# Patient Record
Sex: MALE | Race: WHITE | Employment: UNEMPLOYED | URBAN - METROPOLITAN AREA
[De-identification: names, ages, dates, MRNs, and addresses within clinical notes are randomized per-mention and may not be internally consistent; named-entity substitution may affect disease eponyms.]

---

## 2017-01-06 RX ORDER — OMEPRAZOLE 20 MG/1
CAPSULE, DELAYED RELEASE ORAL
Qty: 30 CAPSULE | Refills: 3 | Status: SHIPPED | OUTPATIENT
Start: 2017-01-06 | End: 2017-05-01 | Stop reason: SDUPTHER

## 2017-01-09 RX ORDER — ZOLPIDEM TARTRATE 5 MG/1
TABLET ORAL
Qty: 20 TABLET | Refills: 0 | Status: SHIPPED | OUTPATIENT
Start: 2017-01-09 | End: 2017-02-09 | Stop reason: SDUPTHER

## 2017-02-07 ENCOUNTER — HOSPITAL ENCOUNTER (OUTPATIENT)
Dept: PAIN MANAGEMENT | Age: 52
Discharge: HOME | End: 2017-02-07
Attending: ANESTHESIOLOGY | Admitting: ANESTHESIOLOGY
Payer: MEDICARE

## 2017-02-07 VITALS
SYSTOLIC BLOOD PRESSURE: 140 MMHG | HEART RATE: 68 BPM | TEMPERATURE: 99 F | RESPIRATION RATE: 12 BRPM | DIASTOLIC BLOOD PRESSURE: 100 MMHG

## 2017-02-07 PROCEDURE — 97032 APPL MODALITY 1+ESTIM EA 15: CPT

## 2017-02-07 PROCEDURE — 97016 VASOPNEUMATIC DEVICE THERAPY: CPT

## 2017-02-07 PROCEDURE — 97112 NEUROMUSCULAR REEDUCATION: CPT

## 2017-02-07 ASSESSMENT — PAIN DESCRIPTION - LOCATION: LOCATION: NECK;ARM

## 2017-02-07 ASSESSMENT — PAIN DESCRIPTION - DESCRIPTORS: DESCRIPTORS: CONSTANT;ACHING;DULL

## 2017-02-07 ASSESSMENT — PAIN DESCRIPTION - ORIENTATION: ORIENTATION: LEFT;RIGHT

## 2017-02-07 ASSESSMENT — PAIN DESCRIPTION - PAIN TYPE: TYPE: CHRONIC PAIN

## 2017-02-07 ASSESSMENT — PAIN SCALES - GENERAL: PAINLEVEL_OUTOF10: 5

## 2017-02-09 ENCOUNTER — HOSPITAL ENCOUNTER (OUTPATIENT)
Dept: PAIN MANAGEMENT | Age: 52
Discharge: HOME | End: 2017-02-09
Attending: ANESTHESIOLOGY | Admitting: ANESTHESIOLOGY
Payer: MEDICARE

## 2017-02-09 VITALS
DIASTOLIC BLOOD PRESSURE: 92 MMHG | SYSTOLIC BLOOD PRESSURE: 156 MMHG | HEART RATE: 86 BPM | RESPIRATION RATE: 16 BRPM | TEMPERATURE: 98.4 F

## 2017-02-09 PROCEDURE — 97112 NEUROMUSCULAR REEDUCATION: CPT

## 2017-02-09 PROCEDURE — 97032 APPL MODALITY 1+ESTIM EA 15: CPT

## 2017-02-09 RX ORDER — MELOXICAM 7.5 MG/1
7.5 TABLET ORAL EVERY 12 HOURS
Qty: 60 TABLET | Refills: 0 | Status: SHIPPED | OUTPATIENT
Start: 2017-02-09 | End: 2017-03-09 | Stop reason: ALTCHOICE

## 2017-02-09 RX ORDER — AMITRIPTYLINE HYDROCHLORIDE 25 MG/1
25 TABLET, FILM COATED ORAL NIGHTLY
Qty: 30 TABLET | Refills: 0 | Status: SHIPPED | OUTPATIENT
Start: 2017-02-09 | End: 2017-03-09 | Stop reason: SDUPTHER

## 2017-02-09 RX ORDER — TIZANIDINE 4 MG/1
4 TABLET ORAL NIGHTLY
Qty: 30 TABLET | Refills: 0 | Status: SHIPPED | OUTPATIENT
Start: 2017-02-09 | End: 2017-03-09 | Stop reason: SDUPTHER

## 2017-02-09 ASSESSMENT — PAIN DESCRIPTION - PROGRESSION: CLINICAL_PROGRESSION: NOT CHANGED

## 2017-02-09 ASSESSMENT — PAIN DESCRIPTION - PAIN TYPE: TYPE: CHRONIC PAIN

## 2017-02-09 ASSESSMENT — PAIN SCALES - GENERAL: PAINLEVEL_OUTOF10: 7

## 2017-02-09 ASSESSMENT — PAIN DESCRIPTION - LOCATION: LOCATION: ARM;NECK

## 2017-02-09 ASSESSMENT — PAIN DESCRIPTION - ORIENTATION: ORIENTATION: RIGHT;LEFT;POSTERIOR

## 2017-02-09 ASSESSMENT — PAIN DESCRIPTION - FREQUENCY: FREQUENCY: CONTINUOUS

## 2017-02-09 ASSESSMENT — PAIN DESCRIPTION - DESCRIPTORS: DESCRIPTORS: BURNING;CONSTANT;ACHING

## 2017-02-09 ASSESSMENT — PAIN DESCRIPTION - ONSET: ONSET: ON-GOING

## 2017-02-10 RX ORDER — ZOLPIDEM TARTRATE 5 MG/1
TABLET ORAL
Qty: 20 TABLET | Refills: 0 | Status: SHIPPED | OUTPATIENT
Start: 2017-02-10 | End: 2017-03-03 | Stop reason: SDUPTHER

## 2017-03-03 RX ORDER — GABAPENTIN 300 MG/1
CAPSULE ORAL
Qty: 90 CAPSULE | Refills: 3 | Status: SHIPPED | OUTPATIENT
Start: 2017-03-03 | End: 2017-09-21 | Stop reason: SDUPTHER

## 2017-03-03 RX ORDER — ZOLPIDEM TARTRATE 5 MG/1
TABLET ORAL
Qty: 20 TABLET | Refills: 0 | Status: SHIPPED | OUTPATIENT
Start: 2017-03-03 | End: 2017-04-03 | Stop reason: SDUPTHER

## 2017-03-09 ENCOUNTER — HOSPITAL ENCOUNTER (OUTPATIENT)
Dept: PAIN MANAGEMENT | Age: 52
Discharge: HOME | End: 2017-03-09
Payer: MEDICARE

## 2017-03-09 VITALS
RESPIRATION RATE: 16 BRPM | HEART RATE: 79 BPM | TEMPERATURE: 98 F | BODY MASS INDEX: 31.84 KG/M2 | HEIGHT: 69 IN | DIASTOLIC BLOOD PRESSURE: 88 MMHG | WEIGHT: 215 LBS | SYSTOLIC BLOOD PRESSURE: 147 MMHG

## 2017-03-09 DIAGNOSIS — M54.17 LUMBOSACRAL NEURITIS: Primary | ICD-10-CM

## 2017-03-09 PROCEDURE — 99214 OFFICE O/P EST MOD 30 MIN: CPT

## 2017-03-09 RX ORDER — AMITRIPTYLINE HYDROCHLORIDE 25 MG/1
25 TABLET, FILM COATED ORAL NIGHTLY
Qty: 30 TABLET | Refills: 0 | Status: SHIPPED | OUTPATIENT
Start: 2017-03-11 | End: 2017-04-10 | Stop reason: SDUPTHER

## 2017-03-09 RX ORDER — TIZANIDINE 4 MG/1
4 TABLET ORAL NIGHTLY
Qty: 30 TABLET | Refills: 0 | Status: SHIPPED | OUTPATIENT
Start: 2017-03-11 | End: 2017-04-10 | Stop reason: SDUPTHER

## 2017-03-09 ASSESSMENT — PAIN DESCRIPTION - DESCRIPTORS: DESCRIPTORS: CONSTANT;ACHING;BURNING

## 2017-03-09 ASSESSMENT — PAIN DESCRIPTION - PROGRESSION: CLINICAL_PROGRESSION: GRADUALLY WORSENING

## 2017-03-09 ASSESSMENT — PAIN SCALES - GENERAL: PAINLEVEL_OUTOF10: 7

## 2017-03-09 ASSESSMENT — ENCOUNTER SYMPTOMS
SUSPICIOUS LESIONS: 0
EYE DISCHARGE: 0
UNUSUAL HAIR DISTRIBUTION: 0
BLURRED VISION: 0

## 2017-03-09 ASSESSMENT — PAIN DESCRIPTION - ORIENTATION: ORIENTATION: LOWER;RIGHT;LEFT

## 2017-03-09 ASSESSMENT — PAIN DESCRIPTION - FREQUENCY: FREQUENCY: CONTINUOUS

## 2017-03-09 ASSESSMENT — PAIN DESCRIPTION - LOCATION: LOCATION: NECK;BACK;SHOULDER

## 2017-03-09 ASSESSMENT — PAIN DESCRIPTION - PAIN TYPE: TYPE: CHRONIC PAIN

## 2017-03-09 ASSESSMENT — PAIN DESCRIPTION - ONSET: ONSET: ON-GOING

## 2017-03-20 ENCOUNTER — TELEPHONE (OUTPATIENT)
Dept: INTERNAL MEDICINE | Age: 52
End: 2017-03-20

## 2017-03-26 ENCOUNTER — HOSPITAL ENCOUNTER (INPATIENT)
Age: 52
LOS: 2 days | Discharge: HOME | DRG: 249 | End: 2017-03-28
Attending: EMERGENCY MEDICINE | Admitting: INTERNAL MEDICINE
Payer: MEDICARE

## 2017-03-26 ENCOUNTER — APPOINTMENT (OUTPATIENT)
Dept: GENERAL RADIOLOGY | Age: 52
DRG: 249 | End: 2017-03-26
Payer: MEDICARE

## 2017-03-26 ENCOUNTER — APPOINTMENT (OUTPATIENT)
Dept: CT IMAGING | Age: 52
DRG: 249 | End: 2017-03-26
Payer: MEDICARE

## 2017-03-26 DIAGNOSIS — R10.9 ABDOMINAL PAIN, UNSPECIFIED LOCATION: Primary | ICD-10-CM

## 2017-03-26 DIAGNOSIS — R74.8 ELEVATED ALKALINE PHOSPHATASE LEVEL: ICD-10-CM

## 2017-03-26 DIAGNOSIS — R11.2 NAUSEA AND VOMITING, INTRACTABILITY OF VOMITING NOT SPECIFIED, UNSPECIFIED VOMITING TYPE: ICD-10-CM

## 2017-03-26 DIAGNOSIS — R79.89 ELEVATED LACTIC ACID LEVEL: ICD-10-CM

## 2017-03-26 DIAGNOSIS — R73.9 HYPERGLYCEMIA: ICD-10-CM

## 2017-03-26 DIAGNOSIS — E83.42 HYPOMAGNESEMIA: ICD-10-CM

## 2017-03-26 DIAGNOSIS — F10.10 ALCOHOL ABUSE: ICD-10-CM

## 2017-03-26 DIAGNOSIS — E86.9 VOLUME DEPLETION: ICD-10-CM

## 2017-03-26 DIAGNOSIS — D72.829 LEUKOCYTOSIS, UNSPECIFIED TYPE: ICD-10-CM

## 2017-03-26 DIAGNOSIS — R00.0 TACHYCARDIA: ICD-10-CM

## 2017-03-26 PROBLEM — K20.90 ESOPHAGITIS: Status: ACTIVE | Noted: 2017-03-26

## 2017-03-26 PROBLEM — F32.9 MAJOR DEPRESSIVE DISORDER: Status: ACTIVE | Noted: 2017-03-26

## 2017-03-26 LAB
-: NORMAL
ABSOLUTE EOS #: 0 K/UL (ref 0–0.4)
ABSOLUTE LYMPH #: 1.53 K/UL (ref 1–4.8)
ABSOLUTE MONO #: 1.02 K/UL (ref 0.1–0.8)
ALBUMIN SERPL-MCNC: 4.6 G/DL (ref 3.5–5.2)
ALBUMIN/GLOBULIN RATIO: 1.2 (ref 1–2.5)
ALP BLD-CCNC: 158 U/L (ref 40–129)
ALT SERPL-CCNC: 60 U/L (ref 5–41)
AMORPHOUS: NORMAL
ANION GAP SERPL CALCULATED.3IONS-SCNC: 35 MMOL/L (ref 9–17)
AST SERPL-CCNC: 62 U/L
BACTERIA: NORMAL
BASOPHILS # BLD: 0 % (ref 0–2)
BASOPHILS ABSOLUTE: 0 K/UL (ref 0–0.2)
BILIRUB SERPL-MCNC: 1.24 MG/DL (ref 0.3–1.2)
BILIRUBIN DIRECT: 0.29 MG/DL
BILIRUBIN URINE: NEGATIVE
BILIRUBIN, INDIRECT: 0.95 MG/DL (ref 0–1)
BUN BLDV-MCNC: 5 MG/DL (ref 6–20)
BUN/CREAT BLD: ABNORMAL (ref 9–20)
CALCIUM SERPL-MCNC: 9.1 MG/DL (ref 8.6–10.4)
CASTS UA: NORMAL /LPF (ref 0–8)
CHLORIDE BLD-SCNC: 86 MMOL/L (ref 98–107)
CO2: 15 MMOL/L (ref 20–31)
COLOR: YELLOW
COMMENT UA: ABNORMAL
CREAT SERPL-MCNC: 0.82 MG/DL (ref 0.7–1.2)
CRYSTALS, UA: NORMAL /HPF
DIFFERENTIAL TYPE: ABNORMAL
EOSINOPHILS RELATIVE PERCENT: 0 % (ref 1–4)
EPITHELIAL CELLS UA: NORMAL /HPF (ref 0–5)
FIO2: ABNORMAL
GFR AFRICAN AMERICAN: >60 ML/MIN
GFR NON-AFRICAN AMERICAN: >60 ML/MIN
GFR SERPL CREATININE-BSD FRML MDRD: ABNORMAL ML/MIN/{1.73_M2}
GFR SERPL CREATININE-BSD FRML MDRD: ABNORMAL ML/MIN/{1.73_M2}
GLOBULIN: ABNORMAL G/DL (ref 1.5–3.8)
GLUCOSE BLD-MCNC: 222 MG/DL (ref 75–110)
GLUCOSE BLD-MCNC: 267 MG/DL (ref 70–99)
GLUCOSE URINE: NEGATIVE
HCO3 VENOUS: 17.4 MMOL/L (ref 24–30)
HCT VFR BLD CALC: 57.1 % (ref 41–53)
HEMOGLOBIN: 19.6 G/DL (ref 13.5–17.5)
KETONES, URINE: ABNORMAL
LACTIC ACID, WHOLE BLOOD: 14.5 MMOL/L (ref 0.7–2.1)
LACTIC ACID, WHOLE BLOOD: 8.1 MMOL/L (ref 0.7–2.1)
LEUKOCYTE ESTERASE, URINE: NEGATIVE
LIPASE: 29 U/L (ref 13–60)
LYMPHOCYTES # BLD: 6 % (ref 24–44)
MAGNESIUM: 1.4 MG/DL (ref 1.6–2.6)
MCH RBC QN AUTO: 31.7 PG (ref 26–34)
MCHC RBC AUTO-ENTMCNC: 34.3 G/DL (ref 31–37)
MCV RBC AUTO: 92.4 FL (ref 80–100)
MONOCYTES # BLD: 4 % (ref 1–7)
MORPHOLOGY: NORMAL
MUCUS: NORMAL
NEGATIVE BASE EXCESS, VEN: 7 (ref 0–2)
NITRITE, URINE: NEGATIVE
O2 DEVICE/FLOW/%: ABNORMAL
O2 SAT, VEN: 81 %
OTHER OBSERVATIONS UA: NORMAL
PATIENT TEMP: ABNORMAL
PCO2, VEN: 27 MM HG (ref 39–55)
PDW BLD-RTO: 14.1 % (ref 12.5–15.4)
PH UA: 5.5 (ref 5–8)
PH VENOUS: 7.42 (ref 7.32–7.42)
PLATELET # BLD: 331 K/UL (ref 140–450)
PLATELET ESTIMATE: ABNORMAL
PMV BLD AUTO: 9.8 FL (ref 6–12)
PO2, VEN: 43 MM HG (ref 30–50)
POC LACTIC ACID, VEN: 14.62 MMOL/L (ref 0.9–1.7)
POC PCO2 TEMP: ABNORMAL MM HG
POC PH TEMP: ABNORMAL
POC PO2 TEMP: ABNORMAL MM HG
POC TROPONIN I: 0 NG/ML (ref 0–0.1)
POC TROPONIN I: 0.01 NG/ML (ref 0–0.1)
POC TROPONIN INTERP: NORMAL
POC TROPONIN INTERP: NORMAL
POSITIVE BASE EXCESS, VEN: ABNORMAL (ref 0–2)
POTASSIUM SERPL-SCNC: 4.8 MMOL/L (ref 3.7–5.3)
PROTEIN UA: ABNORMAL
RBC # BLD: 6.18 M/UL (ref 4.5–5.9)
RBC # BLD: ABNORMAL 10*6/UL
RBC UA: NORMAL /HPF (ref 0–4)
RENAL EPITHELIAL, UA: NORMAL /HPF
SEG NEUTROPHILS: 90 % (ref 36–66)
SEGMENTED NEUTROPHILS ABSOLUTE COUNT: 22.95 K/UL (ref 1.8–7.7)
SODIUM BLD-SCNC: 136 MMOL/L (ref 135–144)
SPECIFIC GRAVITY UA: 1.04 (ref 1–1.03)
TOTAL CO2, VENOUS: 18 MM HG (ref 25–31)
TOTAL PROTEIN: 8.4 G/DL (ref 6.4–8.3)
TRICHOMONAS: NORMAL
TURBIDITY: CLEAR
URINE HGB: NEGATIVE
UROBILINOGEN, URINE: NORMAL
WBC # BLD: 25.5 K/UL (ref 3.5–11)
WBC # BLD: ABNORMAL 10*3/UL
WBC UA: NORMAL /HPF (ref 0–5)
YEAST: NORMAL

## 2017-03-26 PROCEDURE — 83605 ASSAY OF LACTIC ACID: CPT

## 2017-03-26 PROCEDURE — 93005 ELECTROCARDIOGRAM TRACING: CPT

## 2017-03-26 PROCEDURE — 80076 HEPATIC FUNCTION PANEL: CPT

## 2017-03-26 PROCEDURE — 6360000002 HC RX W HCPCS: Performed by: EMERGENCY MEDICINE

## 2017-03-26 PROCEDURE — 2500000003 HC RX 250 WO HCPCS: Performed by: HOSPITALIST

## 2017-03-26 PROCEDURE — C9113 INJ PANTOPRAZOLE SODIUM, VIA: HCPCS | Performed by: HOSPITALIST

## 2017-03-26 PROCEDURE — 83036 HEMOGLOBIN GLYCOSYLATED A1C: CPT

## 2017-03-26 PROCEDURE — 80307 DRUG TEST PRSMV CHEM ANLYZR: CPT

## 2017-03-26 PROCEDURE — 6360000004 HC RX CONTRAST MEDICATION: Performed by: EMERGENCY MEDICINE

## 2017-03-26 PROCEDURE — 82947 ASSAY GLUCOSE BLOOD QUANT: CPT

## 2017-03-26 PROCEDURE — 84207 ASSAY OF VITAMIN B-6: CPT

## 2017-03-26 PROCEDURE — 85025 COMPLETE CBC W/AUTO DIFF WBC: CPT

## 2017-03-26 PROCEDURE — 87040 BLOOD CULTURE FOR BACTERIA: CPT

## 2017-03-26 PROCEDURE — 84100 ASSAY OF PHOSPHORUS: CPT

## 2017-03-26 PROCEDURE — 96375 TX/PRO/DX INJ NEW DRUG ADDON: CPT

## 2017-03-26 PROCEDURE — 83735 ASSAY OF MAGNESIUM: CPT

## 2017-03-26 PROCEDURE — 84443 ASSAY THYROID STIM HORMONE: CPT

## 2017-03-26 PROCEDURE — 82010 KETONE BODYS QUAN: CPT

## 2017-03-26 PROCEDURE — 81001 URINALYSIS AUTO W/SCOPE: CPT

## 2017-03-26 PROCEDURE — 71010 XR CHEST PORTABLE: CPT

## 2017-03-26 PROCEDURE — 85610 PROTHROMBIN TIME: CPT

## 2017-03-26 PROCEDURE — 83690 ASSAY OF LIPASE: CPT

## 2017-03-26 PROCEDURE — 80061 LIPID PANEL: CPT

## 2017-03-26 PROCEDURE — 82803 BLOOD GASES ANY COMBINATION: CPT

## 2017-03-26 PROCEDURE — 82607 VITAMIN B-12: CPT

## 2017-03-26 PROCEDURE — 84484 ASSAY OF TROPONIN QUANT: CPT

## 2017-03-26 PROCEDURE — 99285 EMERGENCY DEPT VISIT HI MDM: CPT

## 2017-03-26 PROCEDURE — 2580000003 HC RX 258: Performed by: EMERGENCY MEDICINE

## 2017-03-26 PROCEDURE — 96374 THER/PROPH/DIAG INJ IV PUSH: CPT

## 2017-03-26 PROCEDURE — 2580000003 HC RX 258: Performed by: INTERNAL MEDICINE

## 2017-03-26 PROCEDURE — 74000 XR ABDOMEN LIMITED (KUB): CPT

## 2017-03-26 PROCEDURE — 80074 ACUTE HEPATITIS PANEL: CPT

## 2017-03-26 PROCEDURE — 82746 ASSAY OF FOLIC ACID SERUM: CPT

## 2017-03-26 PROCEDURE — 6370000000 HC RX 637 (ALT 250 FOR IP): Performed by: HOSPITALIST

## 2017-03-26 PROCEDURE — 80048 BASIC METABOLIC PNL TOTAL CA: CPT

## 2017-03-26 PROCEDURE — 6370000000 HC RX 637 (ALT 250 FOR IP): Performed by: INTERNAL MEDICINE

## 2017-03-26 PROCEDURE — 2060000000 HC ICU INTERMEDIATE R&B

## 2017-03-26 PROCEDURE — 6360000002 HC RX W HCPCS: Performed by: HOSPITALIST

## 2017-03-26 PROCEDURE — 82306 VITAMIN D 25 HYDROXY: CPT

## 2017-03-26 PROCEDURE — G0480 DRUG TEST DEF 1-7 CLASSES: HCPCS

## 2017-03-26 PROCEDURE — 96376 TX/PRO/DX INJ SAME DRUG ADON: CPT

## 2017-03-26 PROCEDURE — 6360000002 HC RX W HCPCS

## 2017-03-26 PROCEDURE — 96361 HYDRATE IV INFUSION ADD-ON: CPT

## 2017-03-26 PROCEDURE — 74177 CT ABD & PELVIS W/CONTRAST: CPT

## 2017-03-26 RX ORDER — MORPHINE SULFATE 2 MG/ML
2 INJECTION, SOLUTION INTRAMUSCULAR; INTRAVENOUS
Status: DISCONTINUED | OUTPATIENT
Start: 2017-03-26 | End: 2017-03-27

## 2017-03-26 RX ORDER — DEXTROSE MONOHYDRATE 50 MG/ML
100 INJECTION, SOLUTION INTRAVENOUS PRN
Status: DISCONTINUED | OUTPATIENT
Start: 2017-03-26 | End: 2017-03-28 | Stop reason: HOSPADM

## 2017-03-26 RX ORDER — MORPHINE SULFATE 4 MG/ML
4 INJECTION, SOLUTION INTRAMUSCULAR; INTRAVENOUS
Status: DISCONTINUED | OUTPATIENT
Start: 2017-03-26 | End: 2017-03-27

## 2017-03-26 RX ORDER — LORAZEPAM 2 MG/ML
4 INJECTION INTRAMUSCULAR
Status: DISCONTINUED | OUTPATIENT
Start: 2017-03-26 | End: 2017-03-28 | Stop reason: HOSPADM

## 2017-03-26 RX ORDER — LORAZEPAM 2 MG/ML
3 INJECTION INTRAMUSCULAR
Status: DISCONTINUED | OUTPATIENT
Start: 2017-03-26 | End: 2017-03-28 | Stop reason: HOSPADM

## 2017-03-26 RX ORDER — LORAZEPAM 2 MG/ML
2 INJECTION INTRAMUSCULAR
Status: DISCONTINUED | OUTPATIENT
Start: 2017-03-26 | End: 2017-03-28 | Stop reason: HOSPADM

## 2017-03-26 RX ORDER — ONDANSETRON 2 MG/ML
INJECTION INTRAMUSCULAR; INTRAVENOUS
Status: COMPLETED
Start: 2017-03-26 | End: 2017-03-26

## 2017-03-26 RX ORDER — SODIUM CHLORIDE 0.9 % (FLUSH) 0.9 %
10 SYRINGE (ML) INJECTION EVERY 12 HOURS SCHEDULED
Status: DISCONTINUED | OUTPATIENT
Start: 2017-03-26 | End: 2017-03-28 | Stop reason: HOSPADM

## 2017-03-26 RX ORDER — SODIUM CHLORIDE 9 MG/ML
INJECTION, SOLUTION INTRAVENOUS CONTINUOUS
Status: DISCONTINUED | OUTPATIENT
Start: 2017-03-26 | End: 2017-03-28 | Stop reason: HOSPADM

## 2017-03-26 RX ORDER — LORAZEPAM 2 MG/ML
1 INJECTION INTRAMUSCULAR ONCE
Status: COMPLETED | OUTPATIENT
Start: 2017-03-26 | End: 2017-03-26

## 2017-03-26 RX ORDER — LORAZEPAM 1 MG/1
1 TABLET ORAL
Status: DISCONTINUED | OUTPATIENT
Start: 2017-03-26 | End: 2017-03-28 | Stop reason: HOSPADM

## 2017-03-26 RX ORDER — LORAZEPAM 2 MG/ML
1 INJECTION INTRAMUSCULAR
Status: DISCONTINUED | OUTPATIENT
Start: 2017-03-26 | End: 2017-03-28 | Stop reason: HOSPADM

## 2017-03-26 RX ORDER — NICOTINE 21 MG/24HR
1 PATCH, TRANSDERMAL 24 HOURS TRANSDERMAL DAILY
Status: DISCONTINUED | OUTPATIENT
Start: 2017-03-27 | End: 2017-03-28 | Stop reason: HOSPADM

## 2017-03-26 RX ORDER — 0.9 % SODIUM CHLORIDE 0.9 %
1000 INTRAVENOUS SOLUTION INTRAVENOUS ONCE
Status: COMPLETED | OUTPATIENT
Start: 2017-03-26 | End: 2017-03-27

## 2017-03-26 RX ORDER — LACTULOSE 10 G/15ML
20 SOLUTION ORAL 3 TIMES DAILY
Status: CANCELLED | OUTPATIENT
Start: 2017-03-26

## 2017-03-26 RX ORDER — DEXTROSE MONOHYDRATE 25 G/50ML
12.5 INJECTION, SOLUTION INTRAVENOUS PRN
Status: DISCONTINUED | OUTPATIENT
Start: 2017-03-26 | End: 2017-03-28 | Stop reason: HOSPADM

## 2017-03-26 RX ORDER — SODIUM CHLORIDE 0.9 % (FLUSH) 0.9 %
10 SYRINGE (ML) INJECTION PRN
Status: DISCONTINUED | OUTPATIENT
Start: 2017-03-26 | End: 2017-03-28 | Stop reason: HOSPADM

## 2017-03-26 RX ORDER — KETOROLAC TROMETHAMINE 30 MG/ML
30 INJECTION, SOLUTION INTRAMUSCULAR; INTRAVENOUS EVERY 6 HOURS PRN
Status: CANCELLED | OUTPATIENT
Start: 2017-03-26 | End: 2017-03-31

## 2017-03-26 RX ORDER — LORAZEPAM 2 MG/1
4 TABLET ORAL
Status: DISCONTINUED | OUTPATIENT
Start: 2017-03-26 | End: 2017-03-28 | Stop reason: HOSPADM

## 2017-03-26 RX ORDER — QUETIAPINE FUMARATE 25 MG/1
25 TABLET, FILM COATED ORAL NIGHTLY
Status: DISCONTINUED | OUTPATIENT
Start: 2017-03-26 | End: 2017-03-28 | Stop reason: HOSPADM

## 2017-03-26 RX ORDER — 0.9 % SODIUM CHLORIDE 0.9 %
10 VIAL (ML) INJECTION DAILY
Status: DISCONTINUED | OUTPATIENT
Start: 2017-03-27 | End: 2017-03-27

## 2017-03-26 RX ORDER — FOLIC ACID 1 MG/1
1 TABLET ORAL DAILY
Status: DISCONTINUED | OUTPATIENT
Start: 2017-03-27 | End: 2017-03-28 | Stop reason: HOSPADM

## 2017-03-26 RX ORDER — FENTANYL CITRATE 50 UG/ML
100 INJECTION, SOLUTION INTRAMUSCULAR; INTRAVENOUS ONCE
Status: COMPLETED | OUTPATIENT
Start: 2017-03-26 | End: 2017-03-26

## 2017-03-26 RX ORDER — ONDANSETRON 2 MG/ML
4 INJECTION INTRAMUSCULAR; INTRAVENOUS ONCE
Status: COMPLETED | OUTPATIENT
Start: 2017-03-26 | End: 2017-03-26

## 2017-03-26 RX ORDER — PANTOPRAZOLE SODIUM 40 MG/10ML
40 INJECTION, POWDER, LYOPHILIZED, FOR SOLUTION INTRAVENOUS 2 TIMES DAILY
Status: DISCONTINUED | OUTPATIENT
Start: 2017-03-26 | End: 2017-03-27

## 2017-03-26 RX ORDER — ONDANSETRON 2 MG/ML
4 INJECTION INTRAMUSCULAR; INTRAVENOUS EVERY 6 HOURS PRN
Status: DISCONTINUED | OUTPATIENT
Start: 2017-03-26 | End: 2017-03-28 | Stop reason: HOSPADM

## 2017-03-26 RX ORDER — LORAZEPAM 2 MG/1
2 TABLET ORAL
Status: DISCONTINUED | OUTPATIENT
Start: 2017-03-26 | End: 2017-03-28 | Stop reason: HOSPADM

## 2017-03-26 RX ORDER — SUCRALFATE ORAL 1 G/10ML
1 SUSPENSION ORAL EVERY 6 HOURS SCHEDULED
Status: DISCONTINUED | OUTPATIENT
Start: 2017-03-27 | End: 2017-03-28 | Stop reason: HOSPADM

## 2017-03-26 RX ORDER — NICOTINE POLACRILEX 4 MG
15 LOZENGE BUCCAL PRN
Status: DISCONTINUED | OUTPATIENT
Start: 2017-03-26 | End: 2017-03-28 | Stop reason: HOSPADM

## 2017-03-26 RX ORDER — CHLORDIAZEPOXIDE HYDROCHLORIDE 5 MG/1
10 CAPSULE, GELATIN COATED ORAL 3 TIMES DAILY
Status: DISCONTINUED | OUTPATIENT
Start: 2017-03-26 | End: 2017-03-27

## 2017-03-26 RX ORDER — GABAPENTIN 300 MG/1
300 CAPSULE ORAL 3 TIMES DAILY
Status: DISCONTINUED | OUTPATIENT
Start: 2017-03-26 | End: 2017-03-28 | Stop reason: HOSPADM

## 2017-03-26 RX ORDER — THIAMINE MONONITRATE (VIT B1) 100 MG
100 TABLET ORAL DAILY
Status: DISCONTINUED | OUTPATIENT
Start: 2017-03-27 | End: 2017-03-28 | Stop reason: HOSPADM

## 2017-03-26 RX ORDER — 0.9 % SODIUM CHLORIDE 0.9 %
2000 INTRAVENOUS SOLUTION INTRAVENOUS ONCE
Status: COMPLETED | OUTPATIENT
Start: 2017-03-26 | End: 2017-03-26

## 2017-03-26 RX ORDER — PYRIDOXINE HCL (VITAMIN B6) 50 MG
50 TABLET ORAL DAILY
Status: DISCONTINUED | OUTPATIENT
Start: 2017-03-27 | End: 2017-03-28 | Stop reason: HOSPADM

## 2017-03-26 RX ORDER — TIZANIDINE 4 MG/1
4 TABLET ORAL NIGHTLY
Status: CANCELLED | OUTPATIENT
Start: 2017-03-26

## 2017-03-26 RX ORDER — PANTOPRAZOLE SODIUM 40 MG/1
40 TABLET, DELAYED RELEASE ORAL
Status: DISCONTINUED | OUTPATIENT
Start: 2017-03-27 | End: 2017-03-28 | Stop reason: HOSPADM

## 2017-03-26 RX ORDER — METOCLOPRAMIDE HYDROCHLORIDE 5 MG/ML
10 INJECTION INTRAMUSCULAR; INTRAVENOUS ONCE
Status: COMPLETED | OUTPATIENT
Start: 2017-03-26 | End: 2017-03-26

## 2017-03-26 RX ORDER — UREA 10 %
3 LOTION (ML) TOPICAL NIGHTLY PRN
Status: DISCONTINUED | OUTPATIENT
Start: 2017-03-27 | End: 2017-03-28 | Stop reason: HOSPADM

## 2017-03-26 RX ORDER — METOPROLOL TARTRATE 50 MG/1
50 TABLET, FILM COATED ORAL 2 TIMES DAILY
Status: DISCONTINUED | OUTPATIENT
Start: 2017-03-27 | End: 2017-03-27

## 2017-03-26 RX ADMIN — GABAPENTIN 300 MG: 300 CAPSULE ORAL at 23:45

## 2017-03-26 RX ADMIN — HYDROMORPHONE HYDROCHLORIDE 1 MG: 1 INJECTION, SOLUTION INTRAMUSCULAR; INTRAVENOUS; SUBCUTANEOUS at 19:54

## 2017-03-26 RX ADMIN — ONDANSETRON 4 MG: 2 INJECTION, SOLUTION INTRAMUSCULAR; INTRAVENOUS at 18:05

## 2017-03-26 RX ADMIN — PANTOPRAZOLE SODIUM 40 MG: 40 INJECTION, POWDER, FOR SOLUTION INTRAVENOUS at 23:46

## 2017-03-26 RX ADMIN — QUETIAPINE FUMARATE 25 MG: 25 TABLET ORAL at 23:45

## 2017-03-26 RX ADMIN — HYDROMORPHONE HYDROCHLORIDE 1 MG: 1 INJECTION, SOLUTION INTRAMUSCULAR; INTRAVENOUS; SUBCUTANEOUS at 18:26

## 2017-03-26 RX ADMIN — LORAZEPAM 1 MG: 2 INJECTION INTRAMUSCULAR; INTRAVENOUS at 20:49

## 2017-03-26 RX ADMIN — IOHEXOL 130 ML: 350 INJECTION, SOLUTION INTRAVENOUS at 20:10

## 2017-03-26 RX ADMIN — SUCRALFATE 1 G: 1 SUSPENSION ORAL at 23:49

## 2017-03-26 RX ADMIN — MAGNESIUM SULFATE IN DEXTROSE 2 G: 10 INJECTION, SOLUTION INTRAVENOUS at 23:14

## 2017-03-26 RX ADMIN — CHLORDIAZEPOXIDE HYDROCHLORIDE 10 MG: 5 CAPSULE ORAL at 23:45

## 2017-03-26 RX ADMIN — MAGNESIUM SULFATE IN DEXTROSE 1 G: 10 INJECTION, SOLUTION INTRAVENOUS at 22:09

## 2017-03-26 RX ADMIN — SODIUM CHLORIDE 2000 ML: 9 INJECTION, SOLUTION INTRAVENOUS at 18:27

## 2017-03-26 RX ADMIN — FENTANYL CITRATE 100 MCG: 50 INJECTION, SOLUTION INTRAMUSCULAR; INTRAVENOUS at 17:37

## 2017-03-26 RX ADMIN — ONDANSETRON 4 MG: 2 INJECTION INTRAMUSCULAR; INTRAVENOUS at 18:05

## 2017-03-26 RX ADMIN — Medication 10 ML: at 23:46

## 2017-03-26 RX ADMIN — HYDROMORPHONE HYDROCHLORIDE 1 MG: 1 INJECTION, SOLUTION INTRAMUSCULAR; INTRAVENOUS; SUBCUTANEOUS at 22:16

## 2017-03-26 RX ADMIN — LORAZEPAM 1 MG: 2 INJECTION INTRAMUSCULAR; INTRAVENOUS at 18:04

## 2017-03-26 RX ADMIN — METOCLOPRAMIDE 10 MG: 5 INJECTION, SOLUTION INTRAMUSCULAR; INTRAVENOUS at 18:35

## 2017-03-26 RX ADMIN — INSULIN LISPRO 2 UNITS: 100 INJECTION, SOLUTION INTRAVENOUS; SUBCUTANEOUS at 23:51

## 2017-03-26 RX ADMIN — METRONIDAZOLE 500 MG: 500 INJECTION, SOLUTION INTRAVENOUS at 23:56

## 2017-03-26 ASSESSMENT — PAIN DESCRIPTION - PAIN TYPE
TYPE: ACUTE PAIN

## 2017-03-26 ASSESSMENT — PAIN SCALES - GENERAL
PAINLEVEL_OUTOF10: 7
PAINLEVEL_OUTOF10: 10
PAINLEVEL_OUTOF10: 9
PAINLEVEL_OUTOF10: 10
PAINLEVEL_OUTOF10: 6

## 2017-03-26 ASSESSMENT — PAIN DESCRIPTION - LOCATION
LOCATION: ABDOMEN

## 2017-03-26 ASSESSMENT — PAIN DESCRIPTION - ORIENTATION
ORIENTATION: RIGHT

## 2017-03-26 ASSESSMENT — PAIN DESCRIPTION - DESCRIPTORS: DESCRIPTORS: ACHING

## 2017-03-27 ENCOUNTER — APPOINTMENT (OUTPATIENT)
Dept: ULTRASOUND IMAGING | Age: 52
DRG: 249 | End: 2017-03-27
Payer: MEDICARE

## 2017-03-27 ENCOUNTER — ANESTHESIA (OUTPATIENT)
Dept: ENDOSCOPY | Age: 52
DRG: 249 | End: 2017-03-27
Payer: MEDICARE

## 2017-03-27 ENCOUNTER — ANESTHESIA EVENT (OUTPATIENT)
Dept: ENDOSCOPY | Age: 52
DRG: 249 | End: 2017-03-27
Payer: MEDICARE

## 2017-03-27 VITALS
DIASTOLIC BLOOD PRESSURE: 52 MMHG | RESPIRATION RATE: 14 BRPM | SYSTOLIC BLOOD PRESSURE: 93 MMHG | OXYGEN SATURATION: 93 %

## 2017-03-27 LAB
ABSOLUTE EOS #: 0 K/UL (ref 0–0.4)
ABSOLUTE LYMPH #: 1.9 K/UL (ref 1–4.8)
ABSOLUTE MONO #: 0.1 K/UL (ref 0.1–1.2)
ALBUMIN SERPL-MCNC: 3.9 G/DL (ref 3.5–5.2)
ALBUMIN/GLOBULIN RATIO: 1.3 (ref 1–2.5)
ALP BLD-CCNC: 112 U/L (ref 40–129)
ALT SERPL-CCNC: 38 U/L (ref 5–41)
AMMONIA: 78 UMOL/L (ref 16–60)
AMPHETAMINE SCREEN URINE: NEGATIVE
ANION GAP SERPL CALCULATED.3IONS-SCNC: 12 MMOL/L (ref 9–17)
ANION GAP SERPL CALCULATED.3IONS-SCNC: 19 MMOL/L (ref 9–17)
AST SERPL-CCNC: 31 U/L
BARBITURATE SCREEN URINE: NEGATIVE
BASOPHILS # BLD: 0 % (ref 0–2)
BASOPHILS ABSOLUTE: 0.1 K/UL (ref 0–0.2)
BENZODIAZEPINE SCREEN, URINE: NEGATIVE
BETA-HYDROXYBUTYRATE: 0.07 MMOL/L (ref 0.02–0.27)
BILIRUB SERPL-MCNC: 0.68 MG/DL (ref 0.3–1.2)
BILIRUBIN URINE: NEGATIVE
BUN BLDV-MCNC: 4 MG/DL (ref 6–20)
BUN BLDV-MCNC: 4 MG/DL (ref 6–20)
BUN/CREAT BLD: ABNORMAL (ref 9–20)
BUN/CREAT BLD: ABNORMAL (ref 9–20)
BUPRENORPHINE URINE: ABNORMAL
CALCIUM SERPL-MCNC: 8.1 MG/DL (ref 8.6–10.4)
CALCIUM SERPL-MCNC: 8.3 MG/DL (ref 8.6–10.4)
CANNABINOID SCREEN URINE: NEGATIVE
CHLORIDE BLD-SCNC: 100 MMOL/L (ref 98–107)
CHLORIDE BLD-SCNC: 93 MMOL/L (ref 98–107)
CHOLESTEROL/HDL RATIO: 6.3
CHOLESTEROL: 221 MG/DL
CO2: 20 MMOL/L (ref 20–31)
CO2: 24 MMOL/L (ref 20–31)
COCAINE METABOLITE, URINE: NEGATIVE
COLOR: YELLOW
COMMENT UA: NORMAL
CREAT SERPL-MCNC: 0.63 MG/DL (ref 0.7–1.2)
CREAT SERPL-MCNC: 0.74 MG/DL (ref 0.7–1.2)
DIFFERENTIAL TYPE: ABNORMAL
EKG ATRIAL RATE: 159 BPM
EKG ATRIAL RATE: 99 BPM
EKG P AXIS: -3 DEGREES
EKG P AXIS: 32 DEGREES
EKG P-R INTERVAL: 124 MS
EKG P-R INTERVAL: 148 MS
EKG Q-T INTERVAL: 318 MS
EKG Q-T INTERVAL: 374 MS
EKG QRS DURATION: 80 MS
EKG QRS DURATION: 86 MS
EKG QTC CALCULATION (BAZETT): 479 MS
EKG QTC CALCULATION (BAZETT): 517 MS
EKG R AXIS: -41 DEGREES
EKG R AXIS: -62 DEGREES
EKG T AXIS: 42 DEGREES
EKG T AXIS: 54 DEGREES
EKG VENTRICULAR RATE: 159 BPM
EKG VENTRICULAR RATE: 99 BPM
EOSINOPHILS RELATIVE PERCENT: 0 % (ref 1–4)
ESTIMATED AVERAGE GLUCOSE: 146 MG/DL
ETHANOL PERCENT: <0.01 %
ETHANOL: <10 MG/DL
FOLATE: 7 NG/ML
GFR AFRICAN AMERICAN: >60 ML/MIN
GFR AFRICAN AMERICAN: >60 ML/MIN
GFR NON-AFRICAN AMERICAN: >60 ML/MIN
GFR NON-AFRICAN AMERICAN: >60 ML/MIN
GFR SERPL CREATININE-BSD FRML MDRD: ABNORMAL ML/MIN/{1.73_M2}
GLUCOSE BLD-MCNC: 125 MG/DL (ref 75–110)
GLUCOSE BLD-MCNC: 131 MG/DL (ref 70–99)
GLUCOSE BLD-MCNC: 132 MG/DL (ref 75–110)
GLUCOSE BLD-MCNC: 133 MG/DL (ref 75–110)
GLUCOSE BLD-MCNC: 165 MG/DL (ref 75–110)
GLUCOSE BLD-MCNC: 229 MG/DL (ref 70–99)
GLUCOSE URINE: NEGATIVE
HAV IGM SER IA-ACNC: NONREACTIVE
HBA1C MFR BLD: 6.7 % (ref 4–6)
HCT VFR BLD CALC: 45.7 % (ref 41–53)
HDLC SERPL-MCNC: 35 MG/DL
HEMOGLOBIN: 15.6 G/DL (ref 13.5–17.5)
HEPATITIS B CORE IGM ANTIBODY: NONREACTIVE
HEPATITIS B SURFACE ANTIGEN: NONREACTIVE
HEPATITIS C ANTIBODY: NONREACTIVE
INR BLD: 1.1
KETONES, URINE: NEGATIVE
LACTIC ACID, WHOLE BLOOD: 1.7 MMOL/L (ref 0.7–2.1)
LACTIC ACID, WHOLE BLOOD: 6.4 MMOL/L (ref 0.7–2.1)
LACTIC ACID: ABNORMAL MMOL/L
LACTIC ACID: NORMAL MMOL/L
LDL CHOLESTEROL DIRECT: 150 MG/DL
LDL CHOLESTEROL: ABNORMAL MG/DL (ref 0–130)
LEUKOCYTE ESTERASE, URINE: NEGATIVE
LYMPHOCYTES # BLD: 12 % (ref 24–44)
MAGNESIUM: 2 MG/DL (ref 1.6–2.6)
MCH RBC QN AUTO: 31.8 PG (ref 26–34)
MCHC RBC AUTO-ENTMCNC: 34.2 G/DL (ref 31–37)
MCV RBC AUTO: 92.9 FL (ref 80–100)
MDMA URINE: ABNORMAL
METHADONE SCREEN, URINE: NEGATIVE
METHAMPHETAMINE, URINE: ABNORMAL
MONOCYTES # BLD: 1 % (ref 2–11)
NITRITE, URINE: NEGATIVE
OPIATES, URINE: POSITIVE
OSMOLALITY URINE: 197 MOSM/KG (ref 80–1300)
OXYCODONE SCREEN URINE: NEGATIVE
PDW BLD-RTO: 14 % (ref 12.5–15.4)
PH UA: 5.5 (ref 5–8)
PHENCYCLIDINE, URINE: NEGATIVE
PHOSPHORUS: 1.4 MG/DL (ref 2.5–4.5)
PLATELET # BLD: 219 K/UL (ref 140–450)
PLATELET ESTIMATE: ABNORMAL
PMV BLD AUTO: 8.6 FL (ref 6–12)
POTASSIUM SERPL-SCNC: 3.7 MMOL/L (ref 3.7–5.3)
POTASSIUM SERPL-SCNC: 3.8 MMOL/L (ref 3.7–5.3)
PROPOXYPHENE, URINE: ABNORMAL
PROTEIN UA: NEGATIVE
PROTHROMBIN TIME: 11.8 SEC (ref 9.4–12.6)
RBC # BLD: 4.92 M/UL (ref 4.5–5.9)
RBC # BLD: ABNORMAL 10*6/UL
SEG NEUTROPHILS: 87 % (ref 36–66)
SEGMENTED NEUTROPHILS ABSOLUTE COUNT: 13.9 K/UL (ref 1.8–7.7)
SERUM OSMOLALITY: 283 MOSM/KG (ref 275–295)
SODIUM BLD-SCNC: 132 MMOL/L (ref 135–144)
SODIUM BLD-SCNC: 136 MMOL/L (ref 135–144)
SODIUM,UR: <20 MMOL/L
SPECIFIC GRAVITY UA: 1.02 (ref 1–1.03)
TEST INFORMATION: ABNORMAL
TOTAL PROTEIN: 6.8 G/DL (ref 6.4–8.3)
TRICYCLIC ANTIDEPRESSANTS, UR: ABNORMAL
TRIGL SERPL-MCNC: 448 MG/DL
TSH SERPL DL<=0.05 MIU/L-ACNC: 2.56 MIU/L (ref 0.3–5)
TURBIDITY: CLEAR
URINE HGB: NEGATIVE
UROBILINOGEN, URINE: NORMAL
VITAMIN B-12: 269 PG/ML (ref 211–946)
VITAMIN D 25-HYDROXY: 11.4 NG/ML (ref 30–100)
VLDLC SERPL CALC-MCNC: ABNORMAL MG/DL (ref 1–30)
WBC # BLD: 15.9 K/UL (ref 3.5–11)
WBC # BLD: ABNORMAL 10*3/UL

## 2017-03-27 PROCEDURE — 6370000000 HC RX 637 (ALT 250 FOR IP): Performed by: INTERNAL MEDICINE

## 2017-03-27 PROCEDURE — 6360000002 HC RX W HCPCS: Performed by: HOSPITALIST

## 2017-03-27 PROCEDURE — 80053 COMPREHEN METABOLIC PANEL: CPT

## 2017-03-27 PROCEDURE — 2580000003 HC RX 258: Performed by: STUDENT IN AN ORGANIZED HEALTH CARE EDUCATION/TRAINING PROGRAM

## 2017-03-27 PROCEDURE — 6360000002 HC RX W HCPCS: Performed by: SPECIALIST

## 2017-03-27 PROCEDURE — 80074 ACUTE HEPATITIS PANEL: CPT

## 2017-03-27 PROCEDURE — 85025 COMPLETE CBC W/AUTO DIFF WBC: CPT

## 2017-03-27 PROCEDURE — 97161 PT EVAL LOW COMPLEX 20 MIN: CPT

## 2017-03-27 PROCEDURE — 87206 SMEAR FLUORESCENT/ACID STAI: CPT

## 2017-03-27 PROCEDURE — 2580000003 HC RX 258: Performed by: HOSPITALIST

## 2017-03-27 PROCEDURE — 2580000003 HC RX 258: Performed by: INTERNAL MEDICINE

## 2017-03-27 PROCEDURE — 83935 ASSAY OF URINE OSMOLALITY: CPT

## 2017-03-27 PROCEDURE — 82947 ASSAY GLUCOSE BLOOD QUANT: CPT

## 2017-03-27 PROCEDURE — 88312 SPECIAL STAINS GROUP 1: CPT

## 2017-03-27 PROCEDURE — 87106 FUNGI IDENTIFICATION YEAST: CPT

## 2017-03-27 PROCEDURE — 3700000000 HC ANESTHESIA ATTENDED CARE: Performed by: INTERNAL MEDICINE

## 2017-03-27 PROCEDURE — 2500000003 HC RX 250 WO HCPCS: Performed by: HOSPITALIST

## 2017-03-27 PROCEDURE — 2060000000 HC ICU INTERMEDIATE R&B

## 2017-03-27 PROCEDURE — 84425 ASSAY OF VITAMIN B-1: CPT

## 2017-03-27 PROCEDURE — 6370000000 HC RX 637 (ALT 250 FOR IP): Performed by: HOSPITALIST

## 2017-03-27 PROCEDURE — 80061 LIPID PANEL: CPT

## 2017-03-27 PROCEDURE — 84300 ASSAY OF URINE SODIUM: CPT

## 2017-03-27 PROCEDURE — 97535 SELF CARE MNGMENT TRAINING: CPT

## 2017-03-27 PROCEDURE — 2500000003 HC RX 250 WO HCPCS: Performed by: INTERNAL MEDICINE

## 2017-03-27 PROCEDURE — 83605 ASSAY OF LACTIC ACID: CPT

## 2017-03-27 PROCEDURE — 7100000011 HC PHASE II RECOVERY - ADDTL 15 MIN: Performed by: INTERNAL MEDICINE

## 2017-03-27 PROCEDURE — 83721 ASSAY OF BLOOD LIPOPROTEIN: CPT

## 2017-03-27 PROCEDURE — G8988 SELF CARE GOAL STATUS: HCPCS

## 2017-03-27 PROCEDURE — 2500000003 HC RX 250 WO HCPCS: Performed by: SPECIALIST

## 2017-03-27 PROCEDURE — 97530 THERAPEUTIC ACTIVITIES: CPT

## 2017-03-27 PROCEDURE — 36415 COLL VENOUS BLD VENIPUNCTURE: CPT

## 2017-03-27 PROCEDURE — 0DB58ZX EXCISION OF ESOPHAGUS, VIA NATURAL OR ARTIFICIAL OPENING ENDOSCOPIC, DIAGNOSTIC: ICD-10-PCS | Performed by: INTERNAL MEDICINE

## 2017-03-27 PROCEDURE — 97165 OT EVAL LOW COMPLEX 30 MIN: CPT

## 2017-03-27 PROCEDURE — 94762 N-INVAS EAR/PLS OXIMTRY CONT: CPT

## 2017-03-27 PROCEDURE — 3609012400 HC EGD TRANSORAL BIOPSY SINGLE/MULTIPLE: Performed by: INTERNAL MEDICINE

## 2017-03-27 PROCEDURE — G8979 MOBILITY GOAL STATUS: HCPCS

## 2017-03-27 PROCEDURE — 76937 US GUIDE VASCULAR ACCESS: CPT

## 2017-03-27 PROCEDURE — G8978 MOBILITY CURRENT STATUS: HCPCS

## 2017-03-27 PROCEDURE — 6360000002 HC RX W HCPCS: Performed by: INTERNAL MEDICINE

## 2017-03-27 PROCEDURE — 83735 ASSAY OF MAGNESIUM: CPT

## 2017-03-27 PROCEDURE — 76705 ECHO EXAM OF ABDOMEN: CPT

## 2017-03-27 PROCEDURE — G8987 SELF CARE CURRENT STATUS: HCPCS

## 2017-03-27 PROCEDURE — 6360000002 HC RX W HCPCS: Performed by: STUDENT IN AN ORGANIZED HEALTH CARE EDUCATION/TRAINING PROGRAM

## 2017-03-27 PROCEDURE — 7100000010 HC PHASE II RECOVERY - FIRST 15 MIN: Performed by: INTERNAL MEDICINE

## 2017-03-27 PROCEDURE — 83930 ASSAY OF BLOOD OSMOLALITY: CPT

## 2017-03-27 PROCEDURE — 87102 FUNGUS ISOLATION CULTURE: CPT

## 2017-03-27 PROCEDURE — 80048 BASIC METABOLIC PNL TOTAL CA: CPT

## 2017-03-27 PROCEDURE — 88305 TISSUE EXAM BY PATHOLOGIST: CPT

## 2017-03-27 PROCEDURE — 3700000001 HC ADD 15 MINUTES (ANESTHESIA): Performed by: INTERNAL MEDICINE

## 2017-03-27 PROCEDURE — 82140 ASSAY OF AMMONIA: CPT

## 2017-03-27 RX ORDER — FLUCONAZOLE 2 MG/ML
400 INJECTION, SOLUTION INTRAVENOUS EVERY 24 HOURS
Status: DISCONTINUED | OUTPATIENT
Start: 2017-03-27 | End: 2017-03-28 | Stop reason: HOSPADM

## 2017-03-27 RX ORDER — CHLORDIAZEPOXIDE HYDROCHLORIDE 5 MG/1
15 CAPSULE, GELATIN COATED ORAL 3 TIMES DAILY
Status: DISCONTINUED | OUTPATIENT
Start: 2017-03-27 | End: 2017-03-28 | Stop reason: HOSPADM

## 2017-03-27 RX ORDER — METOPROLOL TARTRATE 50 MG/1
50 TABLET, FILM COATED ORAL 2 TIMES DAILY
Status: DISCONTINUED | OUTPATIENT
Start: 2017-03-27 | End: 2017-03-28 | Stop reason: HOSPADM

## 2017-03-27 RX ORDER — ATORVASTATIN CALCIUM 40 MG/1
40 TABLET, FILM COATED ORAL NIGHTLY
Status: DISCONTINUED | OUTPATIENT
Start: 2017-03-27 | End: 2017-03-28 | Stop reason: HOSPADM

## 2017-03-27 RX ORDER — LUBIPROSTONE 8 UG/1
8 CAPSULE, GELATIN COATED ORAL 2 TIMES DAILY WITH MEALS
Status: DISCONTINUED | OUTPATIENT
Start: 2017-03-27 | End: 2017-03-28 | Stop reason: HOSPADM

## 2017-03-27 RX ORDER — LIDOCAINE HYDROCHLORIDE 10 MG/ML
INJECTION, SOLUTION EPIDURAL; INFILTRATION; INTRACAUDAL; PERINEURAL PRN
Status: DISCONTINUED | OUTPATIENT
Start: 2017-03-27 | End: 2017-03-27 | Stop reason: SDUPTHER

## 2017-03-27 RX ORDER — ERGOCALCIFEROL 1.25 MG/1
50000 CAPSULE ORAL WEEKLY
Status: DISCONTINUED | OUTPATIENT
Start: 2017-03-27 | End: 2017-03-27

## 2017-03-27 RX ORDER — FENOFIBRATE 54 MG/1
54 TABLET ORAL DAILY
Status: DISCONTINUED | OUTPATIENT
Start: 2017-03-27 | End: 2017-03-28 | Stop reason: HOSPADM

## 2017-03-27 RX ORDER — PROPOFOL 10 MG/ML
INJECTION, EMULSION INTRAVENOUS PRN
Status: DISCONTINUED | OUTPATIENT
Start: 2017-03-27 | End: 2017-03-27 | Stop reason: SDUPTHER

## 2017-03-27 RX ORDER — PREDNISOLONE 15 MG/5 ML
4 SOLUTION, ORAL ORAL 4 TIMES DAILY
Status: DISCONTINUED | OUTPATIENT
Start: 2017-03-27 | End: 2017-03-28 | Stop reason: HOSPADM

## 2017-03-27 RX ORDER — OXYCODONE HYDROCHLORIDE 5 MG/1
10 TABLET ORAL EVERY 4 HOURS PRN
Status: DISCONTINUED | OUTPATIENT
Start: 2017-03-27 | End: 2017-03-28 | Stop reason: HOSPADM

## 2017-03-27 RX ORDER — OXYCODONE HYDROCHLORIDE 5 MG/1
5 TABLET ORAL EVERY 4 HOURS PRN
Status: DISCONTINUED | OUTPATIENT
Start: 2017-03-27 | End: 2017-03-28 | Stop reason: HOSPADM

## 2017-03-27 RX ORDER — MIDAZOLAM HYDROCHLORIDE 1 MG/ML
INJECTION INTRAMUSCULAR; INTRAVENOUS PRN
Status: DISCONTINUED | OUTPATIENT
Start: 2017-03-27 | End: 2017-03-27 | Stop reason: SDUPTHER

## 2017-03-27 RX ORDER — LACTULOSE 10 G/15ML
10 SOLUTION ORAL 3 TIMES DAILY
Status: DISCONTINUED | OUTPATIENT
Start: 2017-03-27 | End: 2017-03-28 | Stop reason: HOSPADM

## 2017-03-27 RX ORDER — OMEGA-3S/DHA/EPA/FISH OIL/D3 300MG-1000
800 CAPSULE ORAL DAILY
Status: DISCONTINUED | OUTPATIENT
Start: 2017-03-27 | End: 2017-03-28 | Stop reason: HOSPADM

## 2017-03-27 RX ADMIN — SODIUM CHLORIDE: 9 INJECTION, SOLUTION INTRAVENOUS at 00:29

## 2017-03-27 RX ADMIN — PANCRELIPASE 2 CAPSULE: 24000; 76000; 120000 CAPSULE, DELAYED RELEASE PELLETS ORAL at 16:16

## 2017-03-27 RX ADMIN — MIDAZOLAM HYDROCHLORIDE 2 MG: 1 INJECTION, SOLUTION INTRAMUSCULAR; INTRAVENOUS at 14:16

## 2017-03-27 RX ADMIN — OXYCODONE HYDROCHLORIDE 10 MG: 5 TABLET ORAL at 06:41

## 2017-03-27 RX ADMIN — SUCRALFATE 1 G: 1 SUSPENSION ORAL at 18:38

## 2017-03-27 RX ADMIN — Medication 5 ML: at 18:39

## 2017-03-27 RX ADMIN — METOPROLOL TARTRATE 50 MG: 50 TABLET, FILM COATED ORAL at 22:15

## 2017-03-27 RX ADMIN — OXYCODONE HYDROCHLORIDE 10 MG: 5 TABLET ORAL at 16:14

## 2017-03-27 RX ADMIN — OXYCODONE HYDROCHLORIDE 10 MG: 5 TABLET ORAL at 10:50

## 2017-03-27 RX ADMIN — MAGNESIUM SULFATE IN DEXTROSE 2 G: 10 INJECTION, SOLUTION INTRAVENOUS at 09:30

## 2017-03-27 RX ADMIN — Medication 50 MG: at 09:16

## 2017-03-27 RX ADMIN — METOPROLOL TARTRATE 50 MG: 50 TABLET, FILM COATED ORAL at 01:02

## 2017-03-27 RX ADMIN — Medication 3.9 MG: at 18:38

## 2017-03-27 RX ADMIN — FLUCONAZOLE 400 MG: 2 INJECTION, SOLUTION INTRAVENOUS at 18:38

## 2017-03-27 RX ADMIN — PANCRELIPASE 2 CAPSULE: 24000; 76000; 120000 CAPSULE, DELAYED RELEASE PELLETS ORAL at 09:16

## 2017-03-27 RX ADMIN — PANTOPRAZOLE SODIUM 40 MG: 40 TABLET, DELAYED RELEASE ORAL at 09:16

## 2017-03-27 RX ADMIN — Medication 1 TABLET: at 09:16

## 2017-03-27 RX ADMIN — ENOXAPARIN SODIUM 40 MG: 40 INJECTION SUBCUTANEOUS at 09:16

## 2017-03-27 RX ADMIN — GABAPENTIN 300 MG: 300 CAPSULE ORAL at 09:16

## 2017-03-27 RX ADMIN — METOPROLOL TARTRATE 50 MG: 50 TABLET, FILM COATED ORAL at 09:13

## 2017-03-27 RX ADMIN — MORPHINE SULFATE 4 MG: 4 INJECTION, SOLUTION INTRAMUSCULAR; INTRAVENOUS at 01:07

## 2017-03-27 RX ADMIN — Medication 100 MG: at 09:16

## 2017-03-27 RX ADMIN — SODIUM CHLORIDE: 9 INJECTION, SOLUTION INTRAVENOUS at 14:13

## 2017-03-27 RX ADMIN — CHLORDIAZEPOXIDE HYDROCHLORIDE 15 MG: 5 CAPSULE ORAL at 21:18

## 2017-03-27 RX ADMIN — LIDOCAINE HYDROCHLORIDE 40 MG: 10 INJECTION, SOLUTION EPIDURAL; INFILTRATION; INTRACAUDAL; PERINEURAL at 14:16

## 2017-03-27 RX ADMIN — INSULIN LISPRO 1 UNITS: 100 INJECTION, SOLUTION INTRAVENOUS; SUBCUTANEOUS at 20:49

## 2017-03-27 RX ADMIN — CHLORDIAZEPOXIDE HYDROCHLORIDE 15 MG: 5 CAPSULE ORAL at 16:14

## 2017-03-27 RX ADMIN — ATORVASTATIN CALCIUM 40 MG: 40 TABLET, FILM COATED ORAL at 21:16

## 2017-03-27 RX ADMIN — METRONIDAZOLE 500 MG: 500 INJECTION, SOLUTION INTRAVENOUS at 23:07

## 2017-03-27 RX ADMIN — MORPHINE SULFATE 4 MG: 4 INJECTION, SOLUTION INTRAMUSCULAR; INTRAVENOUS at 03:27

## 2017-03-27 RX ADMIN — SODIUM PHOSPHATE, MONOBASIC, MONOHYDRATE AND SODIUM PHOSPHATE, DIBASIC, ANHYDROUS 31.2 MMOL: 276; 142 INJECTION, SOLUTION INTRAVENOUS at 09:30

## 2017-03-27 RX ADMIN — METRONIDAZOLE 500 MG: 500 INJECTION, SOLUTION INTRAVENOUS at 06:43

## 2017-03-27 RX ADMIN — SUCRALFATE 1 G: 1 SUSPENSION ORAL at 06:42

## 2017-03-27 RX ADMIN — CEFTRIAXONE SODIUM 1 G: 1 INJECTION, POWDER, FOR SOLUTION INTRAMUSCULAR; INTRAVENOUS at 00:29

## 2017-03-27 RX ADMIN — CHLORDIAZEPOXIDE HYDROCHLORIDE 15 MG: 5 CAPSULE ORAL at 09:16

## 2017-03-27 RX ADMIN — SODIUM CHLORIDE 1000 ML: 9 INJECTION, SOLUTION INTRAVENOUS at 00:32

## 2017-03-27 RX ADMIN — SODIUM CHLORIDE 1000 ML: 9 INJECTION, SOLUTION INTRAVENOUS at 00:33

## 2017-03-27 RX ADMIN — LACTULOSE 10 G: 20 SOLUTION ORAL at 21:19

## 2017-03-27 RX ADMIN — PROPOFOL 300 MG: 10 INJECTION, EMULSION INTRAVENOUS at 14:23

## 2017-03-27 RX ADMIN — GABAPENTIN 300 MG: 300 CAPSULE ORAL at 16:14

## 2017-03-27 RX ADMIN — CALCIUM GLUCONATE 2 G: 94 INJECTION, SOLUTION INTRAVENOUS at 16:11

## 2017-03-27 RX ADMIN — FOLIC ACID 1 MG: 1 TABLET ORAL at 09:16

## 2017-03-27 RX ADMIN — GABAPENTIN 300 MG: 300 CAPSULE ORAL at 21:18

## 2017-03-27 RX ADMIN — OXYCODONE HYDROCHLORIDE 10 MG: 5 TABLET ORAL at 19:56

## 2017-03-27 ASSESSMENT — PAIN DESCRIPTION - PAIN TYPE
TYPE: ACUTE PAIN
TYPE: ACUTE PAIN;CHRONIC PAIN
TYPE: CHRONIC PAIN

## 2017-03-27 ASSESSMENT — PAIN SCALES - GENERAL
PAINLEVEL_OUTOF10: 7
PAINLEVEL_OUTOF10: 5
PAINLEVEL_OUTOF10: 0
PAINLEVEL_OUTOF10: 8
PAINLEVEL_OUTOF10: 7
PAINLEVEL_OUTOF10: 3
PAINLEVEL_OUTOF10: 7
PAINLEVEL_OUTOF10: 7
PAINLEVEL_OUTOF10: 4
PAINLEVEL_OUTOF10: 5
PAINLEVEL_OUTOF10: 7
PAINLEVEL_OUTOF10: 9
PAINLEVEL_OUTOF10: 7
PAINLEVEL_OUTOF10: 0
PAINLEVEL_OUTOF10: 0

## 2017-03-27 ASSESSMENT — PAIN DESCRIPTION - ORIENTATION
ORIENTATION: RIGHT;LOWER
ORIENTATION: RIGHT;LEFT

## 2017-03-27 ASSESSMENT — PAIN DESCRIPTION - LOCATION
LOCATION: BACK;NECK
LOCATION: BACK;ABDOMEN
LOCATION: ABDOMEN;BACK
LOCATION: BACK;ABDOMEN

## 2017-03-27 ASSESSMENT — PAIN DESCRIPTION - FREQUENCY: FREQUENCY: CONTINUOUS

## 2017-03-27 ASSESSMENT — PAIN DESCRIPTION - DESCRIPTORS
DESCRIPTORS: ACHING
DESCRIPTORS: ACHING;SORE
DESCRIPTORS: SORE;ACHING

## 2017-03-28 VITALS
RESPIRATION RATE: 16 BRPM | BODY MASS INDEX: 31.84 KG/M2 | WEIGHT: 215 LBS | OXYGEN SATURATION: 98 % | DIASTOLIC BLOOD PRESSURE: 91 MMHG | HEIGHT: 69 IN | HEART RATE: 79 BPM | SYSTOLIC BLOOD PRESSURE: 122 MMHG | TEMPERATURE: 99 F

## 2017-03-28 LAB
DIRECT EXAM: NORMAL
GLUCOSE BLD-MCNC: 109 MG/DL (ref 75–110)
GLUCOSE BLD-MCNC: 121 MG/DL (ref 75–110)
HIV AG/AB: NONREACTIVE
Lab: NORMAL
Lab: NORMAL
SPECIMEN DESCRIPTION: NORMAL
SPECIMEN DESCRIPTION: NORMAL
STATUS: NORMAL
STATUS: NORMAL
SURGICAL PATHOLOGY REPORT: NORMAL

## 2017-03-28 PROCEDURE — 2580000003 HC RX 258: Performed by: INTERNAL MEDICINE

## 2017-03-28 PROCEDURE — 97110 THERAPEUTIC EXERCISES: CPT

## 2017-03-28 PROCEDURE — 97535 SELF CARE MNGMENT TRAINING: CPT

## 2017-03-28 PROCEDURE — 82947 ASSAY GLUCOSE BLOOD QUANT: CPT

## 2017-03-28 PROCEDURE — 2500000003 HC RX 250 WO HCPCS: Performed by: INTERNAL MEDICINE

## 2017-03-28 PROCEDURE — 87389 HIV-1 AG W/HIV-1&-2 AB AG IA: CPT

## 2017-03-28 PROCEDURE — 97116 GAIT TRAINING THERAPY: CPT

## 2017-03-28 PROCEDURE — 6370000000 HC RX 637 (ALT 250 FOR IP): Performed by: INTERNAL MEDICINE

## 2017-03-28 PROCEDURE — 97530 THERAPEUTIC ACTIVITIES: CPT

## 2017-03-28 PROCEDURE — 94762 N-INVAS EAR/PLS OXIMTRY CONT: CPT

## 2017-03-28 PROCEDURE — 6370000000 HC RX 637 (ALT 250 FOR IP): Performed by: HOSPITALIST

## 2017-03-28 PROCEDURE — 36415 COLL VENOUS BLD VENIPUNCTURE: CPT

## 2017-03-28 PROCEDURE — 6360000002 HC RX W HCPCS: Performed by: INTERNAL MEDICINE

## 2017-03-28 RX ORDER — FENOFIBRATE 54 MG/1
54 TABLET ORAL DAILY
Qty: 30 TABLET | Refills: 3 | Status: SHIPPED | OUTPATIENT
Start: 2017-03-28 | End: 2017-07-23 | Stop reason: SDUPTHER

## 2017-03-28 RX ORDER — OXYCODONE HYDROCHLORIDE 5 MG/1
5 TABLET ORAL EVERY 4 HOURS PRN
Qty: 15 TABLET | Refills: 0 | Status: SHIPPED | OUTPATIENT
Start: 2017-03-28 | End: 2017-04-04

## 2017-03-28 RX ORDER — METRONIDAZOLE 500 MG/1
500 TABLET ORAL 3 TIMES DAILY
Qty: 21 TABLET | Refills: 0 | Status: SHIPPED | OUTPATIENT
Start: 2017-03-28 | End: 2017-04-04 | Stop reason: ALTCHOICE

## 2017-03-28 RX ORDER — NICOTINE 21 MG/24HR
1 PATCH, TRANSDERMAL 24 HOURS TRANSDERMAL DAILY
Qty: 30 PATCH | Refills: 3 | Status: SHIPPED | OUTPATIENT
Start: 2017-03-28 | End: 2017-04-27

## 2017-03-28 RX ORDER — PREDNISOLONE 15 MG/5 ML
4 SOLUTION, ORAL ORAL 4 TIMES DAILY
Qty: 36.4 ML | Refills: 0 | Status: SHIPPED | OUTPATIENT
Start: 2017-03-28 | End: 2017-04-04

## 2017-03-28 RX ORDER — QUETIAPINE FUMARATE 25 MG/1
25 TABLET, FILM COATED ORAL NIGHTLY
Qty: 60 TABLET | Refills: 3 | Status: SHIPPED | OUTPATIENT
Start: 2017-03-28 | End: 2017-03-28 | Stop reason: HOSPADM

## 2017-03-28 RX ORDER — ATORVASTATIN CALCIUM 40 MG/1
40 TABLET, FILM COATED ORAL NIGHTLY
Qty: 30 TABLET | Refills: 3 | Status: SHIPPED | OUTPATIENT
Start: 2017-03-28 | End: 2017-07-23 | Stop reason: SDUPTHER

## 2017-03-28 RX ADMIN — OXYCODONE HYDROCHLORIDE 10 MG: 5 TABLET ORAL at 08:48

## 2017-03-28 RX ADMIN — Medication 5 ML: at 08:48

## 2017-03-28 RX ADMIN — Medication 50 MG: at 08:46

## 2017-03-28 RX ADMIN — QUETIAPINE FUMARATE 25 MG: 25 TABLET ORAL at 00:08

## 2017-03-28 RX ADMIN — SODIUM CHLORIDE: 9 INJECTION, SOLUTION INTRAVENOUS at 02:59

## 2017-03-28 RX ADMIN — SUCRALFATE 1 G: 1 SUSPENSION ORAL at 13:48

## 2017-03-28 RX ADMIN — PANCRELIPASE 2 CAPSULE: 24000; 76000; 120000 CAPSULE, DELAYED RELEASE PELLETS ORAL at 08:47

## 2017-03-28 RX ADMIN — PANTOPRAZOLE SODIUM 40 MG: 40 TABLET, DELAYED RELEASE ORAL at 06:05

## 2017-03-28 RX ADMIN — Medication 3.9 MG: at 13:48

## 2017-03-28 RX ADMIN — METOPROLOL TARTRATE 50 MG: 50 TABLET, FILM COATED ORAL at 08:46

## 2017-03-28 RX ADMIN — OXYCODONE HYDROCHLORIDE 5 MG: 5 TABLET ORAL at 04:25

## 2017-03-28 RX ADMIN — ENOXAPARIN SODIUM 40 MG: 40 INJECTION SUBCUTANEOUS at 08:48

## 2017-03-28 RX ADMIN — GABAPENTIN 300 MG: 300 CAPSULE ORAL at 08:47

## 2017-03-28 RX ADMIN — OXYCODONE HYDROCHLORIDE 10 MG: 5 TABLET ORAL at 00:08

## 2017-03-28 RX ADMIN — SUCRALFATE 1 G: 1 SUSPENSION ORAL at 08:48

## 2017-03-28 RX ADMIN — LACTULOSE 10 G: 20 SOLUTION ORAL at 08:48

## 2017-03-28 RX ADMIN — Medication 5 ML: at 13:48

## 2017-03-28 RX ADMIN — Medication 3.9 MG: at 08:48

## 2017-03-28 RX ADMIN — FENOFIBRATE 54 MG: 54 TABLET, FILM COATED ORAL at 08:47

## 2017-03-28 RX ADMIN — GABAPENTIN 300 MG: 300 CAPSULE ORAL at 13:48

## 2017-03-28 RX ADMIN — OXYCODONE HYDROCHLORIDE 10 MG: 5 TABLET ORAL at 13:09

## 2017-03-28 RX ADMIN — Medication 1 TABLET: at 08:45

## 2017-03-28 RX ADMIN — CHOLECALCIFEROL TAB 10 MCG (400 UNIT) 800 UNITS: 10 TAB at 08:45

## 2017-03-28 RX ADMIN — Medication 100 MG: at 08:45

## 2017-03-28 RX ADMIN — PANCRELIPASE 2 CAPSULE: 24000; 76000; 120000 CAPSULE, DELAYED RELEASE PELLETS ORAL at 13:48

## 2017-03-28 RX ADMIN — CHLORDIAZEPOXIDE HYDROCHLORIDE 15 MG: 5 CAPSULE ORAL at 13:09

## 2017-03-28 RX ADMIN — FOLIC ACID 1 MG: 1 TABLET ORAL at 08:47

## 2017-03-28 RX ADMIN — METRONIDAZOLE 500 MG: 500 INJECTION, SOLUTION INTRAVENOUS at 06:05

## 2017-03-28 RX ADMIN — CHLORDIAZEPOXIDE HYDROCHLORIDE 15 MG: 5 CAPSULE ORAL at 08:48

## 2017-03-28 RX ADMIN — LACTULOSE 10 G: 20 SOLUTION ORAL at 13:48

## 2017-03-28 ASSESSMENT — PAIN SCALES - GENERAL
PAINLEVEL_OUTOF10: 8
PAINLEVEL_OUTOF10: 6
PAINLEVEL_OUTOF10: 6
PAINLEVEL_OUTOF10: 7
PAINLEVEL_OUTOF10: 5
PAINLEVEL_OUTOF10: 4
PAINLEVEL_OUTOF10: 8
PAINLEVEL_OUTOF10: 5
PAINLEVEL_OUTOF10: 7
PAINLEVEL_OUTOF10: 7

## 2017-03-28 ASSESSMENT — PAIN DESCRIPTION - LOCATION
LOCATION: BACK;ABDOMEN
LOCATION: ABDOMEN;BACK
LOCATION: BACK;ABDOMEN

## 2017-03-28 ASSESSMENT — PAIN DESCRIPTION - PAIN TYPE
TYPE: ACUTE PAIN
TYPE: ACUTE PAIN;CHRONIC PAIN
TYPE: ACUTE PAIN

## 2017-03-28 ASSESSMENT — PAIN DESCRIPTION - ORIENTATION: ORIENTATION: RIGHT;LEFT

## 2017-03-28 ASSESSMENT — PAIN DESCRIPTION - FREQUENCY: FREQUENCY: CONTINUOUS

## 2017-03-28 ASSESSMENT — PAIN DESCRIPTION - DESCRIPTORS: DESCRIPTORS: SORE

## 2017-03-28 ASSESSMENT — PAIN DESCRIPTION - PROGRESSION: CLINICAL_PROGRESSION: GRADUALLY IMPROVING

## 2017-03-29 ENCOUNTER — CARE COORDINATION (OUTPATIENT)
Dept: INTERNAL MEDICINE | Age: 52
End: 2017-03-29

## 2017-03-29 LAB
-: NORMAL
REASON FOR REJECTION: NORMAL
VITAMIN B1 WHOLE BLOOD: 104 NMOL/L (ref 70–180)
ZZ NTE CLEAN UP: ORDERED TEST: NORMAL
ZZ NTE WITH NAME CLEAN UP: SPECIMEN SOURCE: NORMAL

## 2017-04-02 LAB
CULTURE: NORMAL
Lab: NORMAL
Lab: NORMAL
SPECIMEN DESCRIPTION: NORMAL
SPECIMEN DESCRIPTION: NORMAL
STATUS: NORMAL
STATUS: NORMAL

## 2017-04-03 RX ORDER — ZOLPIDEM TARTRATE 5 MG/1
TABLET ORAL
Qty: 20 TABLET | Refills: 0 | Status: SHIPPED | OUTPATIENT
Start: 2017-04-03 | End: 2017-04-28 | Stop reason: SDUPTHER

## 2017-04-04 ENCOUNTER — OFFICE VISIT (OUTPATIENT)
Dept: INTERNAL MEDICINE | Age: 52
End: 2017-04-04
Payer: MEDICARE

## 2017-04-04 ENCOUNTER — OFFICE VISIT (OUTPATIENT)
Dept: NEUROSURGERY | Age: 52
End: 2017-04-04
Payer: MEDICARE

## 2017-04-04 VITALS
WEIGHT: 210.8 LBS | SYSTOLIC BLOOD PRESSURE: 133 MMHG | HEIGHT: 69 IN | DIASTOLIC BLOOD PRESSURE: 96 MMHG | OXYGEN SATURATION: 99 % | RESPIRATION RATE: 12 BRPM | HEART RATE: 86 BPM | BODY MASS INDEX: 31.22 KG/M2

## 2017-04-04 VITALS
HEIGHT: 69 IN | HEART RATE: 86 BPM | BODY MASS INDEX: 31.55 KG/M2 | SYSTOLIC BLOOD PRESSURE: 153 MMHG | DIASTOLIC BLOOD PRESSURE: 98 MMHG | WEIGHT: 213 LBS

## 2017-04-04 DIAGNOSIS — M54.2 NECK PAIN: Primary | ICD-10-CM

## 2017-04-04 DIAGNOSIS — Z23 NEED FOR PNEUMOCOCCAL VACCINATION: ICD-10-CM

## 2017-04-04 DIAGNOSIS — F10.10 ALCOHOL ABUSE: ICD-10-CM

## 2017-04-04 DIAGNOSIS — K22.10 EROSIVE ESOPHAGITIS: ICD-10-CM

## 2017-04-04 DIAGNOSIS — E11.69 TYPE 2 DIABETES MELLITUS WITH OTHER SPECIFIED COMPLICATION (HCC): Primary | ICD-10-CM

## 2017-04-04 DIAGNOSIS — K86.89 PANCREATIC INSUFFICIENCY: ICD-10-CM

## 2017-04-04 PROCEDURE — 90670 PCV13 VACCINE IM: CPT | Performed by: INTERNAL MEDICINE

## 2017-04-04 PROCEDURE — 90471 IMMUNIZATION ADMIN: CPT | Performed by: INTERNAL MEDICINE

## 2017-04-04 PROCEDURE — 99496 TRANSJ CARE MGMT HIGH F2F 7D: CPT | Performed by: INTERNAL MEDICINE

## 2017-04-04 PROCEDURE — 99212 OFFICE O/P EST SF 10 MIN: CPT | Performed by: NEUROLOGICAL SURGERY

## 2017-04-04 ASSESSMENT — PATIENT HEALTH QUESTIONNAIRE - PHQ9
1. LITTLE INTEREST OR PLEASURE IN DOING THINGS: 0
SUM OF ALL RESPONSES TO PHQ9 QUESTIONS 1 & 2: 0
2. FEELING DOWN, DEPRESSED OR HOPELESS: 0
SUM OF ALL RESPONSES TO PHQ QUESTIONS 1-9: 0

## 2017-04-04 ASSESSMENT — ENCOUNTER SYMPTOMS: ABDOMINAL PAIN: 1

## 2017-04-05 LAB
CULTURE: ABNORMAL
Lab: ABNORMAL
SPECIMEN DESCRIPTION: ABNORMAL
STATUS: ABNORMAL

## 2017-04-14 ENCOUNTER — APPOINTMENT (OUTPATIENT)
Dept: MRI IMAGING | Age: 52
End: 2017-04-14
Payer: MEDICARE

## 2017-04-14 ENCOUNTER — HOSPITAL ENCOUNTER (OUTPATIENT)
Dept: MRI IMAGING | Age: 52
Discharge: HOME | End: 2017-04-14
Payer: MEDICARE

## 2017-04-14 DIAGNOSIS — M54.17 LUMBOSACRAL NEURITIS: ICD-10-CM

## 2017-04-14 PROCEDURE — 72148 MRI LUMBAR SPINE W/O DYE: CPT

## 2017-04-27 ENCOUNTER — HOSPITAL ENCOUNTER (OUTPATIENT)
Dept: GENERAL RADIOLOGY | Age: 52
Discharge: HOME | End: 2017-04-27
Payer: MEDICARE

## 2017-04-27 ENCOUNTER — TELEPHONE (OUTPATIENT)
Dept: NEUROSURGERY | Age: 52
End: 2017-04-27

## 2017-04-27 ENCOUNTER — TELEPHONE (OUTPATIENT)
Dept: PAIN MANAGEMENT | Age: 52
End: 2017-04-27

## 2017-04-27 ENCOUNTER — HOSPITAL ENCOUNTER (OUTPATIENT)
Dept: PAIN MANAGEMENT | Age: 52
Discharge: HOME | End: 2017-04-27
Payer: MEDICARE

## 2017-04-27 ENCOUNTER — HOSPITAL ENCOUNTER (OUTPATIENT)
Age: 52
Discharge: HOME | End: 2017-04-27
Payer: MEDICARE

## 2017-04-27 VITALS
DIASTOLIC BLOOD PRESSURE: 97 MMHG | HEART RATE: 94 BPM | RESPIRATION RATE: 18 BRPM | TEMPERATURE: 98.9 F | SYSTOLIC BLOOD PRESSURE: 153 MMHG

## 2017-04-27 DIAGNOSIS — M54.41 ACUTE RIGHT-SIDED LOW BACK PAIN WITH BILATERAL SCIATICA: Primary | ICD-10-CM

## 2017-04-27 DIAGNOSIS — M54.42 ACUTE RIGHT-SIDED LOW BACK PAIN WITH BILATERAL SCIATICA: Primary | ICD-10-CM

## 2017-04-27 DIAGNOSIS — M54.2 NECK PAIN: ICD-10-CM

## 2017-04-27 PROCEDURE — 99214 OFFICE O/P EST MOD 30 MIN: CPT

## 2017-04-27 PROCEDURE — 72040 X-RAY EXAM NECK SPINE 2-3 VW: CPT

## 2017-04-27 ASSESSMENT — PAIN DESCRIPTION - FREQUENCY: FREQUENCY: CONTINUOUS

## 2017-04-27 ASSESSMENT — ENCOUNTER SYMPTOMS
BLURRED VISION: 0
SUSPICIOUS LESIONS: 0
UNUSUAL HAIR DISTRIBUTION: 0
EYE DISCHARGE: 0

## 2017-04-27 ASSESSMENT — PAIN SCALES - GENERAL: PAINLEVEL_OUTOF10: 8

## 2017-04-27 ASSESSMENT — PAIN DESCRIPTION - ONSET: ONSET: ON-GOING

## 2017-04-27 ASSESSMENT — PAIN DESCRIPTION - PAIN TYPE: TYPE: CHRONIC PAIN

## 2017-04-27 ASSESSMENT — PAIN DESCRIPTION - ORIENTATION: ORIENTATION: LOWER

## 2017-04-27 ASSESSMENT — PAIN DESCRIPTION - LOCATION: LOCATION: BACK;NECK

## 2017-04-27 NOTE — TELEPHONE ENCOUNTER
Patient stopped by the office today, upset about his MRI of cervical spine being denied by [facility]. He brought denial letter, he was told by [facility] that we received the same letter. The Brittny Singer noticed that   The address for Dr. Elmer Hodges was for the office he had years ago in [place], [place]. Author explained that this is the reason why our office was unaware. Conrado Tate at [facility] was contacted; notified the issue of the wrong address. She stated that the MRI of Cervical was denied because there had been no evidence of prior CT or xrays prior to ordering MRI. Author told there that there have been images ordered and performed. She requested documentation   Of notes and results of testing faxed to [phone] attn: Conrado Tate. Author told patient that as soon as office is notified on [facility]'s response he will be contacted. Patient decided to cancel appt with Dr. Elmer Hodges on 05.02.17 but did complete his xray of cervical from order 04.04.2017. Patient stated he felt as though the pain clinic acted like they \"were mad at me, or like I offended them because I was unable to get the testing down. I have not been able to start physical therapy either because I have been sick\". Author stated she would make Dr. Lyn Silveira aware of the current situation.

## 2017-05-01 RX ORDER — ZOLPIDEM TARTRATE 5 MG/1
TABLET ORAL
Qty: 20 TABLET | Refills: 0 | Status: SHIPPED | OUTPATIENT
Start: 2017-05-01 | End: 2017-05-18 | Stop reason: ALTCHOICE

## 2017-05-01 RX ORDER — OMEPRAZOLE 20 MG/1
CAPSULE, DELAYED RELEASE ORAL
Qty: 30 CAPSULE | Refills: 3 | Status: SHIPPED | OUTPATIENT
Start: 2017-05-01 | End: 2017-08-25 | Stop reason: SDUPTHER

## 2017-05-06 ENCOUNTER — HOSPITAL ENCOUNTER (OUTPATIENT)
Dept: MRI IMAGING | Age: 52
Discharge: HOME | End: 2017-05-06
Payer: MEDICARE

## 2017-05-06 DIAGNOSIS — M54.2 NECK PAIN: ICD-10-CM

## 2017-05-06 PROCEDURE — 72141 MRI NECK SPINE W/O DYE: CPT

## 2017-05-08 RX ORDER — AMITRIPTYLINE HYDROCHLORIDE 25 MG/1
25 TABLET, FILM COATED ORAL NIGHTLY
Qty: 30 TABLET | Refills: 0 | Status: SHIPPED | OUTPATIENT
Start: 2017-05-08 | End: 2017-05-18 | Stop reason: ALTCHOICE

## 2017-05-08 RX ORDER — TIZANIDINE 4 MG/1
4 TABLET ORAL NIGHTLY
Qty: 30 TABLET | Refills: 0 | Status: SHIPPED | OUTPATIENT
Start: 2017-05-08 | End: 2017-05-18 | Stop reason: ALTCHOICE

## 2017-05-09 ENCOUNTER — OFFICE VISIT (OUTPATIENT)
Dept: NEUROSURGERY | Age: 52
End: 2017-05-09
Payer: MEDICARE

## 2017-05-09 VITALS
WEIGHT: 222 LBS | BODY MASS INDEX: 32.88 KG/M2 | DIASTOLIC BLOOD PRESSURE: 86 MMHG | HEART RATE: 79 BPM | HEIGHT: 69 IN | SYSTOLIC BLOOD PRESSURE: 159 MMHG

## 2017-05-09 DIAGNOSIS — G95.9 CERVICAL MYELOPATHY (HCC): Primary | ICD-10-CM

## 2017-05-09 PROCEDURE — 99212 OFFICE O/P EST SF 10 MIN: CPT | Performed by: NEUROLOGICAL SURGERY

## 2017-05-18 ENCOUNTER — HOSPITAL ENCOUNTER (OUTPATIENT)
Dept: PAIN MANAGEMENT | Age: 52
Discharge: HOME | End: 2017-05-18
Payer: MEDICARE

## 2017-05-18 VITALS
DIASTOLIC BLOOD PRESSURE: 92 MMHG | HEIGHT: 69 IN | TEMPERATURE: 98.8 F | WEIGHT: 222 LBS | BODY MASS INDEX: 32.88 KG/M2 | HEART RATE: 95 BPM | SYSTOLIC BLOOD PRESSURE: 150 MMHG | RESPIRATION RATE: 20 BRPM

## 2017-05-18 PROCEDURE — 99215 OFFICE O/P EST HI 40 MIN: CPT

## 2017-05-18 RX ORDER — M-VIT,TX,IRON,MINS/CALC/FOLIC 27MG-0.4MG
1 TABLET ORAL DAILY
Status: ON HOLD | COMMUNITY
End: 2018-06-05

## 2017-05-18 RX ORDER — VENLAFAXINE HYDROCHLORIDE 150 MG/1
150 TABLET, EXTENDED RELEASE ORAL
COMMUNITY
End: 2018-03-13 | Stop reason: SDUPTHER

## 2017-05-18 RX ORDER — TEMAZEPAM 30 MG/1
30 CAPSULE ORAL NIGHTLY PRN
Status: ON HOLD | COMMUNITY
End: 2018-06-05

## 2017-05-18 ASSESSMENT — ENCOUNTER SYMPTOMS
BACK PAIN: 1
UNUSUAL HAIR DISTRIBUTION: 0
BLURRED VISION: 0
SUSPICIOUS LESIONS: 0
EYE DISCHARGE: 0

## 2017-05-18 ASSESSMENT — PAIN DESCRIPTION - DESCRIPTORS: DESCRIPTORS: BURNING;PINS AND NEEDLES;CONSTANT

## 2017-05-18 ASSESSMENT — PAIN DESCRIPTION - PAIN TYPE: TYPE: CHRONIC PAIN

## 2017-05-18 ASSESSMENT — PAIN DESCRIPTION - ONSET: ONSET: ON-GOING

## 2017-05-18 ASSESSMENT — PAIN DESCRIPTION - PROGRESSION: CLINICAL_PROGRESSION: GRADUALLY WORSENING

## 2017-05-18 ASSESSMENT — PAIN DESCRIPTION - ORIENTATION: ORIENTATION: LOWER

## 2017-05-18 ASSESSMENT — PAIN DESCRIPTION - FREQUENCY: FREQUENCY: CONTINUOUS

## 2017-05-18 ASSESSMENT — PAIN SCALES - GENERAL: PAINLEVEL_OUTOF10: 8

## 2017-05-18 ASSESSMENT — PAIN DESCRIPTION - LOCATION: LOCATION: BACK;NECK

## 2017-05-25 ENCOUNTER — HOSPITAL ENCOUNTER (OUTPATIENT)
Dept: PAIN MANAGEMENT | Age: 52
Discharge: HOME | End: 2017-05-25
Payer: MEDICARE

## 2017-05-25 VITALS
OXYGEN SATURATION: 98 % | DIASTOLIC BLOOD PRESSURE: 81 MMHG | HEIGHT: 69 IN | TEMPERATURE: 98.1 F | HEART RATE: 76 BPM | RESPIRATION RATE: 16 BRPM | WEIGHT: 222 LBS | BODY MASS INDEX: 32.88 KG/M2 | SYSTOLIC BLOOD PRESSURE: 113 MMHG

## 2017-05-25 DIAGNOSIS — M54.9 BACKACHE, UNSPECIFIED: ICD-10-CM

## 2017-05-25 LAB
HCT VFR BLD CALC: 42.4 % (ref 41–53)
HEMOGLOBIN: 14.9 G/DL (ref 13.5–17.5)
INR BLD: 1
MCH RBC QN AUTO: 31.8 PG (ref 26–34)
MCHC RBC AUTO-ENTMCNC: 35.1 G/DL (ref 31–37)
MCV RBC AUTO: 90.7 FL (ref 80–100)
PDW BLD-RTO: 14.4 % (ref 12.5–15.4)
PLATELET # BLD: 363 K/UL (ref 140–450)
PMV BLD AUTO: 9.4 FL (ref 6–12)
PROTHROMBIN TIME: 11.3 SEC (ref 9.4–12.6)
RBC # BLD: 4.68 M/UL (ref 4.5–5.9)
WBC # BLD: 11.3 K/UL (ref 3.5–11)

## 2017-05-25 PROCEDURE — 62323 NJX INTERLAMINAR LMBR/SAC: CPT

## 2017-05-25 PROCEDURE — 6360000002 HC RX W HCPCS: Performed by: PAIN MEDICINE

## 2017-05-25 PROCEDURE — 2580000003 HC RX 258: Performed by: PAIN MEDICINE

## 2017-05-25 PROCEDURE — 6360000002 HC RX W HCPCS

## 2017-05-25 PROCEDURE — 6360000004 HC RX CONTRAST MEDICATION

## 2017-05-25 PROCEDURE — 85610 PROTHROMBIN TIME: CPT

## 2017-05-25 PROCEDURE — 85027 COMPLETE CBC AUTOMATED: CPT

## 2017-05-25 RX ORDER — MIDAZOLAM HYDROCHLORIDE 1 MG/ML
2 INJECTION INTRAMUSCULAR; INTRAVENOUS ONCE
Status: COMPLETED | OUTPATIENT
Start: 2017-05-25 | End: 2017-05-25

## 2017-05-25 RX ORDER — SODIUM CHLORIDE, SODIUM LACTATE, POTASSIUM CHLORIDE, CALCIUM CHLORIDE 600; 310; 30; 20 MG/100ML; MG/100ML; MG/100ML; MG/100ML
INJECTION, SOLUTION INTRAVENOUS CONTINUOUS
Status: DISCONTINUED | OUTPATIENT
Start: 2017-05-25 | End: 2017-05-26 | Stop reason: HOSPADM

## 2017-05-25 RX ORDER — FENTANYL CITRATE 50 UG/ML
100 INJECTION, SOLUTION INTRAMUSCULAR; INTRAVENOUS ONCE
Status: COMPLETED | OUTPATIENT
Start: 2017-05-25 | End: 2017-05-25

## 2017-05-25 RX ADMIN — SODIUM CHLORIDE, POTASSIUM CHLORIDE, SODIUM LACTATE AND CALCIUM CHLORIDE: 600; 310; 30; 20 INJECTION, SOLUTION INTRAVENOUS at 11:54

## 2017-05-25 RX ADMIN — FENTANYL CITRATE 50 MCG: 50 INJECTION, SOLUTION INTRAMUSCULAR; INTRAVENOUS at 12:00

## 2017-05-25 RX ADMIN — MIDAZOLAM HYDROCHLORIDE 1 MG: 1 INJECTION, SOLUTION INTRAMUSCULAR; INTRAVENOUS at 11:59

## 2017-05-25 ASSESSMENT — PAIN - FUNCTIONAL ASSESSMENT
PAIN_FUNCTIONAL_ASSESSMENT: 0-10

## 2017-05-25 ASSESSMENT — PAIN DESCRIPTION - DESCRIPTORS: DESCRIPTORS: BURNING;PINS AND NEEDLES;CONSTANT

## 2017-05-30 RX ORDER — PANCRELIPASE 24000; 76000; 120000 [USP'U]/1; [USP'U]/1; [USP'U]/1
CAPSULE, DELAYED RELEASE PELLETS ORAL
Qty: 180 CAPSULE | Refills: 0 | Status: SHIPPED | OUTPATIENT
Start: 2017-05-30 | End: 2017-09-28 | Stop reason: SDUPTHER

## 2017-06-05 RX ORDER — AMITRIPTYLINE HYDROCHLORIDE 25 MG/1
TABLET, FILM COATED ORAL
Qty: 30 TABLET | Refills: 3 | Status: SHIPPED | OUTPATIENT
Start: 2017-06-05 | End: 2017-09-28 | Stop reason: SDUPTHER

## 2017-06-09 ENCOUNTER — HOSPITAL ENCOUNTER (OUTPATIENT)
Dept: PAIN MANAGEMENT | Age: 52
Discharge: HOME | End: 2017-06-09
Payer: MEDICARE

## 2017-06-09 VITALS
SYSTOLIC BLOOD PRESSURE: 121 MMHG | RESPIRATION RATE: 16 BRPM | WEIGHT: 22 LBS | HEIGHT: 69 IN | HEART RATE: 68 BPM | TEMPERATURE: 98.2 F | DIASTOLIC BLOOD PRESSURE: 86 MMHG | OXYGEN SATURATION: 99 % | BODY MASS INDEX: 3.26 KG/M2

## 2017-06-09 DIAGNOSIS — M54.9 BACKACHE, UNSPECIFIED: ICD-10-CM

## 2017-06-09 PROCEDURE — 2580000003 HC RX 258: Performed by: PAIN MEDICINE

## 2017-06-09 PROCEDURE — 6360000002 HC RX W HCPCS: Performed by: PAIN MEDICINE

## 2017-06-09 PROCEDURE — 64494 INJ PARAVERT F JNT L/S 2 LEV: CPT

## 2017-06-09 PROCEDURE — 64493 INJ PARAVERT F JNT L/S 1 LEV: CPT

## 2017-06-09 PROCEDURE — 2500000003 HC RX 250 WO HCPCS

## 2017-06-09 RX ORDER — SODIUM CHLORIDE, SODIUM LACTATE, POTASSIUM CHLORIDE, CALCIUM CHLORIDE 600; 310; 30; 20 MG/100ML; MG/100ML; MG/100ML; MG/100ML
INJECTION, SOLUTION INTRAVENOUS CONTINUOUS
Status: DISCONTINUED | OUTPATIENT
Start: 2017-06-09 | End: 2017-06-10 | Stop reason: HOSPADM

## 2017-06-09 RX ORDER — MIDAZOLAM HYDROCHLORIDE 1 MG/ML
2 INJECTION INTRAMUSCULAR; INTRAVENOUS ONCE
Status: COMPLETED | OUTPATIENT
Start: 2017-06-09 | End: 2017-06-09

## 2017-06-09 RX ORDER — FENTANYL CITRATE 50 UG/ML
100 INJECTION, SOLUTION INTRAMUSCULAR; INTRAVENOUS ONCE
Status: DISCONTINUED | OUTPATIENT
Start: 2017-06-09 | End: 2017-06-10 | Stop reason: HOSPADM

## 2017-06-09 RX ADMIN — MIDAZOLAM HYDROCHLORIDE 2 MG: 1 INJECTION, SOLUTION INTRAMUSCULAR; INTRAVENOUS at 11:07

## 2017-06-09 RX ADMIN — SODIUM CHLORIDE, POTASSIUM CHLORIDE, SODIUM LACTATE AND CALCIUM CHLORIDE: 600; 310; 30; 20 INJECTION, SOLUTION INTRAVENOUS at 10:57

## 2017-06-09 ASSESSMENT — PAIN DESCRIPTION - DESCRIPTORS: DESCRIPTORS: STABBING;CONSTANT

## 2017-06-09 ASSESSMENT — PAIN - FUNCTIONAL ASSESSMENT: PAIN_FUNCTIONAL_ASSESSMENT: 0-10

## 2017-06-12 RX ORDER — TIZANIDINE 4 MG/1
TABLET ORAL
Qty: 30 TABLET | Refills: 0 | Status: SHIPPED | OUTPATIENT
Start: 2017-06-12 | End: 2017-07-10 | Stop reason: SDUPTHER

## 2017-06-16 ENCOUNTER — HOSPITAL ENCOUNTER (OUTPATIENT)
Dept: PAIN MANAGEMENT | Age: 52
Discharge: HOME | End: 2017-06-16
Payer: MEDICARE

## 2017-06-16 VITALS
HEIGHT: 69 IN | DIASTOLIC BLOOD PRESSURE: 86 MMHG | RESPIRATION RATE: 18 BRPM | HEART RATE: 68 BPM | WEIGHT: 220 LBS | OXYGEN SATURATION: 99 % | BODY MASS INDEX: 32.58 KG/M2 | TEMPERATURE: 99.2 F | SYSTOLIC BLOOD PRESSURE: 131 MMHG

## 2017-06-16 DIAGNOSIS — M54.9 BACKACHE, UNSPECIFIED: ICD-10-CM

## 2017-06-16 PROCEDURE — 2500000003 HC RX 250 WO HCPCS

## 2017-06-16 PROCEDURE — 6360000002 HC RX W HCPCS: Performed by: PAIN MEDICINE

## 2017-06-16 PROCEDURE — 64493 INJ PARAVERT F JNT L/S 1 LEV: CPT

## 2017-06-16 PROCEDURE — 64494 INJ PARAVERT F JNT L/S 2 LEV: CPT

## 2017-06-16 RX ORDER — FENTANYL CITRATE 50 UG/ML
100 INJECTION, SOLUTION INTRAMUSCULAR; INTRAVENOUS ONCE
Status: DISCONTINUED | OUTPATIENT
Start: 2017-06-16 | End: 2017-06-17 | Stop reason: HOSPADM

## 2017-06-16 RX ORDER — MIDAZOLAM HYDROCHLORIDE 1 MG/ML
2 INJECTION INTRAMUSCULAR; INTRAVENOUS ONCE
Status: COMPLETED | OUTPATIENT
Start: 2017-06-16 | End: 2017-06-16

## 2017-06-16 RX ORDER — SODIUM CHLORIDE, SODIUM LACTATE, POTASSIUM CHLORIDE, CALCIUM CHLORIDE 600; 310; 30; 20 MG/100ML; MG/100ML; MG/100ML; MG/100ML
INJECTION, SOLUTION INTRAVENOUS CONTINUOUS
Status: DISCONTINUED | OUTPATIENT
Start: 2017-06-16 | End: 2017-06-17 | Stop reason: HOSPADM

## 2017-06-16 RX ADMIN — MIDAZOLAM HYDROCHLORIDE 2 MG: 1 INJECTION, SOLUTION INTRAMUSCULAR; INTRAVENOUS at 12:21

## 2017-06-16 ASSESSMENT — PAIN - FUNCTIONAL ASSESSMENT
PAIN_FUNCTIONAL_ASSESSMENT: 0-10

## 2017-06-16 ASSESSMENT — PAIN DESCRIPTION - DESCRIPTORS: DESCRIPTORS: CONSTANT;ACHING;SHARP

## 2017-06-22 ENCOUNTER — HOSPITAL ENCOUNTER (OUTPATIENT)
Dept: PAIN MANAGEMENT | Age: 52
Discharge: HOME | End: 2017-06-22
Payer: MEDICARE

## 2017-06-22 VITALS
HEIGHT: 69 IN | WEIGHT: 220 LBS | SYSTOLIC BLOOD PRESSURE: 164 MMHG | DIASTOLIC BLOOD PRESSURE: 99 MMHG | RESPIRATION RATE: 18 BRPM | TEMPERATURE: 98.1 F | BODY MASS INDEX: 32.58 KG/M2 | HEART RATE: 96 BPM

## 2017-06-22 PROCEDURE — 99214 OFFICE O/P EST MOD 30 MIN: CPT

## 2017-06-22 ASSESSMENT — ENCOUNTER SYMPTOMS
BLURRED VISION: 0
UNUSUAL HAIR DISTRIBUTION: 0
SUSPICIOUS LESIONS: 0
BACK PAIN: 1
EYE DISCHARGE: 0

## 2017-06-26 ENCOUNTER — HOSPITAL ENCOUNTER (OUTPATIENT)
Dept: PAIN MANAGEMENT | Age: 52
Discharge: HOME | End: 2017-06-26
Payer: MEDICARE

## 2017-06-26 VITALS
TEMPERATURE: 98.5 F | WEIGHT: 220 LBS | DIASTOLIC BLOOD PRESSURE: 79 MMHG | BODY MASS INDEX: 32.58 KG/M2 | RESPIRATION RATE: 16 BRPM | HEIGHT: 69 IN | OXYGEN SATURATION: 96 % | HEART RATE: 77 BPM | SYSTOLIC BLOOD PRESSURE: 125 MMHG

## 2017-06-26 DIAGNOSIS — M54.9 BACKACHE, UNSPECIFIED: ICD-10-CM

## 2017-06-26 PROCEDURE — 2580000003 HC RX 258: Performed by: ANESTHESIOLOGY

## 2017-06-26 PROCEDURE — 6360000002 HC RX W HCPCS

## 2017-06-26 PROCEDURE — 6360000002 HC RX W HCPCS: Performed by: ANESTHESIOLOGY

## 2017-06-26 PROCEDURE — 64635 DESTROY LUMB/SAC FACET JNT: CPT

## 2017-06-26 PROCEDURE — 2500000003 HC RX 250 WO HCPCS

## 2017-06-26 PROCEDURE — 64636 DESTROY L/S FACET JNT ADDL: CPT

## 2017-06-26 RX ORDER — LIDOCAINE HYDROCHLORIDE 10 MG/ML
0.3 INJECTION, SOLUTION EPIDURAL; INFILTRATION; INTRACAUDAL; PERINEURAL ONCE
Status: DISCONTINUED | OUTPATIENT
Start: 2017-06-26 | End: 2017-06-27 | Stop reason: HOSPADM

## 2017-06-26 RX ORDER — FENTANYL CITRATE 50 UG/ML
25 INJECTION, SOLUTION INTRAMUSCULAR; INTRAVENOUS ONCE
Status: COMPLETED | OUTPATIENT
Start: 2017-06-26 | End: 2017-06-26

## 2017-06-26 RX ORDER — MIDAZOLAM HYDROCHLORIDE 1 MG/ML
0.5 INJECTION INTRAMUSCULAR; INTRAVENOUS 4 TIMES DAILY PRN
Status: DISCONTINUED | OUTPATIENT
Start: 2017-06-26 | End: 2017-06-27 | Stop reason: HOSPADM

## 2017-06-26 RX ORDER — FENTANYL CITRATE 50 UG/ML
50 INJECTION, SOLUTION INTRAMUSCULAR; INTRAVENOUS PRN
Status: COMPLETED | OUTPATIENT
Start: 2017-06-26 | End: 2017-06-26

## 2017-06-26 RX ORDER — SODIUM CHLORIDE, SODIUM LACTATE, POTASSIUM CHLORIDE, CALCIUM CHLORIDE 600; 310; 30; 20 MG/100ML; MG/100ML; MG/100ML; MG/100ML
500 INJECTION, SOLUTION INTRAVENOUS CONTINUOUS
Status: DISCONTINUED | OUTPATIENT
Start: 2017-06-26 | End: 2017-06-27 | Stop reason: HOSPADM

## 2017-06-26 RX ADMIN — FENTANYL CITRATE 25 MCG: 50 INJECTION, SOLUTION INTRAMUSCULAR; INTRAVENOUS at 11:16

## 2017-06-26 RX ADMIN — MIDAZOLAM HYDROCHLORIDE 1 MG: 1 INJECTION, SOLUTION INTRAMUSCULAR; INTRAVENOUS at 11:05

## 2017-06-26 RX ADMIN — FENTANYL CITRATE 50 MCG: 50 INJECTION, SOLUTION INTRAMUSCULAR; INTRAVENOUS at 11:05

## 2017-06-26 RX ADMIN — MIDAZOLAM HYDROCHLORIDE 0.5 MG: 1 INJECTION, SOLUTION INTRAMUSCULAR; INTRAVENOUS at 11:15

## 2017-06-26 RX ADMIN — MIDAZOLAM HYDROCHLORIDE 0.5 MG: 1 INJECTION, SOLUTION INTRAMUSCULAR; INTRAVENOUS at 11:21

## 2017-06-26 RX ADMIN — FENTANYL CITRATE 25 MCG: 50 INJECTION, SOLUTION INTRAMUSCULAR; INTRAVENOUS at 11:24

## 2017-06-26 RX ADMIN — SODIUM CHLORIDE, POTASSIUM CHLORIDE, SODIUM LACTATE AND CALCIUM CHLORIDE 500 ML: 600; 310; 30; 20 INJECTION, SOLUTION INTRAVENOUS at 10:53

## 2017-06-26 ASSESSMENT — PAIN DESCRIPTION - DESCRIPTORS: DESCRIPTORS: CONSTANT;ACHING;SHARP

## 2017-06-26 ASSESSMENT — PAIN DESCRIPTION - FREQUENCY: FREQUENCY: CONTINUOUS

## 2017-06-26 ASSESSMENT — PAIN SCALES - GENERAL: PAINLEVEL_OUTOF10: 8

## 2017-06-26 ASSESSMENT — PAIN DESCRIPTION - ONSET: ONSET: ON-GOING

## 2017-06-26 ASSESSMENT — PAIN DESCRIPTION - PAIN TYPE: TYPE: CHRONIC PAIN

## 2017-06-26 ASSESSMENT — PAIN - FUNCTIONAL ASSESSMENT
PAIN_FUNCTIONAL_ASSESSMENT: 0-10
PAIN_FUNCTIONAL_ASSESSMENT: 0-10

## 2017-06-26 ASSESSMENT — PAIN DESCRIPTION - ORIENTATION: ORIENTATION: RIGHT;LEFT;LOWER

## 2017-06-26 ASSESSMENT — PAIN DESCRIPTION - LOCATION: LOCATION: BACK

## 2017-07-11 RX ORDER — TIZANIDINE 4 MG/1
TABLET ORAL
Qty: 30 TABLET | Refills: 0 | Status: SHIPPED | OUTPATIENT
Start: 2017-07-11 | End: 2017-08-07 | Stop reason: SDUPTHER

## 2017-07-18 ENCOUNTER — HOSPITAL ENCOUNTER (OUTPATIENT)
Dept: PAIN MANAGEMENT | Age: 52
Discharge: HOME | End: 2017-07-18
Payer: MEDICARE

## 2017-07-18 VITALS
SYSTOLIC BLOOD PRESSURE: 146 MMHG | TEMPERATURE: 97.9 F | HEART RATE: 85 BPM | BODY MASS INDEX: 32.58 KG/M2 | WEIGHT: 220 LBS | DIASTOLIC BLOOD PRESSURE: 95 MMHG | HEIGHT: 69 IN | RESPIRATION RATE: 16 BRPM

## 2017-07-18 PROCEDURE — 99213 OFFICE O/P EST LOW 20 MIN: CPT

## 2017-07-18 ASSESSMENT — PAIN DESCRIPTION - FREQUENCY: FREQUENCY: CONTINUOUS

## 2017-07-18 ASSESSMENT — PAIN DESCRIPTION - ORIENTATION: ORIENTATION: LOWER;RIGHT;LEFT

## 2017-07-18 ASSESSMENT — PAIN DESCRIPTION - PROGRESSION: CLINICAL_PROGRESSION: NOT CHANGED

## 2017-07-18 ASSESSMENT — PAIN DESCRIPTION - ONSET: ONSET: ON-GOING

## 2017-07-18 ASSESSMENT — PAIN DESCRIPTION - LOCATION: LOCATION: BACK;NECK;SHOULDER

## 2017-07-18 ASSESSMENT — PAIN SCALES - GENERAL: PAINLEVEL_OUTOF10: 8

## 2017-07-18 ASSESSMENT — PAIN DESCRIPTION - PAIN TYPE: TYPE: CHRONIC PAIN

## 2017-07-21 ENCOUNTER — HOSPITAL ENCOUNTER (OUTPATIENT)
Dept: PAIN MANAGEMENT | Age: 52
Discharge: HOME | End: 2017-07-21
Payer: MEDICARE

## 2017-07-21 VITALS
OXYGEN SATURATION: 99 % | HEART RATE: 71 BPM | SYSTOLIC BLOOD PRESSURE: 121 MMHG | DIASTOLIC BLOOD PRESSURE: 68 MMHG | RESPIRATION RATE: 16 BRPM | TEMPERATURE: 97.8 F

## 2017-07-21 DIAGNOSIS — M54.9 BACKACHE, UNSPECIFIED: ICD-10-CM

## 2017-07-21 PROCEDURE — 64636 DESTROY L/S FACET JNT ADDL: CPT

## 2017-07-21 PROCEDURE — 2500000003 HC RX 250 WO HCPCS

## 2017-07-21 PROCEDURE — 2580000003 HC RX 258: Performed by: PAIN MEDICINE

## 2017-07-21 PROCEDURE — 64635 DESTROY LUMB/SAC FACET JNT: CPT

## 2017-07-21 PROCEDURE — 6360000002 HC RX W HCPCS

## 2017-07-21 PROCEDURE — 6360000002 HC RX W HCPCS: Performed by: PAIN MEDICINE

## 2017-07-21 RX ORDER — FENTANYL CITRATE 50 UG/ML
100 INJECTION, SOLUTION INTRAMUSCULAR; INTRAVENOUS ONCE
Status: COMPLETED | OUTPATIENT
Start: 2017-07-21 | End: 2017-07-21

## 2017-07-21 RX ORDER — MIDAZOLAM HYDROCHLORIDE 1 MG/ML
2 INJECTION INTRAMUSCULAR; INTRAVENOUS ONCE
Status: COMPLETED | OUTPATIENT
Start: 2017-07-21 | End: 2017-07-21

## 2017-07-21 RX ORDER — SODIUM CHLORIDE, SODIUM LACTATE, POTASSIUM CHLORIDE, CALCIUM CHLORIDE 600; 310; 30; 20 MG/100ML; MG/100ML; MG/100ML; MG/100ML
INJECTION, SOLUTION INTRAVENOUS CONTINUOUS
Status: DISCONTINUED | OUTPATIENT
Start: 2017-07-21 | End: 2017-07-22 | Stop reason: HOSPADM

## 2017-07-21 RX ADMIN — FENTANYL CITRATE 50 MCG: 50 INJECTION, SOLUTION INTRAMUSCULAR; INTRAVENOUS at 13:30

## 2017-07-21 RX ADMIN — SODIUM CHLORIDE, POTASSIUM CHLORIDE, SODIUM LACTATE AND CALCIUM CHLORIDE: 600; 310; 30; 20 INJECTION, SOLUTION INTRAVENOUS at 13:25

## 2017-07-21 RX ADMIN — MIDAZOLAM HYDROCHLORIDE 1 MG: 1 INJECTION, SOLUTION INTRAMUSCULAR; INTRAVENOUS at 13:31

## 2017-07-21 ASSESSMENT — PAIN - FUNCTIONAL ASSESSMENT
PAIN_FUNCTIONAL_ASSESSMENT: 0-10
PAIN_FUNCTIONAL_ASSESSMENT: 0-10

## 2017-07-24 RX ORDER — FENOFIBRATE 54 MG/1
TABLET ORAL
Qty: 30 TABLET | Refills: 2 | Status: SHIPPED | OUTPATIENT
Start: 2017-07-24 | End: 2017-10-25 | Stop reason: SDUPTHER

## 2017-07-24 RX ORDER — ATORVASTATIN CALCIUM 40 MG/1
TABLET, FILM COATED ORAL
Qty: 30 TABLET | Refills: 2 | Status: SHIPPED | OUTPATIENT
Start: 2017-07-24 | End: 2017-10-25 | Stop reason: SDUPTHER

## 2017-08-03 ENCOUNTER — HOSPITAL ENCOUNTER (OUTPATIENT)
Dept: PAIN MANAGEMENT | Age: 52
Discharge: HOME | End: 2017-08-03
Payer: MEDICARE

## 2017-08-03 VITALS
TEMPERATURE: 98 F | DIASTOLIC BLOOD PRESSURE: 92 MMHG | RESPIRATION RATE: 12 BRPM | SYSTOLIC BLOOD PRESSURE: 138 MMHG | HEART RATE: 68 BPM

## 2017-08-03 PROCEDURE — 99214 OFFICE O/P EST MOD 30 MIN: CPT

## 2017-08-03 RX ORDER — CLONAZEPAM 1 MG/1
1 TABLET ORAL NIGHTLY
Status: ON HOLD | COMMUNITY
End: 2018-07-03 | Stop reason: HOSPADM

## 2017-08-03 ASSESSMENT — ENCOUNTER SYMPTOMS
EYE DISCHARGE: 0
HEMOPTYSIS: 0
UNUSUAL HAIR DISTRIBUTION: 0
BLURRED VISION: 0
SUSPICIOUS LESIONS: 0
WHEEZING: 0
BACK PAIN: 1

## 2017-08-07 ENCOUNTER — OFFICE VISIT (OUTPATIENT)
Dept: INTERNAL MEDICINE | Age: 52
End: 2017-08-07
Payer: MEDICARE

## 2017-08-07 VITALS
DIASTOLIC BLOOD PRESSURE: 86 MMHG | BODY MASS INDEX: 34.51 KG/M2 | OXYGEN SATURATION: 97 % | HEIGHT: 69 IN | HEART RATE: 83 BPM | WEIGHT: 233 LBS | SYSTOLIC BLOOD PRESSURE: 128 MMHG

## 2017-08-07 DIAGNOSIS — E11.9 TYPE 2 DIABETES MELLITUS WITHOUT COMPLICATION, WITHOUT LONG-TERM CURRENT USE OF INSULIN (HCC): Primary | ICD-10-CM

## 2017-08-07 DIAGNOSIS — I10 BENIGN ESSENTIAL HTN: ICD-10-CM

## 2017-08-07 DIAGNOSIS — Z23 NEED FOR PROPHYLACTIC VACCINATION AGAINST STREPTOCOCCUS PNEUMONIAE (PNEUMOCOCCUS): ICD-10-CM

## 2017-08-07 PROCEDURE — 99214 OFFICE O/P EST MOD 30 MIN: CPT | Performed by: INTERNAL MEDICINE

## 2017-08-07 RX ORDER — TIZANIDINE 4 MG/1
TABLET ORAL
Qty: 30 TABLET | Refills: 0 | Status: SHIPPED | OUTPATIENT
Start: 2017-08-07 | End: 2017-08-25 | Stop reason: SDUPTHER

## 2017-08-07 ASSESSMENT — ENCOUNTER SYMPTOMS
BACK PAIN: 1
GASTROINTESTINAL NEGATIVE: 1

## 2017-08-10 ENCOUNTER — TELEPHONE (OUTPATIENT)
Dept: INTERNAL MEDICINE | Age: 52
End: 2017-08-10

## 2017-08-10 DIAGNOSIS — H91.93 BILATERAL HEARING LOSS, UNSPECIFIED HEARING LOSS TYPE: Primary | ICD-10-CM

## 2017-08-25 ENCOUNTER — TELEPHONE (OUTPATIENT)
Dept: INTERNAL MEDICINE | Age: 52
End: 2017-08-25

## 2017-08-25 DIAGNOSIS — H91.93 DECREASED HEARING, BILATERAL: Primary | ICD-10-CM

## 2017-08-25 RX ORDER — TIZANIDINE 4 MG/1
TABLET ORAL
Qty: 30 TABLET | Refills: 0 | Status: SHIPPED | OUTPATIENT
Start: 2017-08-25 | End: 2017-09-28 | Stop reason: SDUPTHER

## 2017-08-25 RX ORDER — OMEPRAZOLE 20 MG/1
CAPSULE, DELAYED RELEASE ORAL
Qty: 30 CAPSULE | Refills: 2 | Status: SHIPPED | OUTPATIENT
Start: 2017-08-25 | End: 2017-11-24 | Stop reason: SDUPTHER

## 2017-08-29 ENCOUNTER — TELEPHONE (OUTPATIENT)
Dept: ORTHOPEDIC SURGERY | Age: 52
End: 2017-08-29

## 2017-09-14 ENCOUNTER — HOSPITAL ENCOUNTER (OUTPATIENT)
Dept: PAIN MANAGEMENT | Age: 52
Discharge: HOME | End: 2017-09-14
Payer: MEDICARE

## 2017-09-14 VITALS
TEMPERATURE: 98 F | HEART RATE: 88 BPM | RESPIRATION RATE: 16 BRPM | SYSTOLIC BLOOD PRESSURE: 159 MMHG | DIASTOLIC BLOOD PRESSURE: 113 MMHG

## 2017-09-14 DIAGNOSIS — M54.50 CHRONIC BILATERAL LOW BACK PAIN WITHOUT SCIATICA: Primary | ICD-10-CM

## 2017-09-14 DIAGNOSIS — G89.29 CHRONIC BILATERAL LOW BACK PAIN WITHOUT SCIATICA: Primary | ICD-10-CM

## 2017-09-14 PROCEDURE — 99213 OFFICE O/P EST LOW 20 MIN: CPT | Performed by: PAIN MEDICINE

## 2017-09-14 PROCEDURE — 99214 OFFICE O/P EST MOD 30 MIN: CPT

## 2017-09-14 ASSESSMENT — ENCOUNTER SYMPTOMS
WHEEZING: 0
SUSPICIOUS LESIONS: 0
EYE DISCHARGE: 0
BLURRED VISION: 0
UNUSUAL HAIR DISTRIBUTION: 0
HEMOPTYSIS: 0
BACK PAIN: 1

## 2017-09-14 ASSESSMENT — PAIN DESCRIPTION - PAIN TYPE: TYPE: CHRONIC PAIN

## 2017-09-14 ASSESSMENT — PAIN DESCRIPTION - DESCRIPTORS: DESCRIPTORS: BURNING;ACHING;STABBING

## 2017-09-14 ASSESSMENT — PAIN DESCRIPTION - FREQUENCY: FREQUENCY: CONTINUOUS

## 2017-09-14 ASSESSMENT — PAIN SCALES - GENERAL: PAINLEVEL_OUTOF10: 7

## 2017-09-14 ASSESSMENT — PAIN DESCRIPTION - LOCATION: LOCATION: BACK;SHOULDER

## 2017-09-14 ASSESSMENT — PAIN DESCRIPTION - ORIENTATION: ORIENTATION: RIGHT;LEFT;LOWER;UPPER

## 2017-09-14 ASSESSMENT — PAIN DESCRIPTION - ONSET: ONSET: ON-GOING

## 2017-09-18 ENCOUNTER — OFFICE VISIT (OUTPATIENT)
Dept: ORTHOPEDIC SURGERY | Age: 52
End: 2017-09-18
Payer: MEDICARE

## 2017-09-18 VITALS
DIASTOLIC BLOOD PRESSURE: 87 MMHG | HEIGHT: 68 IN | BODY MASS INDEX: 34.86 KG/M2 | WEIGHT: 230 LBS | HEART RATE: 98 BPM | SYSTOLIC BLOOD PRESSURE: 129 MMHG

## 2017-09-18 DIAGNOSIS — M25.562 CHRONIC PAIN OF LEFT KNEE: Primary | ICD-10-CM

## 2017-09-18 DIAGNOSIS — G89.29 CHRONIC PAIN OF LEFT KNEE: Primary | ICD-10-CM

## 2017-09-18 PROCEDURE — 20610 DRAIN/INJ JOINT/BURSA W/O US: CPT | Performed by: ORTHOPAEDIC SURGERY

## 2017-09-18 PROCEDURE — 99203 OFFICE O/P NEW LOW 30 MIN: CPT | Performed by: ORTHOPAEDIC SURGERY

## 2017-09-18 RX ORDER — BETAMETHASONE SODIUM PHOSPHATE AND BETAMETHASONE ACETATE 3; 3 MG/ML; MG/ML
6 INJECTION, SUSPENSION INTRA-ARTICULAR; INTRALESIONAL; INTRAMUSCULAR; SOFT TISSUE ONCE
Status: COMPLETED | OUTPATIENT
Start: 2017-09-18 | End: 2017-09-18

## 2017-09-18 RX ORDER — BUPIVACAINE HYDROCHLORIDE 2.5 MG/ML
2 INJECTION, SOLUTION INFILTRATION; PERINEURAL ONCE
Status: COMPLETED | OUTPATIENT
Start: 2017-09-18 | End: 2017-09-18

## 2017-09-18 RX ORDER — LIDOCAINE HYDROCHLORIDE 10 MG/ML
5 INJECTION, SOLUTION INFILTRATION; PERINEURAL ONCE
Status: COMPLETED | OUTPATIENT
Start: 2017-09-18 | End: 2017-09-18

## 2017-09-18 RX ADMIN — BUPIVACAINE HYDROCHLORIDE 5 MG: 2.5 INJECTION, SOLUTION INFILTRATION; PERINEURAL at 14:29

## 2017-09-18 RX ADMIN — BETAMETHASONE SODIUM PHOSPHATE AND BETAMETHASONE ACETATE 6 MG: 3; 3 INJECTION, SUSPENSION INTRA-ARTICULAR; INTRALESIONAL; INTRAMUSCULAR; SOFT TISSUE at 14:29

## 2017-09-18 RX ADMIN — LIDOCAINE HYDROCHLORIDE 5 ML: 10 INJECTION, SOLUTION INFILTRATION; PERINEURAL at 14:30

## 2017-09-21 LAB
CREATININE URINE: 120.6 MG/DL
MICROALBUMIN/CREAT 24H UR: 6 MG/G{CREAT}

## 2017-09-22 RX ORDER — GABAPENTIN 300 MG/1
CAPSULE ORAL
Qty: 90 CAPSULE | Refills: 2 | Status: ON HOLD | OUTPATIENT
Start: 2017-09-22 | End: 2018-06-05

## 2017-09-29 RX ORDER — TIZANIDINE 4 MG/1
TABLET ORAL
Qty: 30 TABLET | Refills: 0 | Status: SHIPPED | OUTPATIENT
Start: 2017-09-29 | End: 2017-11-06 | Stop reason: SDUPTHER

## 2017-09-29 RX ORDER — AMITRIPTYLINE HYDROCHLORIDE 25 MG/1
TABLET, FILM COATED ORAL
Qty: 30 TABLET | Refills: 2 | Status: SHIPPED | OUTPATIENT
Start: 2017-09-29 | End: 2017-12-15 | Stop reason: SDUPTHER

## 2017-09-29 RX ORDER — PANCRELIPASE 24000; 76000; 120000 [USP'U]/1; [USP'U]/1; [USP'U]/1
CAPSULE, DELAYED RELEASE PELLETS ORAL
Qty: 180 CAPSULE | Refills: 0 | Status: SHIPPED | OUTPATIENT
Start: 2017-09-29 | End: 2017-10-29 | Stop reason: SDUPTHER

## 2017-10-02 ENCOUNTER — HOSPITAL ENCOUNTER (OUTPATIENT)
Dept: PHYSICAL THERAPY | Facility: CLINIC | Age: 52
Setting detail: THERAPIES SERIES
Discharge: HOME | End: 2017-10-02
Payer: MEDICARE

## 2017-10-02 PROCEDURE — 97163 PT EVAL HIGH COMPLEX 45 MIN: CPT

## 2017-10-02 PROCEDURE — 97140 MANUAL THERAPY 1/> REGIONS: CPT

## 2017-10-02 NOTE — CONSULTS
[] Rajeev Cedillo        Outpatient Physical                Therapy       [address]       Phone: [phone]       Fax: [phone] [x] [facility]       Promotion at [address]       Phone: [phone]       Fax: [phone] [] [facility] [address] [place] [address]      for Health Promotion    [address] [facility] [address]      Phone: [phone]     Fax:  [phone]     Physical Therapy Spine Evaluation    Date:  10/2/2017  Patient: Dori Miranda  : 1965  MRN: [id number]  Physician: Micheal Barney MD     Insurance: [employer]  Medical Diagnosis: bilateral LBP without sciatica    Rehab Codes:  M54.5; M79.1; M54.2; M54.6; M25.511; M25.512  Onset Date: 16    Next 's appt.:     Subjective:   CC: pain in low back and neck; Unable to walk, sit or stand for any perior of time; constant burning in shoulders and upper arms; patient with burning in both anterior thighs since the accident;  HPI: (onset date) patient fractured c-spine with decompression surgery    PMHx:  [x] Diabetes past [x] HTN      [x] Arthritis [x] Other: pancreatitis, falls frequently; ETOH abouse, DKA, c.  Difficile; anesthesia complication cervical  Surgery; patient with laminecomy C3-7              [x] Refer to full medical chart  In EPIC   Tests: [x] X-Ray: [x] MRI: Per patient DDD     Medications: [x] Refer to full medical record [] None [x] Other: has had injections and various therapies  Allergies:       [x] None     Function:  Hand Dominance  [x] Right    Working:    [x] Not Employed - has been denied for disability two times; by training, patient is a journeyman; hasn't worked since just before hurting self     Job/ADL Description: patient with limited activity; lives with sister; difficulty taking dinner to another room; patient tries to walk throughout the day a total of 30 minutes;  Pain:  [x] Yes   Location: LBP, neck, shoulders Pain Rating: (0-10 scale) 7/10  Pain altered Tx:    [x] No    Symptoms:  [x] Same  Better: Worse:   [x] Sit    [x] Rise/Sit    [x]Stand    [x] Walk    [] Lying    [x] Bend                              Sleep: [] OK    [x] Disturbed: sleeps only 1 hr at a time and wakes several times because of this. Objective:      STRENGTH  STRENGTH  ROM    Left Right  Left Right Cervical    C5 Shld Abd   L1-2 Hip Flex 4 4 Flexion 38   Shld Flexion   Hip Abd   Extension 30   Shld IR   L3-4 Knee Ext 4 4 Rotation L  68 R 55   Shld ER   L4 Ankle DF 4 4 Sidebend L 28 R 28   C6 Elb Flex   L5 EHL   Retraction    C7 Elb Ext   S1 Plant.  Flex 4- 4- Lumbar    C8 EPL   Abdominals   Flexion 12 cm   T1 Fing Abd   Erector Spinae   Extension 12         Rotation L  R         Sidebend L 15 R 20         UE/LE           Shoulder AROM 100 100         IR LB LB                                    SLS: less than 2 sec each LE  General listening seated: L anterior body; lower  General listening standing: anterior; many direction  Tender points: 9/16    TESTS (+/-) LEFT RIGHT Not Tested   SLR [] sit [] supine   [x]   Hamstring (SLR)   [x]   SKTC   [x]   DKTC   [x]   Slump/Dural + + []   SI JT   [x]   SHANNAN   [x]   Joint Mobility +spasm  []     OBSERVATION No Deficit Deficit Not Tested Comments   Posture       Forward Head [] [x] [] Indentation cervial   Rounded Shoulders [] [x] []    Kyphosis [x] [] []    Lordosis [] [x] [] increased   Lateral Shift [] [] []    Scoliosis [] [x] [] lumbar   Iliac Crest [] [] []    PSIS [] [] []    ASIS [] [] []    Genu Valgus [x] [] []    Genu Varus [x] [] []    Genu Recurvatum [x] [] []    Pronation [] [] []    Supination [] [] []    Leg Length Discrp [] [] []    Slumped Sitting [] [] []    Palpation [] [x] [] Multiple trigger points c-spine, t-spine and lumbar region   Sensation [] [] []    Edema [x] [] []    Neurological [] [] []                FUNCTION Normal Difficult Unable   Sitting [] [x] []   Standing [] [x] []   Ambulation [] [x]

## 2017-10-02 NOTE — PLAN OF CARE
isometric strength  4. ? Function: modify posture/body mechanics  5. Independent with Home Exercise Program  6. Demonstrate Knowledge of fall prevention     LTG: (to be met in 12 treatments)  1. Pain below 6/10  2. Decreased lordosis and possible reduction in scoliosis with treatment of soft tissue  3. Have patient do at least 20 min of continuous walking to increase physical condition     Patient goals:  \"get better\"        Rehab Potential:  [] Good  [x] Fair  [] Poor             Suggested Professional Referral:  [x] No  [] Yes:  Barriers to Goal Achievement[de-identified]  [x] No  [] Yes:  Domestic Concerns:  [] No  [x] Yes: patient with unstable social situation, currently living with sister; no signs of abuse     Pt. Education:  [x] Plans/Goals, Risks/Benefits discussed  [x] Home exercise program  Method of Education: [x] Verbal  [x] Demo  [x] Written: information on dry needling  Comprehension of Education:  [x] Verbalizes understanding. [x] Demonstrates understanding. [x] Needs Review. [] Demonstrates/verbalizes understanding of HEP/Ed previously given. Treatment Plan:  [x] Therapeutic Exercise    [] Modalities:  [x] Therapeutic Activity    [] Ultrasound  [x] Electrical Stimulation  [] Gait Training     [] Massage       [] Lumbar/Cervical Traction  [] Neuromuscular Re-education [x] Cold/hotpack [] Iontophoresis: 4 mg/mL  [x] Instruction in HEP             Dexamethasone Sodium  [x] Manual Therapy, dry needling             Phosphate 40-80 mAmin  [] Aquatic Therapy    [] Other:     [] Vasocompression/       Game Ready      []  Medication allergies reviewed for use of    Dexamethasone Sodium Phosphate 4mg/ml     with iontophoresis treatments. Pt is not allergic.       Frequency:  2  x/week for 12 visits      Electronically signed by: Red Brown PT        Physician Signature:________________________________Date:__________________  By signing above or cosigning this note, I have reviewed this plan of care and certify a need for medically necessary rehabilitation services.      *PLEASE SIGN ABOVE AND FAX BACK ALL PAGES*

## 2017-10-02 NOTE — FLOWSHEET NOTE
[facility] Fall Risk Assessment    Patient Name:  Linda Rowley  : 1965        Risk Factor Scale  Score   History of Falls [x] Yes  [] No 25  0  25   Secondary Diagnosis [x] Yes  [] No 15  0 15   Ambulatory Aid [] Furniture  [] Crutches/cane/walker  [x] None/bedrest/wheelchair/nurse 30  15  0 0   IV/Heparin Lock [] Yes  [x] No 20  0 0   Gait/Transferring [] Impaired  [] Weak  [x] Normal/bedrest/immobile 20  10  0 0   Mental Status [] Forgets limitations  [x] Oriented to own ability 15  0 0      Total:  40     Based on the Assessment score: check the appropriate box.     []  No intervention needed   Low =   Score of 0-24    [x]  Use standard prevention interventions Moderate =  Score of 24-44   [x] Give patient handout and discuss fall prevention strategies   [x] Establish goal of education for patient/family RE: fall prevention strategies    []  Use high risk prevention interventions High = Score of 45 and higher   [] Give patient handout and discuss fall prevention strategies   [] Establish goal of education for patient/family Re: fall prevention strategies   [] Discuss lifeline / other resources    Electronically signed by:   Red Brown PT  Date: 10/2/2017

## 2017-10-05 ENCOUNTER — HOSPITAL ENCOUNTER (OUTPATIENT)
Dept: PHYSICAL THERAPY | Facility: CLINIC | Age: 52
Setting detail: THERAPIES SERIES
Discharge: HOME | End: 2017-10-05
Payer: MEDICARE

## 2017-10-05 PROCEDURE — 97140 MANUAL THERAPY 1/> REGIONS: CPT

## 2017-10-05 PROCEDURE — 97530 THERAPEUTIC ACTIVITIES: CPT

## 2017-10-05 NOTE — FLOWSHEET NOTE
per plan of care.    [] Other:      Time In:  8:00 am           Time Out: 8:55 am    Electronically signed by:  Akshat Zimmer PT

## 2017-10-11 ENCOUNTER — HOSPITAL ENCOUNTER (OUTPATIENT)
Dept: PHYSICAL THERAPY | Facility: CLINIC | Age: 52
Setting detail: THERAPIES SERIES
Discharge: HOME | End: 2017-10-11
Payer: MEDICARE

## 2017-10-11 PROCEDURE — 97140 MANUAL THERAPY 1/> REGIONS: CPT

## 2017-10-11 NOTE — FLOWSHEET NOTE
infraspinatus bilaterally; 0.5\" in cervical paraspinals (3); 1 2\" in T6 homeostatic point; 2 1\" in upper thoracic paraspinals, bilaterally  left in situ 15 min     Specific Instructions for next treatment:  DN; stretch/manual work to psoas; core mm contraction; eval for MET          Treatment Charges: Mins Units   []  Modalities     []  Ther Exercise     [x]  Manual Therapy 35 2   []  Ther Activities     []  Aquatics     []  Vasocompression     []  Other     Total Treatment time 35        Assessment: [] Progressing toward goals. [x] No change: no significant changes this date. [] Other:    Problems:    [x] ? Back Pain: 7/10                                         [x] ? Cervical Pain: 7/10              [x] ? ROM: general stiffness in spine with limitation in movement; poor UE shoulder flexibility                                           [x] ? Strength: poor physical condition               [x] ? Function:  [x] Postural Deviations: scoliosis; increased lordosis; cervical spine abnormality with incision indented  [] Gait Deviations  [x] Other: mm tenderness/spasm  Poor balance                                                STG: (to be met in 6  treatments)  1. ? Pain: below 7/10  2. ? ROM: tolerate shoulder flexibility; psoas stretching  3. ? Strength: tolerate some core and post hip isometric strength  4. ? Function: modify posture/body mechanics  5. Independent with Home Exercise Program  6. Demonstrate Knowledge of fall prevention     LTG: (to be met in 12 treatments)  1. Pain below 6/10  2. Decreased lordosis and possible reduction in scoliosis with treatment of soft tissue  3. Have patient do at least 20 min of continuous walking to increase physical condition     Patient goals:  \"get better\"       Pt. Education:  [] Yes  [x] No  [] Reviewed Prior HEP/Ed  Method of Education: [] Verbal  [] Demo  [] Written: Fall Prevention strategies  Comprehension of Education:  [x] Verbalizes understanding.   []

## 2017-10-12 ENCOUNTER — HOSPITAL ENCOUNTER (OUTPATIENT)
Dept: PHYSICAL THERAPY | Facility: CLINIC | Age: 52
Setting detail: THERAPIES SERIES
Discharge: HOME | End: 2017-10-12
Payer: MEDICARE

## 2017-10-12 ENCOUNTER — APPOINTMENT (OUTPATIENT)
Dept: PHYSICAL THERAPY | Facility: CLINIC | Age: 52
End: 2017-10-12
Payer: MEDICARE

## 2017-10-12 PROCEDURE — 97140 MANUAL THERAPY 1/> REGIONS: CPT

## 2017-10-12 PROCEDURE — 97110 THERAPEUTIC EXERCISES: CPT

## 2017-10-12 NOTE — FLOWSHEET NOTE
to increase physical condition     Patient goals:  \"get better\"       Pt. Education:  [] Yes  [] No  [x] Reviewed Prior HEP/Ed  Method of Education: [x] Verbal  [] Demo  [] Written:  Comprehension of Education:  [x] Verbalizes understanding. [] Demonstrates understanding. [] Needs review. [x] Demonstrates/verbalizes HEP/Ed previously given. Reviewed rolling to the side to get out of bed and fall prevention strategies     Plan: [x] Continue per plan of care.    [] Other:      Time In:  11:00 am           Time Out: 12:10 pm    Electronically signed by:  Alexys Nunes PTA

## 2017-10-16 ENCOUNTER — APPOINTMENT (OUTPATIENT)
Dept: PHYSICAL THERAPY | Facility: CLINIC | Age: 52
End: 2017-10-16
Payer: MEDICARE

## 2017-10-18 ENCOUNTER — APPOINTMENT (OUTPATIENT)
Dept: PHYSICAL THERAPY | Facility: CLINIC | Age: 52
End: 2017-10-18
Payer: MEDICARE

## 2017-10-23 ENCOUNTER — HOSPITAL ENCOUNTER (OUTPATIENT)
Dept: PHYSICAL THERAPY | Facility: CLINIC | Age: 52
Setting detail: THERAPIES SERIES
Discharge: HOME | End: 2017-10-23
Payer: MEDICARE

## 2017-10-23 PROCEDURE — 97140 MANUAL THERAPY 1/> REGIONS: CPT

## 2017-10-23 NOTE — FLOWSHEET NOTE
paraspinals at multiple levels;  10 1\" in upper thoracic paraspinals, bilaterally  left in situ 15 min     Specific Instructions for next treatment:  DN; stretch/manual work to psoas; core mm contraction; eval for MET    10.12.17 Review SKTC technique to avoid use of UE to avoid burning sensation noted by patient       Treatment Charges: Mins Units   []  Modalities     []  Ther Exercise     [x]  Manual Therapy 50 3   []  Ther Activities     []  Aquatics     []  Vasocompression     []  Other     Total Treatment time 50 3       Assessment: [] Progressing toward goals. [x] No change:      [] Other:    Problems:    [x] ? Back Pain: 7/10                                         [x] ? Cervical Pain: 7/10              [x] ? ROM: general stiffness in spine with limitation in movement; poor UE shoulder flexibility                                           [x] ? Strength: poor physical condition               [x] ? Function:  [x] Postural Deviations: scoliosis; increased lordosis; cervical spine abnormality with incision indented  [] Gait Deviations  [x] Other: mm tenderness/spasm  Poor balance                                                STG: (to be met in 6  treatments)  1. ? Pain: below 7/10  2. ? ROM: tolerate shoulder flexibility; psoas stretching  3. ? Strength: tolerate some core and post hip isometric strength  4. ? Function: modify posture/body mechanics  5. Independent with Home Exercise Program  6. Demonstrate Knowledge of fall prevention     LTG: (to be met in 12 treatments)  1. Pain below 6/10  2. Decreased lordosis and possible reduction in scoliosis with treatment of soft tissue  3. Have patient do at least 20 min of continuous walking to increase physical condition     Patient goals:  \"get better\"       Pt. Education:  [] Yes  [] No  [x] Reviewed Prior HEP/Ed  Method of Education: [x] Verbal  [] Demo  [] Written:  Comprehension of Education:  [x] Verbalizes understanding.   [] Demonstrates understanding. [] Needs review. [x] Demonstrates/verbalizes HEP/Ed previously given. Reviewed rolling to the side to get out of bed and fall prevention strategies     Plan: [x] Continue per plan of care.    [] Other:      Time In:  11:00 am           Time Out: 12:10 pm    Electronically signed by:  Lewis Mcgill, PT

## 2017-10-25 RX ORDER — ATORVASTATIN CALCIUM 40 MG/1
TABLET, FILM COATED ORAL
Qty: 90 TABLET | Refills: 2 | Status: SHIPPED | OUTPATIENT
Start: 2017-10-25 | End: 2019-02-25 | Stop reason: SDUPTHER

## 2017-10-25 RX ORDER — FENOFIBRATE 54 MG/1
TABLET ORAL
Qty: 90 TABLET | Refills: 2 | Status: ON HOLD | OUTPATIENT
Start: 2017-10-25 | End: 2021-01-01

## 2017-10-25 NOTE — TELEPHONE ENCOUNTER
Health Maintenance   Topic Date Due    Diabetic foot exam  07/26/1977    Diabetic retinal exam  07/26/1977    Pneumococcal med risk (1 of 1 - PPSV23) 07/26/1986    Colon Cancer Screen FIT/FOBT  07/26/2017    Flu vaccine (1) 09/01/2017    Diabetic hemoglobin A1C test  03/27/2018    Lipid screen  03/27/2018    Diabetic microalbuminuria test  09/21/2018    DTaP/Tdap/Td vaccine (2 - Td) 04/27/2026    HIV screen  Completed             (applicable per patient's age: Cancer Screenings, Depression Screening, Fall Risk Screening, Immunizations)    Hemoglobin A1C (%)   Date Value   03/27/2017 6.7 (H)   04/27/2016 5.2   09/12/2012 7.5 (H)     Microalb/Crt.  Ratio (mcg/mg creat)   Date Value   08/29/2011 25     LDL Cholesterol (mg/dL)   Date Value   03/27/2017          AST (U/L)   Date Value   03/27/2017 31     ALT (U/L)   Date Value   03/27/2017 38     BUN (mg/dL)   Date Value   03/27/2017 4 (L)      (goal A1C is < 7)   (goal LDL is <100) need 30-50% reduction from baseline     BP Readings from Last 3 Encounters:   09/18/17 129/87   09/14/17 (!) 159/113   08/07/17 128/86    (goal /80)      All Future Testing planned in [facility]:  Lab Frequency Next Occurrence   PT eval and treat Once 03/09/2017   Microalbumin, Ur Once 03/05/2018   PT eval and treat Once 04/27/2017   PT eval and treat Once 07/28/2017   Microalbumin, Ur Once 05/14/2018       Next Visit Date:  Future Appointments  Date Time Provider Bob Serrano   10/26/2017 8:00 AM Mariano Kay, PT [facility]   10/30/2017 11:00 AM Mariano Kay, PT [facility]   11/2/2017 8:00 AM Marianoelizabeth Kay, PT [facility]   3/21/2018 9:35 AM Scarlett Chanel MD [id number]            Patient Active Problem List:     Alcohol withdrawal (HCC)     Alcohol abuse     Benign essential HTN     Chronic bilateral low back pain without sciatica     Moderate episode of recurrent major depressive disorder (HCC)     Generalized abdominal pain

## 2017-10-26 ENCOUNTER — HOSPITAL ENCOUNTER (OUTPATIENT)
Dept: PHYSICAL THERAPY | Facility: CLINIC | Age: 52
Setting detail: THERAPIES SERIES
Discharge: HOME | End: 2017-10-26
Payer: MEDICARE

## 2017-10-26 PROCEDURE — 97140 MANUAL THERAPY 1/> REGIONS: CPT

## 2017-10-26 PROCEDURE — 97110 THERAPEUTIC EXERCISES: CPT

## 2017-10-26 NOTE — FLOWSHEET NOTE
prone: LUMBAR: 4 2\" in L2-4 ea side, 3\" in L5 bilaterally; 3 3\" in superior cluneals bilaterally; ; 1\" in bilat posterior popliteals; ; 1\" in sural; 2\" in common fibular; 2\" in T6 homeostatic point;  1\" in greater occipital homestatic points bilaterally; ;  0.5\" in cervical paraspinals at multiple levels;  10 1\" in upper thoracic paraspinals, bilaterally  left in situ 15 min     Specific Instructions for next treatment:  DN; stretch/manual work to psoas; core mm contraction; eval for MET    10.12.17 Review SKTC technique to avoid use of UE to avoid burning sensation noted by patient       Treatment Charges: Mins Units   []  Modalities     [x]  Ther Exercise 20 1   [x]  Manual Therapy 30 2   []  Ther Activities     []  Aquatics     []  Vasocompression     []  Other     Total Treatment time 50 3       Assessment: [x] Progressing toward goals. Significant tightness in bilateral hip flexors with R much more tight than L; concentrated on form and understanding of anatomy for patient to continue with stretches at home; much cervical tightness, especially with rotation, mostly due to past surgery. Patient able to tolerate gentle PROM and UT stretches with head in neutral to help decrease UE symptoms. Patient encouraged to continue with home program to further benefit him. Demonstrating proper body mechanics without need for verbal cues. [x] No change:      [] Other:    Problems:    [x] ? Back Pain: 7/10                                         [x] ? Cervical Pain: 7/10              [x] ? ROM: general stiffness in spine with limitation in movement; poor UE shoulder flexibility                                           [x] ? Strength: poor physical condition               [x] ?  Function:  [x] Postural Deviations: scoliosis; increased lordosis; cervical spine abnormality with incision indented  [] Gait Deviations  [x] Other: mm tenderness/spasm  Poor balance                                                STG: (to be met

## 2017-10-30 ENCOUNTER — HOSPITAL ENCOUNTER (OUTPATIENT)
Dept: PHYSICAL THERAPY | Facility: CLINIC | Age: 52
Setting detail: THERAPIES SERIES
Discharge: HOME | End: 2017-10-30
Payer: MEDICARE

## 2017-10-30 PROCEDURE — 97140 MANUAL THERAPY 1/> REGIONS: CPT

## 2017-10-30 RX ORDER — PANCRELIPASE 24000; 76000; 120000 [USP'U]/1; [USP'U]/1; [USP'U]/1
CAPSULE, DELAYED RELEASE PELLETS ORAL
Qty: 180 CAPSULE | Refills: 0 | Status: SHIPPED | OUTPATIENT
Start: 2017-10-30 | End: 2017-11-27 | Stop reason: SDUPTHER

## 2017-10-30 NOTE — TELEPHONE ENCOUNTER
Next Visit Date:  Future Appointments  Date Time Provider Bob Maggie   10/30/2017 11:00 AM Mariano Rahul, PT [facility]   11/2/2017 8:00 AM Mariano Rahul, PT [facility]   11/6/2017 11:00 AM Mariano Rahul, PT [facility]   11/9/2017 8:00 AM Megan Povsic, PT [facility]   11/13/2017 11:00 AM Megan Povsic, PT [facility]   11/16/2017 8:00 AM Megan Povsic, PT [facility]   11/20/2017 11:00 AM Megan Povsic, PT [facility]   11/23/2017 8:00 AM Megan Povsic, PT [facility]   11/27/2017 11:00 AM Mariano Rahul, PT [facility]   11/30/2017 8:00 AM Mariano Rahul, PT [facility]   3/21/2018 9:35 AM [facility]       Health Maintenance   Topic Date Due    Diabetic foot exam  07/26/1977    Diabetic retinal exam  07/26/1977    Pneumococcal med risk (1 of 1 - PPSV23) 07/26/1986    Colon Cancer Screen FIT/FOBT  07/26/2017    Flu vaccine (1) 09/01/2017    Diabetic hemoglobin A1C test  03/27/2018    Lipid screen  03/27/2018    Diabetic microalbuminuria test  09/21/2018    DTaP/Tdap/Td vaccine (2 - Td) 04/27/2026    HIV screen  Completed       Hemoglobin A1C (%)   Date Value   03/27/2017 6.7 (H)   04/27/2016 5.2   09/12/2012 7.5 (H)             ( goal A1C is < 7)   Microalb/Crt.  Ratio (mcg/mg creat)   Date Value   08/29/2011 25     LDL Cholesterol (mg/dL)   Date Value   03/27/2017            (goal LDL is <100)   AST (U/L)   Date Value   03/27/2017 31     ALT (U/L)   Date Value   03/27/2017 38     BUN (mg/dL)   Date Value   03/27/2017 4 (L)     BP Readings from Last 3 Encounters:   09/18/17 129/87   09/14/17 (!) 159/113   08/07/17 128/86          (goal 120/80)    All Future Testing planned in [facility]  Lab Frequency Next Occurrence   PT eval and treat Once 03/09/2017   Microalbumin, Ur Once 03/05/2018   PT eval and treat Once 04/27/2017   PT eval and treat Once 07/28/2017   Microalbumin, Ur

## 2017-10-30 NOTE — FLOWSHEET NOTE
[] Estrellita Moreno       Outpatient Physical        Therapy       [address]       Phone: [phone]       Fax: [phone] [x] [facility] at [address] [facility]       Phone: [phone]       Fax: [phone] [] [facility]  [address] [facility]   Phone: [phone]   Fax:  [phone]     Physical Therapy Daily Treatment Note    Date:  10/30/2017  Patient Name:  Dilia Ramirez    :  1965  MRN: [id number]  Physician: Maycol Toscano MD                                                          Insurance: paramount advantage  Medical Diagnosis: bilateral LBP without sciatica                                      Rehab Codes:                      M54.5; M79.1; M54.2; M54.6; M25.511; M25.512  Onset Date: 16                                       Next 's appt. :      Visit# / total visits:       Cancels/No Shows: 0    Subjective:    Pain:  [x] Yes  [] No Location: lumbar, cervical Pain Rating: (0-10 scale) 710  Pain altered Tx:  [x] No  [] Yes  Action:  Comments: normal amount of pain; patient feels like he is coming down with a cold.  Reports he is working on HEP    Objective:  Modalities:   Precautions: cervical laminectomy  Exercises: not today  Exercise Reps/ Time Weight/ Level Comments   UT stretches 2 ea  Head in midline   PROM c-spine x  Gentle, in available ROM   Cane shoulder flexion 10 1# cane    Cane h. abd/adduction 10 1# cane           PROM  5'   Psoas, HS, piriformis, hip IR/ER   Psoas stretch 2 min  Off edge of table    PPT 10x        Marches 10x        SKTC 20'x3   Use with towel     LTR 10x       Other:   Not today: Therapeutic activities: reviewed fall prevention strategies; briefly spoke re posture and use of support  Not today: MFR: c-spine, traps, OBR, bilateral shoulders including deltoids, biceps; much tightness    DRY NEEDLING: consent obtained and skin prepped with alcohol; standard procedure followed:   45 total:in prone: LUMBAR: 3 2\" in L2-4 ea side, 3\" in L5 bilaterally; 3 3\" in superior cluneals bilaterally; ; 1\" in bilat posterior popliteals; ; 1\" in sural; 2\" in common fibular; 0.5\" in tibial bilaterally;    0.5\" in greater occipital homestatic points bilaterally; ;  0.5\" in cervical paraspinals at multiple levels;  and upper thoracic paraspinals, bilaterally  left in situ 15 min     Specific Instructions for next treatment:  DN; stretch/manual work to psoas; core mm contraction; eval for MET    10.12.17 Review SKTC technique to avoid use of UE to avoid burning sensation noted by patient       Treatment Charges: Mins Units   []  Modalities     []  Ther Exercise     [x]  Manual Therapy 35 2   []  Ther Activities     []  Aquatics     []  Vasocompression     []  Other     Total Treatment time 35 2       Assessment: [] Progressing toward goals. [x] No change: did not do as much today due to cold/sniffling; patient tolerated dry needling. [] Other:    Problems:    [x] ? Back Pain: 7/10                                         [x] ? Cervical Pain: 7/10              [x] ? ROM: general stiffness in spine with limitation in movement; poor UE shoulder flexibility                                           [x] ? Strength: poor physical condition               [x] ? Function:  [x] Postural Deviations: scoliosis; increased lordosis; cervical spine abnormality with incision indented  [] Gait Deviations  [x] Other: mm tenderness/spasm  Poor balance                                                STG: (to be met in 6  treatments)  1. ? Pain: below 7/10: ONGOING 10/26/17  2. ? ROM: tolerate shoulder flexibility; psoas stretching: MET 10/26/17  3. ? Strength: tolerate some core and post hip isometric strength: MET 10/26/17  4. ? Function: modify posture/body mechanics: MET 10/26/17  5. Independent with Home Exercise Program: MET 10/26/17  6.  Demonstrate Knowledge of fall prevention: MET 10/5/17     LTG: (to be met in 12 treatments)  1. Pain below 6/10  2. Decreased lordosis and possible reduction in scoliosis with treatment of soft tissue  3. Have patient do at least 20 min of continuous walking to increase physical condition     Patient goals:  \"get better\"       Pt. Education:  [] Yes  [] No  [x] Reviewed Prior HEP/Ed  Method of Education: [] Verbal  [] Demo  [] Written: cane shoulder ex and psoas stretching  Comprehension of Education:  [x] Verbalizes understanding. [] Demonstrates understanding. [x] Needs review. [] Demonstrates/verbalizes HEP/Ed previously given. Plan: [x] Continue per plan of care.    [] Other:      Time In:  11:00 am           Time Out:  11:50 am    Electronically signed by:  Diana Lakhani PT

## 2017-11-02 ENCOUNTER — HOSPITAL ENCOUNTER (OUTPATIENT)
Dept: PHYSICAL THERAPY | Facility: CLINIC | Age: 52
Setting detail: THERAPIES SERIES
Discharge: HOME | End: 2017-11-02
Payer: MEDICARE

## 2017-11-02 PROCEDURE — 97140 MANUAL THERAPY 1/> REGIONS: CPT

## 2017-11-02 PROCEDURE — 97110 THERAPEUTIC EXERCISES: CPT

## 2017-11-02 NOTE — FLOWSHEET NOTE
[] Angle Hunter       Outpatient Physical        Therapy       [address]       Phone: [phone]       Fax: [phone] [x] [facility] Promotion at [address]       Phone: [phone]       Fax: [phone] [] [facility]. [address] [place] [address] Promotion  [address] [place] [address]   Phone: [phone]   Fax:  [phone]     Physical Therapy Daily Treatment Note    Date:  2017  Patient Name:  Bonnie Huertas    :  1965  MRN: [id number]  Physician: Louis Grace MD                                                          Insurance: paramount advantage  Medical Diagnosis: bilateral LBP without sciatica                                      Rehab Codes:                      M54.5; M79.1; M54.2; M54.6; M25.511; M25.512  Onset Date: 16                                       Next 's appt. :      Visit# / total visits:       Cancels/No Shows: 0    Subjective:    Pain:  [x] Yes  [] No Location: lumbar, cervical Pain Rating: (0-10 scale) 7.5/10  Pain altered Tx:  [x] No  [] Yes  Action:  Comments: shoulders are burning down to elbows; patient was sore the day of the last treatment but it improved by the next day. Patient sees counselor tomorrow.     Objective:  Modalities:   Precautions: cervical laminectomy  Exercises: not today  Exercise Reps/ Time Weight/ Level Comments   Pulleys UE 4 min  Started 17         UT stretches 2 ea  Head in midline   PROM c-spine x  Gentle, in available ROM   Cane punch-ups 15 2# cane Started 17   Cane shoulder flexion 10 2# cane Progressed 17   Cane h. abd/adduction 10 2# cane Progressed 17          PROM  5'   Psoas, HS, piriformis, hip IR/ER   Psoas stretch 2 min  Off edge of table    PPT 10x  5 sec      Marches 10x       Alt arm/leg 10 A Started    SLR 10 A Started 17          SKTC 20'x3   Use with towel     LTR 10x       Other:   Not today: Therapeutic (to be met in 6  treatments)  1. ? Pain: below 7/10: ONGOING 10/26/17  2. ? ROM: tolerate shoulder flexibility; psoas stretching: MET 10/26/17  3. ? Strength: tolerate some core and post hip isometric strength: MET 10/26/17  4. ? Function: modify posture/body mechanics: MET 10/26/17  5. Independent with Home Exercise Program: MET 10/26/17  6. Demonstrate Knowledge of fall prevention: MET 10/5/17     LTG: (to be met in 12 treatments)  1. Pain below 6/10  2. Decreased lordosis and possible reduction in scoliosis with treatment of soft tissue  3. Have patient do at least 20 min of continuous walking to increase physical condition     Patient goals:  \"get better\"       Pt. Education:  [x] Yes  [] No  [x] Reviewed Prior HEP/Ed  Method of Education: [x] Verbal  [x] Demo  [x] Written: core mm exercises  Comprehension of Education:  [x] Verbalizes understanding. [x] Demonstrates understanding. [x] Needs review. [] Demonstrates/verbalizes HEP/Ed previously given. Plan: [x] Continue per plan of care.    [] Other:      Time In:  8:00 am           Time Out:  9:10 am    Electronically signed by:  Karsten Barahona PT

## 2017-11-06 ENCOUNTER — HOSPITAL ENCOUNTER (OUTPATIENT)
Dept: PHYSICAL THERAPY | Facility: CLINIC | Age: 52
Setting detail: THERAPIES SERIES
Discharge: HOME | End: 2017-11-06
Payer: MEDICARE

## 2017-11-06 PROCEDURE — 97140 MANUAL THERAPY 1/> REGIONS: CPT

## 2017-11-06 PROCEDURE — 97110 THERAPEUTIC EXERCISES: CPT

## 2017-11-06 NOTE — FLOWSHEET NOTE
[] Deena Harrison       Outpatient Physical        Therapy       [address]       Phone: [phone]       Fax: [phone] [x] [facility] Prom[facility] at [address] [facility]       Phone: [phone]       Fax: [phone] [] [facility]. [facility] Prom[facility]  [address] [facility]   Phone: [phone]   Fax:  [phone]     Physical Therapy Daily Treatment Note    Date:  2017  Patient Name:  Angus Sicard    :  1965  MRN: [id number]  Physician: Paolo Ho MD                                                          Insurance: paramount advantage  Medical Diagnosis: bilateral LBP without sciatica                                      Rehab Codes:                      M54.5; M79.1; M54.2; M54.6; M25.511; M25.512  Onset Date: 16                                       Next 's appt. :      Visit# / total visits:       Cancels/No Shows: 0    Subjective:    Pain:  [x] Yes  [] No Location: lumbar, cervical Pain Rating: (0-10 scale) 7.5/10  Pain altered Tx:  [x] No  [] Yes  Action:  Comments: shoulders are burning down to elbows; patient was sore the day of the last treatment but it improved by the next day. Patient sees counselor tomorrow.     Objective:  Modalities:   Precautions: cervical laminectomy  Exercises: not today  Exercise Reps/ Time Weight/ Level Comments   Pulleys UE 4 min  Started 17         standing   bilaterally   Shoulder extension 10 red Started 17   Shoulder rows 10 red Started 17   ER 10 red Started 17         UT stretches 2 ea  Head in midline   PROM c-spine x  Gentle, in available ROM   Cane punch-ups 15 2# cane Started 17; easy; inc next visit   Cane shoulder flexion 15 2# cane Progressed 17   Cane h. abd/adduction 15 2# cane Progressed 17          PROM  5'   Psoas, HS, piriformis, hip IR/ER   Psoas stretch 2 min  Off edge of table bilaterally    PPT 10x  5

## 2017-11-06 NOTE — TELEPHONE ENCOUNTER
sciatica     Moderate episode of recurrent major depressive disorder (HCC)     Generalized abdominal pain     Diarrhea of presumed infectious origin     Metabolic acidosis, increased anion gap (IAG)     C. difficile diarrhea     SIRS (systemic inflammatory response syndrome) (HCC)     Chronic diarrhea     Pancreatic insufficiency     Alcohol-induced chronic pancreatitis (HCC)     Major depressive disorder     Esophagitis     Type 2 diabetes mellitus without complication, without long-term current use of insulin ([address]

## 2017-11-07 RX ORDER — TIZANIDINE 4 MG/1
TABLET ORAL
Qty: 30 TABLET | Refills: 0 | Status: SHIPPED | OUTPATIENT
Start: 2017-11-07 | End: 2017-12-15 | Stop reason: SDUPTHER

## 2017-11-09 ENCOUNTER — HOSPITAL ENCOUNTER (OUTPATIENT)
Dept: PHYSICAL THERAPY | Facility: CLINIC | Age: 52
Setting detail: THERAPIES SERIES
Discharge: HOME | End: 2017-11-09
Payer: MEDICARE

## 2017-11-09 PROCEDURE — 97110 THERAPEUTIC EXERCISES: CPT

## 2017-11-09 PROCEDURE — 97140 MANUAL THERAPY 1/> REGIONS: CPT

## 2017-11-09 NOTE — FLOWSHEET NOTE
[] [facility]       Outpatient Physical        Therapy       [address]       Phone: [phone]       Fax: [phone] [x] [facility] at [address]       Phone: [phone]       Fax: [phone] [] Marie. [address] [place] [address] Health Promotion  [address] [place] [address]   Phone: [phone]   Fax:  [phone]     Physical Therapy Daily Treatment Note    Date:  2017  Patient Name:  Shaina Stoddard    :  1965  MRN: [id number]  Physician: Benigno Redmond MD                                                          Insurance: paramount advantage  Medical Diagnosis: bilateral LBP without sciatica                                      Rehab Codes:                      M54.5; M79.1; M54.2; M54.6; M25.511; M25.512  Onset Date: 16                                       Next 's appt. :       Visit# / total visits: 10/12      Cancels/No Shows: 0    Subjective:    Pain:  [x] Yes  [] No Location: lumbar, cervical Pain Rating: (0-10 scale) 7/10  Pain altered Tx:  [x] No  [] Yes  Action:  Comments: patient was sore in buttocks after last session; patient can really feel the weakness while doing exercises. Patient feels strengthening is more beneficial to him than the dry needling which he can't say that it is making a great difference to him.     Objective:  Modalities:   Precautions: cervical laminectomy  Exercises: not today  Exercise Reps/ Time Weight/ Level Comments   Pulleys UE 4 min  Started 17         standing   bilaterally   Shoulder extension 10 red Started 17   Shoulder rows 10 red Started 17   ER 10 red Started 17         UT stretches 2 ea  Head in midline   PROM c-spine x  Gentle, in available ROM   Cane punch-ups 15 3# cane Progressed 17   Cane shoulder flexion 10 3# cane Progressed 17   Cane h. abd/adduction 10 3# cane Progressed 17          PROM  5'   Psoas, HS, piriformis, hip IR/ER   Psoas stretch 2 min  Off edge of table bilaterally    PPT 10x  5 sec      Marches 10x       Alt arm/leg 10 A Started 11/2/17   SLR 15 A progressed 11/6/17   HS stretch x man bilateral    SKTC 20'x3   Use with towel therapist assist due to patient having pain in shoulders    LTR 10x             Side-bilaterally      Hip abduction 10 A Started 11/6/17   clamshell 10 A Started 11/6/17   Reverse clamshell 10 A Started 11/6/17         prone      Alt leg 5 A Started 11/9/17- difficult   Alt arm 5 A Started 11/9/17- difficult         Other:   Not today: Therapeutic activities: reviewed fall prevention strategies; briefly spoke re posture and use of support  Not today: MFR: c-spine, traps, OBR, bilateral shoulders including deltoids, biceps; much tightness    DRY NEEDLING: consent obtained and skin prepped with alcohol; standard procedure followed:   32 total:in prone: LUMBAR: 4 2\" in L2-5 ea side; 0.5\" in posterior popliteal bilaterally; 2\" in common fibular bilaterally, 0.5\" in tibial bilaterally,  3 superior cluneals bilaterally with 3\" needles; 0.5\" in greater occipital homestatic points bilaterally;2\" in T6 homeostatic point;  3\" in inferior gluteal bilaterally  left in situ 15 min     Specific Instructions for next treatment:  RE-EVAL; DN; continue to progress with exercises       Treatment Charges: Mins Units   []  Modalities     [x]  Ther Exercise 30 2   [x]  Manual Therapy 25 2   []  Ther Activities     []  Aquatics     []  Vasocompression     []  Other     Total Treatment time 55 4       Assessment: [x] Progressing toward goals. Weakness in cervical spine was noted with attempt to do arm raises in prone. Patient will need further cervical strengthening in protected range to furhter improve this area; patient with much improvement in ROM with stretching during session. [] No change:      [] Other:    Problems:    [x] ? Back Pain: 7/10                                         [x] ?  Cervical Pain: 7/10 [x] ? ROM: general stiffness in spine with limitation in movement; poor UE shoulder flexibility                                           [x] ? Strength: poor physical condition               [x] ? Function:  [x] Postural Deviations: scoliosis; increased lordosis; cervical spine abnormality with incision indented  [] Gait Deviations  [x] Other: mm tenderness/spasm  Poor balance                                                STG: (to be met in 6  treatments)  1. ? Pain: below 7/10: ONGOING 10/26/17  2. ? ROM: tolerate shoulder flexibility; psoas stretching: MET 10/26/17  3. ? Strength: tolerate some core and post hip isometric strength: MET 10/26/17  4. ? Function: modify posture/body mechanics: MET 10/26/17  5. Independent with Home Exercise Program: MET 10/26/17  6. Demonstrate Knowledge of fall prevention: MET 10/5/17     LTG: (to be met in 12 treatments)  1. Pain below 6/10  2. Decreased lordosis and possible reduction in scoliosis with treatment of soft tissue  3. Have patient do at least 20 min of continuous walking to increase physical condition     Patient goals:  \"get better\"       Pt. Education:  [x] Yes  [] No  [x] Reviewed Prior HEP/Ed  Method of Education: [x] Verbal  [x] Demo  [] Written: clamshell and theraband UE ex and red t-band  Comprehension of Education:  [x] Verbalizes understanding. [x] Demonstrates understanding. [x] Needs review. [] Demonstrates/verbalizes HEP/Ed previously given. Plan: [x] Continue per plan of care. [x] Other: re-eval next session.       Time In:  8:00 am           Time Out:  9:10 am    Electronically signed by:  Krystle Pedroza, PT

## 2017-11-13 ENCOUNTER — HOSPITAL ENCOUNTER (OUTPATIENT)
Dept: PHYSICAL THERAPY | Facility: CLINIC | Age: 52
Setting detail: THERAPIES SERIES
Discharge: HOME | End: 2017-11-13
Payer: MEDICARE

## 2017-11-13 PROCEDURE — 97140 MANUAL THERAPY 1/> REGIONS: CPT

## 2017-11-13 PROCEDURE — 97110 THERAPEUTIC EXERCISES: CPT

## 2017-11-13 NOTE — PROGRESS NOTES
[] Deena Harrison       Outpatient Physical                Therapy         [address]       Phone: [phone]       Fax: [phone] [x] [facility] at [address] [facility]       Phone: [phone]       Fax: [phone] [] [facility]. [address] [place] [address]     [address] [facility]     Phone: [phone]     Fax:  [phone]     Physical Therapy Progress Note    Date: 2017      Patient: Angus Sicard  : 1965  MRN: [id number]    Physician: Paolo Ho MD                                                          Insurance: [employer] advantage  Medical Diagnosis: bilateral LBP without sciatica                                      Rehab Codes:                      M54.5; M79.1; M54.2; M54.6; M25.511; M25.512  Onset Date: 16                                       Next 's appt. :       Visit# / total visits:                             Cancels/No Shows: 0      Subjective:  Pain:  [x] Yes  [] No          Location: lumbar, cervical       Pain Rating: (0-10 scale) 7/10  Pain altered Tx:  [x] No  [] Yes  Action:  Comments:  Patient feels strengthening is more beneficial to him than the dry needling which he can't say that DN  is making a great difference to him. No real change in pain level reported       Objective:  Test Measurements: decreased amount of scoliosis; lumbar lordosis remains unchanged, however, tightness of hip flexors is improved  Function: still with very limited endurance to physical activity. (3 min on treadmill)    Assessment:  STG: (to be met in 6  treatments)  1. ? Pain: below 7/10: ONGOING 10/26/17  2. ? ROM: tolerate shoulder flexibility; psoas stretching: MET 10/26/17  3. ? Strength: tolerate some core and post hip isometric strength: MET 10/26/17  4. ? Function: modify posture/body mechanics: MET 10/26/17  5.  Independent with Home Exercise Program: MET

## 2017-11-13 NOTE — FLOWSHEET NOTE
[] [facility]       Outpatient Physical        Therapy       [address]       Phone: [phone]       Fax: [phone] [x] [facility] Promotion at [address]       Phone: [phone]       Fax: [phone] [] Marie. [address] [place] [address] Health Promotion  [address] [place] [address]   Phone: [phone]   Fax:  [phone]     Physical Therapy Daily Treatment Note    Date:  2017  Patient Name:  Diogenes Casanova    :  1965  MRN: [id number]  Physician: Vanna Delarosa MD                                                          Insurance: paramount advantage  Medical Diagnosis: bilateral LBP without sciatica                                      Rehab Codes:                      M54.5; M79.1; M54.2; M54.6; M25.511; M25.512  Onset Date: 16                                       Next 's appt. :       Visit# / total visits:       Cancels/No Shows: 0    Subjective:    Pain:  [x] Yes  [] No Location: lumbar, cervical Pain Rating: (0-10 scale) 7-7.5/10  Pain altered Tx:  [x] No  [] Yes  Action:  Comments: patient reports shoulders were really sore and he used HP on Friday night;     Objective:   17: Observation: min scoliosis noted; still with increased lordosis  Modalities:  declines  Precautions: cervical laminectomy poor ability in prone  Exercises: not today  Exercise Reps/ Time Weight/ Level Comments   treadmill 3 min 1.0 Started : fatigue, sob, knees weak         Side step mini lunge 5ea A Started ; keep ta contracted; posture   Front mini lunge 5ea A Started ; keep ta contracted; watch posture         Pulleys UE 4 min  Started 17         standing   bilaterally   Shoulder extension 10 red Started 17   Shoulder rows 10 red Started 17   ER 10 red Started 17         supine      c-isometrics 3 ea Mild R Started 17   UT stretches 2 ea  Head in midline   PROM c-spine x  Gentle, well with c-isometrics and keeping chin tuck in prone during arm lifts (had to modify position with arms more horizontal); still with pain when doing this activity. [] No change:      [] Other:    Problems:    [x] ? Back Pain: 7/10                                         [x] ? Cervical Pain: 7/10              [x] ? ROM: general stiffness in spine with limitation in movement; poor UE shoulder flexibility                                           [x] ? Strength: poor physical condition               [x] ? Function:  [x] Postural Deviations: scoliosis; increased lordosis; cervical spine abnormality with incision indented  [] Gait Deviations  [x] Other: mm tenderness/spasm  Poor balance                                                STG: (to be met in 6  treatments)  1. ? Pain: below 7/10: ONGOING 10/26/17  2. ? ROM: tolerate shoulder flexibility; psoas stretching: MET 10/26/17  3. ? Strength: tolerate some core and post hip isometric strength: MET 10/26/17  4. ? Function: modify posture/body mechanics: MET 10/26/17  5. Independent with Home Exercise Program: MET 10/26/17  6. Demonstrate Knowledge of fall prevention: MET 10/5/17     LTG: (to be met in 12 treatments): ONGOING: continue x 12 sessions if approved by MD  1. Pain below 6/10  2. Decreased lordosis and possible reduction in scoliosis with treatment of soft tissue  3. Have patient do at least 20 min of continuous walking to increase physical condition     Patient goals:  \"get better\"       Pt. Education:  [x] Yes  [] No  [x] Reviewed Prior HEP/Ed  Method of Education: [x] Verbal  [x] Demo  [] Written: pari and jer UE ex and red t-band  Comprehension of Education:  [x] Verbalizes understanding. [x] Demonstrates understanding. [x] Needs review. [] Demonstrates/verbalizes HEP/Ed previously given. Plan: [x] Continue per plan of care.    [x] Other: send MD note      Time In:  11:00 am           Time Out:  11:58 am    Electronically signed by:

## 2017-11-16 ENCOUNTER — HOSPITAL ENCOUNTER (OUTPATIENT)
Dept: PHYSICAL THERAPY | Facility: CLINIC | Age: 52
Setting detail: THERAPIES SERIES
Discharge: HOME | End: 2017-11-16
Payer: MEDICARE

## 2017-11-16 NOTE — FLOWSHEET NOTE
[] Job Eleonora        Outpatient Physical                Therapy       [address]       Phone: [phone]       Fax: [phone] [] [facility]       Promotion at [address] [place] [address]       Phone: [phone]       Fax: [phone] [] [facility] Health Promotion     [address] [place] [address]      Phone: [phone]      Fax:  [phone]     Physical Therapy Cancel/No Show note    Date: 2017  Patient: Colene Kanner  : 1965  MRN: [id number]    Cancels/No Shows to date:      For today's appointment patient:  []  Cancelled  []  Rescheduled appointment  [x]  No-show     Reason given by patient:  []  Patient ill  []  Conflicting appointment  []  No transportation    []  Conflict with work  []  No reason given  []  Weather related  []  Other:      Comments: left message asking to confirm next appointment     []  Next appointment was confirmed    Electronically signed by: Lam Beaulieu PTA

## 2017-11-20 ENCOUNTER — HOSPITAL ENCOUNTER (OUTPATIENT)
Dept: PHYSICAL THERAPY | Facility: CLINIC | Age: 52
Setting detail: THERAPIES SERIES
Discharge: HOME | End: 2017-11-20
Payer: MEDICARE

## 2017-11-20 PROCEDURE — 97110 THERAPEUTIC EXERCISES: CPT

## 2017-11-23 ENCOUNTER — APPOINTMENT (OUTPATIENT)
Dept: PHYSICAL THERAPY | Facility: CLINIC | Age: 52
End: 2017-11-23
Payer: MEDICARE

## 2017-11-24 ENCOUNTER — TELEPHONE (OUTPATIENT)
Dept: INTERNAL MEDICINE | Age: 52
End: 2017-11-24

## 2017-11-24 RX ORDER — QUETIAPINE FUMARATE 25 MG/1
25 TABLET, FILM COATED ORAL 2 TIMES DAILY
Qty: 60 TABLET | Refills: 3 | Status: SHIPPED | OUTPATIENT
Start: 2017-11-24 | End: 2018-03-23 | Stop reason: SDUPTHER

## 2017-11-24 NOTE — TELEPHONE ENCOUNTER
Patient called requesting Seroquel 25 mg. He states the doctor her was getting it from told him to get it form his PCP.  Please advise

## 2017-11-24 NOTE — TELEPHONE ENCOUNTER
Patient call regarding running out of script from hospital prescribed by Dr. Jose Juan Gallardo while inpatient. Refill refused by Dr. Jose Juan Gallardo for seroquel outpatient as has never seen patient after hospital encounter. Patient claims has PCP and psychiatrist and agrees to contact them regarding refill and scheduling appointment to discuss medications.     Ludmila Lux MD  Internal Medicine, PGY2

## 2017-11-27 ENCOUNTER — HOSPITAL ENCOUNTER (OUTPATIENT)
Dept: PHYSICAL THERAPY | Facility: CLINIC | Age: 52
Setting detail: THERAPIES SERIES
Discharge: HOME | End: 2017-11-27
Payer: MEDICARE

## 2017-11-27 PROCEDURE — 97140 MANUAL THERAPY 1/> REGIONS: CPT

## 2017-11-27 RX ORDER — PANCRELIPASE 24000; 76000; 120000 [USP'U]/1; [USP'U]/1; [USP'U]/1
CAPSULE, DELAYED RELEASE PELLETS ORAL
Qty: 180 CAPSULE | Refills: 2 | Status: ON HOLD | OUTPATIENT
Start: 2017-11-27 | End: 2018-06-06 | Stop reason: SDUPTHER

## 2017-11-27 RX ORDER — OMEPRAZOLE 20 MG/1
CAPSULE, DELAYED RELEASE ORAL
Qty: 30 CAPSULE | Refills: 1 | Status: SHIPPED | OUTPATIENT
Start: 2017-11-27 | End: 2018-01-24 | Stop reason: SDUPTHER

## 2017-11-27 NOTE — FLOWSHEET NOTE
[] [facility]       Outpatient Physical        Therapy       [address]       Phone: [phone]       Fax: [phone] [x] [facility] at [address] [facility]       Phone: [phone]       Fax: [phone] [] [facility]. [address] [place] [address] Health Promotion  [address] [facility]   Phone: [phone]   Fax:  [phone]     Physical Therapy Daily Treatment Note    Date:  2017  Patient Name:  Teressa Singh    :  1965  MRN: [id number]  Physician: Magdalene Bosworth, MD                                                          Insurance: paramount advantage  Medical Diagnosis: bilateral LBP without sciatica                                      Rehab Codes:                      M54.5; M79.1; M54.2; M54.6; M25.511; M25.512  Onset Date: 16                                       Next 's appt. :       Visit# / total visits:       Cancels/No Shows: 1/0    Subjective:    Pain:  [x] Yes  [] No Location: lumbar, cervical Pain Rating: (0-10 scale) 8/10  Pain altered Tx:  [x] No  [] Yes  Action:  Comments: pt lost balance and fell backwards onto buttocks; also coming down with respiratory issues; patient with pain in lower back \"at the bottom\" and shoulders/neck.     Objective:   17: Observation: min scoliosis noted; still with increased lordosis  Modalities:  declines  Precautions: cervical laminectomy poor ability in prone  Exercises: NOT TODAY  Exercise Reps/ Time Weight/ Level Comments   treadmill 4 min 1.0 Started : fatigue, sob, knees weak         Side step mini lunge 5ea A Started ; keep ta contracted; posture   Front mini lunge 5ea A Started ; keep ta contracted; watch posture         Pulleys UE 4 min  Started 17         standing   bilaterally   Shoulder extension 15 red Started 17   Shoulder rows 15 red Started 17   ER 10 red Started 17         supine      c-isometrics 3 ea Mild R Started 11/13/17   UT stretches 2 ea  Head in midline   PROM c-spine x  Gentle, in available ROM   Cane punch-ups 15 3# cane Progressed 11/9/17   Cane shoulder flexion 10 3# cane Progressed 11/9/17   Cane h. abd/adduction 10 3# cane Progressed 11/9/17          PROM  5'   Psoas, HS, piriformis, hip IR/ER   Psoas stretch 2 min  Off edge of table bilaterally    PPT 10x  5 sec     Alt arm 10 A Started 11/13    Marches 10x  A     Alt arm/leg 10 A Started 11/2/17   SLR 15 A progressed 11/6/17   HS stretch x man bilateral    SKTC 20'x3   Use with towel therapist assist due to patient having pain in shoulders    LTR 10x             Side-bilaterally      Hip abduction 12 A Progressed 11/13/17   clamshell 10 A Started 11/6/17   Reverse clamshell 10 A Started 11/6/17         prone      Alt leg 2x5 A Progressed 11/13   Alt arm 5 A Started 11/9/17- difficult         Other:    Not today: Therapeutic activities: reviewed fall prevention strategies; briefly spoke re posture and use of support  Not today MFR: supine: c-spine, traps, OBR, bilateral shoulders including deltoids, biceps; spine in prone, 15 min total     DRY NEEDLING: consent obtained and skin prepped with alcohol; standard procedure followed:   48 total:in prone: LUMBAR: 4 2\" in L2-4 ea side; 3\" IN L5 bilat and 3 3\" in B inferior cluneals; 3\" in ea inferior gluteal; 1\" in posterior popliteal; 2\" in B common fibular: ; 0.5\" in posterior popliteal bilaterally; 2\" in common fibular bilaterally,  1\" in greater occipital homestatic points bilaterally 0.5\" in cervical paraspinals bilaterally; 1\" (3) in both RC insertions; 1\" in deltoid B; 1\" in B lateral antebrachial cutaneous, 1\" in deep radial bilaterally;  left in situ 15 min     Listening: standing: much swaying; also with pull L lateral;  Specific Instructions for next treatment:   continue to progress with exercises -careful with arms overhead; try to progress time on treadmill or walking around clinic; may also do nu-step in addition       Treatment Charges: Mins Units   []  Modalities     []  Ther Exercise     [x]  Manual Therapy 50 3   []  Ther Activities     []  Aquatics     []  Vasocompression     []  Other     Total Treatment time 50 3       Assessment: [] Progressing toward goals. [x] No change: difficult to determine if recent fall affected overall symptoms      [] Other:    Problems:    [x] ? Back Pain: 7/10                                         [x] ? Cervical Pain: 7/10              [x] ? ROM: general stiffness in spine with limitation in movement; poor UE shoulder flexibility                                           [x] ? Strength: poor physical condition               [x] ? Function:  [x] Postural Deviations: scoliosis; increased lordosis; cervical spine abnormality with incision indented  [] Gait Deviations  [x] Other: mm tenderness/spasm  Poor balance                                                STG: (to be met in 6  treatments)  1. ? Pain: below 7/10: ONGOING 10/26/17  2. ? ROM: tolerate shoulder flexibility; psoas stretching: MET 10/26/17  3. ? Strength: tolerate some core and post hip isometric strength: MET 10/26/17  4. ? Function: modify posture/body mechanics: MET 10/26/17  5. Independent with Home Exercise Program: MET 10/26/17  6. Demonstrate Knowledge of fall prevention: MET 10/5/17     LTG: (to be met in 12 treatments): ONGOING: continue x 12 sessions   1. Pain below 6/10  2. Decreased lordosis and possible reduction in scoliosis with treatment of soft tissue  3. Have patient do at least 20 min of continuous walking to increase physical condition     Patient goals:  \"get better\"       Pt. Education:  [] Yes  [x] No  [] Reviewed Prior HEP/Ed  Method of Education: [] Verbal  [] Demo  [] Written: pari and jer UE ex and red t-band  Comprehension of Education:  [] Verbalizes understanding. [] Demonstrates understanding. [x] Needs review.   [] Demonstrates/verbalizes HEP/Ed previously given.     Plan: [x] Continue per plan of care.    [] Other:       Time In:  11:00 am           Time Out:  12:00 pm    Electronically signed by:  Savannah Nuñez PT

## 2017-11-27 NOTE — TELEPHONE ENCOUNTER
Health Maintenance   Topic Date Due    Diabetic foot exam  07/26/1977    Diabetic retinal exam  07/26/1977    Pneumococcal med risk (1 of 1 - PPSV23) 07/26/1986    Colon Cancer Screen FIT/FOBT  07/26/2017    Flu vaccine (1) 09/01/2017    Diabetic hemoglobin A1C test  03/27/2018    Lipid screen  03/27/2018    Diabetic microalbuminuria test  09/21/2018    DTaP/Tdap/Td vaccine (2 - Td) 04/27/2026    HIV screen  Completed             (applicable per patient's age: Cancer Screenings, Depression Screening, Fall Risk Screening, Immunizations)    Hemoglobin A1C (%)   Date Value   03/27/2017 6.7 (H)   04/27/2016 5.2   09/12/2012 7.5 (H)     Microalb/Crt.  Ratio (mcg/mg creat)   Date Value   08/29/2011 25     LDL Cholesterol (mg/dL)   Date Value   03/27/2017          AST (U/L)   Date Value   03/27/2017 31     ALT (U/L)   Date Value   03/27/2017 38     BUN (mg/dL)   Date Value   03/27/2017 4 (L)      (goal A1C is < 7)   (goal LDL is <100) need 30-50% reduction from baseline     BP Readings from Last 3 Encounters:   09/18/17 129/87   09/14/17 (!) 159/113   08/07/17 128/86    (goal /80)      All Future Testing planned in [facility]:  Lab Frequency Next Occurrence   PT eval and treat Once 03/09/2017   Microalbumin, Ur Once 03/05/2018   PT eval and treat Once 04/27/2017   PT eval and treat Once 07/28/2017   Microalbumin, Ur Once 05/14/2018       Next Visit Date:  Future Appointments  Date Time Provider Bob Serrano   11/27/2017 11:00 AM Eulas Betina, PT [facility]   11/30/2017 8:00 AM Eulas Betina, PT [facility]   12/4/2017 11:00 AM Danish Lias, PTA [facility]   12/7/2017 8:00 AM Eulas Betina, PT [facility]   12/11/2017 11:00 AM Danish Lias, PTA [facility]   12/14/2017 8:00 AM Salud Moffett PT [facility]   3/21/2018 9:35 AM [facility]            Patient Active Problem List:     Alcohol withdrawal ([facility]     Alcohol

## 2017-11-27 NOTE — TELEPHONE ENCOUNTER
([facility] [address]     Generalized abdominal pain     Diarrhea of presumed infectious origin     Metabolic acidosis, increased anion gap (IAG)     C. difficile diarrhea     SIRS (systemic inflammatory response syndrome) (HCC)     Chronic diarrhea     Pancreatic insufficiency     Alcohol-induced chronic pancreatitis (HCC)     Major depressive disorder     Esophagitis     Type 2 diabetes mellitus without complication, without long-term current use of insulin ([facility]

## 2017-11-30 ENCOUNTER — HOSPITAL ENCOUNTER (OUTPATIENT)
Dept: PHYSICAL THERAPY | Facility: CLINIC | Age: 52
Setting detail: THERAPIES SERIES
Discharge: HOME | End: 2017-11-30
Payer: MEDICARE

## 2017-11-30 PROCEDURE — 97110 THERAPEUTIC EXERCISES: CPT

## 2017-11-30 NOTE — FLOWSHEET NOTE
[] April [facility]       Outpatient Physical        Therapy       [address]       Phone: [phone]       Fax: [phone] [x] [facility] at [address]       Phone: [phone]       Fax: [phone] [] [facility] [address] [place] [address] Health Promotion  [address] [facility]   Phone: [phone]   Fax:  [phone]     Physical Therapy Daily Treatment Note    Date:  2017  Patient Name:  Akil Finley    :  1965  MRN: [id number]  Physician: Paulina Pearson MD                                                          Insurance: paramount advantage  Medical Diagnosis: bilateral LBP without sciatica                                      Rehab Codes:                      M54.5; M79.1; M54.2; M54.6; M25.511; M25.512  Onset Date: 16                                       Next 's appt. :       Visit# / total visits: 15/24      Cancels/No Shows: 1/0    Subjective:    Pain:  [x] Yes  [] No Location: lumbar, cervical Pain Rating: (0-10 scale) 8/10  Pain altered Tx:  [x] No  [] Yes  Action:  Comments: pt lost balance and fell backwards onto buttocks; also coming down with respiratory issues; patient with pain in lower back \"at the bottom\" and shoulders/neck.     Objective:   17: Observation: min scoliosis noted; still with increased lordosis  Modalities:  declines  Precautions: cervical laminectomy poor ability in prone; L knee pain  Exercises: bolded today  Exercise Reps/ Time Weight/ Level Comments   treadmill 3.5 min 1.0 Started :    Nu-step 7 min 2.0 Started 17         standing      Side step mini lunge 5ea A Started ; keep ta contracted; posture   Front mini lunge 5ea A Started ; keep ta contracted; watch posture   Step-ups 10ea 4\" Started 17   squats 10 A Started 17         Pulleys UE 4 min  Started 17         standing   bilaterally   Shoulder extension 15 red Started Listening: standing: much swaying; also with pull L lateral;    Specific Instructions for next treatment:   Continue with conditioning, DN, manual therapy       Treatment Charges: Mins Units   [x]  Modalities:CP 15 1   [x]  Ther Exercise 55 4   []  Manual Therapy     []  Ther Activities     []  Aquatics     []  Vasocompression     []  Other     Total Treatment time 55 4       Assessment: [] Progressing toward goals. [] No change:       [x] Other: L knee is painful with LE ex; able to gently progress with ex    Problems:    [x] ? Back Pain: 7/10                                         [x] ? Cervical Pain: 7/10              [x] ? ROM: general stiffness in spine with limitation in movement; poor UE shoulder flexibility                                           [x] ? Strength: poor physical condition               [x] ? Function:  [x] Postural Deviations: scoliosis; increased lordosis; cervical spine abnormality with incision indented  [] Gait Deviations  [x] Other: mm tenderness/spasm  Poor balance                                                STG: (to be met in 6  treatments)  1. ? Pain: below 7/10: ONGOING 10/26/17  2. ? ROM: tolerate shoulder flexibility; psoas stretching: MET 10/26/17  3. ? Strength: tolerate some core and post hip isometric strength: MET 10/26/17  4. ? Function: modify posture/body mechanics: MET 10/26/17  5. Independent with Home Exercise Program: MET 10/26/17  6. Demonstrate Knowledge of fall prevention: MET 10/5/17     LTG: (to be met in 12 treatments): ONGOING: continue x 12 sessions   1. Pain below 6/10  2. Decreased lordosis and possible reduction in scoliosis with treatment of soft tissue  3. Have patient do at least 20 min of continuous walking to increase physical condition     Patient goals:  \"get better\"       Pt.  Education:  [x] Yes  [] No  [] Reviewed Prior HEP/Ed  Method of Education: [x] Verbal  [x] Demo: clinic progression [] Written: pari and jer UE ex and red

## 2017-12-04 ENCOUNTER — HOSPITAL ENCOUNTER (OUTPATIENT)
Dept: PHYSICAL THERAPY | Facility: CLINIC | Age: 52
Setting detail: THERAPIES SERIES
Discharge: HOME | End: 2017-12-04
Payer: MEDICARE

## 2017-12-04 PROCEDURE — G0283 ELEC STIM OTHER THAN WOUND: HCPCS

## 2017-12-04 PROCEDURE — 97110 THERAPEUTIC EXERCISES: CPT

## 2017-12-04 NOTE — FLOWSHEET NOTE
[] Juany Garrido       Outpatient Physical        Therapy       [address]       Phone: [phone]       Fax: [phone] [x] [facility] at [address] [facility]       Phone: [phone]       Fax: [phone] [] [facility] for Health Promotion  [address] [facility]   Phone: [phone]   Fax:  [phone]     Physical Therapy Daily Treatment Note    Date:  2017  Patient Name:  Savanna Johnson    :  1965  MRN: [id number]  Physician: Anastasiia Garrison MD                                                          Insurance: paramount advantage  Medical Diagnosis: bilateral LBP without sciatica                                      Rehab Codes:                      M54.5; M79.1; M54.2; M54.6; M25.511; M25.512  Onset Date: 16                                       Next 's appt. :       Visit# / total visits:       Cancels/No Shows: 1/0    Subjective:    Pain:  [x] Yes  [] No Location: lumbar, cervical Pain Rating: (0-10 scale) 8/10  Pain altered Tx:  [] No  [x] Yes  Action: had to stop ex early due to pain  Comments: Patient reporting that he isn't sleeping due to having pain despite taking pain pills. Patient requesting dry needling stating he is in too much pain to do his exercises. Patient contemplated going to ER before coming to PT.      Objective:  Unable to do DN due to therapist unavailable who does the DN  17: Observation: min scoliosis noted; still with increased lordosis  Modalities:  HP/stim to neck and back at the end of treatment  Precautions: cervical laminectomy poor ability in prone; L knee pain  Exercises: bolded today  Exercise Reps/ Time Weight/ Level Comments   treadmill 3.5 min 1.0 Started :    Nu-step 7 min 2.0 Started 17         standing      Side step mini lunge 5ea A Started ; keep ta contracted; posture   Front mini lunge 5ea A Started ; keep ta contracted; watch posture   Step-ups 10ea 4\" Started 11/30/17   squats 10 A Started 11/30/17         Pulleys UE 4 min  Started 11/2/17         standing   bilaterally   Shoulder extension 15 red Started 11/6/17   Shoulder rows 15 red Started 11/6/17   ER 15 red Progressed 11/30/17         supine      c-isometrics 3 ea Mild R Started 11/13/17   UT stretches 2 ea  Head in midline   PROM c-spine x  Gentle, in available ROM   Cane punch-ups 15 3# cane Progressed 11/9/17   Cane shoulder flexion 10 3# cane Progressed 11/9/17   Cane h. abd/adduction 10 3# cane Progressed 11/9/17          PROM  5'   Psoas, HS, piriformis, hip IR/ER   Psoas stretch 2 min  Off edge of table bilaterally    PPT 10x  5 sec     Alt arm 10 A Started 11/13    Marches 10x  A     Alt arm/leg 10 A Started 11/2/17   SLR 12 A progressed 11/6/17   HS stretch x man bilateral    SKTC 20'x3   Use with towel therapist assist due to patient having pain in shoulders    LTR 10x             Side-bilaterally      Hip abduction 15 A Progressed 11/30/17   clamshell 15 A Progressed 11/30/17   Reverse clamshell 15 A Progressed 11/30/17         prone      Alt leg 8 A Progressed 11/13   Alt arm 5 A Started 11/9/17- difficult, modified, arms out to side   Gluteal squeeze 5 5 sec Started 11/30/17 -too much pressure on c-spine   Other:    Not today: Therapeutic activities: reviewed fall prevention strategies; briefly spoke re posture and use of support  Not today MFR: supine: c-spine, traps, OBR, bilateral shoulders including deltoids, biceps; spine in prone, 15 min total    Not today: DRY NEEDLING: consent obtained and skin prepped with alcohol; standard procedure followed:   48 total:in prone: LUMBAR: 4 2\" in L2-4 ea side; 3\" IN L5 bilat and 3 3\" in B inferior cluneals; 3\" in ea inferior gluteal; 1\" in posterior popliteal; 2\" in B common fibular: ; 0.5\" in posterior popliteal bilaterally; 2\" in common fibular bilaterally,  1\" in greater occipital homestatic points bilaterally 0.5\" in cervical paraspinals bilaterally; 1\" (3) in both RC insertions; 1\" in deltoid B; 1\" in B lateral antebrachial cutaneous, 1\" in deep radial bilaterally;  left in situ 15 min     Not today: Listening: standing: much swaying; also with pull L lateral;    Specific Instructions for next treatment:   Continue with conditioning, DN, manual therapy       Treatment Charges: Mins Units   [x]  Modalities: HP/Estm 15/15 1/1   [x]  Ther Exercise 30 2   []  Manual Therapy     []  Ther Activities     []  Aquatics     []  Vasocompression     []  Other     Total Treatment time 45 3       Assessment: [] Progressing toward goals. [] No change:       [x] Other: Limited in ability to complete UE exercises. While 30 minutes into exercise, patient reported he felt too painful and wanted modalities. Estim and heat completed today after exercises. Problems:    [x] ? Back Pain: 7/10                                         [x] ? Cervical Pain: 7/10              [x] ? ROM: general stiffness in spine with limitation in movement; poor UE shoulder flexibility                                           [x] ? Strength: poor physical condition               [x] ? Function:  [x] Postural Deviations: scoliosis; increased lordosis; cervical spine abnormality with incision indented  [] Gait Deviations  [x] Other: mm tenderness/spasm  Poor balance                                                STG: (to be met in 6  treatments)  1. ? Pain: below 7/10: ONGOING 10/26/17  2. ? ROM: tolerate shoulder flexibility; psoas stretching: MET 10/26/17  3. ? Strength: tolerate some core and post hip isometric strength: MET 10/26/17  4. ? Function: modify posture/body mechanics: MET 10/26/17  5. Independent with Home Exercise Program: MET 10/26/17  6. Demonstrate Knowledge of fall prevention: MET 10/5/17     LTG: (to be met in 12 treatments): ONGOING: continue x 12 sessions to visit #24  1. Pain below 6/10  2.  Decreased lordosis and possible reduction in scoliosis with treatment of soft tissue  3. Have patient do at least 20 min of continuous walking to increase physical condition     Patient goals:  \"get better\"       Pt. Education:  [] Yes  [x] No  [] Reviewed Prior HEP/Ed  Method of Education: [] Verbal  [] Demo: clinic progression [] Written: clamshell and therrk UE ex and red t-band  Comprehension of Education:  [] Verbalizes understanding. [] Demonstrates understanding with VC. [x] Needs review. [] Demonstrates/verbalizes HEP/Ed previously given. Plan: [x] Continue per plan of care.    [] Other:       Time In:  11:00 am           Time Out:  11: 58 am    Electronically signed by:  Jeanne Whitehead PTA

## 2017-12-07 ENCOUNTER — HOSPITAL ENCOUNTER (OUTPATIENT)
Dept: PHYSICAL THERAPY | Facility: CLINIC | Age: 52
Setting detail: THERAPIES SERIES
Discharge: HOME | End: 2017-12-07
Payer: MEDICARE

## 2017-12-07 PROCEDURE — 97140 MANUAL THERAPY 1/> REGIONS: CPT

## 2017-12-07 NOTE — FLOWSHEET NOTE
Started 11/30/17         Pulleys UE 4 min  Started 11/2/17         standing   bilaterally   Shoulder extension 15 red Started 11/6/17   Shoulder rows 15 red Started 11/6/17   ER 15 red Progressed 11/30/17         supine      c-isometrics 3 ea Mild R Started 11/13/17   UT stretches 2 ea  Head in midline   PROM c-spine x  Gentle, in available ROM   Cane punch-ups 15 3# cane Progressed 11/9/17   Cane shoulder flexion 10 3# cane Progressed 11/9/17   Cane h. abd/adduction 10 3# cane Progressed 11/9/17          PROM  5'   Psoas, HS, piriformis, hip IR/ER   Psoas stretch 2 min  Off edge of table bilaterally    PPT 10x  5 sec     Alt arm 10 A Started 11/13    Marches 10x  A     Alt arm/leg 10 A Started 11/2/17   SLR 12 A progressed 11/6/17   HS stretch x man bilateral    SKTC 20'x3   Use with towel therapist assist due to patient having pain in shoulders    LTR 10x             Side-bilaterally      Hip abduction 15 A Progressed 11/30/17   clamshell 15 A Progressed 11/30/17   Reverse clamshell 15 A Progressed 11/30/17         prone      Alt leg 8 A Progressed 11/13   Alt arm 5 A Started 11/9/17- difficult, modified, arms out to side   Gluteal squeeze 5 5 sec Started 11/30/17 -too much pressure on c-spine   Other:    Not today: Therapeutic activities: reviewed fall prevention strategies; briefly spoke re posture and use of support  Not today MFR: supine: c-spine, traps, OBR, bilateral shoulders including deltoids, biceps; spine in prone, 15 min total    DRY NEEDLING: consent obtained and skin prepped with alcohol; standard procedure followed:   60 total:in prone: LUMBAR: 4 2\" in L2-4 ea side; 3\" IN L5 bilat and 3 3\" in B inferior cluneals; 3\" in ea inferior gluteal;   1\" in greater occipital homestatic points bilaterally and bilat UT insertion; 0.5\" in bilat greater auricular homeostatic points;  0.5\" in cervical paraspinals bilaterally; 1\" (3) in both RC insertions; 2\" in bilateral scapulae bilat; 1\" in thoracic

## 2017-12-11 ENCOUNTER — HOSPITAL ENCOUNTER (OUTPATIENT)
Dept: PHYSICAL THERAPY | Facility: CLINIC | Age: 52
Setting detail: THERAPIES SERIES
Discharge: HOME | End: 2017-12-11
Payer: MEDICARE

## 2017-12-11 PROCEDURE — 97110 THERAPEUTIC EXERCISES: CPT

## 2017-12-11 NOTE — FLOWSHEET NOTE
posture   Step-ups 10ea 4\" Started 11/30/17   squats 10 A Started 11/30/17         Pulleys UE 4 min  Started 11/2/17         standing   bilaterally   Shoulder extension 15 Green Started 11/6/17 Increased resistance 12/11   Shoulder rows 15 Green Started 11/6/17 Increased resistance 12/11   ER 15 red Progressed 11/30/17         supine      c-isometrics 3 ea Mild R Started 11/13/17   UT stretches 2 ea  Head in midline   PROM c-spine x  Gentle, in available ROM   Cane punch-ups 15 3# cane Progressed 11/9/17   Cane shoulder flexion 15 3# cane Progressed 11/9/17 Increased reps 12/11   Cane h. abd/adduction 15 3# cane Progressed 11/9/17  Increased reps 12/11          PROM  5'   Psoas, HS, piriformis, hip IR/ER   Psoas stretch 2 min  Off edge of table bilaterally    PPT 10x  5 sec     Alt arm 10 A Started 11/13    Marches 10x  A     Alt arm/leg 10 A Started 11/2/17   SLR 12 A progressed 11/6/17   HS stretch x man bilateral    SKTC 20'x3   Use with towel therapist assist due to patient having pain in shoulders    LTR 10x             Side-bilaterally      Hip abduction 15 A Progressed 11/30/17   clamshell 15 A Progressed 11/30/17   Reverse clamshell 15 A Progressed 11/30/17         prone      Alt leg 8 A Progressed 11/13   Alt arm 5 A Started 11/9/17- difficult, modified, arms out to side   UE/LE 5x  Added 12/11   Gluteal squeeze 5 5 sec Started 11/30/17 -too much pressure on c-spine   Other:    Not today: Therapeutic activities: reviewed fall prevention strategies; briefly spoke re posture and use of support  Not today MFR: supine: c-spine, traps, OBR, bilateral shoulders including deltoids, biceps; spine in prone, 15 min total    DRY NEEDLING: consent obtained and skin prepped with alcohol; standard procedure followed:   60 total:in prone: LUMBAR: 4 2\" in L2-4 ea side; 3\" IN L5 bilat and 3 3\" in B inferior cluneals; 3\" in ea inferior gluteal;   1\" in greater occipital homestatic points bilaterally and bilat UT insertion;

## 2017-12-14 ENCOUNTER — HOSPITAL ENCOUNTER (OUTPATIENT)
Dept: PHYSICAL THERAPY | Facility: CLINIC | Age: 52
Setting detail: THERAPIES SERIES
Discharge: HOME | End: 2017-12-14
Payer: MEDICARE

## 2017-12-14 PROCEDURE — 97140 MANUAL THERAPY 1/> REGIONS: CPT

## 2017-12-14 NOTE — FLOWSHEET NOTE
standing   bilaterally   Shoulder extension 15 Green Started 11/6/17 Increased resistance 12/11   Shoulder rows 15 Green Started 11/6/17 Increased resistance 12/11   ER 15 red Progressed 11/30/17         supine      c-isometrics 3 ea Mild R Started 11/13/17   UT stretches 2 ea  Head in midline   PROM c-spine x  Gentle, in available ROM   Cane punch-ups 15 3# cane Progressed 11/9/17   Cane shoulder flexion 15 3# cane Progressed 11/9/17 Increased reps 12/11   Cane h. abd/adduction 15 3# cane Progressed 11/9/17  Increased reps 12/11          PROM  5'   Psoas, HS, piriformis, hip IR/ER   Psoas stretch 2 min  Off edge of table bilaterally    PPT 10x  5 sec     Alt arm 10 A Started 11/13    Marches 10x  A     Alt arm/leg 10 A Started 11/2/17   SLR 12 A progressed 11/6/17   HS stretch x man bilateral    SKTC 20'x3   Use with towel therapist assist due to patient having pain in shoulders    LTR 10x             Side-bilaterally      Hip abduction 15 A Progressed 11/30/17   clamshell 15 A Progressed 11/30/17   Reverse clamshell 15 A Progressed 11/30/17         prone      Alt leg 8 A Progressed 11/13   Alt arm 5 A Started 11/9/17- difficult, modified, arms out to side   UE/LE 5x  Added 12/11   Gluteal squeeze 5 5 sec Started 11/30/17 -too much pressure on c-spine   Other:    Not today: Therapeutic activities: reviewed fall prevention strategies; briefly spoke re posture and use of support  Not today MFR: supine: c-spine, traps, OBR, bilateral shoulders including deltoids, biceps; spine in prone, 15 min total    DRY NEEDLING: consent obtained and skin prepped with alcohol; standard procedure followed:   79 total:in prone: LUMBAR: 4 2\" in L2-4 ea side; 3\" IN L5 bilat and 3 3\" in B superior cluneals;    1\" in greater occipital homestatic points bilaterally and bilat UT insertion;0.5\" in cervical paraspinals bilaterally; 1\" (3) in both RC insertions; 2\" in bilateral scapulae bilat; 1\" in thoracic paravertebral points to T9 HEP/Ed  Method of Education: [] Verbal  [] Demo: clinic progression [] Written: clamshell and thercamilod UE ex and red t-band  Comprehension of Education:  [] Verbalizes understanding. [] Demonstrates understanding with VC. [x] Needs review. [] Demonstrates/verbalizes HEP/Ed previously given. Plan: [x] Continue per plan of care.    [x] Other:  Send MD progress report      Time In: 8:20 am           Time Out:   9:10 am    Electronically signed by:  Lewis Mcgill, PT

## 2017-12-14 NOTE — FLOWSHEET NOTE
[] Shayan Serra       Outpatient Physical                Therapy         [address]       Phone: [phone]       Fax: [phone] [x] [facility]       Promotion at [address] [place] [address]       Phone: [phone]       Fax: [phone] [] [facility]      [facility]     [address] [facility]     Phone: [phone]     Fax:  [phone]     Physical Therapy Progress Note    Date: 2017      Patient: Na Clarke  : 1965  MRN: [id number]    Insurance: [employer] advantage  Medical Diagnosis: bilateral LBP without sciatica                                      Rehab Codes:                      M54.5; M79.1; M54.2; M54.6; M25.511; M25.512  Onset Date: 16                                       Next 's appt. :       Visit# / total visits:                             Cancels/No Shows: 1/0        Subjective:  Pain:  [x] Yes  [] No          Location: lumbar, cervical       Pain Rating: (0-10 scale) 7.5/10  Pain altered Tx:  [] No  [x] Yes  Action: had to stop ex early due to pain  Comments: shoulder pain; patient slipped on slick surface and almost fell 17; most pain is in shoulders today. Assessment: patient has been working on a clinic ex program on certain days and dry needling on other days with some success, though benefit seems to be short term. STG: (to be met in 6  treatments)  1. ? Pain: below 7/10: ONGOING 10/26/17  2. ? ROM: tolerate shoulder flexibility; psoas stretching: MET 10/26/17  3. ? Strength: tolerate some core and post hip isometric strength: MET 10/26/17  4. ? Function: modify posture/body mechanics: MET 10/26/17  5. Independent with Home Exercise Program: MET 10/26/17  6. Demonstrate Knowledge of fall prevention: MET 10/5/17     LTG: (to be met in 12 treatments): ONGOING: continue x 12 sessions to visit #24: NOT YET MET as of 17  1. Pain below 6/10  2.  Decreased

## 2017-12-15 ENCOUNTER — HOSPITAL ENCOUNTER (OUTPATIENT)
Dept: PAIN MANAGEMENT | Age: 52
Discharge: HOME | End: 2017-12-15
Payer: MEDICARE

## 2017-12-15 DIAGNOSIS — G89.29 CHRONIC BILATERAL LOW BACK PAIN WITH BILATERAL SCIATICA: Primary | ICD-10-CM

## 2017-12-15 DIAGNOSIS — M54.41 CHRONIC BILATERAL LOW BACK PAIN WITH BILATERAL SCIATICA: Primary | ICD-10-CM

## 2017-12-15 DIAGNOSIS — M54.2 NECK PAIN, BILATERAL: ICD-10-CM

## 2017-12-15 DIAGNOSIS — M54.42 CHRONIC BILATERAL LOW BACK PAIN WITH BILATERAL SCIATICA: Primary | ICD-10-CM

## 2017-12-15 PROCEDURE — 99214 OFFICE O/P EST MOD 30 MIN: CPT | Performed by: NURSE PRACTITIONER

## 2017-12-15 PROCEDURE — 99214 OFFICE O/P EST MOD 30 MIN: CPT

## 2017-12-15 RX ORDER — AMITRIPTYLINE HYDROCHLORIDE 25 MG/1
TABLET, FILM COATED ORAL
Qty: 30 TABLET | Refills: 3 | Status: SHIPPED | OUTPATIENT
Start: 2017-12-15 | End: 2018-04-13 | Stop reason: SDUPTHER

## 2017-12-15 RX ORDER — MELOXICAM 7.5 MG/1
7.5 TABLET ORAL EVERY 12 HOURS
Qty: 60 TABLET | Refills: 0 | Status: SHIPPED | OUTPATIENT
Start: 2017-12-15 | End: 2018-02-13 | Stop reason: SDUPTHER

## 2017-12-15 RX ORDER — TIZANIDINE 4 MG/1
TABLET ORAL
Qty: 30 TABLET | Refills: 3 | Status: SHIPPED | OUTPATIENT
Start: 2017-12-15 | End: 2018-04-13 | Stop reason: SDUPTHER

## 2017-12-15 ASSESSMENT — ENCOUNTER SYMPTOMS
CONSTIPATION: 0
COUGH: 0
BACK PAIN: 1
SHORTNESS OF BREATH: 0

## 2017-12-15 NOTE — PROGRESS NOTES
Patient is here today for follow up      Chief Complaint:  Neck and low back pain    PMH    Pt c/o neck and back pain for many years. His neck pain started after a fall with unilatreral facet fracture C5-6. He has had surgery to the cervical area in 2016 but radiculopathy and headaches continue. He last say NS in 5/2017 with no surgical intervention suggested. His back pain is chronic and radiates down to his feet with prolonged activity. He has tried NMS, caudal, right and left RFA, PT and dry needling with minimal relief. HPI:   Neck Pain    This is a chronic problem. The current episode started more than 1 year ago. The problem occurs constantly. The pain is associated with a fall. The pain is present in the left side, midline and right side. The quality of the pain is described as aching, burning, shooting and stabbing. The pain is at a severity of 7/10. The pain is moderate. The symptoms are aggravated by bending and position. The pain is same all the time. Associated symptoms include tingling and weakness. Pertinent negatives include no chest pain or fever. He has tried acetaminophen, muscle relaxants and NSAIDs for the symptoms. The treatment provided mild relief. Back Pain   This is a chronic problem. The current episode started more than 1 year ago. The problem occurs constantly. The problem has been gradually worsening since onset. The pain is present in the lumbar spine and sacro-iliac. The quality of the pain is described as aching, burning and shooting. The pain radiates to the right foot and left foot. The pain is at a severity of 7/10. The pain is moderate. The pain is the same all the time. The symptoms are aggravated by position, standing and sitting. Associated symptoms include paresthesias, tingling and weakness. Pertinent negatives include no chest pain or fever. Risk factors include lack of exercise and obesity.  He has tried analgesics, muscle relaxant and NSAIDs (PT NMS) for the symptoms. The treatment provided mild relief. Patient denies any new neurological symptoms. No bowel or bladder incontinence, no weakness, and no falling. Review of OARRS does not show any aberrant prescription behavior. Medication is helping the patient stay active. Patient denies any side effects and reports adequate analgesia. No sign of misuse/abuse.     Past Medical History:   Diagnosis Date    Anesthesia complication     combative/ confused after some anesthesias per pt    C. difficile diarrhea 12/2/2016    Diabetes mellitus ([facility] [address]     DKA (diabetic ketoacidoses) ([facility] [address]     ETOH abuse     Falls frequently     Pancreatitis        Past Surgical History:   Procedure Laterality Date    CERVICAL [address] SURGERY  5/2/16    c-3 thru 7    KNEE ARTHROSCOPY Bilateral     several each knee    KNEE SURGERY Bilateral     right x 3, left x2, scopes plus    NERVE BLOCK  05/25/2017    caudal #1 decadron 10mg    NERVE BLOCK  06/09/2017    bilateral mbnb, marcaine only    NERVE BLOCK Bilateral 06/16/2017    Bilat MBNB #2 marcaine only    NERVE BLOCK Right 06/26/2017    right lumbar RFA decadron  10mg    NERVE BLOCK Left 07/21/2017    left lumbar RF, decadron 4mg and marcaine    NOSE SURGERY      NE EGD TRANSORAL BIOPSY SINGLE/MULTIPLE N/A 3/27/2017    EGD BIOPSY performed by Kiran Hayes DO at [facility]    WISDOM TOOTH EXTRACTION         No Known Allergies      Current Outpatient Prescriptions:     omeprazole (PRILOSEC) 20 MG delayed release capsule, TAKE ONE CAPSULE BY MOUTH DAILY, Disp: 30 capsule, Rfl: 1    CREON [id number] units delayed release capsule, TAKE TWO CAPSULES BY MOUTH THREE TIMES A DAY WITH MEALS, Disp: 180 capsule, Rfl: 2    QUEtiapine (SEROQUEL) 25 MG tablet, Take 1 tablet by mouth 2 times daily, Disp: 60 tablet, Rfl: 3    metoprolol tartrate (LOPRESSOR) 25 MG tablet, TAKE ONE TABLET BY MOUTH TWICE A DAY, Disp: 60 tablet, Rfl: 3    tiZANidine (ZANAFLEX) 4 MG tablet, TAKE ONE diffuse disc bulging and mild   bilateral uncovertebral hypertrophy.  There is focal thickening of the   posterior longitudinal ligament at the C3 level, versus disc extrusion with   superior migration measuring approximately 3 mm in AP dimension.  Mild   bilateral C3 foraminal narrowing is present. She Se is mild spinal canal   stenosis.  AP dimension of the spinal canal measures 8-9 mm at midline.       C4-C5: Bilateral facet hypertrophy.  No significant disc bulge or herniation. Mild -moderate right and mild left C5 foraminal narrowing.  No significant   spinal canal stenosis.  The posterior and lateral aspects of the cord appears   somewhat flattened.  There is no overall cord compression.  This may relate   to cord atrophy.       C5-C6: Bilateral facet hypertrophy.  No significant disc bulge or herniation. Mild right greater than left C6 foraminal narrowing.  The spinal canal is   narrowed in the transverse dimension, with indentation and flattening of the   right posterior aspect of the cord due to facet hypertrophy.  There is no   overall cord compression.       C6-C7: Bilateral facet hypertrophy.  Mild diffuse disc bulging and bilateral   uncovertebral hypertrophy.  Mild bilateral C7 foraminal narrowing.  No   significant spinal canal stenosis.       C7-T1: Bilateral facet hypertrophy.  No significant disc bulge or herniation. Mild bilateral C8 foraminal stenosis.  No significant spinal canal stenosis.         Impression   Interval decrease in extent of cervical cord signal abnormality.  Small foci   of cord signal alteration are present at the C4 vertebral body level,   possibly myelomalacia.       There is mild spinal canal stenosis at several levels with indentation of the   cord and cord flattening.  No evidence of cord compression.       Mild multilevel neural foraminal narrowing is present. Lumbar MRI 4/2017:  FINDINGS:   BONES/ALIGNMENT: There is normal alignment of the spine.  The Chronic bilateral low back pain with bilateral sciatica - Primary    Neck pain, bilateral      Other Visit Diagnoses    None. Treatment Plan:  DISCUSSION: Treatment options discussed with patient and all questions answered to patient's satisfaction. TREATMENT OPTIONS:     Case discussed with Dr Christiano Whittington, at this point there are no further non-opioid measure to offer pt. Will restart Mobic and pt instructed to stop advil  Pt would like to follow up with NS regarding neck pain and will discuss back pain with them.   Pt instructed to call office after NS appt for other options

## 2017-12-15 NOTE — TELEPHONE ENCOUNTER
Next Visit Date:  Future Appointments  Date Time Provider Bob Jaimesi   12/21/2017 8:00 AM Gerardellis Cheek, PTA [facility]   12/28/2017 8:00 AM Roxi Us, PT [facility]   1/2/2018 1:00 PM Dejah Donato MD [facility]   1/4/2018 8:00 AM Roxi Us, PT [facility]   1/8/2018 11:00 AM Gerard Cheek, PTA [facility]   1/11/2018 8:00 AM Roxi Us, PT [facility]   1/15/2018 11:00 AM Gerardellis Cheek, PTA [facility]   1/18/2018 8:00 AM Roxi Us, PT [facility]   1/22/2018 11:00 AM Gerard Cheek, PTA [facility]   1/25/2018 8:00 AM Roxi Us, PT [facility]   3/21/2018 9:35 AM Carol Bae MD [facility]       Health Maintenance   Topic Date Due    Diabetic foot exam  07/26/1977    Diabetic retinal exam  07/26/1977    Pneumococcal med risk (1 of 1 - PPSV23) 07/26/1986    Colon Cancer Screen FIT/FOBT  07/26/2017    Flu vaccine (1) 09/01/2017    Diabetic hemoglobin A1C test  03/27/2018    Lipid screen  03/27/2018    Diabetic microalbuminuria test  09/21/2018    DTaP/Tdap/Td vaccine (2 - Td) 04/27/2026    HIV screen  Completed       Hemoglobin A1C (%)   Date Value   03/27/2017 6.7 (H)   04/27/2016 5.2   09/12/2012 7.5 (H)             ( goal A1C is < 7)   Microalb/Crt.  Ratio (mcg/mg creat)   Date Value   08/29/2011 25     LDL Cholesterol (mg/dL)   Date Value   03/27/2017            (goal LDL is <100)   AST (U/L)   Date Value   03/27/2017 31     ALT (U/L)   Date Value   03/27/2017 38     BUN (mg/dL)   Date Value   03/27/2017 4 (L)     BP Readings from Last 3 Encounters:   09/18/17 129/87   09/14/17 (!) 159/113   08/07/17 128/86          (goal 120/80)    All Future Testing planned in [facility]  Lab Frequency Next Occurrence   PT eval and treat Once 03/09/2017   Microalbumin, Ur Once 03/05/2018   PT eval and treat Once 04/27/2017   PT eval and treat Once 07/28/2017   Microalbumin, Ur Once 05/14/2018               Patient Active Problem List:     Alcohol withdrawal ([facility] [address]     Alcohol abuse     Benign essential HTN     Chronic bilateral low back pain with bilateral sciatica     Moderate episode of recurrent major depressive disorder (HCC)     Generalized abdominal pain     Diarrhea of presumed infectious origin     Metabolic acidosis, increased anion gap (IAG)     C. difficile diarrhea     SIRS (systemic inflammatory response syndrome) (HCC)     Chronic diarrhea     Pancreatic insufficiency     Alcohol-induced chronic pancreatitis (HCC)     Major depressive disorder     Esophagitis     Type 2 diabetes mellitus without complication, without long-term current use of insulin (HCC)     Neck pain, bilateral

## 2017-12-21 ENCOUNTER — HOSPITAL ENCOUNTER (OUTPATIENT)
Dept: PHYSICAL THERAPY | Facility: CLINIC | Age: 52
Setting detail: THERAPIES SERIES
Discharge: HOME | End: 2017-12-21
Payer: MEDICARE

## 2017-12-21 PROCEDURE — 97110 THERAPEUTIC EXERCISES: CPT

## 2017-12-21 PROCEDURE — G0283 ELEC STIM OTHER THAN WOUND: HCPCS

## 2017-12-21 NOTE — FLOWSHEET NOTE
[] [facility]       Outpatient Physical        Therapy       [address]       Phone: [phone]       Fax: [phone] [x] [facility] at [address]       Phone: [phone]       Fax: [phone] [] Marie. [address] [place] [address] [facility]otion  [address] [place] [address]   Phone: [phone]   Fax:  [phone]     Physical Therapy Daily Treatment Note    Date:  2017  Patient Name:  Kylie Nettles    :  1965  MRN: [id number]  Physician: Ruddy Reeder MD                                                          Insurance: paramount advantage  Medical Diagnosis: bilateral LBP without sciatica                                      Rehab Codes:                      M54.5; M79.1; M54.2; M54.6; M25.511; M25.512  Onset Date: 16                                       Next 's appt. :       Visit# / total visits:       Cancels/No Shows: 1/0    Subjective:    Pain:  [x] Yes  [] No Location: lumbar, cervical Pain Rating: (0-10 scale) 7/10  Pain altered Tx:  [] No  [x] Yes  Action: had to stop ex early due to pain  Comments: Shoulder, neck, and back pain remain unchanged.      Objective:    17: Observation: min scoliosis noted; still with increased lordosis  Modalities:   HP TO NECK AND BACK IN supine, HP/stim to neck and back at the end of treatment  Precautions: cervical laminectomy poor ability in prone; L knee pain  Exercises: Bolded completed   Exercise Reps/ Time Weight/ Level Comments   treadmill 3.5 min 1.0 Started :    Nu-step 7 min 3.0 Started 17         standing      Side step mini lunge 5ea A Started ; keep ta contracted; posture   Front mini lunge 5ea A Started ; keep ta contracted; watch posture   Step-ups 10ea 4\" Started 17   squats 15 A Started 17 Increased reps          Pulleys UE 4 min  Started 17         standing   bilaterally   Shoulder extension 15 Green Started 11/6/17 Increased resistance 12/11   Shoulder rows 15 Green Started 11/6/17 Increased resistance 12/11   ER 15 red Progressed 11/30/17         supine      c-isometrics 3 ea Mild R Started 11/13/17   UT stretches 2 ea  Head in midline   PROM c-spine x  Gentle, in available ROM   Cane punch-ups 15 3# cane Progressed 11/9/17   Cane shoulder flexion 15 3# cane Progressed 11/9/17 Increased reps 12/11   Cane h. abd/adduction 15 3# cane Progressed 11/9/17  Increased reps 12/11          PROM  5'   Psoas, HS, piriformis, hip IR/ER   Psoas stretch 2 min  Off edge of table bilaterally    PPT 10x  5 sec     Alt arm 10 A Started 11/13    Marches 10x  A     Alt arm/leg 10 A Started 11/2/17   SLR 12 A progressed 11/6/17   HS stretch x man bilateral    SKTC 20'x3   Use with towel therapist assist due to patient having pain in shoulders    LTR 10x       Calf stretch 20\"x3 Slant board 12/21   Side-bilaterally      Hip abduction 15 A Progressed 11/30/17   clamshell 15 A Progressed 11/30/17   Reverse clamshell 15 A Progressed 11/30/17         prone      Alt leg 10 A Progressed 11/13 Increased reps 12/21   Alt arm 5 A Started 11/9/17- difficult, modified, arms out to side   UE/LE 5x  Added 12/11   Gluteal squeeze 5 5 sec Started 11/30/17 -too much pressure on c-spine   Other:    Not today: Therapeutic activities: reviewed fall prevention strategies; briefly spoke re posture and use of support  Not today MFR: supine: c-spine, traps, OBR, bilateral shoulders including deltoids, biceps; spine in prone, 15 min total    DRY NEEDLING: consent obtained and skin prepped with alcohol; standard procedure followed:   79 total:in prone: LUMBAR: 4 2\" in L2-4 ea side; 3\" IN L5 bilat and 3 3\" in B superior cluneals;    1\" in greater occipital homestatic points bilaterally and bilat UT insertion;0.5\" in cervical paraspinals bilaterally; 1\" (3) in both RC insertions; 2\" in bilateral scapulae bilat; 1\" in thoracic paravertebral points to T9 bilaterally;  bialteral lateral antebrachial cutaneous with 1\" and deep and superficial radial with 1\"; left in situ 15 min      Not today: fascial loading: standing: much swaying; also with pull L lateral;    Specific Instructions for next treatment:   Continue with conditioning, DN, manual therapy       Treatment Charges: Mins Units   []  Modalities: HP/Estm 15/15 0/1   [x]  Ther Exercise     []  Manual Therapy 40 3   []  Ther Activities     []  Aquatics     []  Vasocompression     []  Other     Total Treatment time 55 4       Assessment: [] Progressing toward goals. [x] No change: Focused on conditioning this date. Completed as indicated today with some limitations due to pain. Added calf stretch today in which patient found beneficial.      [] Other:   Problems:    [x] ? Back Pain: 7/10                                         [x] ? Cervical Pain: 7/10              [x] ? ROM: general stiffness in spine with limitation in movement; poor UE shoulder flexibility                                           [x] ? Strength: poor physical condition               [x] ? Function:  [x] Postural Deviations: scoliosis; increased lordosis; cervical spine abnormality with incision indented  [] Gait Deviations  [x] Other: mm tenderness/spasm  Poor balance                                                STG: (to be met in 6  treatments)  1. ? Pain: below 7/10: ONGOING 10/26/17  2. ? ROM: tolerate shoulder flexibility; psoas stretching: MET 10/26/17  3. ? Strength: tolerate some core and post hip isometric strength: MET 10/26/17  4. ? Function: modify posture/body mechanics: MET 10/26/17  5. Independent with Home Exercise Program: MET 10/26/17  6. Demonstrate Knowledge of fall prevention: MET 10/5/17     LTG: (to be met in 12 treatments): ONGOING: continue x 12 sessions to visit #24: NOT YET MET as of 12/14/17  1. Pain below 6/10  2.  Decreased lordosis and possible reduction in scoliosis with treatment of soft

## 2017-12-28 ENCOUNTER — HOSPITAL ENCOUNTER (OUTPATIENT)
Dept: PHYSICAL THERAPY | Facility: CLINIC | Age: 52
Setting detail: THERAPIES SERIES
Discharge: HOME | End: 2017-12-28
Payer: MEDICARE

## 2017-12-28 PROCEDURE — 97140 MANUAL THERAPY 1/> REGIONS: CPT

## 2017-12-28 NOTE — FLOWSHEET NOTE
Side step mini lunge 5ea A Started 11/13; keep ta contracted; posture   Front mini lunge 5ea A Started 11/13; keep ta contracted; watch posture   Step-ups 10ea 4\" Started 11/30/17   squats 15 A Started 11/30/17 Increased reps 12/21         Pulleys UE 4 min  Started 11/2/17         standing   bilaterally   Shoulder extension 15 Green Started 11/6/17 Increased resistance 12/11   Shoulder rows 15 Green Started 11/6/17 Increased resistance 12/11   ER 15 red Progressed 11/30/17         supine      c-isometrics 3 ea Mild R Started 11/13/17   UT stretches 2 ea  Head in midline   PROM c-spine x  Gentle, in available ROM   Cane punch-ups 15 3# cane Progressed 11/9/17   Cane shoulder flexion 15 3# cane Progressed 11/9/17 Increased reps 12/11   Cane h. abd/adduction 15 3# cane Progressed 11/9/17  Increased reps 12/11          PROM  5'   Psoas, HS, piriformis, hip IR/ER   Psoas stretch 2 min  Off edge of table bilaterally    PPT 10x  5 sec     Alt arm 10 A Started 11/13 Marches 10x  A     Alt arm/leg 10 A Started 11/2/17   SLR 12 A progressed 11/6/17   HS stretch x man bilateral    SKTC 20'x3   Use with towel therapist assist due to patient having pain in shoulders    LTR 10x       Calf stretch 20\"x3 Slant board 12/21   Side-bilaterally      Hip abduction 15 A Progressed 11/30/17   clamshell 15 A Progressed 11/30/17   Reverse clamshell 15 A Progressed 11/30/17         prone      Alt leg 10 A Progressed 11/13 Increased reps 12/21   Alt arm 5 A Started 11/9/17- difficult, modified, arms out to side   UE/LE 5x  Added 12/11   Gluteal squeeze 5 5 sec Started 11/30/17 -too much pressure on c-spine   Other:    Not today: Therapeutic activities: reviewed fall prevention strategies; briefly spoke re posture and use of support  Not today MFR: supine: c-spine, traps, OBR, bilateral shoulders including deltoids, biceps; spine in prone, 15 min total    DRY NEEDLING: consent obtained and skin prepped with alcohol; standard procedure followed:    total: 36 in prone: LUMBAR: 4 2\" in L2-4 ea side; 3\" IN L5 bilat and 3 3\" in B superior cluneals;  1\" in thoracic paravertebral points T6-12;  bialteral lateral antebrachial cutaneous with 1\" and deep and superficial radial with 1\"; bilateral 1\" posterior popliteal and sural; 2\" in common fibular; 1\" in tibial; left in situ 15 min      Not today: fascial loading: standing: much swaying; also with pull L lateral;    Specific Instructions for next treatment:   Continue with conditioning, DN, manual therapy       Treatment Charges: Mins Units   []  Modalities: HP/Estm     []  Ther Exercise     [x]  Manual Therapy 35 2   []  Ther Activities     []  Aquatics     []  Vasocompression     []  Other     Total Treatment time         Assessment: [] Progressing toward goals. [x] No change: continue to work on pain control one visit and conditioning/spine stability work the other visit; this combo seems to be helping maintain LB in a tolerable position;     [] Other:   Problems:    [x] ? Back Pain: 7/10                                         [x] ? Cervical Pain: 7/10              [x] ? ROM: general stiffness in spine with limitation in movement; poor UE shoulder flexibility                                           [x] ? Strength: poor physical condition               [x] ? Function:  [x] Postural Deviations: scoliosis; increased lordosis; cervical spine abnormality with incision indented  [] Gait Deviations  [x] Other: mm tenderness/spasm  Poor balance                                                STG: (to be met in 6  treatments)  1. ? Pain: below 7/10: ONGOING 10/26/17  2. ? ROM: tolerate shoulder flexibility; psoas stretching: MET 10/26/17  3. ? Strength: tolerate some core and post hip isometric strength: MET 10/26/17  4. ? Function: modify posture/body mechanics: MET 10/26/17  5. Independent with Home Exercise Program: MET 10/26/17  6.  Demonstrate Knowledge of fall prevention: MET 10/5/17     LTG: (to

## 2018-01-02 ENCOUNTER — OFFICE VISIT (OUTPATIENT)
Dept: NEUROSURGERY | Age: 53
End: 2018-01-02
Payer: MEDICARE

## 2018-01-02 VITALS
WEIGHT: 229 LBS | HEART RATE: 96 BPM | SYSTOLIC BLOOD PRESSURE: 150 MMHG | HEIGHT: 69 IN | DIASTOLIC BLOOD PRESSURE: 96 MMHG | BODY MASS INDEX: 33.92 KG/M2

## 2018-01-02 DIAGNOSIS — M54.2 NECK PAIN: Primary | ICD-10-CM

## 2018-01-02 PROCEDURE — 99213 OFFICE O/P EST LOW 20 MIN: CPT | Performed by: NEUROLOGICAL SURGERY

## 2018-01-02 NOTE — PROGRESS NOTES
[address]  [address] [place] [address]  Dept: [phone]    Patient:  Anne Hussein  YOB: 1965  Date: 1/2/18    The patient is a 50 y.o. male who presents today for followup of the following problems:     Chief Complaint   Patient presents with    Neck Pain        HPI:     Anne Hussein is a 62-year-old  male who is a patient of my former partner Dr. Jaquan Knox. He underwent posterior cervical laminectomy from C3 to C7 on 5/3/16. He complains of neck and shoulder pain. He reports that the majority of his pain is localized to the shoulder area. His pain level is 8/10. He describes the pain as stinging and burning. The pain is aggravated by movement. Nothing alleviates his pain. He denies all other neurological deficit.     History:     Past Medical History:   Diagnosis Date    Anesthesia complication     combative/ confused after some anesthesias per pt    C. difficile diarrhea 12/2/2016    [facility]     DKA (diabetic ketoacidoses) [facility]     ETOH abuse     Falls frequently     Pancreatitis      Past Surgical History:   Procedure Laterality Date    CERVICAL [address] SURGERY  5/2/16    c-3 thru 7    KNEE ARTHROSCOPY Bilateral     several each knee    KNEE SURGERY Bilateral     right x 3, left x2, scopes plus    NERVE BLOCK  05/25/2017    caudal #1 decadron 10mg    NERVE BLOCK  06/09/2017    bilateral mbnb, marcaine only    NERVE BLOCK Bilateral 06/16/2017    Bilat MBNB #2 marcaine only    NERVE BLOCK Right 06/26/2017    right lumbar RFA decadron  10mg    NERVE BLOCK Left 07/21/2017    left lumbar RF, decadron 4mg and marcaine    NOSE SURGERY      UT EGD TRANSORAL BIOPSY SINGLE/MULTIPLE N/A 3/27/2017    EGD BIOPSY [facility] by Dony Cruz DO at [facility]    WISDOM TOOTH EXTRACTION       Family History   Problem Relation Age of Onset    Diabetes Mother     Heart Disease Systems  Constitutional: Negative for activity change and appetite change. HENT: Negative for ear pain and facial swelling. Eyes: Negative for discharge and itching. Respiratory: Negative for choking and chest tightness. Cardiovascular: Negative for chest pain and leg swelling. + CAD + Hyperlipidemia + Hypertension  Gastrointestinal: Negative for nausea and abdominal pain. + GERD  Endocrine: Negative for cold intolerance and heat intolerance. Genitourinary: Negative for frequency and flank pain. Musculoskeletal: Negative for myalgias and joint swelling. Skin: Negative for rash and wound. Allergic/Immunologic: Negative for environmental allergies and food allergies. Hematological: Negative for adenopathy. Does not bruise/bleed easily. Psychiatric/Behavioral: Negative for self-injury. The patient is not nervous/anxious. Physical Exam:      Physical Examination  BP (!) 150/96 (Site: Left Arm, Position: Sitting, Cuff Size: Small Adult)   Pulse 96   Ht 5' 9\" (1.753 m)   Wt 229 lb (103.9 kg)   BMI 33.82 kg/m²   Estimated body mass index is 33.82 kg/m² as calculated from the following:    Height as of this encounter: 5' 9\" (1.753 m). Weight as of this encounter: 229 lb (103.9 kg). General:  Kathy King is a 50y.o. year old male who appears his stated age. HEENT: Normocephalic atraumatic. Neck supple. Neurologic Exam   CN II-XII: Intact  Motor examination:  Full strength, symmetric. Sensory examination:  Intact perception of light touch. Reflex examination:  Negative Wu sign. Patella 2/4. Gait and posture:  Upright posture. Normal narrow based nonspastic gait.     Studies Review:     Reviewed MRI cervical spine without contrast Type:  Film and Report    Images:  Date of Image: 5/6/17    Interval decrease in extent of cervical cord signal abnormality.  Small foci   of cord signal alteration are present at the C4 vertebral body level,   possibly myelomalacia.       There is made to ensure the accuracy of this automated transcription, some errors in transcription may have occurred.

## 2018-01-04 ENCOUNTER — HOSPITAL ENCOUNTER (OUTPATIENT)
Dept: PHYSICAL THERAPY | Facility: CLINIC | Age: 53
Setting detail: THERAPIES SERIES
Discharge: HOME | End: 2018-01-04
Payer: MEDICARE

## 2018-01-04 PROCEDURE — 97140 MANUAL THERAPY 1/> REGIONS: CPT
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
## 2018-01-09 ENCOUNTER — OFFICE VISIT (OUTPATIENT)
Dept: BURN CARE | Age: 53
End: 2018-01-09
Payer: MEDICARE

## 2018-01-09 VITALS
HEART RATE: 97 BPM | DIASTOLIC BLOOD PRESSURE: 85 MMHG | SYSTOLIC BLOOD PRESSURE: 131 MMHG | BODY MASS INDEX: 23.99 KG/M2 | WEIGHT: 162 LBS | HEIGHT: 69 IN

## 2018-01-09 DIAGNOSIS — T30.0 PARTIAL THICKNESS AND FULL THICKNESS BURNS: Primary | ICD-10-CM

## 2018-01-09 PROCEDURE — 3017F COLORECTAL CA SCREEN DOC REV: CPT | Performed by: PLASTIC SURGERY

## 2018-01-09 PROCEDURE — G8484 FLU IMMUNIZE NO ADMIN: HCPCS | Performed by: PLASTIC SURGERY

## 2018-01-09 PROCEDURE — 99203 OFFICE O/P NEW LOW 30 MIN: CPT | Performed by: PLASTIC SURGERY

## 2018-01-09 PROCEDURE — 4004F PT TOBACCO SCREEN RCVD TLK: CPT | Performed by: PLASTIC SURGERY

## 2018-01-09 PROCEDURE — G8427 DOCREV CUR MEDS BY ELIG CLIN: HCPCS | Performed by: PLASTIC SURGERY

## 2018-01-09 PROCEDURE — G8420 CALC BMI NORM PARAMETERS: HCPCS | Performed by: PLASTIC SURGERY

## 2018-01-11 ENCOUNTER — HOSPITAL ENCOUNTER (OUTPATIENT)
Dept: PHYSICAL THERAPY | Facility: CLINIC | Age: 53
Setting detail: THERAPIES SERIES
Discharge: HOME | End: 2018-01-11
Payer: MEDICARE

## 2018-01-11 PROCEDURE — 97140 MANUAL THERAPY 1/> REGIONS: CPT
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
## 2018-01-15 ENCOUNTER — HOSPITAL ENCOUNTER (OUTPATIENT)
Dept: PHYSICAL THERAPY | Facility: CLINIC | Age: 53
Setting detail: THERAPIES SERIES
Discharge: HOME | End: 2018-01-15
Payer: MEDICARE

## 2018-01-15 PROCEDURE — 97110 THERAPEUTIC EXERCISES: CPT
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
## 2018-01-18 ENCOUNTER — HOSPITAL ENCOUNTER (OUTPATIENT)
Dept: PHYSICAL THERAPY | Facility: CLINIC | Age: 53
Setting detail: THERAPIES SERIES
Discharge: HOME | End: 2018-01-18
Payer: MEDICARE

## 2018-01-22 ENCOUNTER — HOSPITAL ENCOUNTER (OUTPATIENT)
Dept: PHYSICAL THERAPY | Facility: CLINIC | Age: 53
Setting detail: THERAPIES SERIES
Discharge: HOME | End: 2018-01-22
Payer: MEDICARE

## 2018-01-22 PROCEDURE — 97110 THERAPEUTIC EXERCISES: CPT
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
## 2018-01-23 NOTE — DISCHARGE SUMMARY
[] Ciarra Lund        Outpatient Physical                Therapy       [address]       Phone: [phone]       Fax: [phone] [x] [facility] at [address] [facility]       Phone: [phone]       Fax: [phone] [] [facility] [address] [place] [address]     [address] [facility]     Phone: [phone]     Fax:  [phone]     Physical Therapy Discharge Note    Date: 2018      Patient: Yimi Murdock  : 1965  MRN: [id number]    Physician: Jamie Bonner MD                                                          Insurance: [employer] advantage  Medical Diagnosis: bilateral LBP without sciatica                                      Rehab Codes:                      M54.5; M79.1; M54.2; M54.6; M25.511; M25.512  Onset Date: 16                                       Next 's appt. :       Visit# / total visits:                             Cancels/No Shows:   Date of initial visit: 10/2/17                Date of final visit: 18        Assessment: the patient was making some progress with overall conditioning level; pain would improve temporarily with dry needling but would not stay better after a couple of days. STG: (to be met in 6  treatments)  1. ? Pain: below 7/10: ONGOING 10/26/17  2. ? ROM: tolerate shoulder flexibility; psoas stretching: MET 10/26/17  3. ? Strength: tolerate some core and post hip isometric strength: MET 10/26/17  4. ? Function: modify posture/body mechanics: MET 10/26/17  5. Independent with Home Exercise Program: MET 10/26/17  6. Demonstrate Knowledge of fall prevention: MET 10/5/17     LTG: (to be met in 12 treatments): ONGOING: continue x 12 sessions to visit #24: NOT YET MET as of 17; NOT MET 18; patient to be on hold until after MD visit in   1. Pain below 6/10  2.  Decreased lordosis and possible reduction in scoliosis with treatment of soft tissue  3. Have patient do at least 20 min of continuous walking to increase physical condition     Patient goals:  \"get better\"        Treatment to Date:  [x] Therapeutic Exercise    [] Modalities:  [x] Therapeutic Activity    [] Ultrasound  [x] Electrical Stimulation  [] Gait Training     [] Massage       [] Lumbar/Cervical Traction  [] Neuromuscular Re-education [x] Cold/hotpack [] Iontophoresis: 4 mg/mL  [x] Instruction in Home Exercise Program                     Dexamethasone Sodium  [x] Manual Therapy             Phosphate 40-80 mAmin  [] Aquatic Therapy                   [] Vasocompression/    [] Other:             Game Ready    Discharge Status:     [] Pt recovered from conditions. Treatment goals were met. [] Pt received maximum benefit. No further therapy indicated at this time. [x] Pt to continue exercise/home instructions independently. [] Therapy interrupted due to:    [] Pt has 2 or more no shows/cancels, is discontinued per our policy. [x] Pt has completed prescribed number of treatment sessions. [] Other:         Electronically signed by Nicholas Erickson PT on 1/22/2018 at 7:48 PM      If you have any questions or concerns, please don't hesitate to call.   Thank you for your referral.

## 2018-01-24 RX ORDER — OMEPRAZOLE 20 MG/1
CAPSULE, DELAYED RELEASE ORAL
Qty: 30 CAPSULE | Refills: 0 | Status: SHIPPED | OUTPATIENT
Start: 2018-01-24 | End: 2018-02-21 | Stop reason: SDUPTHER

## 2018-01-25 ENCOUNTER — APPOINTMENT (OUTPATIENT)
Dept: PHYSICAL THERAPY | Facility: CLINIC | Age: 53
End: 2018-01-25
Payer: MEDICARE

## 2018-02-13 RX ORDER — MELOXICAM 7.5 MG/1
7.5 TABLET ORAL EVERY 12 HOURS
Qty: 60 TABLET | Refills: 0 | Status: SHIPPED | OUTPATIENT
Start: 2018-02-13 | End: 2018-03-14 | Stop reason: SDUPTHER
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
## 2018-02-21 RX ORDER — OMEPRAZOLE 20 MG/1
CAPSULE, DELAYED RELEASE ORAL
Qty: 30 CAPSULE | Refills: 3 | Status: SHIPPED | OUTPATIENT
Start: 2018-02-21 | End: 2018-10-04 | Stop reason: SDUPTHER

## 2018-03-13 ENCOUNTER — INITIAL CONSULT (OUTPATIENT)
Dept: PHYSICAL MEDICINE AND REHAB | Age: 53
End: 2018-03-13
Payer: MEDICARE

## 2018-03-13 VITALS
HEIGHT: 69 IN | WEIGHT: 226.2 LBS | HEART RATE: 79 BPM | BODY MASS INDEX: 33.5 KG/M2 | DIASTOLIC BLOOD PRESSURE: 85 MMHG | TEMPERATURE: 97.8 F | SYSTOLIC BLOOD PRESSURE: 122 MMHG

## 2018-03-13 DIAGNOSIS — M54.2 CHRONIC MIDLINE POSTERIOR NECK PAIN: Primary | ICD-10-CM

## 2018-03-13 DIAGNOSIS — G89.29 CHRONIC MIDLINE POSTERIOR NECK PAIN: Primary | ICD-10-CM

## 2018-03-13 PROCEDURE — G8427 DOCREV CUR MEDS BY ELIG CLIN: HCPCS | Performed by: PHYSICAL MEDICINE & REHABILITATION

## 2018-03-13 PROCEDURE — 3017F COLORECTAL CA SCREEN DOC REV: CPT | Performed by: PHYSICAL MEDICINE & REHABILITATION

## 2018-03-13 PROCEDURE — G8417 CALC BMI ABV UP PARAM F/U: HCPCS | Performed by: PHYSICAL MEDICINE & REHABILITATION

## 2018-03-13 PROCEDURE — 99244 OFF/OP CNSLTJ NEW/EST MOD 40: CPT | Performed by: PHYSICAL MEDICINE & REHABILITATION

## 2018-03-13 PROCEDURE — G8484 FLU IMMUNIZE NO ADMIN: HCPCS | Performed by: PHYSICAL MEDICINE & REHABILITATION

## 2018-03-13 RX ORDER — TRAZODONE HYDROCHLORIDE 100 MG/1
200 TABLET ORAL NIGHTLY PRN
Status: ON HOLD | COMMUNITY
Start: 2018-03-12 | End: 2018-07-03 | Stop reason: HOSPADM

## 2018-03-13 RX ORDER — VENLAFAXINE HYDROCHLORIDE 75 MG/1
CAPSULE, EXTENDED RELEASE ORAL
COMMUNITY
Start: 2018-03-12 | End: 2018-03-13 | Stop reason: SDUPTHER

## 2018-03-13 RX ORDER — ERGOCALCIFEROL 1.25 MG/1
CAPSULE ORAL
Status: ON HOLD | COMMUNITY
Start: 2018-03-12 | End: 2018-06-05

## 2018-03-13 RX ORDER — GABAPENTIN 800 MG/1
TABLET ORAL
Status: ON HOLD | COMMUNITY
Start: 2018-02-14 | End: 2018-06-05

## 2018-03-13 RX ORDER — HYDROXYZINE PAMOATE 100 MG/1
CAPSULE ORAL 3 TIMES DAILY PRN
Status: ON HOLD | COMMUNITY
Start: 2018-03-12 | End: 2018-07-03 | Stop reason: HOSPADM

## 2018-03-13 RX ORDER — VENLAFAXINE HYDROCHLORIDE 150 MG/1
CAPSULE, EXTENDED RELEASE ORAL
COMMUNITY
Start: 2018-03-12 | End: 2018-03-13 | Stop reason: SDUPTHER

## 2018-03-13 RX ORDER — MULTIVITAMIN WITH FOLIC ACID 400 MCG
TABLET ORAL
Status: ON HOLD | COMMUNITY
Start: 2018-03-12 | End: 2018-06-05

## 2018-03-13 NOTE — LETTER
[facility] PHYSICIANS  [facility] PHYSICAL MEDICINE AND REHABILITATION  [address]  [facility] [address]  Dept: [phone]        3/13/18    Patient: Gabriela Hong  YOB: 1965    Dear Dr. Griselda Lopez,    I had the pleasure of seeing one of your patients, Claudia Lala today in the office today. Below are the relevant portions of my assessment and plan of care. IMPRESSION:     1. Chronic midline posterior neck pain  Amb External Referral To Physical Therapy       PLAN:       Orders Placed This Encounter   Procedures    Amb External Referral To Physical Therapy     Referral Priority:   Routine     Referral Type:   Consult for Advice and Opinion     Referral Reason:   Specialty Services Required     Requested Specialty:   Physical Therapy     Number of Visits Requested:   1     Orders Placed This Encounter   Medications    Gabapentin POWD     Sig: Formula 8E: diclo3%+gaba6%+DMSO5%+prilo2%+baclofen2%+lido2%. Apply 1-2 gm topically to affected area TID-QID     Dispense:  120 g     Refill:  0     1. Trial of aqua therapy with PT.  2.   Will provide HEP to begin until he is able to get into aqua therapy. Patient is requesting someplace close to his home for therapy. 3.  Patient can do self massage with theracane. 4.  Trial of compounding cream.      Return in about 6 weeks (around 4/24/2018) for re-evaluate neck pain. Thank you for allowing me to participate in the care of this patient. I will keep you updated on this patient's follow up and I look forward to serving you and your patients again in the future.         Nadia Casillas MD

## 2018-03-13 NOTE — PROGRESS NOTES
[facility] PHYSICIANS  [facility] PHYSICAL MEDICINE AND REHABILITATION  [address] [id number]  Dept: [phone]  Dept Fax: [phone]    Outpatient Follow up Note    Edd Haynes, 50 y.o., male, presents for follow up c/o of Neck Pain (Consult: Neck Pain per Dr. Peter Staples)  . HPI:     Neck Pain    This is a chronic (2 1/2 years after tripping over his dog.) problem. The current episode started more than 1 year ago (had decompressive lami after fall in May of 2017). The problem occurs daily. The problem has been gradually worsening (pain goes into both shoulders causing N/Tin both hands. ). The pain is associated with a remote injury. Pain location: back of the neck, shoulders and hands. The quality of the pain is described as shooting, stabbing and burning. The pain is at a severity of 7/10 (at its worst it is a 9/10). The pain is severe. Exacerbated by: movement. The pain is same all the time. Associated symptoms include headaches, numbness, tingling and weakness. Associated symptoms comments: N/T in hands with weakness (dropping things). Treatments tried: PT ~ 1month ago, TEN's unit, dry needling. The treatment provided mild (dry needling pain relief did not last) relief.        Past Medical History:   Diagnosis Date    Anesthesia complication     combative/ confused after some anesthesias per pt    C. difficile diarrhea 12/2/2016    Diabetes mellitus ([facility] [address]     DKA (diabetic ketoacidoses) ([facility] [address]     ETOH abuse     Falls frequently     Pancreatitis       Past Surgical History:   Procedure Laterality Date    CERVICAL [address] SURGERY  5/2/16    c-3 thru 7    KNEE ARTHROSCOPY Bilateral     several each knee    KNEE SURGERY Bilateral     right x 3, left x2, scopes plus    NERVE BLOCK  05/25/2017    caudal #1 decadron 10mg    NERVE BLOCK  06/09/2017    bilateral mbnb, marcaine only    NERVE BLOCK Bilateral 06/16/2017    Bilat MBNB #2 marcaine only    NERVE BLOCK Right 06/26/2017    right lumbar RFA decadron  10mg    NERVE BLOCK Left 07/21/2017    left lumbar RF, decadron 4mg and marcaine    NOSE SURGERY      [place] EGD TRANSORAL BIOPSY SINGLE/MULTIPLE N/A 3/27/2017    EGD BIOPSY performed [facility]    WISDOM TOOTH EXTRACTION       Family History   Problem Relation Age of Onset    Diabetes Mother     Heart Disease Father     Diabetes Sister      Social History   Substance Use Topics    Smoking status: Current Every Day Smoker     Packs/day: 2.00     Years: 34.00    Smokeless tobacco: Never Used    Alcohol use 1.8 oz/week     3 Cans of beer per week      Comment: stopped 1 month ago        Current Outpatient Prescriptions   Medication Sig Dispense Refill    vitamin D (ERGOCALCIFEROL) 00052 units CAPS capsule       gabapentin (NEURONTIN) 800 MG tablet       hydrOXYzine (VISTARIL) 100 MG capsule       Multiple Vitamin (DAILY-CAROLYNE) TABS       traZODone (DESYREL) 100 MG tablet       Gabapentin POWD Formula 8E: diclo3%+gaba6%+DMSO5%+prilo2%+baclofen2%+lido2%.  Apply 1-2 gm topically to affected area TID- g 0    omeprazole (PRILOSEC) 20 MG delayed release capsule TAKE ONE CAPSULE BY MOUTH DAILY 30 capsule 3    meloxicam (MOBIC) 7.5 MG tablet Take 1 tablet by mouth every 12 hours 60 tablet 0    amitriptyline (ELAVIL) 25 MG tablet TAKE ONE TABLET BY MOUTH ONCE NIGHTLY 30 tablet 3    tiZANidine (ZANAFLEX) 4 MG tablet TAKE ONE TABLET BY MOUTH EVERY NIGHT 30 tablet 3    CREON 86482-21480 units delayed release capsule TAKE TWO CAPSULES BY MOUTH THREE TIMES A DAY WITH MEALS 180 capsule 2    QUEtiapine (SEROQUEL) 25 MG tablet Take 1 tablet by mouth 2 times daily 60 tablet 3    metoprolol tartrate (LOPRESSOR) 25 MG tablet TAKE ONE TABLET BY MOUTH TWICE A DAY 60 tablet 3    fenofibrate (TRICOR) 54 MG tablet TAKE ONE TABLET BY MOUTH DAILY 90 tablet 2    atorvastatin (LIPITOR) 40 MG tablet TAKE ONE TABLET BY MOUTH ONCE NIGHTLY 90 tablet 2    right and mild left C5 foraminal narrowing.  No significant   spinal canal stenosis.  The posterior and lateral aspects of the cord appears   somewhat flattened.  There is no overall cord compression.  This may relate   to cord atrophy.       C5-C6: Bilateral facet hypertrophy.  No significant disc bulge or herniation. Mild right greater than left C6 foraminal narrowing.  The spinal canal is   narrowed in the transverse dimension, with indentation and flattening of the   right posterior aspect of the cord due to facet hypertrophy.  There is no   overall cord compression.       C6-C7: Bilateral facet hypertrophy.  Mild diffuse disc bulging and bilateral   uncovertebral hypertrophy.  Mild bilateral C7 foraminal narrowing.  No   significant spinal canal stenosis.       C7-T1: Bilateral facet hypertrophy.  No significant disc bulge or herniation. Mild bilateral C8 foraminal stenosis.  No significant spinal canal stenosis.         Impression   Interval decrease in extent of cervical cord signal abnormality.  Small foci   of cord signal alteration are present at the C4 vertebral body level,   possibly myelomalacia.       There is mild spinal canal stenosis at several levels with indentation of the   cord and cord flattening.  No evidence of cord compression.       Mild multilevel neural foraminal narrowing is present. Assessment:      1. Chronic midline posterior neck pain  Amb External Referral To Physical Therapy        Plan:        Orders Placed This Encounter   Procedures    Amb External Referral To Physical Therapy     Referral Priority:   Routine     Referral Type:   Consult for Advice and Opinion     Referral Reason:   Specialty Services Required     Requested Specialty:   Physical Therapy     Number of Visits Requested:   1     Orders Placed This Encounter   Medications    Gabapentin POWD     Sig: Formula 8E: diclo3%+gaba6%+DMSO5%+prilo2%+baclofen2%+lido2%.  Apply 1-2 gm topically to affected area

## 2018-03-14 RX ORDER — MELOXICAM 7.5 MG/1
TABLET ORAL
Qty: 60 TABLET | Refills: 3 | Status: ON HOLD | OUTPATIENT
Start: 2018-03-14 | End: 2018-06-05

## 2018-03-14 NOTE — TELEPHONE ENCOUNTER
Next Visit Date:  Future Appointments  Date Time Provider Department Center   3/21/2018 9:35 AM Macario Quan MD [facility]   4/24/2018 1:40 PM Ruby Silva MD [address] [place] [address] Drive Maintenance   Topic Date Due    Diabetic foot exam  07/26/1977    Diabetic retinal exam  07/26/1977    Pneumococcal med risk (1 of 1 - PPSV23) 07/26/1986    Colon Cancer Screen FIT/FOBT  07/26/2017    Shingles Vaccine (1 of 2 - 2 Dose Series) 07/26/2017    Flu vaccine (1) 09/01/2017    A1C test (Diabetic or Prediabetic)  03/27/2018    Lipid screen  03/27/2018    Potassium monitoring  03/27/2018    Creatinine monitoring  03/27/2018    Diabetic microalbuminuria test  09/21/2018    DTaP/Tdap/Td vaccine (2 - Td) 04/27/2026    HIV screen  Completed       Hemoglobin A1C (%)   Date Value   03/27/2017 6.7 (H)   04/27/2016 5.2   09/12/2012 7.5 (H)             ( goal A1C is < 7)   Microalb/Crt.  Ratio (mcg/mg creat)   Date Value   08/29/2011 25     LDL Cholesterol (mg/dL)   Date Value   03/27/2017            (goal LDL is <100)   AST (U/L)   Date Value   03/27/2017 31     ALT (U/L)   Date Value   03/27/2017 38     BUN (mg/dL)   Date Value   03/27/2017 4 (L)     BP Readings from Last 3 Encounters:   03/13/18 122/85   01/09/18 131/85   01/02/18 (!) 150/96          (goal 120/80)    All Future Testing planned in [facility]  Lab Frequency Next Occurrence   Microalbumin, Ur Once 03/05/2018   PT eval and treat Once 04/27/2017   PT eval and treat Once 07/28/2017   Microalbumin, Ur Once 05/14/2018               Patient Active Problem List:     Alcohol withdrawal ([facility] [address]     Alcohol abuse     Benign essential HTN     Chronic bilateral low back pain with bilateral sciatica     Moderate episode of recurrent major depressive disorder (HCC)     Generalized abdominal pain     Diarrhea of presumed infectious origin     Metabolic acidosis, increased anion gap (IAG)     C. difficile diarrhea     SIRS (systemic inflammatory response

## 2018-03-26 RX ORDER — QUETIAPINE FUMARATE 25 MG/1
TABLET, FILM COATED ORAL
Qty: 60 TABLET | Refills: 2 | Status: ON HOLD | OUTPATIENT
Start: 2018-03-26 | End: 2018-06-22 | Stop reason: SDUPTHER

## 2018-04-04 ENCOUNTER — OFFICE VISIT (OUTPATIENT)
Dept: ORTHOPEDIC SURGERY | Age: 53
End: 2018-04-04
Payer: MEDICARE

## 2018-04-04 VITALS
SYSTOLIC BLOOD PRESSURE: 132 MMHG | DIASTOLIC BLOOD PRESSURE: 88 MMHG | BODY MASS INDEX: 33.34 KG/M2 | HEIGHT: 68 IN | WEIGHT: 220 LBS | HEART RATE: 76 BPM

## 2018-04-04 DIAGNOSIS — G89.29 CHRONIC PAIN OF BOTH KNEES: Primary | ICD-10-CM

## 2018-04-04 DIAGNOSIS — M25.562 CHRONIC PAIN OF BOTH KNEES: Primary | ICD-10-CM

## 2018-04-04 DIAGNOSIS — M25.561 CHRONIC PAIN OF BOTH KNEES: Primary | ICD-10-CM

## 2018-04-04 PROCEDURE — 99213 OFFICE O/P EST LOW 20 MIN: CPT | Performed by: ORTHOPAEDIC SURGERY

## 2018-04-04 PROCEDURE — 20610 DRAIN/INJ JOINT/BURSA W/O US: CPT | Performed by: ORTHOPAEDIC SURGERY

## 2018-04-04 PROCEDURE — 4004F PT TOBACCO SCREEN RCVD TLK: CPT | Performed by: ORTHOPAEDIC SURGERY

## 2018-04-04 PROCEDURE — G8417 CALC BMI ABV UP PARAM F/U: HCPCS | Performed by: ORTHOPAEDIC SURGERY

## 2018-04-04 PROCEDURE — G8427 DOCREV CUR MEDS BY ELIG CLIN: HCPCS | Performed by: ORTHOPAEDIC SURGERY

## 2018-04-04 PROCEDURE — 3017F COLORECTAL CA SCREEN DOC REV: CPT | Performed by: ORTHOPAEDIC SURGERY

## 2018-04-04 RX ORDER — BETAMETHASONE SODIUM PHOSPHATE AND BETAMETHASONE ACETATE 3; 3 MG/ML; MG/ML
12 INJECTION, SUSPENSION INTRA-ARTICULAR; INTRALESIONAL; INTRAMUSCULAR; SOFT TISSUE ONCE
Status: COMPLETED | OUTPATIENT
Start: 2018-04-04 | End: 2018-04-04

## 2018-04-04 RX ORDER — BUPIVACAINE HYDROCHLORIDE 5 MG/ML
2 INJECTION, SOLUTION PERINEURAL ONCE
Status: COMPLETED | OUTPATIENT
Start: 2018-04-04 | End: 2018-04-04

## 2018-04-04 RX ORDER — LIDOCAINE HYDROCHLORIDE 10 MG/ML
2 INJECTION, SOLUTION EPIDURAL; INFILTRATION; INTRACAUDAL; PERINEURAL ONCE
Status: COMPLETED | OUTPATIENT
Start: 2018-04-04 | End: 2018-04-04

## 2018-04-04 RX ADMIN — BUPIVACAINE HYDROCHLORIDE 10 MG: 5 INJECTION, SOLUTION PERINEURAL at 16:02

## 2018-04-04 RX ADMIN — LIDOCAINE HYDROCHLORIDE 2 ML: 10 INJECTION, SOLUTION EPIDURAL; INFILTRATION; INTRACAUDAL; PERINEURAL at 16:02

## 2018-04-04 RX ADMIN — LIDOCAINE HYDROCHLORIDE 2 ML: 10 INJECTION, SOLUTION EPIDURAL; INFILTRATION; INTRACAUDAL; PERINEURAL at 16:03

## 2018-04-04 RX ADMIN — BETAMETHASONE SODIUM PHOSPHATE AND BETAMETHASONE ACETATE 12 MG: 3; 3 INJECTION, SUSPENSION INTRA-ARTICULAR; INTRALESIONAL; INTRAMUSCULAR; SOFT TISSUE at 16:01

## 2018-04-04 RX ADMIN — BETAMETHASONE SODIUM PHOSPHATE AND BETAMETHASONE ACETATE 12 MG: 3; 3 INJECTION, SUSPENSION INTRA-ARTICULAR; INTRALESIONAL; INTRAMUSCULAR; SOFT TISSUE at 16:02

## 2018-04-13 RX ORDER — AMITRIPTYLINE HYDROCHLORIDE 25 MG/1
TABLET, FILM COATED ORAL
Qty: 30 TABLET | Refills: 3 | Status: ON HOLD | OUTPATIENT
Start: 2018-04-13 | End: 2018-07-03 | Stop reason: HOSPADM

## 2018-04-13 RX ORDER — TIZANIDINE 4 MG/1
TABLET ORAL
Qty: 30 TABLET | Refills: 2 | Status: SHIPPED | OUTPATIENT
Start: 2018-04-13 | End: 2018-05-17 | Stop reason: HOSPADM

## 2018-04-24 ENCOUNTER — OFFICE VISIT (OUTPATIENT)
Dept: PHYSICAL MEDICINE AND REHAB | Age: 53
End: 2018-04-24
Payer: MEDICARE

## 2018-04-24 ENCOUNTER — CLINICAL DOCUMENTATION (OUTPATIENT)
Dept: PHYSICAL MEDICINE AND REHAB | Age: 53
End: 2018-04-24

## 2018-04-24 VITALS
TEMPERATURE: 97 F | BODY MASS INDEX: 34.8 KG/M2 | HEART RATE: 122 BPM | WEIGHT: 235 LBS | SYSTOLIC BLOOD PRESSURE: 147 MMHG | HEIGHT: 69 IN | DIASTOLIC BLOOD PRESSURE: 92 MMHG

## 2018-04-24 DIAGNOSIS — M54.2 NECK PAIN, BILATERAL: Primary | ICD-10-CM

## 2018-04-24 PROCEDURE — 99214 OFFICE O/P EST MOD 30 MIN: CPT | Performed by: PHYSICAL MEDICINE & REHABILITATION

## 2018-04-24 PROCEDURE — 3017F COLORECTAL CA SCREEN DOC REV: CPT | Performed by: PHYSICAL MEDICINE & REHABILITATION

## 2018-04-24 PROCEDURE — 4004F PT TOBACCO SCREEN RCVD TLK: CPT | Performed by: PHYSICAL MEDICINE & REHABILITATION

## 2018-04-24 PROCEDURE — G8427 DOCREV CUR MEDS BY ELIG CLIN: HCPCS | Performed by: PHYSICAL MEDICINE & REHABILITATION

## 2018-04-24 PROCEDURE — G8417 CALC BMI ABV UP PARAM F/U: HCPCS | Performed by: PHYSICAL MEDICINE & REHABILITATION

## 2018-04-24 RX ORDER — VENLAFAXINE 25 MG/1
125 TABLET ORAL 2 TIMES DAILY
Status: ON HOLD | COMMUNITY
End: 2018-06-05

## 2018-05-09 ENCOUNTER — HOSPITAL ENCOUNTER (OUTPATIENT)
Dept: MRI IMAGING | Age: 53
Discharge: HOME | End: 2018-05-11
Payer: MEDICARE

## 2018-05-09 DIAGNOSIS — M54.2 NECK PAIN, BILATERAL: ICD-10-CM

## 2018-05-09 PROCEDURE — 72141 MRI NECK SPINE W/O DYE: CPT

## 2018-05-17 ENCOUNTER — APPOINTMENT (OUTPATIENT)
Dept: CT IMAGING | Age: 53
End: 2018-05-17
Payer: MEDICARE

## 2018-05-17 ENCOUNTER — APPOINTMENT (OUTPATIENT)
Dept: GENERAL RADIOLOGY | Age: 53
End: 2018-05-17
Payer: MEDICARE

## 2018-05-17 ENCOUNTER — HOSPITAL ENCOUNTER (EMERGENCY)
Age: 53
Discharge: HOME | End: 2018-05-17
Attending: EMERGENCY MEDICINE
Payer: MEDICARE

## 2018-05-17 VITALS
WEIGHT: 228 LBS | SYSTOLIC BLOOD PRESSURE: 150 MMHG | BODY MASS INDEX: 33.77 KG/M2 | DIASTOLIC BLOOD PRESSURE: 58 MMHG | OXYGEN SATURATION: 98 % | HEART RATE: 88 BPM | TEMPERATURE: 98.6 F | RESPIRATION RATE: 20 BRPM | HEIGHT: 69 IN

## 2018-05-17 DIAGNOSIS — R03.0 ELEVATED BLOOD PRESSURE READING: ICD-10-CM

## 2018-05-17 DIAGNOSIS — S09.90XA CLOSED HEAD INJURY, INITIAL ENCOUNTER: ICD-10-CM

## 2018-05-17 DIAGNOSIS — S46.102A INJURY OF TENDON OF LONG HEAD OF BICEPS, LEFT, INITIAL ENCOUNTER: Primary | ICD-10-CM

## 2018-05-17 PROCEDURE — 72125 CT NECK SPINE W/O DYE: CPT

## 2018-05-17 PROCEDURE — 73070 X-RAY EXAM OF ELBOW: CPT

## 2018-05-17 PROCEDURE — 73030 X-RAY EXAM OF SHOULDER: CPT

## 2018-05-17 PROCEDURE — 6370000000 HC RX 637 (ALT 250 FOR IP): Performed by: PHYSICIAN ASSISTANT

## 2018-05-17 PROCEDURE — 99284 EMERGENCY DEPT VISIT MOD MDM: CPT

## 2018-05-17 PROCEDURE — 70450 CT HEAD/BRAIN W/O DYE: CPT

## 2018-05-17 RX ORDER — HYDROCODONE BITARTRATE AND ACETAMINOPHEN 5; 325 MG/1; MG/1
1 TABLET ORAL 3 TIMES DAILY
Qty: 15 TABLET | Refills: 0 | Status: SHIPPED | OUTPATIENT
Start: 2018-05-17 | End: 2018-05-22

## 2018-05-17 RX ORDER — METHOCARBAMOL 750 MG/1
750 TABLET, FILM COATED ORAL 4 TIMES DAILY
Qty: 40 TABLET | Refills: 0 | Status: SHIPPED | OUTPATIENT
Start: 2018-05-17 | End: 2018-05-27

## 2018-05-17 RX ORDER — HYDROCODONE BITARTRATE AND ACETAMINOPHEN 5; 325 MG/1; MG/1
1 TABLET ORAL ONCE
Status: COMPLETED | OUTPATIENT
Start: 2018-05-17 | End: 2018-05-17

## 2018-05-17 RX ADMIN — HYDROCODONE BITARTRATE AND ACETAMINOPHEN 1 TABLET: 5; 325 TABLET ORAL at 21:36

## 2018-05-17 RX ADMIN — HYDROCODONE BITARTRATE AND ACETAMINOPHEN 1 TABLET: 5; 325 TABLET ORAL at 20:02

## 2018-05-17 ASSESSMENT — PAIN SCALES - GENERAL
PAINLEVEL_OUTOF10: 9
PAINLEVEL_OUTOF10: 9
PAINLEVEL_OUTOF10: 10
PAINLEVEL_OUTOF10: 8

## 2018-05-17 ASSESSMENT — PAIN DESCRIPTION - LOCATION: LOCATION: GENERALIZED;ARM;NECK

## 2018-05-17 ASSESSMENT — PAIN DESCRIPTION - ORIENTATION: ORIENTATION: LEFT

## 2018-05-17 ASSESSMENT — PAIN DESCRIPTION - DESCRIPTORS: DESCRIPTORS: SHARP;STABBING

## 2018-06-04 ENCOUNTER — APPOINTMENT (OUTPATIENT)
Dept: CT IMAGING | Age: 53
End: 2018-06-04
Payer: MEDICARE

## 2018-06-04 ENCOUNTER — APPOINTMENT (OUTPATIENT)
Dept: GENERAL RADIOLOGY | Age: 53
End: 2018-06-04
Payer: MEDICARE

## 2018-06-04 ENCOUNTER — HOSPITAL ENCOUNTER (EMERGENCY)
Age: 53
Discharge: HOME | End: 2018-06-04
Attending: EMERGENCY MEDICINE
Payer: MEDICARE

## 2018-06-04 VITALS
DIASTOLIC BLOOD PRESSURE: 97 MMHG | RESPIRATION RATE: 16 BRPM | TEMPERATURE: 97.2 F | HEART RATE: 102 BPM | BODY MASS INDEX: 34.07 KG/M2 | SYSTOLIC BLOOD PRESSURE: 173 MMHG | WEIGHT: 230 LBS | OXYGEN SATURATION: 96 % | HEIGHT: 69 IN

## 2018-06-04 DIAGNOSIS — S02.32XA CLOSED FRACTURE OF LEFT ORBITAL FLOOR, INITIAL ENCOUNTER (HCC): ICD-10-CM

## 2018-06-04 DIAGNOSIS — S22.32XA CLOSED FRACTURE OF ONE RIB OF LEFT SIDE, INITIAL ENCOUNTER: ICD-10-CM
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
## 2018-06-04 DIAGNOSIS — Z00.8 MEDICAL CLEARANCE FOR INCARCERATION: ICD-10-CM

## 2018-06-04 DIAGNOSIS — S13.4XXA WHIPLASH INJURY TO NECK, INITIAL ENCOUNTER: ICD-10-CM

## 2018-06-04 DIAGNOSIS — R45.851 SUICIDAL IDEATION: Primary | ICD-10-CM

## 2018-06-04 LAB
ANION GAP SERPL CALCULATED.3IONS-SCNC: 19 MMOL/L (ref 9–17)
BUN BLDV-MCNC: 6 MG/DL (ref 6–20)
BUN/CREAT BLD: ABNORMAL (ref 9–20)
CALCIUM SERPL-MCNC: 8.3 MG/DL (ref 8.6–10.4)
CHLORIDE BLD-SCNC: 98 MMOL/L (ref 98–107)
CO2: 20 MMOL/L (ref 20–31)
CREAT SERPL-MCNC: 0.57 MG/DL (ref 0.7–1.2)
ETHANOL PERCENT: 0.22 %
ETHANOL: 217 MG/DL
GFR AFRICAN AMERICAN: >60 ML/MIN
GFR NON-AFRICAN AMERICAN: >60 ML/MIN
GFR SERPL CREATININE-BSD FRML MDRD: ABNORMAL ML/MIN/{1.73_M2}
GFR SERPL CREATININE-BSD FRML MDRD: ABNORMAL ML/MIN/{1.73_M2}
GLUCOSE BLD-MCNC: 192 MG/DL (ref 70–99)
HCT VFR BLD CALC: 43.4 % (ref 40.7–50.3)
HEMOGLOBIN: 14.9 G/DL (ref 13–17)
MCH RBC QN AUTO: 28.2 PG (ref 25.2–33.5)
MCHC RBC AUTO-ENTMCNC: 34.3 G/DL (ref 28.4–34.8)
MCV RBC AUTO: 82.2 FL (ref 82.6–102.9)
NRBC AUTOMATED: 0 PER 100 WBC
PDW BLD-RTO: 13.6 % (ref 11.8–14.4)
PLATELET # BLD: 328 K/UL (ref 138–453)
PMV BLD AUTO: 10.5 FL (ref 8.1–13.5)
POTASSIUM SERPL-SCNC: 4.1 MMOL/L (ref 3.7–5.3)
RBC # BLD: 5.28 M/UL (ref 4.21–5.77)
SODIUM BLD-SCNC: 137 MMOL/L (ref 135–144)
WBC # BLD: 13.8 K/UL (ref 3.5–11.3)

## 2018-06-04 PROCEDURE — 70486 CT MAXILLOFACIAL W/O DYE: CPT

## 2018-06-04 PROCEDURE — 74177 CT ABD & PELVIS W/CONTRAST: CPT

## 2018-06-04 PROCEDURE — G0480 DRUG TEST DEF 1-7 CLASSES: HCPCS

## 2018-06-04 PROCEDURE — 72131 CT LUMBAR SPINE W/O DYE: CPT

## 2018-06-04 PROCEDURE — 85027 COMPLETE CBC AUTOMATED: CPT

## 2018-06-04 PROCEDURE — 80048 BASIC METABOLIC PNL TOTAL CA: CPT

## 2018-06-04 PROCEDURE — 73030 X-RAY EXAM OF SHOULDER: CPT

## 2018-06-04 PROCEDURE — 99285 EMERGENCY DEPT VISIT HI MDM: CPT

## 2018-06-04 PROCEDURE — 6360000004 HC RX CONTRAST MEDICATION: Performed by: EMERGENCY MEDICINE

## 2018-06-04 PROCEDURE — 72125 CT NECK SPINE W/O DYE: CPT

## 2018-06-04 PROCEDURE — 70450 CT HEAD/BRAIN W/O DYE: CPT

## 2018-06-04 PROCEDURE — 6370000000 HC RX 637 (ALT 250 FOR IP): Performed by: EMERGENCY MEDICINE

## 2018-06-04 RX ORDER — ACETAMINOPHEN 325 MG/1
650 TABLET ORAL ONCE
Status: COMPLETED | OUTPATIENT
Start: 2018-06-04 | End: 2018-06-04

## 2018-06-04 RX ORDER — GUAIFENESIN, PSEUDOEPHEDRINE HYDROCHLORIDE 600; 60 MG/1; MG/1
1 TABLET, EXTENDED RELEASE ORAL EVERY 12 HOURS
Qty: 14 TABLET | Refills: 0 | Status: ON HOLD | OUTPATIENT
Start: 2018-06-04 | End: 2018-06-05

## 2018-06-04 RX ORDER — AMOXICILLIN AND CLAVULANATE POTASSIUM 875; 125 MG/1; MG/1
1 TABLET, FILM COATED ORAL 2 TIMES DAILY
Qty: 20 TABLET | Refills: 0 | Status: ON HOLD | OUTPATIENT
Start: 2018-06-04 | End: 2018-07-03 | Stop reason: HOSPADM

## 2018-06-04 RX ORDER — AMOXICILLIN AND CLAVULANATE POTASSIUM 875; 125 MG/1; MG/1
1 TABLET, FILM COATED ORAL ONCE
Status: COMPLETED | OUTPATIENT
Start: 2018-06-04 | End: 2018-06-04

## 2018-06-04 RX ADMIN — AMOXICILLIN AND CLAVULANATE POTASSIUM 1 TABLET: 875; 125 TABLET, FILM COATED ORAL at 22:56

## 2018-06-04 RX ADMIN — ACETAMINOPHEN 650 MG: 325 TABLET ORAL at 20:41

## 2018-06-04 RX ADMIN — IOPAMIDOL 75 ML: 755 INJECTION, SOLUTION INTRAVENOUS at 21:46

## 2018-06-04 ASSESSMENT — ENCOUNTER SYMPTOMS
RHINORRHEA: 0
COUGH: 0
BACK PAIN: 1
VOMITING: 0
NAUSEA: 0
ABDOMINAL PAIN: 1
EYE PAIN: 1
SHORTNESS OF BREATH: 0

## 2018-06-04 ASSESSMENT — PAIN SCALES - GENERAL
PAINLEVEL_OUTOF10: 9
PAINLEVEL_OUTOF10: 9

## 2018-06-04 ASSESSMENT — PAIN DESCRIPTION - DESCRIPTORS: DESCRIPTORS: CONSTANT

## 2018-06-04 ASSESSMENT — PAIN DESCRIPTION - LOCATION: LOCATION: NECK;SHOULDER

## 2018-06-04 ASSESSMENT — PAIN DESCRIPTION - FREQUENCY: FREQUENCY: CONTINUOUS

## 2018-06-04 ASSESSMENT — PAIN DESCRIPTION - PAIN TYPE: TYPE: ACUTE PAIN

## 2018-06-04 ASSESSMENT — PAIN DESCRIPTION - PROGRESSION: CLINICAL_PROGRESSION: NOT CHANGED

## 2018-06-05 ENCOUNTER — HOSPITAL ENCOUNTER (OUTPATIENT)
Age: 53
Setting detail: OBSERVATION
Discharge: HOME | End: 2018-06-05
Attending: EMERGENCY MEDICINE | Admitting: EMERGENCY MEDICINE
Payer: MEDICARE

## 2018-06-05 ENCOUNTER — APPOINTMENT (OUTPATIENT)
Dept: GENERAL RADIOLOGY | Age: 53
End: 2018-06-05
Payer: MEDICARE

## 2018-06-05 ENCOUNTER — HOSPITAL ENCOUNTER (INPATIENT)
Age: 53
LOS: 28 days | Discharge: HOME | DRG: 750 | End: 2018-07-03
Attending: PSYCHIATRY & NEUROLOGY | Admitting: PSYCHIATRY & NEUROLOGY
Payer: MEDICARE

## 2018-06-05 VITALS
TEMPERATURE: 97.7 F | SYSTOLIC BLOOD PRESSURE: 127 MMHG | OXYGEN SATURATION: 96 % | HEIGHT: 68 IN | WEIGHT: 221 LBS | DIASTOLIC BLOOD PRESSURE: 76 MMHG | BODY MASS INDEX: 33.49 KG/M2 | HEART RATE: 72 BPM | RESPIRATION RATE: 16 BRPM

## 2018-06-05 DIAGNOSIS — R07.9 CHEST PAIN, UNSPECIFIED TYPE: Primary | ICD-10-CM

## 2018-06-05 PROBLEM — F20.9 SCHIZOPHRENIA (HCC): Status: ACTIVE | Noted: 2018-06-05

## 2018-06-05 LAB
EKG ATRIAL RATE: 73 BPM
EKG P AXIS: 23 DEGREES
EKG P-R INTERVAL: 122 MS
EKG Q-T INTERVAL: 432 MS
EKG QRS DURATION: 88 MS
EKG QTC CALCULATION (BAZETT): 475 MS
EKG R AXIS: -19 DEGREES
EKG T AXIS: 31 DEGREES
EKG VENTRICULAR RATE: 73 BPM
LIPASE: 13 U/L (ref 13–60)
POC TROPONIN I: 0 NG/ML (ref 0–0.1)
POC TROPONIN I: 0 NG/ML (ref 0–0.1)
POC TROPONIN INTERP: NORMAL
POC TROPONIN INTERP: NORMAL

## 2018-06-05 PROCEDURE — 6360000002 HC RX W HCPCS: Performed by: EMERGENCY MEDICINE

## 2018-06-05 PROCEDURE — 6370000000 HC RX 637 (ALT 250 FOR IP): Performed by: EMERGENCY MEDICINE

## 2018-06-05 PROCEDURE — G0378 HOSPITAL OBSERVATION PER HR: HCPCS

## 2018-06-05 PROCEDURE — 99285 EMERGENCY DEPT VISIT HI MDM: CPT

## 2018-06-05 PROCEDURE — 2580000003 HC RX 258: Performed by: EMERGENCY MEDICINE

## 2018-06-05 PROCEDURE — 1240000000 HC EMOTIONAL WELLNESS R&B

## 2018-06-05 PROCEDURE — 96372 THER/PROPH/DIAG INJ SC/IM: CPT

## 2018-06-05 PROCEDURE — 71046 X-RAY EXAM CHEST 2 VIEWS: CPT

## 2018-06-05 PROCEDURE — 96375 TX/PRO/DX INJ NEW DRUG ADDON: CPT

## 2018-06-05 PROCEDURE — 83690 ASSAY OF LIPASE: CPT

## 2018-06-05 PROCEDURE — 6370000000 HC RX 637 (ALT 250 FOR IP): Performed by: INTERNAL MEDICINE

## 2018-06-05 PROCEDURE — 84484 ASSAY OF TROPONIN QUANT: CPT

## 2018-06-05 PROCEDURE — 6370000000 HC RX 637 (ALT 250 FOR IP): Performed by: STUDENT IN AN ORGANIZED HEALTH CARE EDUCATION/TRAINING PROGRAM

## 2018-06-05 PROCEDURE — 96376 TX/PRO/DX INJ SAME DRUG ADON: CPT

## 2018-06-05 PROCEDURE — 96374 THER/PROPH/DIAG INJ IV PUSH: CPT

## 2018-06-05 PROCEDURE — 6370000000 HC RX 637 (ALT 250 FOR IP): Performed by: PSYCHIATRY & NEUROLOGY

## 2018-06-05 PROCEDURE — 93005 ELECTROCARDIOGRAM TRACING: CPT

## 2018-06-05 RX ORDER — AMOXICILLIN AND CLAVULANATE POTASSIUM 875; 125 MG/1; MG/1
1 TABLET, FILM COATED ORAL 2 TIMES DAILY
Status: DISCONTINUED | OUTPATIENT
Start: 2018-06-05 | End: 2018-06-15

## 2018-06-05 RX ORDER — TRAZODONE HYDROCHLORIDE 100 MG/1
200 TABLET ORAL ONCE
Status: COMPLETED | OUTPATIENT
Start: 2018-06-05 | End: 2018-06-05

## 2018-06-05 RX ORDER — FENOFIBRATE 54 MG/1
54 TABLET ORAL DAILY
Status: DISCONTINUED | OUTPATIENT
Start: 2018-06-06 | End: 2018-07-03 | Stop reason: HOSPADM

## 2018-06-05 RX ORDER — ONDANSETRON 2 MG/ML
4 INJECTION INTRAMUSCULAR; INTRAVENOUS ONCE
Status: COMPLETED | OUTPATIENT
Start: 2018-06-05 | End: 2018-06-05

## 2018-06-05 RX ORDER — CLONAZEPAM 1 MG/1
1 TABLET ORAL NIGHTLY
Status: DISCONTINUED | OUTPATIENT
Start: 2018-06-05 | End: 2018-06-22

## 2018-06-05 RX ORDER — HYDROXYZINE HYDROCHLORIDE 25 MG/1
50 TABLET, FILM COATED ORAL 3 TIMES DAILY PRN
Status: DISCONTINUED | OUTPATIENT
Start: 2018-06-05 | End: 2018-06-05

## 2018-06-05 RX ORDER — ONDANSETRON 4 MG/1
4 TABLET, ORALLY DISINTEGRATING ORAL EVERY 8 HOURS PRN
Status: DISCONTINUED | OUTPATIENT
Start: 2018-06-05 | End: 2018-06-05 | Stop reason: HOSPADM

## 2018-06-05 RX ORDER — MELOXICAM 7.5 MG/1
7.5 TABLET ORAL 2 TIMES DAILY
COMMUNITY
End: 2018-08-20 | Stop reason: SDUPTHER

## 2018-06-05 RX ORDER — M-VIT,TX,IRON,MINS/CALC/FOLIC 27MG-0.4MG
1 TABLET ORAL DAILY
Status: DISCONTINUED | OUTPATIENT
Start: 2018-06-06 | End: 2018-07-03 | Stop reason: HOSPADM

## 2018-06-05 RX ORDER — MAGNESIUM HYDROXIDE/ALUMINUM HYDROXICE/SIMETHICONE 120; 1200; 1200 MG/30ML; MG/30ML; MG/30ML
30 SUSPENSION ORAL EVERY 6 HOURS PRN
Status: DISCONTINUED | OUTPATIENT
Start: 2018-06-05 | End: 2018-07-03 | Stop reason: HOSPADM

## 2018-06-05 RX ORDER — ATORVASTATIN CALCIUM 20 MG/1
40 TABLET, FILM COATED ORAL DAILY
Status: DISCONTINUED | OUTPATIENT
Start: 2018-06-06 | End: 2018-07-03 | Stop reason: HOSPADM

## 2018-06-05 RX ORDER — ASPIRIN 81 MG/1
324 TABLET, CHEWABLE ORAL ONCE
Status: COMPLETED | OUTPATIENT
Start: 2018-06-05 | End: 2018-06-05

## 2018-06-05 RX ORDER — AMITRIPTYLINE HYDROCHLORIDE 25 MG/1
25 TABLET, FILM COATED ORAL NIGHTLY
Status: DISCONTINUED | OUTPATIENT
Start: 2018-06-05 | End: 2018-06-07

## 2018-06-05 RX ORDER — SODIUM CHLORIDE 0.9 % (FLUSH) 0.9 %
10 SYRINGE (ML) INJECTION EVERY 12 HOURS SCHEDULED
Status: DISCONTINUED | OUTPATIENT
Start: 2018-06-05 | End: 2018-06-05 | Stop reason: HOSPADM

## 2018-06-05 RX ORDER — AMOXICILLIN AND CLAVULANATE POTASSIUM 875; 125 MG/1; MG/1
1 TABLET, FILM COATED ORAL 2 TIMES DAILY
Status: DISCONTINUED | OUTPATIENT
Start: 2018-06-05 | End: 2018-06-05 | Stop reason: HOSPADM

## 2018-06-05 RX ORDER — TIZANIDINE 4 MG/1
4 TABLET ORAL NIGHTLY
COMMUNITY
End: 2018-08-05 | Stop reason: SDUPTHER

## 2018-06-05 RX ORDER — GABAPENTIN 300 MG/1
300 CAPSULE ORAL 3 TIMES DAILY
Status: CANCELLED | OUTPATIENT
Start: 2018-06-05

## 2018-06-05 RX ORDER — BENZTROPINE MESYLATE 1 MG/ML
2 INJECTION INTRAMUSCULAR; INTRAVENOUS 2 TIMES DAILY PRN
Status: DISCONTINUED | OUTPATIENT
Start: 2018-06-05 | End: 2018-07-03 | Stop reason: HOSPADM

## 2018-06-05 RX ORDER — GABAPENTIN 300 MG/1
300 CAPSULE ORAL 3 TIMES DAILY
Status: DISCONTINUED | OUTPATIENT
Start: 2018-06-05 | End: 2018-06-05 | Stop reason: HOSPADM

## 2018-06-05 RX ORDER — TRAZODONE HYDROCHLORIDE 50 MG/1
50 TABLET ORAL NIGHTLY PRN
Status: DISCONTINUED | OUTPATIENT
Start: 2018-06-06 | End: 2018-06-05

## 2018-06-05 RX ORDER — TRAZODONE HYDROCHLORIDE 100 MG/1
200 TABLET ORAL NIGHTLY PRN
Status: DISCONTINUED | OUTPATIENT
Start: 2018-06-06 | End: 2018-06-14

## 2018-06-05 RX ORDER — VENLAFAXINE 75 MG/1
225 TABLET ORAL
Status: DISCONTINUED | OUTPATIENT
Start: 2018-06-06 | End: 2018-06-22

## 2018-06-05 RX ORDER — HYDROXYZINE HYDROCHLORIDE 25 MG/1
100 TABLET, FILM COATED ORAL 3 TIMES DAILY PRN
Status: DISCONTINUED | OUTPATIENT
Start: 2018-06-05 | End: 2018-07-03 | Stop reason: HOSPADM

## 2018-06-05 RX ORDER — ACETAMINOPHEN 325 MG/1
650 TABLET ORAL EVERY 4 HOURS PRN
Status: DISCONTINUED | OUTPATIENT
Start: 2018-06-05 | End: 2018-06-05 | Stop reason: HOSPADM

## 2018-06-05 RX ORDER — ACETAMINOPHEN 325 MG/1
650 TABLET ORAL EVERY 4 HOURS PRN
Status: DISCONTINUED | OUTPATIENT
Start: 2018-06-05 | End: 2018-07-03 | Stop reason: HOSPADM

## 2018-06-05 RX ORDER — MELOXICAM 7.5 MG/1
7.5 TABLET ORAL 2 TIMES DAILY
Status: DISCONTINUED | OUTPATIENT
Start: 2018-06-05 | End: 2018-06-07

## 2018-06-05 RX ORDER — AMOXICILLIN AND CLAVULANATE POTASSIUM 875; 125 MG/1; MG/1
1 TABLET, FILM COATED ORAL 2 TIMES DAILY
Status: CANCELLED | OUTPATIENT
Start: 2018-06-05

## 2018-06-05 RX ORDER — GABAPENTIN 400 MG/1
800 CAPSULE ORAL 3 TIMES DAILY
Status: ON HOLD | COMMUNITY
End: 2021-01-01

## 2018-06-05 RX ORDER — FENTANYL CITRATE 50 UG/ML
50 INJECTION, SOLUTION INTRAMUSCULAR; INTRAVENOUS ONCE
Status: COMPLETED | OUTPATIENT
Start: 2018-06-05 | End: 2018-06-05

## 2018-06-05 RX ORDER — FOLIC ACID 1 MG/1
1 TABLET ORAL DAILY
Status: DISCONTINUED | OUTPATIENT
Start: 2018-06-06 | End: 2018-07-03 | Stop reason: HOSPADM

## 2018-06-05 RX ORDER — VENLAFAXINE HYDROCHLORIDE 225 MG/1
225 TABLET, EXTENDED RELEASE ORAL
Status: ON HOLD | COMMUNITY
End: 2018-07-03 | Stop reason: HOSPADM

## 2018-06-05 RX ORDER — PANTOPRAZOLE SODIUM 40 MG/1
40 TABLET, DELAYED RELEASE ORAL
Status: DISCONTINUED | OUTPATIENT
Start: 2018-06-06 | End: 2018-07-03 | Stop reason: HOSPADM

## 2018-06-05 RX ORDER — TIZANIDINE 4 MG/1
4 TABLET ORAL NIGHTLY
Status: DISCONTINUED | OUTPATIENT
Start: 2018-06-05 | End: 2018-07-03 | Stop reason: HOSPADM

## 2018-06-05 RX ORDER — QUETIAPINE FUMARATE 25 MG/1
25 TABLET, FILM COATED ORAL 2 TIMES DAILY
Status: DISCONTINUED | OUTPATIENT
Start: 2018-06-05 | End: 2018-06-06

## 2018-06-05 RX ORDER — GABAPENTIN 400 MG/1
800 CAPSULE ORAL 3 TIMES DAILY
Status: DISCONTINUED | OUTPATIENT
Start: 2018-06-05 | End: 2018-06-26 | Stop reason: CLARIF

## 2018-06-05 RX ORDER — SODIUM CHLORIDE 0.9 % (FLUSH) 0.9 %
10 SYRINGE (ML) INJECTION PRN
Status: DISCONTINUED | OUTPATIENT
Start: 2018-06-05 | End: 2018-06-05 | Stop reason: HOSPADM

## 2018-06-05 RX ORDER — MULTIVITAMIN/IRON/FOLIC ACID 18MG-0.4MG
1 TABLET ORAL DAILY
Status: ON HOLD | COMMUNITY
End: 2021-01-01

## 2018-06-05 RX ORDER — ONDANSETRON 4 MG/1
4 TABLET, ORALLY DISINTEGRATING ORAL EVERY 8 HOURS PRN
Status: CANCELLED | OUTPATIENT
Start: 2018-06-05

## 2018-06-05 RX ORDER — LORAZEPAM 2 MG/ML
1 INJECTION INTRAMUSCULAR ONCE
Status: COMPLETED | OUTPATIENT
Start: 2018-06-05 | End: 2018-06-05

## 2018-06-05 RX ORDER — NICOTINE 21 MG/24HR
1 PATCH, TRANSDERMAL 24 HOURS TRANSDERMAL DAILY
Status: DISCONTINUED | OUTPATIENT
Start: 2018-06-06 | End: 2018-07-03 | Stop reason: HOSPADM

## 2018-06-05 RX ADMIN — Medication 10 ML: at 09:19

## 2018-06-05 RX ADMIN — ONDANSETRON 4 MG: 2 INJECTION, SOLUTION INTRAMUSCULAR; INTRAVENOUS at 08:06

## 2018-06-05 RX ADMIN — GABAPENTIN 800 MG: 400 CAPSULE ORAL at 22:04

## 2018-06-05 RX ADMIN — PANCRELIPASE 2 CAPSULE: 24000; 76000; 120000 CAPSULE, DELAYED RELEASE PELLETS ORAL at 11:50

## 2018-06-05 RX ADMIN — AMITRIPTYLINE HYDROCHLORIDE 25 MG: 25 TABLET, FILM COATED ORAL at 22:05

## 2018-06-05 RX ADMIN — METOPROLOL TARTRATE 25 MG: 25 TABLET ORAL at 22:05

## 2018-06-05 RX ADMIN — ONDANSETRON 4 MG: 4 TABLET, ORALLY DISINTEGRATING ORAL at 15:40

## 2018-06-05 RX ADMIN — AMOXICILLIN AND CLAVULANATE POTASSIUM 1 TABLET: 875; 125 TABLET, FILM COATED ORAL at 22:04

## 2018-06-05 RX ADMIN — QUETIAPINE FUMARATE 25 MG: 25 TABLET ORAL at 22:05

## 2018-06-05 RX ADMIN — LORAZEPAM 1 MG: 2 INJECTION INTRAMUSCULAR; INTRAVENOUS at 04:24

## 2018-06-05 RX ADMIN — CLONAZEPAM 1 MG: 1 TABLET ORAL at 22:05

## 2018-06-05 RX ADMIN — ACETAMINOPHEN 650 MG: 325 TABLET, FILM COATED ORAL at 22:04

## 2018-06-05 RX ADMIN — ACETAMINOPHEN 650 MG: 325 TABLET ORAL at 15:53

## 2018-06-05 RX ADMIN — AMOXICILLIN AND CLAVULANATE POTASSIUM 1 TABLET: 875; 125 TABLET, FILM COATED ORAL at 11:50

## 2018-06-05 RX ADMIN — TRAZODONE HYDROCHLORIDE 200 MG: 100 TABLET ORAL at 22:09

## 2018-06-05 RX ADMIN — TIZANIDINE 4 MG: 4 TABLET ORAL at 22:08

## 2018-06-05 RX ADMIN — ASPIRIN 81 MG 324 MG: 81 TABLET ORAL at 06:02

## 2018-06-05 RX ADMIN — MELOXICAM 7.5 MG: 7.5 TABLET ORAL at 22:05

## 2018-06-05 RX ADMIN — ENOXAPARIN SODIUM 40 MG: 40 INJECTION SUBCUTANEOUS at 11:51

## 2018-06-05 RX ADMIN — ONDANSETRON 4 MG: 2 INJECTION, SOLUTION INTRAMUSCULAR; INTRAVENOUS at 04:24

## 2018-06-05 RX ADMIN — METOPROLOL TARTRATE 25 MG: 25 TABLET ORAL at 12:12

## 2018-06-05 RX ADMIN — FENTANYL CITRATE 50 MCG: 50 INJECTION INTRAMUSCULAR; INTRAVENOUS at 09:18

## 2018-06-05 RX ADMIN — GABAPENTIN 300 MG: 300 CAPSULE ORAL at 11:50

## 2018-06-05 ASSESSMENT — ENCOUNTER SYMPTOMS
EYE PAIN: 0
SORE THROAT: 0
PHOTOPHOBIA: 0
NAUSEA: 0
RHINORRHEA: 0
COUGH: 0
DIARRHEA: 0
VOMITING: 0
BLOOD IN STOOL: 0
ABDOMINAL PAIN: 0
SINUS PRESSURE: 0
SHORTNESS OF BREATH: 0

## 2018-06-05 ASSESSMENT — PAIN DESCRIPTION - LOCATION
LOCATION: FLANK
LOCATION: ABDOMEN

## 2018-06-05 ASSESSMENT — PAIN DESCRIPTION - ORIENTATION
ORIENTATION: RIGHT
ORIENTATION: RIGHT

## 2018-06-05 ASSESSMENT — PAIN DESCRIPTION - PROGRESSION: CLINICAL_PROGRESSION: NOT CHANGED

## 2018-06-05 ASSESSMENT — PAIN SCALES - GENERAL
PAINLEVEL_OUTOF10: 7
PAINLEVEL_OUTOF10: 9
PAINLEVEL_OUTOF10: 3
PAINLEVEL_OUTOF10: 10
PAINLEVEL_OUTOF10: 8
PAINLEVEL_OUTOF10: 7

## 2018-06-05 ASSESSMENT — PAIN DESCRIPTION - PAIN TYPE
TYPE: CHRONIC PAIN
TYPE: CHRONIC PAIN
TYPE: ACUTE PAIN
TYPE: ACUTE PAIN
TYPE: CHRONIC PAIN

## 2018-06-05 ASSESSMENT — LIFESTYLE VARIABLES: HISTORY_ALCOHOL_USE: NO

## 2018-06-05 ASSESSMENT — PAIN DESCRIPTION - FREQUENCY
FREQUENCY: CONTINUOUS
FREQUENCY: CONTINUOUS

## 2018-06-05 ASSESSMENT — HEART SCORE: ECG: 1

## 2018-06-05 ASSESSMENT — PATIENT HEALTH QUESTIONNAIRE - PHQ9: SUM OF ALL RESPONSES TO PHQ QUESTIONS 1-9: 14

## 2018-06-05 ASSESSMENT — SLEEP AND FATIGUE QUESTIONNAIRES
DO YOU HAVE DIFFICULTY SLEEPING: NO
DO YOU USE A SLEEP AID: YES
AVERAGE NUMBER OF SLEEP HOURS: 6

## 2018-06-05 ASSESSMENT — PAIN DESCRIPTION - DESCRIPTORS: DESCRIPTORS: ACHING;CONSTANT

## 2018-06-05 ASSESSMENT — PAIN DESCRIPTION - ONSET: ONSET: ON-GOING
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
## 2018-06-05 NOTE — BH NOTE
Patient given tobacco quitline number 57724856316 at this time, refusing to call at this time, states \" I just dont want to quit now\"- patient given information as to the dangers of long term tobacco use. Continue to reinforce the importance of tobacco cessation.

## 2018-06-06 LAB
ABSOLUTE EOS #: 0.1 K/UL (ref 0–0.4)
ABSOLUTE IMMATURE GRANULOCYTE: ABNORMAL K/UL (ref 0–0.3)
ABSOLUTE LYMPH #: 3.4 K/UL (ref 1–4.8)
ABSOLUTE MONO #: 0.5 K/UL (ref 0.1–1.3)
ALBUMIN SERPL-MCNC: 3.7 G/DL (ref 3.5–5.2)
ALBUMIN/GLOBULIN RATIO: ABNORMAL (ref 1–2.5)
ALP BLD-CCNC: 89 U/L (ref 40–129)
ALT SERPL-CCNC: 27 U/L (ref 5–41)
ANION GAP SERPL CALCULATED.3IONS-SCNC: 14 MMOL/L (ref 9–17)
AST SERPL-CCNC: 23 U/L
BASOPHILS # BLD: 1 % (ref 0–2)
BASOPHILS ABSOLUTE: 0 K/UL (ref 0–0.2)
BILIRUB SERPL-MCNC: 0.59 MG/DL (ref 0.3–1.2)
BUN BLDV-MCNC: 8 MG/DL (ref 6–20)
BUN/CREAT BLD: ABNORMAL (ref 9–20)
CALCIUM SERPL-MCNC: 8.2 MG/DL (ref 8.6–10.4)
CHLORIDE BLD-SCNC: 101 MMOL/L (ref 98–107)
CHOLESTEROL/HDL RATIO: 6.1
CHOLESTEROL: 158 MG/DL
CO2: 24 MMOL/L (ref 20–31)
CREAT SERPL-MCNC: 0.62 MG/DL (ref 0.7–1.2)
DIFFERENTIAL TYPE: ABNORMAL
EOSINOPHILS RELATIVE PERCENT: 1 % (ref 0–4)
ESTIMATED AVERAGE GLUCOSE: 206 MG/DL
GFR AFRICAN AMERICAN: >60 ML/MIN
GFR NON-AFRICAN AMERICAN: >60 ML/MIN
GFR SERPL CREATININE-BSD FRML MDRD: ABNORMAL ML/MIN/{1.73_M2}
GFR SERPL CREATININE-BSD FRML MDRD: ABNORMAL ML/MIN/{1.73_M2}
GLUCOSE BLD-MCNC: 208 MG/DL (ref 70–99)
HBA1C MFR BLD: 8.8 % (ref 4–6)
HCT VFR BLD CALC: 38.3 % (ref 41–53)
HDLC SERPL-MCNC: 26 MG/DL
HEMOGLOBIN: 13 G/DL (ref 13.5–17.5)
IMMATURE GRANULOCYTES: ABNORMAL %
LDL CHOLESTEROL DIRECT: 40 MG/DL
LDL CHOLESTEROL: ABNORMAL MG/DL (ref 0–130)
LYMPHOCYTES # BLD: 44 % (ref 24–44)
MAGNESIUM: 1.5 MG/DL (ref 1.6–2.6)
MCH RBC QN AUTO: 28.9 PG (ref 26–34)
MCHC RBC AUTO-ENTMCNC: 34 G/DL (ref 31–37)
MCV RBC AUTO: 85.1 FL (ref 80–100)
MONOCYTES # BLD: 7 % (ref 1–7)
NRBC AUTOMATED: ABNORMAL PER 100 WBC
PDW BLD-RTO: 14.1 % (ref 11.5–14.9)
PLATELET # BLD: 237 K/UL (ref 150–450)
PLATELET ESTIMATE: ABNORMAL
PMV BLD AUTO: 8.7 FL (ref 6–12)
POTASSIUM SERPL-SCNC: 2.7 MMOL/L (ref 3.7–5.3)
RBC # BLD: 4.5 M/UL (ref 4.5–5.9)
RBC # BLD: ABNORMAL 10*6/UL
SEG NEUTROPHILS: 47 % (ref 36–66)
SEGMENTED NEUTROPHILS ABSOLUTE COUNT: 3.6 K/UL (ref 1.3–9.1)
SODIUM BLD-SCNC: 139 MMOL/L (ref 135–144)
THYROXINE, FREE: 1.07 NG/DL (ref 0.93–1.7)
TOTAL PROTEIN: 6 G/DL (ref 6.4–8.3)
TRIGL SERPL-MCNC: 1084 MG/DL
TSH SERPL DL<=0.05 MIU/L-ACNC: 6.37 MIU/L (ref 0.3–5)
VLDLC SERPL CALC-MCNC: ABNORMAL MG/DL (ref 1–30)
WBC # BLD: 7.7 K/UL (ref 3.5–11)
WBC # BLD: ABNORMAL 10*3/UL

## 2018-06-06 PROCEDURE — 80061 LIPID PANEL: CPT

## 2018-06-06 PROCEDURE — 84443 ASSAY THYROID STIM HORMONE: CPT

## 2018-06-06 PROCEDURE — 85025 COMPLETE CBC W/AUTO DIFF WBC: CPT

## 2018-06-06 PROCEDURE — 83036 HEMOGLOBIN GLYCOSYLATED A1C: CPT

## 2018-06-06 PROCEDURE — 83721 ASSAY OF BLOOD LIPOPROTEIN: CPT

## 2018-06-06 PROCEDURE — 99223 1ST HOSP IP/OBS HIGH 75: CPT | Performed by: PSYCHIATRY & NEUROLOGY

## 2018-06-06 PROCEDURE — 83735 ASSAY OF MAGNESIUM: CPT

## 2018-06-06 PROCEDURE — 84439 ASSAY OF FREE THYROXINE: CPT

## 2018-06-06 PROCEDURE — 6370000000 HC RX 637 (ALT 250 FOR IP): Performed by: PSYCHIATRY & NEUROLOGY

## 2018-06-06 PROCEDURE — 36415 COLL VENOUS BLD VENIPUNCTURE: CPT

## 2018-06-06 PROCEDURE — 80053 COMPREHEN METABOLIC PANEL: CPT

## 2018-06-06 PROCEDURE — 1240000000 HC EMOTIONAL WELLNESS R&B

## 2018-06-06 RX ORDER — QUETIAPINE FUMARATE 100 MG/1
100 TABLET, FILM COATED ORAL 2 TIMES DAILY
Status: DISCONTINUED | OUTPATIENT
Start: 2018-06-06 | End: 2018-06-10

## 2018-06-06 RX ORDER — PANCRELIPASE 24000; 76000; 120000 [USP'U]/1; [USP'U]/1; [USP'U]/1
CAPSULE, DELAYED RELEASE PELLETS ORAL
Qty: 180 CAPSULE | Refills: 3 | Status: ON HOLD | OUTPATIENT
Start: 2018-06-06 | End: 2021-01-01

## 2018-06-06 RX ORDER — POTASSIUM CHLORIDE 20 MEQ/1
40 TABLET, EXTENDED RELEASE ORAL 2 TIMES DAILY WITH MEALS
Status: DISCONTINUED | OUTPATIENT
Start: 2018-06-06 | End: 2018-06-06

## 2018-06-06 RX ORDER — POTASSIUM CHLORIDE 20 MEQ/1
40 TABLET, EXTENDED RELEASE ORAL ONCE
Status: COMPLETED | OUTPATIENT
Start: 2018-06-06 | End: 2018-06-06

## 2018-06-06 RX ADMIN — MULTIPLE VITAMINS W/ MINERALS TAB 1 TABLET: TAB at 08:25

## 2018-06-06 RX ADMIN — GABAPENTIN 800 MG: 400 CAPSULE ORAL at 08:23

## 2018-06-06 RX ADMIN — VENLAFAXINE 225 MG: 75 TABLET ORAL at 08:23

## 2018-06-06 RX ADMIN — MELOXICAM 7.5 MG: 7.5 TABLET ORAL at 08:24

## 2018-06-06 RX ADMIN — AMOXICILLIN AND CLAVULANATE POTASSIUM 1 TABLET: 875; 125 TABLET, FILM COATED ORAL at 08:24

## 2018-06-06 RX ADMIN — PANCRELIPASE 2 CAPSULE: 24000; 76000; 120000 CAPSULE, DELAYED RELEASE PELLETS ORAL at 17:33

## 2018-06-06 RX ADMIN — SERTRALINE HYDROCHLORIDE 50 MG: 50 TABLET ORAL at 08:25

## 2018-06-06 RX ADMIN — GABAPENTIN 800 MG: 400 CAPSULE ORAL at 14:16

## 2018-06-06 RX ADMIN — AMITRIPTYLINE HYDROCHLORIDE 25 MG: 25 TABLET, FILM COATED ORAL at 22:38

## 2018-06-06 RX ADMIN — METOPROLOL TARTRATE 25 MG: 25 TABLET ORAL at 08:25

## 2018-06-06 RX ADMIN — QUETIAPINE 100 MG: 100 TABLET ORAL at 22:38

## 2018-06-06 RX ADMIN — ACETAMINOPHEN 650 MG: 325 TABLET, FILM COATED ORAL at 22:36

## 2018-06-06 RX ADMIN — ACETAMINOPHEN 650 MG: 325 TABLET, FILM COATED ORAL at 16:50

## 2018-06-06 RX ADMIN — QUETIAPINE FUMARATE 25 MG: 25 TABLET ORAL at 08:25

## 2018-06-06 RX ADMIN — POTASSIUM CHLORIDE 40 MEQ: 20 TABLET, EXTENDED RELEASE ORAL at 09:38

## 2018-06-06 RX ADMIN — ATORVASTATIN CALCIUM 40 MG: 20 TABLET, FILM COATED ORAL at 08:24

## 2018-06-06 RX ADMIN — MELOXICAM 7.5 MG: 7.5 TABLET ORAL at 22:37

## 2018-06-06 RX ADMIN — GABAPENTIN 800 MG: 400 CAPSULE ORAL at 22:37

## 2018-06-06 RX ADMIN — TRAZODONE HYDROCHLORIDE 200 MG: 100 TABLET ORAL at 22:36

## 2018-06-06 RX ADMIN — HYDROXYZINE HYDROCHLORIDE 100 MG: 25 TABLET, FILM COATED ORAL at 22:36

## 2018-06-06 RX ADMIN — CLONAZEPAM 1 MG: 1 TABLET ORAL at 22:36

## 2018-06-06 RX ADMIN — PANCRELIPASE 2 CAPSULE: 24000; 76000; 120000 CAPSULE, DELAYED RELEASE PELLETS ORAL at 08:30

## 2018-06-06 RX ADMIN — TIZANIDINE 4 MG: 4 TABLET ORAL at 22:38

## 2018-06-06 RX ADMIN — ACETAMINOPHEN 650 MG: 325 TABLET, FILM COATED ORAL at 12:27

## 2018-06-06 RX ADMIN — AMOXICILLIN AND CLAVULANATE POTASSIUM 1 TABLET: 875; 125 TABLET, FILM COATED ORAL at 22:37

## 2018-06-06 RX ADMIN — FENOFIBRATE 54 MG: 54 TABLET ORAL at 08:29

## 2018-06-06 RX ADMIN — FOLIC ACID 1 MG: 1 TABLET ORAL at 08:27

## 2018-06-06 RX ADMIN — ACETAMINOPHEN 650 MG: 325 TABLET, FILM COATED ORAL at 07:10

## 2018-06-06 RX ADMIN — PANCRELIPASE 2 CAPSULE: 24000; 76000; 120000 CAPSULE, DELAYED RELEASE PELLETS ORAL at 12:27

## 2018-06-06 RX ADMIN — METOPROLOL TARTRATE 25 MG: 25 TABLET ORAL at 22:36

## 2018-06-06 RX ADMIN — PANTOPRAZOLE SODIUM 40 MG: 40 TABLET, DELAYED RELEASE ORAL at 07:10

## 2018-06-06 RX ADMIN — HYDROXYZINE HYDROCHLORIDE 100 MG: 25 TABLET, FILM COATED ORAL at 14:18

## 2018-06-06 ASSESSMENT — PAIN DESCRIPTION - LOCATION
LOCATION: FACE;RIB CAGE
LOCATION: NECK

## 2018-06-06 ASSESSMENT — PAIN SCALES - GENERAL
PAINLEVEL_OUTOF10: 7
PAINLEVEL_OUTOF10: 3
PAINLEVEL_OUTOF10: 3
PAINLEVEL_OUTOF10: 8
PAINLEVEL_OUTOF10: 2
PAINLEVEL_OUTOF10: 0
PAINLEVEL_OUTOF10: 8
PAINLEVEL_OUTOF10: 6
PAINLEVEL_OUTOF10: 8
PAINLEVEL_OUTOF10: 0

## 2018-06-06 ASSESSMENT — PAIN DESCRIPTION - FREQUENCY: FREQUENCY: CONTINUOUS

## 2018-06-06 ASSESSMENT — PAIN DESCRIPTION - PROGRESSION: CLINICAL_PROGRESSION: NOT CHANGED

## 2018-06-06 ASSESSMENT — PAIN DESCRIPTION - ONSET: ONSET: ON-GOING

## 2018-06-06 ASSESSMENT — PAIN DESCRIPTION - PAIN TYPE: TYPE: ACUTE PAIN

## 2018-06-06 ASSESSMENT — LIFESTYLE VARIABLES: HISTORY_ALCOHOL_USE: NO

## 2018-06-06 ASSESSMENT — PAIN DESCRIPTION - ORIENTATION: ORIENTATION: RIGHT;LEFT

## 2018-06-06 NOTE — PLAN OF CARE
Problem: Altered Mood, Depressive Behavior:  Goal: Able to verbalize and/or display a decrease in depressive symptoms  Able to verbalize and/or display a decrease in depressive symptoms   Outcome: Ongoing  Pt is alert admitting SI, depression and anxiety. Pt reports feelings of depression has not decreased since admission. Coping skills explored and discussed. Encouraged pt to attend unit programming and socialize with peers. Pt verbalized understanding. 15 min checks maintained safety maintained per protocol.
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
## 2018-06-06 NOTE — PLAN OF CARE
Problem: Altered Mood, Depressive Behavior:  Goal: Able to verbalize and/or display a decrease in depressive symptoms  Able to verbalize and/or display a decrease in depressive symptoms   Outcome: Ongoing  Pt did not participate in Therapeutic Recovery Activity Group at 1430 despite staff encouragement.
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
## 2018-06-06 NOTE — PLAN OF CARE
Problem: Altered Mood, Depressive Behavior:  Goal: Able to verbalize and/or display a decrease in depressive symptoms  Able to verbalize and/or display a decrease in depressive symptoms   Outcome: Ongoing  Psychoeducation Group Note    Date: 6/6/18  Start Time: 0845  End Time: 0915    Number Participants in Group:  6    Goal:  Patient will demonstrate increased interpersonal interaction. Topic:  Goal Setting and [facility]    Discipline Responsible:   OT  AT  [facility]. X RT  Other       Participation Level:     None  Minimal   X Active Listener X Interactive    Monopolizing         Participation Quality:  X Appropriate  Inappropriate   X       Attentive        Intrusive   X       Sharing        Resistant          Supportive        Lethargic       Affective:   X Congruent  Incongruent  Blunted  Flat    Constricted  Anxious  Elated  Angry    Labile  Depressed  Other  Bright       Cognitive:  X Alert X Oriented PPTP     Concentration X G  F  P   Attention Span X G  F  P   Short-Term Memory X G  F  P   Long-Term Memory X G  F  P   ProblemSolving/  Decision Making X G  F  P   Ability to Process  Information X G  F  P      Contributing Factors             Delusional             Hallucinating             Flight of Ideas             Other:       Modes of Intervention:  X Education  Support X Exploration    Clarifying X Problem Solving  Confrontation    Socialization X Limit Setting  Reality Testing   X Activity  Movement  Media    Other:            Response to Learning:  X Able to verbalize current knowledge/experience    Able to verbalize/acknowledge new learning    Able to retain information    Capable of insight    Able to change behavior   X Progressing to goal    Other:        Comments: Pt developed daily goal to stay calm, manage irritability, learn coping skills.

## 2018-06-06 NOTE — BH NOTE
BLOCK Left 07/21/2017    left lumbar RF, decadron 4mg and marcaine    NOSE SURGERY      [place] EGD TRANSORAL BIOPSY SINGLE/MULTIPLE N/A 3/27/2017    EGD BIOPSY performed by Lee Ann Lord DO at [facility]    WISDOM TOOTH EXTRACTION       Medications Prior to Admission:   Prescriptions Prior to Admission: gabapentin (NEURONTIN) 400 MG capsule, Take 800 mg by mouth 3 times daily. .  meloxicam (MOBIC) 7.5 MG tablet, Take 7.5 mg by mouth 2 times daily  venlafaxine 225 MG extended release tablet, Take 225 mg by mouth daily (with breakfast)  tiZANidine (ZANAFLEX) 4 MG tablet, Take 4 mg by mouth nightly  Cholecalciferol 2000 units CAPS, Take 50,000 Units by mouth once a week Takes on Monday  Multiple Vitamins-Minerals (CENTROVITE) TABS, Take 1 tablet by mouth daily  [DISCONTINUED] meloxicam (MOBIC) 7.5 MG tablet, TAKE ONE TABLET BY MOUTH EVERY 12 HOURS  amoxicillin-clavulanate (AUGMENTIN) 875-125 MG per tablet, Take 1 tablet by mouth 2 times daily for 10 days (Patient taking differently: Take 1 tablet by mouth 2 times daily 20 doses remaining)  [DISCONTINUED] pseudoephedrine-guaiFENesin (MUCINEX D)  MG per extended release tablet, Take 1 tablet by mouth every 12 hours for 7 days  metoprolol tartrate (LOPRESSOR) 25 MG tablet, TAKE ONE TABLET BY MOUTH TWICE A DAY  [DISCONTINUED] KETOPROFEN-BACLOFEN-GABAP-LIDO EX, APPLY 1-2 GRAMS TOPICALLY TO AFFECTED AREA(S) 3-4 TIMES DAILY  [DISCONTINUED] venlafaxine (EFFEXOR) 25 MG tablet, Take 125 mg by mouth 2 times daily  sertraline (ZOLOFT) 50 MG tablet, TAKE ONE TABLET BY MOUTH DAILY  [DISCONTINUED] Gabapentin POWD, Formula 8E: diclo3%+gaba6%+DMSO5%+prilo2%+baclofen2%+lido2%.  Apply 1-2 gm topically to affected area TID-QID  amitriptyline (ELAVIL) 25 MG tablet, TAKE ONE TABLET BY MOUTH ONCE NIGHTLY  QUEtiapine (SEROQUEL) 25 MG tablet, TAKE ONE TABLET BY MOUTH TWICE A DAY  hydrOXYzine (VISTARIL) 100 MG capsule, Take by mouth 3 times daily as needed   traZODone (DESYREL) 100 MG
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
## 2018-06-06 NOTE — PLAN OF CARE
Problem: Altered Mood, Depressive Behavior:  Goal: Able to verbalize and/or display a decrease in depressive symptoms  Able to verbalize and/or display a decrease in depressive symptoms   Outcome: Ongoing  Pt did not participate in Recalling / Social Skills / Leisure Skills and Awareness group at 1000 despite staff encouragement.

## 2018-06-07 PROCEDURE — 6370000000 HC RX 637 (ALT 250 FOR IP): Performed by: PSYCHIATRY & NEUROLOGY

## 2018-06-07 PROCEDURE — 1240000000 HC EMOTIONAL WELLNESS R&B

## 2018-06-07 PROCEDURE — 99232 SBSQ HOSP IP/OBS MODERATE 35: CPT | Performed by: PSYCHIATRY & NEUROLOGY

## 2018-06-07 RX ORDER — MELOXICAM 7.5 MG/1
15 TABLET ORAL 2 TIMES DAILY
Status: DISCONTINUED | OUTPATIENT
Start: 2018-06-07 | End: 2018-06-27

## 2018-06-07 RX ORDER — AMITRIPTYLINE HYDROCHLORIDE 25 MG/1
50 TABLET, FILM COATED ORAL NIGHTLY
Status: DISCONTINUED | OUTPATIENT
Start: 2018-06-07 | End: 2018-06-14

## 2018-06-07 RX ADMIN — QUETIAPINE 100 MG: 100 TABLET ORAL at 08:17

## 2018-06-07 RX ADMIN — TRAZODONE HYDROCHLORIDE 200 MG: 100 TABLET ORAL at 20:05

## 2018-06-07 RX ADMIN — AMITRIPTYLINE HYDROCHLORIDE 50 MG: 25 TABLET, FILM COATED ORAL at 20:06

## 2018-06-07 RX ADMIN — FENOFIBRATE 54 MG: 54 TABLET ORAL at 08:36

## 2018-06-07 RX ADMIN — HYDROXYZINE HYDROCHLORIDE 100 MG: 25 TABLET, FILM COATED ORAL at 08:18

## 2018-06-07 RX ADMIN — METOPROLOL TARTRATE 25 MG: 25 TABLET ORAL at 20:06

## 2018-06-07 RX ADMIN — PANCRELIPASE 2 CAPSULE: 24000; 76000; 120000 CAPSULE, DELAYED RELEASE PELLETS ORAL at 13:47

## 2018-06-07 RX ADMIN — GABAPENTIN 800 MG: 400 CAPSULE ORAL at 20:05

## 2018-06-07 RX ADMIN — MELOXICAM 7.5 MG: 7.5 TABLET ORAL at 08:17

## 2018-06-07 RX ADMIN — ATORVASTATIN CALCIUM 40 MG: 20 TABLET, FILM COATED ORAL at 08:18

## 2018-06-07 RX ADMIN — GABAPENTIN 800 MG: 400 CAPSULE ORAL at 13:47

## 2018-06-07 RX ADMIN — PANTOPRAZOLE SODIUM 40 MG: 40 TABLET, DELAYED RELEASE ORAL at 08:17

## 2018-06-07 RX ADMIN — CLONAZEPAM 1 MG: 1 TABLET ORAL at 20:06

## 2018-06-07 RX ADMIN — MULTIPLE VITAMINS W/ MINERALS TAB 1 TABLET: TAB at 08:18

## 2018-06-07 RX ADMIN — HYDROXYZINE HYDROCHLORIDE 100 MG: 25 TABLET, FILM COATED ORAL at 17:55

## 2018-06-07 RX ADMIN — PANCRELIPASE 2 CAPSULE: 24000; 76000; 120000 CAPSULE, DELAYED RELEASE PELLETS ORAL at 09:09

## 2018-06-07 RX ADMIN — MELOXICAM 15 MG: 7.5 TABLET ORAL at 20:06

## 2018-06-07 RX ADMIN — ACETAMINOPHEN 650 MG: 325 TABLET, FILM COATED ORAL at 09:09

## 2018-06-07 RX ADMIN — QUETIAPINE 100 MG: 100 TABLET ORAL at 20:06

## 2018-06-07 RX ADMIN — VENLAFAXINE 225 MG: 75 TABLET ORAL at 08:17

## 2018-06-07 RX ADMIN — AMOXICILLIN AND CLAVULANATE POTASSIUM 1 TABLET: 875; 125 TABLET, FILM COATED ORAL at 20:05

## 2018-06-07 RX ADMIN — ACETAMINOPHEN 650 MG: 325 TABLET, FILM COATED ORAL at 13:49

## 2018-06-07 RX ADMIN — METOPROLOL TARTRATE 25 MG: 25 TABLET ORAL at 08:17

## 2018-06-07 RX ADMIN — FOLIC ACID 1 MG: 1 TABLET ORAL at 08:17

## 2018-06-07 RX ADMIN — TIZANIDINE 4 MG: 4 TABLET ORAL at 20:06

## 2018-06-07 RX ADMIN — ACETAMINOPHEN 650 MG: 325 TABLET, FILM COATED ORAL at 20:05

## 2018-06-07 RX ADMIN — GABAPENTIN 800 MG: 400 CAPSULE ORAL at 08:18

## 2018-06-07 RX ADMIN — PANCRELIPASE 2 CAPSULE: 24000; 76000; 120000 CAPSULE, DELAYED RELEASE PELLETS ORAL at 17:55

## 2018-06-07 RX ADMIN — AMOXICILLIN AND CLAVULANATE POTASSIUM 1 TABLET: 875; 125 TABLET, FILM COATED ORAL at 08:17

## 2018-06-07 ASSESSMENT — PAIN DESCRIPTION - LOCATION
LOCATION: HEAD
LOCATION: BACK
LOCATION: RIB CAGE
LOCATION: HEAD
LOCATION: BACK

## 2018-06-07 ASSESSMENT — PAIN DESCRIPTION - ONSET
ONSET: ON-GOING
ONSET: ON-GOING

## 2018-06-07 ASSESSMENT — PAIN DESCRIPTION - FREQUENCY
FREQUENCY: CONTINUOUS

## 2018-06-07 ASSESSMENT — PAIN SCALES - GENERAL
PAINLEVEL_OUTOF10: 3
PAINLEVEL_OUTOF10: 3
PAINLEVEL_OUTOF10: 1
PAINLEVEL_OUTOF10: 8
PAINLEVEL_OUTOF10: 8
PAINLEVEL_OUTOF10: 3
PAINLEVEL_OUTOF10: 0
PAINLEVEL_OUTOF10: 8

## 2018-06-07 ASSESSMENT — PAIN DESCRIPTION - ORIENTATION
ORIENTATION: LOWER
ORIENTATION: LOWER

## 2018-06-07 ASSESSMENT — PAIN - FUNCTIONAL ASSESSMENT: PAIN_FUNCTIONAL_ASSESSMENT: 0-10

## 2018-06-07 ASSESSMENT — PAIN DESCRIPTION - PAIN TYPE
TYPE: ACUTE PAIN
TYPE: CHRONIC PAIN
TYPE: CHRONIC PAIN
TYPE: ACUTE PAIN

## 2018-06-07 ASSESSMENT — PAIN DESCRIPTION - DESCRIPTORS: DESCRIPTORS: ACHING;DISCOMFORT

## 2018-06-07 NOTE — PLAN OF CARE
Problem: Altered Mood, Depressive Behavior:  Goal: Able to verbalize and/or display a decrease in depressive symptoms  Able to verbalize and/or display a decrease in depressive symptoms   Outcome: Ongoing  During my conversation with patient this morning he denied having any suicidal thoughts but did admit to feeling depressed, writer encouraged pt to attend groups and participate, attend to his ADL'S, continue taking prescribed medications and report any side effects and go to all his follow up appointments.

## 2018-06-07 NOTE — PLAN OF CARE
Problem: Altered Mood, Depressive Behavior:  Goal: Able to verbalize acceptance of life and situations over which he or she has no control  Able to verbalize acceptance of life and situations over which he or she has no control   Outcome: Ongoing  Pt did not participate in Stress Management Cognitive Skills Group at 1330 despite staff encouragement.

## 2018-06-07 NOTE — FLOWSHEET NOTE
*Patient participated in the [facility]       06/07/18 1533   Encounter Summary   Services provided to: Patient   Referral/Consult From: Rounding   Continue Visiting (6/7/18)   Complexity of Encounter Low   Length of Encounter 30 minutes   Spiritual Assessment Completed Yes   Spiritual/Taoist   Type Spiritual support   Assessment Calm   Intervention Active listening   Outcome Receptive

## 2018-06-07 NOTE — PROGRESS NOTES
capsule 2 capsule, 2 capsule, Oral, TID WC  fenofibrate (TRICOR) tablet 54 mg, 54 mg, Oral, Daily  folic acid (FOLVITE) tablet 1 mg, 1 mg, Oral, Daily  gabapentin (NEURONTIN) capsule 800 mg, 800 mg, Oral, TID  meloxicam (MOBIC) tablet 7.5 mg, 7.5 mg, Oral, BID  metoprolol tartrate (LOPRESSOR) tablet 25 mg, 25 mg, Oral, BID  therapeutic multivitamin-minerals 1 tablet, 1 tablet, Oral, Daily  pantoprazole (PROTONIX) tablet 40 mg, 40 mg, Oral, QAM AC  tiZANidine (ZANAFLEX) tablet 4 mg, 4 mg, Oral, Nightly  venlafaxine (EFFEXOR) tablet 225 mg, 225 mg, Oral, Daily with breakfast  hydrOXYzine (ATARAX) tablet 100 mg, 100 mg, Oral, TID PRN  traZODone (DESYREL) tablet 200 mg, 200 mg, Oral, Nightly PRN    ASSESSMENT AND PLAN    Will continue to adjust medications accordingly

## 2018-06-07 NOTE — PLAN OF CARE
Problem: Altered Mood, Depressive Behavior:  Goal: Able to verbalize and/or display a decrease in depressive symptoms  Able to verbalize and/or display a decrease in depressive symptoms   Outcome: Ongoing  Pt did not participate in Positive Thinking / Coping Skills / Therapeutic Activity group at 1000 despite staff encouragement.
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
## 2018-06-07 NOTE — PLAN OF CARE
Problem: Falls - Risk of:  Goal: Will remain free from falls  Will remain free from falls   Outcome: Ongoing  Pt remains free from falls at this time, safety precautions are in place, will continue to monitor pt.

## 2018-06-08 PROBLEM — F32.9 MAJOR DEPRESSIVE DISORDER: Status: RESOLVED | Noted: 2017-03-26 | Resolved: 2018-06-08

## 2018-06-08 PROCEDURE — 99232 SBSQ HOSP IP/OBS MODERATE 35: CPT | Performed by: PSYCHIATRY & NEUROLOGY

## 2018-06-08 PROCEDURE — 6370000000 HC RX 637 (ALT 250 FOR IP): Performed by: PSYCHIATRY & NEUROLOGY

## 2018-06-08 PROCEDURE — 1240000000 HC EMOTIONAL WELLNESS R&B

## 2018-06-08 RX ADMIN — QUETIAPINE 100 MG: 100 TABLET ORAL at 21:32

## 2018-06-08 RX ADMIN — FOLIC ACID 1 MG: 1 TABLET ORAL at 08:19

## 2018-06-08 RX ADMIN — AMITRIPTYLINE HYDROCHLORIDE 50 MG: 25 TABLET, FILM COATED ORAL at 21:31

## 2018-06-08 RX ADMIN — GABAPENTIN 800 MG: 400 CAPSULE ORAL at 13:49

## 2018-06-08 RX ADMIN — PANCRELIPASE 2 CAPSULE: 24000; 76000; 120000 CAPSULE, DELAYED RELEASE PELLETS ORAL at 08:19

## 2018-06-08 RX ADMIN — AMOXICILLIN AND CLAVULANATE POTASSIUM 1 TABLET: 875; 125 TABLET, FILM COATED ORAL at 08:20

## 2018-06-08 RX ADMIN — PANCRELIPASE 2 CAPSULE: 24000; 76000; 120000 CAPSULE, DELAYED RELEASE PELLETS ORAL at 17:57

## 2018-06-08 RX ADMIN — ACETAMINOPHEN 650 MG: 325 TABLET, FILM COATED ORAL at 15:25

## 2018-06-08 RX ADMIN — ATORVASTATIN CALCIUM 40 MG: 20 TABLET, FILM COATED ORAL at 08:20

## 2018-06-08 RX ADMIN — MULTIPLE VITAMINS W/ MINERALS TAB 1 TABLET: TAB at 08:20

## 2018-06-08 RX ADMIN — QUETIAPINE 100 MG: 100 TABLET ORAL at 08:19

## 2018-06-08 RX ADMIN — VENLAFAXINE 225 MG: 75 TABLET ORAL at 08:20

## 2018-06-08 RX ADMIN — METOPROLOL TARTRATE 25 MG: 25 TABLET ORAL at 08:19

## 2018-06-08 RX ADMIN — METOPROLOL TARTRATE 25 MG: 25 TABLET ORAL at 21:31

## 2018-06-08 RX ADMIN — TRAZODONE HYDROCHLORIDE 200 MG: 100 TABLET ORAL at 21:36

## 2018-06-08 RX ADMIN — TIZANIDINE 4 MG: 4 TABLET ORAL at 21:31

## 2018-06-08 RX ADMIN — AMOXICILLIN AND CLAVULANATE POTASSIUM 1 TABLET: 875; 125 TABLET, FILM COATED ORAL at 21:31

## 2018-06-08 RX ADMIN — MELOXICAM 15 MG: 7.5 TABLET ORAL at 08:20

## 2018-06-08 RX ADMIN — HYDROXYZINE HYDROCHLORIDE 100 MG: 25 TABLET, FILM COATED ORAL at 15:25

## 2018-06-08 RX ADMIN — HYDROXYZINE HYDROCHLORIDE 100 MG: 25 TABLET, FILM COATED ORAL at 23:02

## 2018-06-08 RX ADMIN — FENOFIBRATE 54 MG: 54 TABLET ORAL at 08:19

## 2018-06-08 RX ADMIN — PANCRELIPASE 2 CAPSULE: 24000; 76000; 120000 CAPSULE, DELAYED RELEASE PELLETS ORAL at 11:59

## 2018-06-08 RX ADMIN — HYDROXYZINE HYDROCHLORIDE 100 MG: 25 TABLET, FILM COATED ORAL at 08:20

## 2018-06-08 RX ADMIN — GABAPENTIN 800 MG: 400 CAPSULE ORAL at 21:31

## 2018-06-08 RX ADMIN — GABAPENTIN 800 MG: 400 CAPSULE ORAL at 08:19

## 2018-06-08 RX ADMIN — PANTOPRAZOLE SODIUM 40 MG: 40 TABLET, DELAYED RELEASE ORAL at 06:52

## 2018-06-08 RX ADMIN — CLONAZEPAM 1 MG: 1 TABLET ORAL at 21:32

## 2018-06-08 RX ADMIN — ACETAMINOPHEN 650 MG: 325 TABLET, FILM COATED ORAL at 11:59

## 2018-06-08 RX ADMIN — MELOXICAM 15 MG: 7.5 TABLET ORAL at 21:31

## 2018-06-08 ASSESSMENT — PAIN DESCRIPTION - DESCRIPTORS: DESCRIPTORS: ACHING

## 2018-06-08 ASSESSMENT — PAIN DESCRIPTION - LOCATION
LOCATION: BACK;NECK
LOCATION: GENERALIZED

## 2018-06-08 ASSESSMENT — PAIN SCALES - GENERAL
PAINLEVEL_OUTOF10: 0
PAINLEVEL_OUTOF10: 3
PAINLEVEL_OUTOF10: 1
PAINLEVEL_OUTOF10: 8
PAINLEVEL_OUTOF10: 3
PAINLEVEL_OUTOF10: 7
PAINLEVEL_OUTOF10: 7
PAINLEVEL_OUTOF10: 0
PAINLEVEL_OUTOF10: 0

## 2018-06-08 ASSESSMENT — PAIN DESCRIPTION - PAIN TYPE
TYPE: CHRONIC PAIN

## 2018-06-08 ASSESSMENT — PAIN DESCRIPTION - ORIENTATION: ORIENTATION: LOWER

## 2018-06-08 ASSESSMENT — PAIN DESCRIPTION - FREQUENCY: FREQUENCY: CONTINUOUS

## 2018-06-08 ASSESSMENT — PAIN DESCRIPTION - ONSET: ONSET: ON-GOING

## 2018-06-08 NOTE — PLAN OF CARE
Problem: Anger Management/Homicidal Ideation:  Goal: Absence of angry outbursts  Absence of angry outbursts   Outcome: Ongoing  PSYCHOEDUCATION GROUP NOTE    Date: June 8, 2018  Start Time: 0845  End Time: 0900    Number Participants in Group:  10/22    Goal:  Patient will demonstrate increased interpersonal interaction   Topic: [employer] and Goal Setting     Discipline Responsible:   OT  AT  [facility]. x RT MHP Other       Participation Level:     None  Minimal   x Active Listener x Interactive    Monopolizing         Participation Quality:  x Appropriate  Inappropriate   x       Attentive        Intrusive   x       Sharing        Resistant          Supportive        Lethargic       Affective:   x Congruent  Incongruent  Blunted  Flat    Constricted  Anxious  Elated  Angry    Labile  Depressed  Other         Cognitive:  x Alert x Oriented PPTP     Concentration x G  F  P   Attention Span x G  F  P   Short-Term Memory x G  F  P   Long-Term Memory x G  F  P   ProblemSolving/  Decision Making x G  F  P   Ability to Process  Information x G  F  P      Contributing Factors             Delusional             Hallucinating             Flight of Ideas             Other:       Modes of Intervention:  x Education  Support  Exploration   x Clarifying x Problem Solving  Confrontation   x Socialization  Limit Setting x Reality Testing   x Activity  Movement  Media    Other:            Response to Learning:   Able to verbalize current knowledge/experience    Able to verbalize/acknowledge new learning    Able to retain information    Capable of insight    Able to change behavior   x Progressing to goal    Other:        Comments:

## 2018-06-08 NOTE — PLAN OF CARE
Problem: Anger Management/Homicidal Ideation:  Goal: Absence of angry outbursts  Absence of angry outbursts   Outcome: Ongoing  No angry outbursts noted at this time. Pleasant and cooperative upon approach. Goal: Absence of homicidal ideation  Absence of homicidal ideation   Outcome: Ongoing  Denies any thoughts of HI. Problem: Altered Mood, Depressive Behavior:  Goal: Able to verbalize acceptance of life and situations over which he or she has no control  Able to verbalize acceptance of life and situations over which he or she has no control   Outcome: Ongoing  1:1 interaction provided. Appears restless, but is cooperative. Discussed reasons for admission. Educated on ways to deal with life stressors. Educated on positive coping skills and relaxation techniques. Patient receptive. Encouraged goal setting and compliance upon discharge. Patient agrees. Safety maintained  Goal: Able to verbalize and/or display a decrease in depressive symptoms  Able to verbalize and/or display a decrease in depressive symptoms   Outcome: Ongoing      Problem: Pain:  Goal: Pain level will decrease  Pain level will decrease   Outcome: Ongoing  Tylenol given and was effective. Goal: Control of acute pain  Control of acute pain   Outcome: Ongoing    Goal: Control of chronic pain  Control of chronic pain   Outcome: Ongoing      Problem: Falls - Risk of:  Goal: Will remain free from falls  Will remain free from falls   Outcome: Ongoing  Ambulation is steady. No falls or injuries noted at this time.   Goal: Absence of physical injury  Absence of physical injury   Outcome: Ongoing
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
## 2018-06-08 NOTE — PLAN OF CARE
Problem: Falls - Risk of:  Goal: Will remain free from falls  Will remain free from falls   Outcome: Ongoing  Pt remains free from falls at this time, will continue to monitor pt.

## 2018-06-09 PROCEDURE — 1240000000 HC EMOTIONAL WELLNESS R&B

## 2018-06-09 PROCEDURE — 6370000000 HC RX 637 (ALT 250 FOR IP): Performed by: PSYCHIATRY & NEUROLOGY

## 2018-06-09 RX ADMIN — GABAPENTIN 800 MG: 400 CAPSULE ORAL at 13:01

## 2018-06-09 RX ADMIN — ACETAMINOPHEN 650 MG: 325 TABLET, FILM COATED ORAL at 15:28

## 2018-06-09 RX ADMIN — QUETIAPINE 100 MG: 100 TABLET ORAL at 08:30

## 2018-06-09 RX ADMIN — TIZANIDINE 4 MG: 4 TABLET ORAL at 20:52

## 2018-06-09 RX ADMIN — MULTIPLE VITAMINS W/ MINERALS TAB 1 TABLET: TAB at 08:30

## 2018-06-09 RX ADMIN — CLONAZEPAM 1 MG: 1 TABLET ORAL at 20:52

## 2018-06-09 RX ADMIN — GABAPENTIN 800 MG: 400 CAPSULE ORAL at 08:30

## 2018-06-09 RX ADMIN — VENLAFAXINE 225 MG: 75 TABLET ORAL at 08:30

## 2018-06-09 RX ADMIN — HYDROXYZINE HYDROCHLORIDE 100 MG: 25 TABLET, FILM COATED ORAL at 13:00

## 2018-06-09 RX ADMIN — GABAPENTIN 800 MG: 400 CAPSULE ORAL at 20:52

## 2018-06-09 RX ADMIN — AMITRIPTYLINE HYDROCHLORIDE 50 MG: 25 TABLET, FILM COATED ORAL at 20:52

## 2018-06-09 RX ADMIN — PANCRELIPASE 2 CAPSULE: 24000; 76000; 120000 CAPSULE, DELAYED RELEASE PELLETS ORAL at 08:31

## 2018-06-09 RX ADMIN — PANCRELIPASE 2 CAPSULE: 24000; 76000; 120000 CAPSULE, DELAYED RELEASE PELLETS ORAL at 17:46

## 2018-06-09 RX ADMIN — MELOXICAM 15 MG: 7.5 TABLET ORAL at 08:30

## 2018-06-09 RX ADMIN — AMOXICILLIN AND CLAVULANATE POTASSIUM 1 TABLET: 875; 125 TABLET, FILM COATED ORAL at 20:52

## 2018-06-09 RX ADMIN — QUETIAPINE 100 MG: 100 TABLET ORAL at 20:52

## 2018-06-09 RX ADMIN — FENOFIBRATE 54 MG: 54 TABLET ORAL at 08:31

## 2018-06-09 RX ADMIN — ATORVASTATIN CALCIUM 40 MG: 20 TABLET, FILM COATED ORAL at 08:30

## 2018-06-09 RX ADMIN — AMOXICILLIN AND CLAVULANATE POTASSIUM 1 TABLET: 875; 125 TABLET, FILM COATED ORAL at 08:29

## 2018-06-09 RX ADMIN — ACETAMINOPHEN 650 MG: 325 TABLET, FILM COATED ORAL at 05:31

## 2018-06-09 RX ADMIN — METOPROLOL TARTRATE 25 MG: 25 TABLET ORAL at 20:52

## 2018-06-09 RX ADMIN — PANCRELIPASE 2 CAPSULE: 24000; 76000; 120000 CAPSULE, DELAYED RELEASE PELLETS ORAL at 13:00

## 2018-06-09 RX ADMIN — FOLIC ACID 1 MG: 1 TABLET ORAL at 08:30

## 2018-06-09 RX ADMIN — PANTOPRAZOLE SODIUM 40 MG: 40 TABLET, DELAYED RELEASE ORAL at 08:30

## 2018-06-09 RX ADMIN — TRAZODONE HYDROCHLORIDE 200 MG: 100 TABLET ORAL at 20:52

## 2018-06-09 RX ADMIN — METOPROLOL TARTRATE 25 MG: 25 TABLET ORAL at 08:30

## 2018-06-09 RX ADMIN — MELOXICAM 15 MG: 7.5 TABLET ORAL at 20:52

## 2018-06-09 ASSESSMENT — PAIN SCALES - GENERAL
PAINLEVEL_OUTOF10: 3
PAINLEVEL_OUTOF10: 5
PAINLEVEL_OUTOF10: 2
PAINLEVEL_OUTOF10: 8
PAINLEVEL_OUTOF10: 7
PAINLEVEL_OUTOF10: 0
PAINLEVEL_OUTOF10: 7
PAINLEVEL_OUTOF10: 2
PAINLEVEL_OUTOF10: 7

## 2018-06-09 ASSESSMENT — PAIN DESCRIPTION - LOCATION
LOCATION: BACK;HIP;NECK
LOCATION: BACK;HIP;NECK
LOCATION: BACK;RIB CAGE;HIP
LOCATION: BACK

## 2018-06-09 ASSESSMENT — PAIN DESCRIPTION - PAIN TYPE
TYPE: CHRONIC PAIN

## 2018-06-09 NOTE — PROGRESS NOTES
Pt asked writer to check his court case.  Writer did this and informed pt his court date was rescheduled to 6/15/18 at [address] [place] [address].

## 2018-06-09 NOTE — PLAN OF CARE
Problem: Altered Mood, Depressive Behavior:  Goal: Able to verbalize acceptance of life and situations over which he or she has no control  Able to verbalize acceptance of life and situations over which he or she has no control   Outcome: Ongoing  Pt did not participate in Goal Setting and [facility] [employer] despite staff encouragement.

## 2018-06-10 PROCEDURE — 6370000000 HC RX 637 (ALT 250 FOR IP): Performed by: PSYCHIATRY & NEUROLOGY

## 2018-06-10 PROCEDURE — 99232 SBSQ HOSP IP/OBS MODERATE 35: CPT | Performed by: REGISTERED NURSE

## 2018-06-10 PROCEDURE — 1240000000 HC EMOTIONAL WELLNESS R&B

## 2018-06-10 PROCEDURE — 6370000000 HC RX 637 (ALT 250 FOR IP): Performed by: REGISTERED NURSE

## 2018-06-10 RX ORDER — QUETIAPINE FUMARATE 100 MG/1
100 TABLET, FILM COATED ORAL 3 TIMES DAILY
Status: DISCONTINUED | OUTPATIENT
Start: 2018-06-10 | End: 2018-06-11

## 2018-06-10 RX ADMIN — PANCRELIPASE 2 CAPSULE: 24000; 76000; 120000 CAPSULE, DELAYED RELEASE PELLETS ORAL at 17:56

## 2018-06-10 RX ADMIN — FENOFIBRATE 54 MG: 54 TABLET ORAL at 08:25

## 2018-06-10 RX ADMIN — ACETAMINOPHEN 650 MG: 325 TABLET, FILM COATED ORAL at 17:56

## 2018-06-10 RX ADMIN — HYDROXYZINE HYDROCHLORIDE 100 MG: 25 TABLET, FILM COATED ORAL at 08:25

## 2018-06-10 RX ADMIN — ACETAMINOPHEN 650 MG: 325 TABLET, FILM COATED ORAL at 11:37

## 2018-06-10 RX ADMIN — PANCRELIPASE 2 CAPSULE: 24000; 76000; 120000 CAPSULE, DELAYED RELEASE PELLETS ORAL at 13:06

## 2018-06-10 RX ADMIN — MULTIPLE VITAMINS W/ MINERALS TAB 1 TABLET: TAB at 08:25

## 2018-06-10 RX ADMIN — QUETIAPINE 100 MG: 100 TABLET ORAL at 08:26

## 2018-06-10 RX ADMIN — QUETIAPINE FUMARATE 100 MG: 100 TABLET ORAL at 21:03

## 2018-06-10 RX ADMIN — HYDROXYZINE HYDROCHLORIDE 100 MG: 25 TABLET, FILM COATED ORAL at 16:04

## 2018-06-10 RX ADMIN — AMITRIPTYLINE HYDROCHLORIDE 50 MG: 25 TABLET, FILM COATED ORAL at 21:02

## 2018-06-10 RX ADMIN — MELOXICAM 15 MG: 7.5 TABLET ORAL at 08:25

## 2018-06-10 RX ADMIN — CLONAZEPAM 1 MG: 1 TABLET ORAL at 21:02

## 2018-06-10 RX ADMIN — METOPROLOL TARTRATE 25 MG: 25 TABLET ORAL at 21:03

## 2018-06-10 RX ADMIN — ATORVASTATIN CALCIUM 40 MG: 20 TABLET, FILM COATED ORAL at 08:25

## 2018-06-10 RX ADMIN — METOPROLOL TARTRATE 25 MG: 25 TABLET ORAL at 08:26

## 2018-06-10 RX ADMIN — FOLIC ACID 1 MG: 1 TABLET ORAL at 08:25

## 2018-06-10 RX ADMIN — TRAZODONE HYDROCHLORIDE 200 MG: 100 TABLET ORAL at 21:02

## 2018-06-10 RX ADMIN — VENLAFAXINE 225 MG: 75 TABLET ORAL at 08:26

## 2018-06-10 RX ADMIN — PANCRELIPASE 2 CAPSULE: 24000; 76000; 120000 CAPSULE, DELAYED RELEASE PELLETS ORAL at 08:25

## 2018-06-10 RX ADMIN — GABAPENTIN 800 MG: 400 CAPSULE ORAL at 08:25

## 2018-06-10 RX ADMIN — AMOXICILLIN AND CLAVULANATE POTASSIUM 1 TABLET: 875; 125 TABLET, FILM COATED ORAL at 08:26

## 2018-06-10 RX ADMIN — PANTOPRAZOLE SODIUM 40 MG: 40 TABLET, DELAYED RELEASE ORAL at 06:48

## 2018-06-10 RX ADMIN — AMOXICILLIN AND CLAVULANATE POTASSIUM 1 TABLET: 875; 125 TABLET, FILM COATED ORAL at 21:03

## 2018-06-10 RX ADMIN — GABAPENTIN 800 MG: 400 CAPSULE ORAL at 13:06

## 2018-06-10 RX ADMIN — GABAPENTIN 800 MG: 400 CAPSULE ORAL at 21:03

## 2018-06-10 RX ADMIN — MELOXICAM 15 MG: 7.5 TABLET ORAL at 21:02

## 2018-06-10 RX ADMIN — TIZANIDINE 4 MG: 4 TABLET ORAL at 21:03

## 2018-06-10 ASSESSMENT — PAIN SCALES - GENERAL
PAINLEVEL_OUTOF10: 7
PAINLEVEL_OUTOF10: 3
PAINLEVEL_OUTOF10: 2
PAINLEVEL_OUTOF10: 3
PAINLEVEL_OUTOF10: 7
PAINLEVEL_OUTOF10: 1
PAINLEVEL_OUTOF10: 7

## 2018-06-10 ASSESSMENT — PAIN DESCRIPTION - LOCATION
LOCATION: GENERALIZED
LOCATION: GENERALIZED

## 2018-06-10 ASSESSMENT — PAIN DESCRIPTION - PAIN TYPE: TYPE: CHRONIC PAIN
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
## 2018-06-10 NOTE — BH NOTE
Psychoeducation Group Note    Date: 06/10/18  Start Time: 0950  End Time: 0976    Number Participants in Group:  22/22    Goal:  Patient will demonstrate increased interpersonal interaction   Topic: Safety Drill group    Discipline Responsible:   OT  AT   X Nsg.  RT  Other       Participation Level:     None  Minimal   X Active Listener  Interactive    Monopolizing         Participation Quality:  X Appropriate  Inappropriate          Attentive        Intrusive          Sharing        Resistant          Supportive        Lethargic       Affective:   X Congruent  Incongruent  Blunted  Flat    Constricted  Anxious  Elated  Angry    Labile  Depressed  Other         Cognitive:  X Alert  Oriented PPTP     Concentration  G X F  P   Attention Span  G X F  P   Short-Term Memory  G X F  P   Long-Term Memory  G X F  P   ProblemSolving/  Decision Making  G X F  P   Ability to Process  Information  G X F  P      Contributing Factors             Delusional             Hallucinating             Flight of Ideas             Other:       Modes of Intervention:   Education  Support  Exploration    Clarifying  Problem Solving  Confrontation    Socialization  Limit Setting  Reality Testing    Activity  Movement  Media   X Other: Safety           Response to Learning:   Able to verbalize current knowledge/experience    Able to verbalize/acknowledge new learning    Able to retain information    Capable of insight    Able to change behavior   X Progressing to goal    Other:        Comments: Pts met and discussed safety issues. All patient rooms and group rooms checked.

## 2018-06-10 NOTE — PLAN OF CARE
Problem: Altered Mood, Depressive Behavior:  Goal: Able to verbalize and/or display a decrease in depressive symptoms  Able to verbalize and/or display a decrease in depressive symptoms   Outcome: Ongoing  Pt did not participate in Cognitive Skills Therapeutic Group at 1330 despite staff encouragement.

## 2018-06-10 NOTE — PLAN OF CARE
Problem: Altered Mood, Depressive Behavior:  Goal: Able to verbalize and/or display a decrease in depressive symptoms  Able to verbalize and/or display a decrease in depressive symptoms   Outcome: Ongoing  Pt did not participate in Goal Setting and Community Meeting at [employer] despite staff encouragement.

## 2018-06-11 PROCEDURE — 6370000000 HC RX 637 (ALT 250 FOR IP): Performed by: PSYCHIATRY & NEUROLOGY

## 2018-06-11 PROCEDURE — 6370000000 HC RX 637 (ALT 250 FOR IP): Performed by: REGISTERED NURSE

## 2018-06-11 PROCEDURE — 1240000000 HC EMOTIONAL WELLNESS R&B

## 2018-06-11 PROCEDURE — 99232 SBSQ HOSP IP/OBS MODERATE 35: CPT | Performed by: REGISTERED NURSE

## 2018-06-11 RX ORDER — QUETIAPINE FUMARATE 100 MG/1
100 TABLET, FILM COATED ORAL 2 TIMES DAILY
Status: DISCONTINUED | OUTPATIENT
Start: 2018-06-12 | End: 2018-06-18

## 2018-06-11 RX ORDER — QUETIAPINE FUMARATE 300 MG/1
300 TABLET, FILM COATED ORAL NIGHTLY
Status: DISCONTINUED | OUTPATIENT
Start: 2018-06-11 | End: 2018-06-12

## 2018-06-11 RX ADMIN — QUETIAPINE FUMARATE 100 MG: 100 TABLET ORAL at 08:18

## 2018-06-11 RX ADMIN — FENOFIBRATE 54 MG: 54 TABLET ORAL at 08:19

## 2018-06-11 RX ADMIN — ACETAMINOPHEN 650 MG: 325 TABLET, FILM COATED ORAL at 12:49

## 2018-06-11 RX ADMIN — AMITRIPTYLINE HYDROCHLORIDE 50 MG: 25 TABLET, FILM COATED ORAL at 21:02

## 2018-06-11 RX ADMIN — METOPROLOL TARTRATE 25 MG: 25 TABLET ORAL at 08:18

## 2018-06-11 RX ADMIN — FOLIC ACID 1 MG: 1 TABLET ORAL at 08:19

## 2018-06-11 RX ADMIN — GABAPENTIN 800 MG: 400 CAPSULE ORAL at 21:02

## 2018-06-11 RX ADMIN — PANCRELIPASE 2 CAPSULE: 24000; 76000; 120000 CAPSULE, DELAYED RELEASE PELLETS ORAL at 12:49

## 2018-06-11 RX ADMIN — TIZANIDINE 4 MG: 4 TABLET ORAL at 21:03

## 2018-06-11 RX ADMIN — VENLAFAXINE 225 MG: 75 TABLET ORAL at 08:18

## 2018-06-11 RX ADMIN — PANCRELIPASE 2 CAPSULE: 24000; 76000; 120000 CAPSULE, DELAYED RELEASE PELLETS ORAL at 08:19

## 2018-06-11 RX ADMIN — GABAPENTIN 800 MG: 400 CAPSULE ORAL at 15:01

## 2018-06-11 RX ADMIN — METOPROLOL TARTRATE 25 MG: 25 TABLET ORAL at 21:02

## 2018-06-11 RX ADMIN — MULTIPLE VITAMINS W/ MINERALS TAB 1 TABLET: TAB at 08:18

## 2018-06-11 RX ADMIN — PANTOPRAZOLE SODIUM 40 MG: 40 TABLET, DELAYED RELEASE ORAL at 06:19

## 2018-06-11 RX ADMIN — CLONAZEPAM 1 MG: 1 TABLET ORAL at 21:02

## 2018-06-11 RX ADMIN — QUETIAPINE FUMARATE 300 MG: 300 TABLET ORAL at 21:02

## 2018-06-11 RX ADMIN — AMOXICILLIN AND CLAVULANATE POTASSIUM 1 TABLET: 875; 125 TABLET, FILM COATED ORAL at 08:18

## 2018-06-11 RX ADMIN — GABAPENTIN 800 MG: 400 CAPSULE ORAL at 08:18

## 2018-06-11 RX ADMIN — MELOXICAM 15 MG: 7.5 TABLET ORAL at 08:18

## 2018-06-11 RX ADMIN — HYDROXYZINE HYDROCHLORIDE 100 MG: 25 TABLET, FILM COATED ORAL at 21:02

## 2018-06-11 RX ADMIN — HYDROXYZINE HYDROCHLORIDE 100 MG: 25 TABLET, FILM COATED ORAL at 08:18

## 2018-06-11 RX ADMIN — MELOXICAM 15 MG: 7.5 TABLET ORAL at 21:02

## 2018-06-11 RX ADMIN — AMOXICILLIN AND CLAVULANATE POTASSIUM 1 TABLET: 875; 125 TABLET, FILM COATED ORAL at 21:02

## 2018-06-11 RX ADMIN — QUETIAPINE FUMARATE 100 MG: 100 TABLET ORAL at 15:02

## 2018-06-11 RX ADMIN — ATORVASTATIN CALCIUM 40 MG: 20 TABLET, FILM COATED ORAL at 08:23

## 2018-06-11 RX ADMIN — TRAZODONE HYDROCHLORIDE 200 MG: 100 TABLET ORAL at 21:02

## 2018-06-11 RX ADMIN — PANCRELIPASE 2 CAPSULE: 24000; 76000; 120000 CAPSULE, DELAYED RELEASE PELLETS ORAL at 18:00

## 2018-06-11 ASSESSMENT — PAIN DESCRIPTION - PAIN TYPE
TYPE: ACUTE PAIN
TYPE: ACUTE PAIN

## 2018-06-11 ASSESSMENT — PAIN SCALES - GENERAL
PAINLEVEL_OUTOF10: 2
PAINLEVEL_OUTOF10: 3
PAINLEVEL_OUTOF10: 7

## 2018-06-11 NOTE — PLAN OF CARE
Problem: Altered Mood, Depressive Behavior:  Goal: Able to verbalize acceptance of life and situations over which he or she has no control  Able to verbalize acceptance of life and situations over which he or she has no control   Outcome: Ongoing  PSYCHOTHERAPY GROUP NOTE    Date: 6/11/18  Start Time: 11 AM  End Time: 1150 AM    Number Participants in Group:  15    Goal:  Patient will demonstrate increased interpersonal interaction   Topic: Support     Discipline Responsible:   OT  AT x SW  Nsg.  RT MHP Other       Participation Level:     None  Minimal    Active Listener x Interactive    Monopolizing         Participation Quality:  x Appropriate  Inappropriate   x       Attentive        Intrusive   x       Sharing        Resistant   x       Supportive        Lethargic       Affective:   x Congruent  Incongruent  Blunted  Flat    Constricted  Anxious  Elated  Angry    Labile  Depressed  Other         Cognitive:  x Alert x Oriented PPTP     Concentration  G x F  P   Attention Span  G x F  P   Short-Term Memory  G x F  P   Long-Term Memory  G x F  P   ProblemSolving/  Decision Making  G x F  P   Ability to Process  Information  G x F  P      Contributing Factors             Delusional             Hallucinating             Flight of Ideas             Other:       Modes of Intervention:   Education x Support  Exploration    Clarifying  Problem Solving  Confrontation    Socialization  Limit Setting  Reality Testing    Activity  Movement  Media    Other:            Response to Learning:  x Able to verbalize current knowledge/experience   x Able to verbalize/acknowledge new learning    Able to retain information    Capable of insight    Able to change behavior    Progressing to goal    Other:        Comments:

## 2018-06-11 NOTE — PROGRESS NOTES
Oral, Daily PRN  aluminum & magnesium hydroxide-simethicone (MAALOX) 200-200-20 MG/5ML suspension 30 mL, 30 mL, Oral, Q6H PRN  nicotine (NICODERM CQ) 14 MG/24HR 1 patch, 1 patch, Transdermal, Daily  nicotine polacrilex (NICORETTE) gum 2 mg, 2 mg, Oral, Q2H PRN  amoxicillin-clavulanate (AUGMENTIN) 875-125 MG per tablet 1 tablet, 1 tablet, Oral, BID  atorvastatin (LIPITOR) tablet 40 mg, 40 mg, Oral, Daily  clonazePAM (KLONOPIN) tablet 1 mg, 1 mg, Oral, Nightly  lipase-protease-amylase (CREON) 17678-75457 units delayed release capsule 2 capsule, 2 capsule, Oral, TID WC  fenofibrate (TRICOR) tablet 54 mg, 54 mg, Oral, Daily  folic acid (FOLVITE) tablet 1 mg, 1 mg, Oral, Daily  gabapentin (NEURONTIN) capsule 800 mg, 800 mg, Oral, TID  metoprolol tartrate (LOPRESSOR) tablet 25 mg, 25 mg, Oral, BID  therapeutic multivitamin-minerals 1 tablet, 1 tablet, Oral, Daily  pantoprazole (PROTONIX) tablet 40 mg, 40 mg, Oral, QAM AC  tiZANidine (ZANAFLEX) tablet 4 mg, 4 mg, Oral, Nightly  venlafaxine (EFFEXOR) tablet 225 mg, 225 mg, Oral, Daily with breakfast  hydrOXYzine (ATARAX) tablet 100 mg, 100 mg, Oral, TID PRN  traZODone (DESYREL) tablet 200 mg, 200 mg, Oral, Nightly PRN    ASSESSMENT AND PLAN    Continue current medication management but increase Seroquel at HS.  group psychotherapy ,unit milieu and discharge planning
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
## 2018-06-11 NOTE — PLAN OF CARE
Problem: Altered Mood, Depressive Behavior:  Goal: Able to verbalize and/or display a decrease in depressive symptoms  Able to verbalize and/or display a decrease in depressive symptoms   Outcome: Ongoing  Pt did not participate in Humor / Social Skills / Leisure Skills and Awareness group at [employer] despite staff encouragement.

## 2018-06-12 PROCEDURE — 6370000000 HC RX 637 (ALT 250 FOR IP): Performed by: PSYCHIATRY & NEUROLOGY

## 2018-06-12 PROCEDURE — 1240000000 HC EMOTIONAL WELLNESS R&B

## 2018-06-12 PROCEDURE — 6370000000 HC RX 637 (ALT 250 FOR IP): Performed by: REGISTERED NURSE

## 2018-06-12 PROCEDURE — 99232 SBSQ HOSP IP/OBS MODERATE 35: CPT | Performed by: REGISTERED NURSE

## 2018-06-12 RX ORDER — QUETIAPINE FUMARATE 200 MG/1
400 TABLET, FILM COATED ORAL NIGHTLY
Status: DISCONTINUED | OUTPATIENT
Start: 2018-06-12 | End: 2018-06-18

## 2018-06-12 RX ADMIN — QUETIAPINE FUMARATE 100 MG: 100 TABLET ORAL at 08:11

## 2018-06-12 RX ADMIN — ATORVASTATIN CALCIUM 40 MG: 20 TABLET, FILM COATED ORAL at 08:10

## 2018-06-12 RX ADMIN — CLONAZEPAM 1 MG: 1 TABLET ORAL at 20:49

## 2018-06-12 RX ADMIN — GABAPENTIN 800 MG: 400 CAPSULE ORAL at 14:32

## 2018-06-12 RX ADMIN — AMITRIPTYLINE HYDROCHLORIDE 50 MG: 25 TABLET, FILM COATED ORAL at 20:49

## 2018-06-12 RX ADMIN — QUETIAPINE FUMARATE 100 MG: 100 TABLET ORAL at 16:32

## 2018-06-12 RX ADMIN — MULTIPLE VITAMINS W/ MINERALS TAB 1 TABLET: TAB at 08:11

## 2018-06-12 RX ADMIN — QUETIAPINE FUMARATE 400 MG: 200 TABLET ORAL at 20:49

## 2018-06-12 RX ADMIN — TRAZODONE HYDROCHLORIDE 200 MG: 100 TABLET ORAL at 20:49

## 2018-06-12 RX ADMIN — AMOXICILLIN AND CLAVULANATE POTASSIUM 1 TABLET: 875; 125 TABLET, FILM COATED ORAL at 08:10

## 2018-06-12 RX ADMIN — TIZANIDINE 4 MG: 4 TABLET ORAL at 20:49

## 2018-06-12 RX ADMIN — MELOXICAM 15 MG: 7.5 TABLET ORAL at 08:11

## 2018-06-12 RX ADMIN — ACETAMINOPHEN 650 MG: 325 TABLET, FILM COATED ORAL at 19:22

## 2018-06-12 RX ADMIN — MELOXICAM 15 MG: 7.5 TABLET ORAL at 20:49

## 2018-06-12 RX ADMIN — PANCRELIPASE 2 CAPSULE: 24000; 76000; 120000 CAPSULE, DELAYED RELEASE PELLETS ORAL at 16:32

## 2018-06-12 RX ADMIN — FOLIC ACID 1 MG: 1 TABLET ORAL at 08:11

## 2018-06-12 RX ADMIN — GABAPENTIN 800 MG: 400 CAPSULE ORAL at 20:49

## 2018-06-12 RX ADMIN — PANTOPRAZOLE SODIUM 40 MG: 40 TABLET, DELAYED RELEASE ORAL at 06:50

## 2018-06-12 RX ADMIN — PANCRELIPASE 2 CAPSULE: 24000; 76000; 120000 CAPSULE, DELAYED RELEASE PELLETS ORAL at 12:27

## 2018-06-12 RX ADMIN — METOPROLOL TARTRATE 25 MG: 25 TABLET ORAL at 20:49

## 2018-06-12 RX ADMIN — PANCRELIPASE 2 CAPSULE: 24000; 76000; 120000 CAPSULE, DELAYED RELEASE PELLETS ORAL at 08:10

## 2018-06-12 RX ADMIN — FENOFIBRATE 54 MG: 54 TABLET ORAL at 08:10

## 2018-06-12 RX ADMIN — METOPROLOL TARTRATE 25 MG: 25 TABLET ORAL at 08:11

## 2018-06-12 RX ADMIN — AMOXICILLIN AND CLAVULANATE POTASSIUM 1 TABLET: 875; 125 TABLET, FILM COATED ORAL at 20:49

## 2018-06-12 RX ADMIN — GABAPENTIN 800 MG: 400 CAPSULE ORAL at 08:11

## 2018-06-12 RX ADMIN — VENLAFAXINE 225 MG: 75 TABLET ORAL at 08:11

## 2018-06-12 ASSESSMENT — PAIN DESCRIPTION - PAIN TYPE
TYPE: CHRONIC PAIN

## 2018-06-12 ASSESSMENT — PAIN DESCRIPTION - ORIENTATION: ORIENTATION: LOWER

## 2018-06-12 ASSESSMENT — PAIN DESCRIPTION - LOCATION
LOCATION: BACK;NECK

## 2018-06-12 ASSESSMENT — PAIN SCALES - GENERAL
PAINLEVEL_OUTOF10: 7
PAINLEVEL_OUTOF10: 0
PAINLEVEL_OUTOF10: 0
PAINLEVEL_OUTOF10: 6
PAINLEVEL_OUTOF10: 7

## 2018-06-12 ASSESSMENT — PAIN DESCRIPTION - ONSET: ONSET: ON-GOING

## 2018-06-12 ASSESSMENT — PAIN DESCRIPTION - DESCRIPTORS: DESCRIPTORS: ACHING

## 2018-06-12 ASSESSMENT — PAIN DESCRIPTION - FREQUENCY: FREQUENCY: CONTINUOUS

## 2018-06-12 NOTE — PLAN OF CARE
Problem: Anger Management/Homicidal Ideation:  Goal: Absence of angry outbursts  Absence of angry outbursts   Outcome: Ongoing  Pt has remained in control as of this documentation, will continue to monitor pt.

## 2018-06-12 NOTE — PLAN OF CARE
Problem: Anger Management/Homicidal Ideation:  Goal: Absence of angry outbursts  Absence of angry outbursts   Outcome: Ongoing  PSYCHOTHERAPY GROUP NOTE    Date: 6/12/18  Start Time: 11  End Time: 1150AM    Number Participants in Group:  Support      Goal:  Patient will demonstrate increased interpersonal interaction   Topic: 8    Discipline Responsible:   OT  AT x SW  Nsg.  RT MHP Other       Participation Level:     None  Minimal    Active Listener x Interactive    Monopolizing         Participation Quality:  x Appropriate  Inappropriate   x       Attentive        Intrusive   x       Sharing        Resistant   x       Supportive        Lethargic       Affective:    Congruent x Incongruent  Blunted  Flat    Constricted  Anxious  Elated  Angry    Labile  Depressed  Other         Cognitive:  x Alert x Oriented PPTP     Concentration  G x F  P   Attention Span  G x F  P   Short-Term Memory  G x F  P   Long-Term Memory  G x F  P   ProblemSolving/  Decision Making  G x F  P   Ability to Process  Information  G x F  P      Contributing Factors             Delusional             Hallucinating             Flight of Ideas             Other:       Modes of Intervention:   Education x Support  Exploration    Clarifying  Problem Solving  Confrontation    Socialization  Limit Setting  Reality Testing    Activity  Movement  Media    Other:            Response to Learning:  x Able to verbalize current knowledge/experience    Able to verbalize/acknowledge new learning   x Able to retain information   x Capable of insight    Able to change behavior    Progressing to goal    Other:        Comments:

## 2018-06-12 NOTE — PLAN OF CARE
Problem: Altered Mood, Depressive Behavior:  Goal: Able to verbalize and/or display a decrease in depressive symptoms  Able to verbalize and/or display a decrease in depressive symptoms   Outcome: Ongoing  Pt did not participate in Communication Skills Therapeutic Group at 1330 despite staff encouragement.

## 2018-06-12 NOTE — PLAN OF CARE
Problem: Altered Mood, Depressive Behavior:  Goal: Able to verbalize and/or display a decrease in depressive symptoms  Able to verbalize and/or display a decrease in depressive symptoms   Outcome: Ongoing  Pt did not participate in Colors and Emotions / Creative Expression / Stress and Relaxation group at 1000 despite staff encouragement.

## 2018-06-13 LAB
ALBUMIN SERPL-MCNC: 4 G/DL (ref 3.5–5.2)
ALBUMIN/GLOBULIN RATIO: ABNORMAL (ref 1–2.5)
ALP BLD-CCNC: 102 U/L (ref 40–129)
ALT SERPL-CCNC: 25 U/L (ref 5–41)
ANION GAP SERPL CALCULATED.3IONS-SCNC: 14 MMOL/L (ref 9–17)
AST SERPL-CCNC: 15 U/L
BILIRUB SERPL-MCNC: 0.16 MG/DL (ref 0.3–1.2)
BUN BLDV-MCNC: 22 MG/DL (ref 6–20)
BUN/CREAT BLD: ABNORMAL (ref 9–20)
CALCIUM SERPL-MCNC: 9.4 MG/DL (ref 8.6–10.4)
CHLORIDE BLD-SCNC: 95 MMOL/L (ref 98–107)
CO2: 25 MMOL/L (ref 20–31)
CREAT SERPL-MCNC: 0.77 MG/DL (ref 0.7–1.2)
GFR AFRICAN AMERICAN: >60 ML/MIN
GFR NON-AFRICAN AMERICAN: >60 ML/MIN
GFR SERPL CREATININE-BSD FRML MDRD: ABNORMAL ML/MIN/{1.73_M2}
GFR SERPL CREATININE-BSD FRML MDRD: ABNORMAL ML/MIN/{1.73_M2}
GLUCOSE BLD-MCNC: 411 MG/DL (ref 75–110)
GLUCOSE BLD-MCNC: 433 MG/DL (ref 70–99)
POTASSIUM SERPL-SCNC: 4.5 MMOL/L (ref 3.7–5.3)
SODIUM BLD-SCNC: 134 MMOL/L (ref 135–144)
TOTAL PROTEIN: 6.7 G/DL (ref 6.4–8.3)

## 2018-06-13 PROCEDURE — 6370000000 HC RX 637 (ALT 250 FOR IP): Performed by: INTERNAL MEDICINE

## 2018-06-13 PROCEDURE — 80053 COMPREHEN METABOLIC PANEL: CPT

## 2018-06-13 PROCEDURE — 6370000000 HC RX 637 (ALT 250 FOR IP): Performed by: REGISTERED NURSE

## 2018-06-13 PROCEDURE — 1240000000 HC EMOTIONAL WELLNESS R&B

## 2018-06-13 PROCEDURE — 6370000000 HC RX 637 (ALT 250 FOR IP): Performed by: PSYCHIATRY & NEUROLOGY

## 2018-06-13 PROCEDURE — 99232 SBSQ HOSP IP/OBS MODERATE 35: CPT | Performed by: REGISTERED NURSE

## 2018-06-13 PROCEDURE — 82947 ASSAY GLUCOSE BLOOD QUANT: CPT

## 2018-06-13 PROCEDURE — 36415 COLL VENOUS BLD VENIPUNCTURE: CPT

## 2018-06-13 RX ORDER — DEXTROSE MONOHYDRATE 50 MG/ML
100 INJECTION, SOLUTION INTRAVENOUS PRN
Status: DISCONTINUED | OUTPATIENT
Start: 2018-06-13 | End: 2018-07-03 | Stop reason: HOSPADM

## 2018-06-13 RX ORDER — NICOTINE POLACRILEX 4 MG
15 LOZENGE BUCCAL PRN
Status: DISCONTINUED | OUTPATIENT
Start: 2018-06-13 | End: 2018-07-03 | Stop reason: HOSPADM

## 2018-06-13 RX ORDER — DEXTROSE MONOHYDRATE 25 G/50ML
12.5 INJECTION, SOLUTION INTRAVENOUS PRN
Status: DISCONTINUED | OUTPATIENT
Start: 2018-06-13 | End: 2018-07-03 | Stop reason: HOSPADM

## 2018-06-13 RX ADMIN — GABAPENTIN 800 MG: 400 CAPSULE ORAL at 08:27

## 2018-06-13 RX ADMIN — GABAPENTIN 800 MG: 400 CAPSULE ORAL at 15:25

## 2018-06-13 RX ADMIN — FENOFIBRATE 54 MG: 54 TABLET ORAL at 08:30

## 2018-06-13 RX ADMIN — PANCRELIPASE 2 CAPSULE: 24000; 76000; 120000 CAPSULE, DELAYED RELEASE PELLETS ORAL at 12:56

## 2018-06-13 RX ADMIN — PANTOPRAZOLE SODIUM 40 MG: 40 TABLET, DELAYED RELEASE ORAL at 06:18

## 2018-06-13 RX ADMIN — PANCRELIPASE 2 CAPSULE: 24000; 76000; 120000 CAPSULE, DELAYED RELEASE PELLETS ORAL at 17:56

## 2018-06-13 RX ADMIN — AMOXICILLIN AND CLAVULANATE POTASSIUM 1 TABLET: 875; 125 TABLET, FILM COATED ORAL at 08:27

## 2018-06-13 RX ADMIN — TRAZODONE HYDROCHLORIDE 200 MG: 100 TABLET ORAL at 21:49

## 2018-06-13 RX ADMIN — MELOXICAM 15 MG: 7.5 TABLET ORAL at 21:50

## 2018-06-13 RX ADMIN — AMITRIPTYLINE HYDROCHLORIDE 50 MG: 25 TABLET, FILM COATED ORAL at 21:49

## 2018-06-13 RX ADMIN — PANCRELIPASE 2 CAPSULE: 24000; 76000; 120000 CAPSULE, DELAYED RELEASE PELLETS ORAL at 08:30

## 2018-06-13 RX ADMIN — METOPROLOL TARTRATE 25 MG: 25 TABLET ORAL at 21:50

## 2018-06-13 RX ADMIN — MULTIPLE VITAMINS W/ MINERALS TAB 1 TABLET: TAB at 08:26

## 2018-06-13 RX ADMIN — METOPROLOL TARTRATE 25 MG: 25 TABLET ORAL at 08:27

## 2018-06-13 RX ADMIN — ATORVASTATIN CALCIUM 40 MG: 20 TABLET, FILM COATED ORAL at 08:27

## 2018-06-13 RX ADMIN — VENLAFAXINE 225 MG: 75 TABLET ORAL at 08:27

## 2018-06-13 RX ADMIN — HYDROXYZINE HYDROCHLORIDE 100 MG: 25 TABLET, FILM COATED ORAL at 08:28

## 2018-06-13 RX ADMIN — INSULIN LISPRO 3 UNITS: 100 INJECTION, SOLUTION INTRAVENOUS; SUBCUTANEOUS at 21:47

## 2018-06-13 RX ADMIN — INSULIN LISPRO 6 UNITS: 100 INJECTION, SOLUTION INTRAVENOUS; SUBCUTANEOUS at 15:55

## 2018-06-13 RX ADMIN — HYDROXYZINE HYDROCHLORIDE 100 MG: 25 TABLET, FILM COATED ORAL at 21:50

## 2018-06-13 RX ADMIN — HYDROXYZINE HYDROCHLORIDE 100 MG: 25 TABLET, FILM COATED ORAL at 15:25

## 2018-06-13 RX ADMIN — FOLIC ACID 1 MG: 1 TABLET ORAL at 08:27

## 2018-06-13 RX ADMIN — GABAPENTIN 800 MG: 400 CAPSULE ORAL at 21:49

## 2018-06-13 RX ADMIN — AMOXICILLIN AND CLAVULANATE POTASSIUM 1 TABLET: 875; 125 TABLET, FILM COATED ORAL at 21:48

## 2018-06-13 RX ADMIN — TIZANIDINE 4 MG: 4 TABLET ORAL at 21:50

## 2018-06-13 RX ADMIN — QUETIAPINE FUMARATE 100 MG: 100 TABLET ORAL at 15:25

## 2018-06-13 RX ADMIN — QUETIAPINE FUMARATE 100 MG: 100 TABLET ORAL at 08:27

## 2018-06-13 RX ADMIN — QUETIAPINE FUMARATE 400 MG: 200 TABLET ORAL at 21:49

## 2018-06-13 RX ADMIN — ACETAMINOPHEN 650 MG: 325 TABLET, FILM COATED ORAL at 15:26

## 2018-06-13 RX ADMIN — MELOXICAM 15 MG: 7.5 TABLET ORAL at 08:27

## 2018-06-13 RX ADMIN — CLONAZEPAM 1 MG: 1 TABLET ORAL at 21:49

## 2018-06-13 ASSESSMENT — PAIN DESCRIPTION - PAIN TYPE
TYPE: ACUTE PAIN

## 2018-06-13 ASSESSMENT — PAIN DESCRIPTION - LOCATION: LOCATION: GENERALIZED

## 2018-06-13 ASSESSMENT — PAIN SCALES - GENERAL
PAINLEVEL_OUTOF10: 6
PAINLEVEL_OUTOF10: 3
PAINLEVEL_OUTOF10: 7
PAINLEVEL_OUTOF10: 2
PAINLEVEL_OUTOF10: 7
PAINLEVEL_OUTOF10: 2

## 2018-06-13 NOTE — PLAN OF CARE
Problem: Anger Management/Homicidal Ideation:  Goal: Absence of angry outbursts  Absence of angry outbursts   Outcome: Ongoing  PSYCHOEDUCATION GROUP NOTE    Date: 06/13/18  Start Time: 1430  End Time: 1515    Number Participants in Group:  7/15    Goal:  Patient will demonstrate increased interpersonal interaction   Topic: Socialization/Leisure Awareness/Problem Solving    Discipline Responsible:   OT  AT    Ns. x RT MHP Other       Participation Level:     None  Minimal   x Active Listener x Interactive    Monopolizing         Participation Quality:  x Appropriate  Inappropriate   x       Attentive        Intrusive   x       Sharing        Resistant          Supportive        Lethargic       Affective:   x Congruent  Incongruent  Blunted  Flat    Constricted  Anxious  Elated  Angry    Labile  Depressed  Other         Cognitive:  x Alert x Oriented PPTP     Concentration x G  F  P   Attention Span x G  F  P   Short-Term Memory x G  F  P   Long-Term Memory x G  F  P   ProblemSolving/  Decision Making x G  F  P   Ability to Process  Information x G  F  P      Contributing Factors             Delusional             Hallucinating             Flight of Ideas             Other:       Modes of Intervention:   Education x Support  Exploration    Clarifying x Problem Solving  Confrontation   x Socialization  Limit Setting x Reality Testing   x Activity  Movement  Media    Other:            Response to Learning:   Able to verbalize current knowledge/experience    Able to verbalize/acknowledge new learning    Able to retain information    Capable of insight    Able to change behavior   x Progressing to goal    Other:        Comments:

## 2018-06-13 NOTE — PLAN OF CARE
Problem: Anger Management/Homicidal Ideation:  Goal: Absence of angry outbursts  Absence of angry outbursts   Outcome: Ongoing  Pt did not attend Community Meeting at 0900 d/t resting in room despite staff invitation to attend.

## 2018-06-13 NOTE — PLAN OF CARE
Problem: Anger Management/Homicidal Ideation:  Goal: Absence of angry outbursts  Absence of angry outbursts   Outcome: Ongoing  Pt did not participate in Personal Opinions / Communication Skills / Relating to Others group at 1330 despite staff encouragement.

## 2018-06-13 NOTE — PROGRESS NOTES
Department of Psychiatry  Attending Progress Note      SUBJECTIVE:  Kaitlin Doyle is seen in the day room. He states the feelings of violence have decreased even more. He states he was still not able to sleep well last night even with the increase in Seroquel. Denies side effects to medication regimen. Affect remains flat and poorly reactive. Charting and medications reviewed. Therapeutic support provided. There is still no ability to formulate safe plan other than continued hospitalization.     OBJECTIVE    Physical  VITALS:  /62   Pulse 73   Temp 97.7 °F (36.5 °C) (Oral)   Resp 16   Ht 5' 8\" (1.727 m)   Wt 221 lb (100.2 kg)   SpO2 96%   BMI 33.60 kg/m²         Mental Status Examination:  Level of consciousness: alert  Appearance:  hospital attire  Behavior/Motor:  psychomotor agitation  Attitude toward examiner:  pleasant  Speech:  rapid and loud  Mood:  constricted  Affect:  mood congruent  Thought processes:  rapid  Thought content:  Homocidal ideation denies  Suicidal Ideation:  passive  Delusions:  no evidence of delusions  Perceptual Disturbance:  auditory hallucinations but less  Cognition:  oriented to person, place, and time  Memory impaired immediate recall  Insight:  fair  Judgment:  fair      Medications  Current Facility-Administered Medications: insulin lispro (HUMALOG) injection vial 0-6 Units, 0-6 Units, Subcutaneous, TID WC  insulin lispro (HUMALOG) injection vial 0-3 Units, 0-3 Units, Subcutaneous, Nightly  glucose (GLUTOSE) 40 % oral gel 15 g, 15 g, Oral, PRN  dextrose 50 % solution 12.5 g, 12.5 g, Intravenous, PRN  glucagon (rDNA) injection 1 mg, 1 mg, Intramuscular, PRN  dextrose 5 % solution, 100 mL/hr, Intravenous, PRN  QUEtiapine (SEROQUEL) tablet 400 mg, 400 mg, Oral, Nightly  QUEtiapine (SEROQUEL) tablet 100 mg, 100 mg, Oral, BID  amitriptyline (ELAVIL) tablet 50 mg, 50 mg, Oral, Nightly  meloxicam (MOBIC) tablet 15 mg, 15 mg, Oral, BID  acetaminophen (TYLENOL) tablet 650 mg, 650 mg, Oral, Q4H PRN  benztropine mesylate (COGENTIN) injection 2 mg, 2 mg, Intramuscular, BID PRN  magnesium hydroxide (MILK OF MAGNESIA) 400 MG/5ML suspension 30 mL, 30 mL, Oral, Daily PRN  aluminum & magnesium hydroxide-simethicone (MAALOX) 200-200-20 MG/5ML suspension 30 mL, 30 mL, Oral, Q6H PRN  nicotine (NICODERM CQ) 14 MG/24HR 1 patch, 1 patch, Transdermal, Daily  nicotine polacrilex (NICORETTE) gum 2 mg, 2 mg, Oral, Q2H PRN  amoxicillin-clavulanate (AUGMENTIN) 875-125 MG per tablet 1 tablet, 1 tablet, Oral, BID  atorvastatin (LIPITOR) tablet 40 mg, 40 mg, Oral, Daily  clonazePAM (KLONOPIN) tablet 1 mg, 1 mg, Oral, Nightly  lipase-protease-amylase (CREON) 63858-20363 units delayed release capsule 2 capsule, 2 capsule, Oral, TID WC  fenofibrate (TRICOR) tablet 54 mg, 54 mg, Oral, Daily  folic acid (FOLVITE) tablet 1 mg, 1 mg, Oral, Daily  gabapentin (NEURONTIN) capsule 800 mg, 800 mg, Oral, TID  metoprolol tartrate (LOPRESSOR) tablet 25 mg, 25 mg, Oral, BID  therapeutic multivitamin-minerals 1 tablet, 1 tablet, Oral, Daily  pantoprazole (PROTONIX) tablet 40 mg, 40 mg, Oral, QAM AC  tiZANidine (ZANAFLEX) tablet 4 mg, 4 mg, Oral, Nightly  venlafaxine (EFFEXOR) tablet 225 mg, 225 mg, Oral, Daily with breakfast  hydrOXYzine (ATARAX) tablet 100 mg, 100 mg, Oral, TID PRN  traZODone (DESYREL) tablet 200 mg, 200 mg, Oral, Nightly PRN    ASSESSMENT AND PLAN    Continue current medication management. Seroquel last increased 6/12/18.   group psychotherapy ,unit milieu and discharge planning

## 2018-06-13 NOTE — PLAN OF CARE
Problem: Anger Management/Homicidal Ideation:  Goal: Absence of angry outbursts  Absence of angry outbursts   Outcome: Ongoing  Psychoeducation Group Note    Date: 6/13/2018  Start Time: 1100  End Time: 8408    Number Participants in Group:  10    Goal:  Patient will demonstrate increased interpersonal interaction.    Topic:  Relating to Others / Communication Skills / Self Disclosure     Discipline Responsible:   OT  AT  [facility]. X RT  Other       Participation Level:     None  Minimal   x Active Listener x Interactive    Monopolizing         Participation Quality:  x Appropriate  Inappropriate   x       Attentive        Intrusive   x       Sharing        Resistant          Supportive        Lethargic       Affective:    Congruent  Incongruent  Blunted x Flat    Constricted  Anxious  Elated  Angry    Labile  Depressed  Other  Bright       Cognitive:  x Alert x Oriented PPTP     Concentration x G  F  P   Attention Span x G  F  P   Short-Term Memory x G  F  P   Long-Term Memory  G  F  P   ProblemSolving/  Decision Making x G  F  P   Ability to Process  Information x G  F  P      Contributing Factors             Delusional             Hallucinating             Flight of Ideas             Other:       Modes of Intervention:  x Education x Support x Exploration   x Clarifying x Problem Solving x Confrontation   x Socialization x Limit Setting x Reality Testing   x Activity  Movement  Media    Other:            Response to Learning:  x Able to verbalize current knowledge/experience   x Able to verbalize/acknowledge new learning   x Able to retain information   x Capable of insight    Able to change behavior   x Progressing to goal    Other:        Comments:

## 2018-06-13 NOTE — PLAN OF CARE
Problem: Altered Mood, Depressive Behavior:  Goal: Able to verbalize and/or display a decrease in depressive symptoms  Able to verbalize and/or display a decrease in depressive symptoms   Outcome: Ongoing  During my evening conversation with patient he admitted to having fleeting suicidal thoughts but agreed to seek staff before acting on his thoughts. Writer encouraged pt to attend programs and participate, continue taking prescribed medications and report any side effects and go to all his appointments.

## 2018-06-14 LAB
GLUCOSE BLD-MCNC: 314 MG/DL (ref 75–110)
GLUCOSE BLD-MCNC: 331 MG/DL (ref 75–110)
GLUCOSE BLD-MCNC: 341 MG/DL (ref 75–110)
GLUCOSE BLD-MCNC: 351 MG/DL (ref 75–110)

## 2018-06-14 PROCEDURE — 6370000000 HC RX 637 (ALT 250 FOR IP): Performed by: PSYCHIATRY & NEUROLOGY

## 2018-06-14 PROCEDURE — 99222 1ST HOSP IP/OBS MODERATE 55: CPT | Performed by: INTERNAL MEDICINE

## 2018-06-14 PROCEDURE — 1240000000 HC EMOTIONAL WELLNESS R&B

## 2018-06-14 PROCEDURE — 82947 ASSAY GLUCOSE BLOOD QUANT: CPT

## 2018-06-14 PROCEDURE — 99232 SBSQ HOSP IP/OBS MODERATE 35: CPT | Performed by: REGISTERED NURSE

## 2018-06-14 PROCEDURE — 6370000000 HC RX 637 (ALT 250 FOR IP): Performed by: INTERNAL MEDICINE

## 2018-06-14 PROCEDURE — 6370000000 HC RX 637 (ALT 250 FOR IP): Performed by: REGISTERED NURSE

## 2018-06-14 RX ORDER — MIRTAZAPINE 15 MG/1
15 TABLET, FILM COATED ORAL NIGHTLY
Status: DISCONTINUED | OUTPATIENT
Start: 2018-06-14 | End: 2018-06-17

## 2018-06-14 RX ORDER — GLIMEPIRIDE 4 MG/1
4 TABLET ORAL
Status: DISCONTINUED | OUTPATIENT
Start: 2018-06-14 | End: 2018-07-03 | Stop reason: HOSPADM

## 2018-06-14 RX ADMIN — PANCRELIPASE 2 CAPSULE: 24000; 76000; 120000 CAPSULE, DELAYED RELEASE PELLETS ORAL at 08:34

## 2018-06-14 RX ADMIN — MIRTAZAPINE 15 MG: 15 TABLET, FILM COATED ORAL at 21:30

## 2018-06-14 RX ADMIN — GABAPENTIN 800 MG: 400 CAPSULE ORAL at 14:33

## 2018-06-14 RX ADMIN — QUETIAPINE FUMARATE 100 MG: 100 TABLET ORAL at 16:12

## 2018-06-14 RX ADMIN — MULTIPLE VITAMINS W/ MINERALS TAB 1 TABLET: TAB at 08:35

## 2018-06-14 RX ADMIN — QUETIAPINE FUMARATE 100 MG: 100 TABLET ORAL at 08:36

## 2018-06-14 RX ADMIN — PANTOPRAZOLE SODIUM 40 MG: 40 TABLET, DELAYED RELEASE ORAL at 06:56

## 2018-06-14 RX ADMIN — GLIMEPIRIDE 4 MG: 4 TABLET ORAL at 12:00

## 2018-06-14 RX ADMIN — HYDROXYZINE HYDROCHLORIDE 100 MG: 25 TABLET, FILM COATED ORAL at 16:13

## 2018-06-14 RX ADMIN — INSULIN LISPRO 2 UNITS: 100 INJECTION, SOLUTION INTRAVENOUS; SUBCUTANEOUS at 21:30

## 2018-06-14 RX ADMIN — MELOXICAM 15 MG: 7.5 TABLET ORAL at 21:29

## 2018-06-14 RX ADMIN — AMOXICILLIN AND CLAVULANATE POTASSIUM 1 TABLET: 875; 125 TABLET, FILM COATED ORAL at 21:30

## 2018-06-14 RX ADMIN — VENLAFAXINE 225 MG: 75 TABLET ORAL at 08:36

## 2018-06-14 RX ADMIN — AMOXICILLIN AND CLAVULANATE POTASSIUM 1 TABLET: 875; 125 TABLET, FILM COATED ORAL at 08:36

## 2018-06-14 RX ADMIN — INSULIN LISPRO 4 UNITS: 100 INJECTION, SOLUTION INTRAVENOUS; SUBCUTANEOUS at 12:01

## 2018-06-14 RX ADMIN — TIZANIDINE 4 MG: 4 TABLET ORAL at 21:29

## 2018-06-14 RX ADMIN — PANCRELIPASE 2 CAPSULE: 24000; 76000; 120000 CAPSULE, DELAYED RELEASE PELLETS ORAL at 16:44

## 2018-06-14 RX ADMIN — ATORVASTATIN CALCIUM 40 MG: 20 TABLET, FILM COATED ORAL at 08:35

## 2018-06-14 RX ADMIN — FENOFIBRATE 54 MG: 54 TABLET ORAL at 08:35

## 2018-06-14 RX ADMIN — GABAPENTIN 800 MG: 400 CAPSULE ORAL at 08:35

## 2018-06-14 RX ADMIN — MELOXICAM 15 MG: 7.5 TABLET ORAL at 08:36

## 2018-06-14 RX ADMIN — QUETIAPINE FUMARATE 400 MG: 200 TABLET ORAL at 21:29

## 2018-06-14 RX ADMIN — METOPROLOL TARTRATE 25 MG: 25 TABLET ORAL at 21:30

## 2018-06-14 RX ADMIN — INSULIN LISPRO 4 UNITS: 100 INJECTION, SOLUTION INTRAVENOUS; SUBCUTANEOUS at 16:45

## 2018-06-14 RX ADMIN — GABAPENTIN 800 MG: 400 CAPSULE ORAL at 21:30

## 2018-06-14 RX ADMIN — FOLIC ACID 1 MG: 1 TABLET ORAL at 08:35

## 2018-06-14 RX ADMIN — PANCRELIPASE 2 CAPSULE: 24000; 76000; 120000 CAPSULE, DELAYED RELEASE PELLETS ORAL at 12:06

## 2018-06-14 RX ADMIN — CLONAZEPAM 1 MG: 1 TABLET ORAL at 21:30

## 2018-06-14 RX ADMIN — INSULIN LISPRO 3 UNITS: 100 INJECTION, SOLUTION INTRAVENOUS; SUBCUTANEOUS at 08:39

## 2018-06-14 ASSESSMENT — PAIN DESCRIPTION - ONSET: ONSET: ON-GOING

## 2018-06-14 ASSESSMENT — PAIN DESCRIPTION - PAIN TYPE: TYPE: CHRONIC PAIN

## 2018-06-14 ASSESSMENT — PAIN DESCRIPTION - LOCATION: LOCATION: GENERALIZED

## 2018-06-14 ASSESSMENT — PAIN SCALES - GENERAL
PAINLEVEL_OUTOF10: 7
PAINLEVEL_OUTOF10: 5
PAINLEVEL_OUTOF10: 7

## 2018-06-14 ASSESSMENT — PAIN DESCRIPTION - DESCRIPTORS: DESCRIPTORS: ACHING

## 2018-06-14 ASSESSMENT — PAIN DESCRIPTION - FREQUENCY: FREQUENCY: CONTINUOUS

## 2018-06-14 NOTE — PLAN OF CARE
Problem: Falls - Risk of:  Goal: Will remain free from falls  Will remain free from falls   Outcome: Ongoing  Pt remains free of falls.

## 2018-06-14 NOTE — CONSULTS
[address] [facility].    Date:   6/14/2018  Patient name:  Kit Caraballo  Date of admission:  6/5/2018  6:06 PM  MRN:   [id number]  YOB: 1965      Cc DM     HPI   Pt admitted with sympts of depression     Consult for DM         HPI   1) Location/Symptom elevated FS   2) Timing/Onset: more than 3 months   Not on any medications at home   3) Context/Setting: hx of etoh use with pancreatitis chronic likely cause   4) Quality: high FS tired  5) Duration: cont   6) Modifying Factors: HTN on metoprolol well controlled   7) Severity:  Moderate     Past Medical History:   Diagnosis Date    Anesthesia complication     combative/ confused after some anesthesias per pt    C. difficile diarrhea 12/2/2016    Diabetes mellitus ([address]     DKA (diabetic ketoacidoses) ([address]     ETOH abuse     Falls frequently     Lumbar disc disease     Pancreatitis      Family History   Problem Relation Age of Onset    Diabetes Mother     Heart Disease Father     Diabetes Sister       reports that he has been smoking. He has a 68.00 pack-year smoking history. He has never used smokeless tobacco. He reports that he drinks about 1.8 oz of alcohol per week . He reports that he does not use drugs. Past Medical History:   Diagnosis Date    Anesthesia complication     combative/ confused after some anesthesias per pt    C. difficile diarrhea 12/2/2016    Diabetes mellitus ([address]     DKA (diabetic ketoacidoses) ([address]     ETOH abuse     Falls frequently     Lumbar disc disease     Pancreatitis      No Known Allergies    Review of systems    Constitutional: Negative for fever and fatigue. Respiratory: Negative for cough and shortness of breath. Cardiovascular: Negative for chest pain, palpitations and leg swelling. Gastrointestinal: Negative for abdominal pain, diarrhea and blood in stool. Endocrine: Negative for cold intolerance.    Genitourinary:
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
## 2018-06-15 PROBLEM — E11.65 TYPE 2 DIABETES MELLITUS WITH HYPERGLYCEMIA, WITHOUT LONG-TERM CURRENT USE OF INSULIN (HCC): Status: ACTIVE | Noted: 2017-08-07

## 2018-06-15 LAB
GLUCOSE BLD-MCNC: 259 MG/DL (ref 75–110)
GLUCOSE BLD-MCNC: 347 MG/DL (ref 75–110)
GLUCOSE BLD-MCNC: 400 MG/DL (ref 75–110)
GLUCOSE BLD-MCNC: 410 MG/DL (ref 75–110)

## 2018-06-15 PROCEDURE — 6370000000 HC RX 637 (ALT 250 FOR IP): Performed by: INTERNAL MEDICINE

## 2018-06-15 PROCEDURE — 6370000000 HC RX 637 (ALT 250 FOR IP): Performed by: PSYCHIATRY & NEUROLOGY

## 2018-06-15 PROCEDURE — 6370000000 HC RX 637 (ALT 250 FOR IP): Performed by: REGISTERED NURSE

## 2018-06-15 PROCEDURE — 82947 ASSAY GLUCOSE BLOOD QUANT: CPT

## 2018-06-15 PROCEDURE — 99232 SBSQ HOSP IP/OBS MODERATE 35: CPT | Performed by: INTERNAL MEDICINE

## 2018-06-15 PROCEDURE — 1240000000 HC EMOTIONAL WELLNESS R&B

## 2018-06-15 PROCEDURE — 99232 SBSQ HOSP IP/OBS MODERATE 35: CPT | Performed by: NURSE PRACTITIONER

## 2018-06-15 RX ORDER — INSULIN GLARGINE 100 [IU]/ML
15 INJECTION, SOLUTION SUBCUTANEOUS NIGHTLY
Status: DISCONTINUED | OUTPATIENT
Start: 2018-06-15 | End: 2018-06-17

## 2018-06-15 RX ADMIN — CLONAZEPAM 1 MG: 1 TABLET ORAL at 21:28

## 2018-06-15 RX ADMIN — ACETAMINOPHEN 650 MG: 325 TABLET, FILM COATED ORAL at 16:55

## 2018-06-15 RX ADMIN — FENOFIBRATE 54 MG: 54 TABLET ORAL at 08:30

## 2018-06-15 RX ADMIN — HYDROXYZINE HYDROCHLORIDE 100 MG: 25 TABLET, FILM COATED ORAL at 12:53

## 2018-06-15 RX ADMIN — QUETIAPINE FUMARATE 100 MG: 100 TABLET ORAL at 15:52

## 2018-06-15 RX ADMIN — AMOXICILLIN AND CLAVULANATE POTASSIUM 1 TABLET: 875; 125 TABLET, FILM COATED ORAL at 08:29

## 2018-06-15 RX ADMIN — HYDROXYZINE HYDROCHLORIDE 100 MG: 25 TABLET, FILM COATED ORAL at 05:20

## 2018-06-15 RX ADMIN — GLIMEPIRIDE 4 MG: 4 TABLET ORAL at 08:28

## 2018-06-15 RX ADMIN — METOPROLOL TARTRATE 25 MG: 25 TABLET ORAL at 21:27

## 2018-06-15 RX ADMIN — MULTIPLE VITAMINS W/ MINERALS TAB 1 TABLET: TAB at 08:28

## 2018-06-15 RX ADMIN — TIZANIDINE 4 MG: 4 TABLET ORAL at 21:27

## 2018-06-15 RX ADMIN — PANCRELIPASE 2 CAPSULE: 24000; 76000; 120000 CAPSULE, DELAYED RELEASE PELLETS ORAL at 12:54

## 2018-06-15 RX ADMIN — MIRTAZAPINE 15 MG: 15 TABLET, FILM COATED ORAL at 21:27

## 2018-06-15 RX ADMIN — MELOXICAM 15 MG: 7.5 TABLET ORAL at 21:27

## 2018-06-15 RX ADMIN — GABAPENTIN 800 MG: 400 CAPSULE ORAL at 21:27

## 2018-06-15 RX ADMIN — PANCRELIPASE 2 CAPSULE: 24000; 76000; 120000 CAPSULE, DELAYED RELEASE PELLETS ORAL at 16:53

## 2018-06-15 RX ADMIN — MELOXICAM 15 MG: 7.5 TABLET ORAL at 08:29

## 2018-06-15 RX ADMIN — VENLAFAXINE 225 MG: 75 TABLET ORAL at 08:29

## 2018-06-15 RX ADMIN — QUETIAPINE FUMARATE 400 MG: 200 TABLET ORAL at 21:27

## 2018-06-15 RX ADMIN — INSULIN LISPRO 3 UNITS: 100 INJECTION, SOLUTION INTRAVENOUS; SUBCUTANEOUS at 21:31

## 2018-06-15 RX ADMIN — QUETIAPINE FUMARATE 100 MG: 100 TABLET ORAL at 08:29

## 2018-06-15 RX ADMIN — ACETAMINOPHEN 650 MG: 325 TABLET, FILM COATED ORAL at 05:20

## 2018-06-15 RX ADMIN — GABAPENTIN 800 MG: 400 CAPSULE ORAL at 14:38

## 2018-06-15 RX ADMIN — FOLIC ACID 1 MG: 1 TABLET ORAL at 08:29

## 2018-06-15 RX ADMIN — GABAPENTIN 800 MG: 400 CAPSULE ORAL at 08:29

## 2018-06-15 RX ADMIN — INSULIN GLARGINE 15 UNITS: 100 INJECTION, SOLUTION SUBCUTANEOUS at 21:29

## 2018-06-15 RX ADMIN — INSULIN LISPRO 3 UNITS: 100 INJECTION, SOLUTION INTRAVENOUS; SUBCUTANEOUS at 08:33

## 2018-06-15 RX ADMIN — PANCRELIPASE 2 CAPSULE: 24000; 76000; 120000 CAPSULE, DELAYED RELEASE PELLETS ORAL at 08:28

## 2018-06-15 RX ADMIN — INSULIN LISPRO 4 UNITS: 100 INJECTION, SOLUTION INTRAVENOUS; SUBCUTANEOUS at 16:52

## 2018-06-15 RX ADMIN — ATORVASTATIN CALCIUM 40 MG: 20 TABLET, FILM COATED ORAL at 08:29

## 2018-06-15 RX ADMIN — PANTOPRAZOLE SODIUM 40 MG: 40 TABLET, DELAYED RELEASE ORAL at 08:30

## 2018-06-15 ASSESSMENT — PAIN SCALES - GENERAL
PAINLEVEL_OUTOF10: 7
PAINLEVEL_OUTOF10: 0
PAINLEVEL_OUTOF10: 7
PAINLEVEL_OUTOF10: 7
PAINLEVEL_OUTOF10: 3
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
## 2018-06-15 NOTE — PLAN OF CARE
Problem: Altered Mood, Depressive Behavior:  Goal: Able to verbalize and/or display a decrease in depressive symptoms  Able to verbalize and/or display a decrease in depressive symptoms   Outcome: Ongoing  Psychoeducation Group Note    Date: 6/15/18  Start Time: 1440  End Time: 1520    Number Participants in Group:  13    Goal:  Patient will demonstrate increased interpersonal interaction.    Topic:  Benefits of Exercise for Recovery Group       Discipline Responsible:   OT  AT  [facility]. X RT  Other       Participation Level:     None  Minimal   X Active Listener X Interactive    Monopolizing         Participation Quality:  X Appropriate  Inappropriate   X       Attentive        Intrusive   X       Sharing        Resistant          Supportive        Lethargic       Affective:   X Congruent  Incongruent  Blunted  Flat    Constricted  Anxious  Elated  Angry    Labile  Depressed  Other  Bright       Cognitive:  X Alert X Oriented PPTP     Concentration X G  F  P   Attention Span X G  F  P   Short-Term Memory X G  F  P   Long-Term Memory X G  F  P   ProblemSolving/  Decision Making X G  F  P   Ability to Process  Information X G  F  P      Contributing Factors             Delusional             Hallucinating             Flight of Ideas             Other:       Modes of Intervention:  X Education  Support X Exploration    Clarifying X Problem Solving  Confrontation   X Socialization X Limit Setting  Reality Testing   X Activity X Movement  Media    Other:            Response to Learning:  X Able to verbalize current knowledge/experience   X Able to verbalize/acknowledge new learning   X Able to retain information   X Capable of insight    Able to change behavior   X Progressing to goal    Other:        Comments:

## 2018-06-15 NOTE — PLAN OF CARE
Problem: Falls - Risk of:  Goal: Will remain free from falls  Will remain free from falls   Outcome: Ongoing  No falls this shift

## 2018-06-16 LAB
GLUCOSE BLD-MCNC: 235 MG/DL (ref 75–110)
GLUCOSE BLD-MCNC: 265 MG/DL (ref 75–110)
GLUCOSE BLD-MCNC: 336 MG/DL (ref 75–110)
GLUCOSE BLD-MCNC: 357 MG/DL (ref 75–110)
GLUCOSE BLD-MCNC: 416 MG/DL (ref 75–110)

## 2018-06-16 PROCEDURE — 6370000000 HC RX 637 (ALT 250 FOR IP): Performed by: INTERNAL MEDICINE

## 2018-06-16 PROCEDURE — 6370000000 HC RX 637 (ALT 250 FOR IP): Performed by: PSYCHIATRY & NEUROLOGY

## 2018-06-16 PROCEDURE — 99231 SBSQ HOSP IP/OBS SF/LOW 25: CPT | Performed by: INTERNAL MEDICINE

## 2018-06-16 PROCEDURE — 82947 ASSAY GLUCOSE BLOOD QUANT: CPT

## 2018-06-16 PROCEDURE — 1240000000 HC EMOTIONAL WELLNESS R&B

## 2018-06-16 PROCEDURE — 6370000000 HC RX 637 (ALT 250 FOR IP): Performed by: REGISTERED NURSE

## 2018-06-16 RX ADMIN — FOLIC ACID 1 MG: 1 TABLET ORAL at 08:17

## 2018-06-16 RX ADMIN — PANCRELIPASE 2 CAPSULE: 24000; 76000; 120000 CAPSULE, DELAYED RELEASE PELLETS ORAL at 08:16

## 2018-06-16 RX ADMIN — MELOXICAM 15 MG: 7.5 TABLET ORAL at 08:16

## 2018-06-16 RX ADMIN — TIZANIDINE 4 MG: 4 TABLET ORAL at 21:30

## 2018-06-16 RX ADMIN — ACETAMINOPHEN 650 MG: 325 TABLET, FILM COATED ORAL at 05:25

## 2018-06-16 RX ADMIN — QUETIAPINE FUMARATE 100 MG: 100 TABLET ORAL at 08:16

## 2018-06-16 RX ADMIN — PANTOPRAZOLE SODIUM 40 MG: 40 TABLET, DELAYED RELEASE ORAL at 05:30

## 2018-06-16 RX ADMIN — HYDROXYZINE HYDROCHLORIDE 100 MG: 25 TABLET, FILM COATED ORAL at 13:11

## 2018-06-16 RX ADMIN — PANCRELIPASE 2 CAPSULE: 24000; 76000; 120000 CAPSULE, DELAYED RELEASE PELLETS ORAL at 12:17

## 2018-06-16 RX ADMIN — GLIMEPIRIDE 4 MG: 4 TABLET ORAL at 08:16

## 2018-06-16 RX ADMIN — ATORVASTATIN CALCIUM 40 MG: 20 TABLET, FILM COATED ORAL at 08:16

## 2018-06-16 RX ADMIN — HYDROXYZINE HYDROCHLORIDE 100 MG: 25 TABLET, FILM COATED ORAL at 21:34

## 2018-06-16 RX ADMIN — MULTIPLE VITAMINS W/ MINERALS TAB 1 TABLET: TAB at 08:16

## 2018-06-16 RX ADMIN — INSULIN LISPRO 6 UNITS: 100 INJECTION, SOLUTION INTRAVENOUS; SUBCUTANEOUS at 13:13

## 2018-06-16 RX ADMIN — MIRTAZAPINE 15 MG: 15 TABLET, FILM COATED ORAL at 21:30

## 2018-06-16 RX ADMIN — QUETIAPINE FUMARATE 400 MG: 200 TABLET ORAL at 21:30

## 2018-06-16 RX ADMIN — INSULIN LISPRO 4 UNITS: 100 INJECTION, SOLUTION INTRAVENOUS; SUBCUTANEOUS at 21:29

## 2018-06-16 RX ADMIN — GABAPENTIN 800 MG: 400 CAPSULE ORAL at 14:35

## 2018-06-16 RX ADMIN — INSULIN LISPRO 3 UNITS: 100 INJECTION, SOLUTION INTRAVENOUS; SUBCUTANEOUS at 08:17

## 2018-06-16 RX ADMIN — CLONAZEPAM 1 MG: 1 TABLET ORAL at 21:30

## 2018-06-16 RX ADMIN — FENOFIBRATE 54 MG: 54 TABLET ORAL at 08:16

## 2018-06-16 RX ADMIN — PANCRELIPASE 2 CAPSULE: 24000; 76000; 120000 CAPSULE, DELAYED RELEASE PELLETS ORAL at 16:54

## 2018-06-16 RX ADMIN — VENLAFAXINE 225 MG: 75 TABLET ORAL at 08:16

## 2018-06-16 RX ADMIN — GABAPENTIN 800 MG: 400 CAPSULE ORAL at 21:31

## 2018-06-16 RX ADMIN — MELOXICAM 15 MG: 7.5 TABLET ORAL at 21:30

## 2018-06-16 RX ADMIN — GABAPENTIN 800 MG: 400 CAPSULE ORAL at 08:17

## 2018-06-16 RX ADMIN — INSULIN LISPRO 6 UNITS: 100 INJECTION, SOLUTION INTRAVENOUS; SUBCUTANEOUS at 12:17

## 2018-06-16 RX ADMIN — ACETAMINOPHEN 650 MG: 325 TABLET, FILM COATED ORAL at 14:35

## 2018-06-16 RX ADMIN — INSULIN LISPRO 4 UNITS: 100 INJECTION, SOLUTION INTRAVENOUS; SUBCUTANEOUS at 16:54

## 2018-06-16 RX ADMIN — HYDROXYZINE HYDROCHLORIDE 100 MG: 25 TABLET, FILM COATED ORAL at 05:25

## 2018-06-16 RX ADMIN — METOPROLOL TARTRATE 25 MG: 25 TABLET ORAL at 08:16

## 2018-06-16 RX ADMIN — QUETIAPINE FUMARATE 100 MG: 100 TABLET ORAL at 16:31

## 2018-06-16 RX ADMIN — INSULIN GLARGINE 15 UNITS: 100 INJECTION, SOLUTION SUBCUTANEOUS at 21:29

## 2018-06-16 RX ADMIN — METOPROLOL TARTRATE 25 MG: 25 TABLET ORAL at 21:31

## 2018-06-16 ASSESSMENT — PAIN DESCRIPTION - PAIN TYPE
TYPE: CHRONIC PAIN

## 2018-06-16 ASSESSMENT — PAIN DESCRIPTION - DESCRIPTORS: DESCRIPTORS: THROBBING

## 2018-06-16 ASSESSMENT — PAIN SCALES - GENERAL
PAINLEVEL_OUTOF10: 3
PAINLEVEL_OUTOF10: 5
PAINLEVEL_OUTOF10: 7
PAINLEVEL_OUTOF10: 1
PAINLEVEL_OUTOF10: 7
PAINLEVEL_OUTOF10: 2
PAINLEVEL_OUTOF10: 7
PAINLEVEL_OUTOF10: 3
PAINLEVEL_OUTOF10: 4

## 2018-06-16 ASSESSMENT — PAIN DESCRIPTION - LOCATION
LOCATION: BACK;NECK

## 2018-06-16 ASSESSMENT — PAIN DESCRIPTION - FREQUENCY: FREQUENCY: CONTINUOUS

## 2018-06-16 ASSESSMENT — PAIN DESCRIPTION - ONSET: ONSET: ON-GOING

## 2018-06-16 NOTE — BH NOTE
Psychoeducation Group Note    Date: 06/16/18 Start Time: 1010  End Time: 1050    Number Participants in Group:  10/21    Goal:  Patient will demonstrate increased interpersonal interaction   Topic: Green Behavior Plan -- Life Enhancing Areas    Discipline Responsible:   OT  AT   x Nsg.  RT  Other       Participation Level:     None  Minimal    Active Listener x Interactive    Monopolizing         Participation Quality:  x Appropriate  Inappropriate   x       Attentive        Intrusive   x       Sharing        Resistant   x       Supportive        Lethargic       Affective:   x Congruent  Incongruent  Blunted  Flat    Constricted  Anxious  Elated  Angry    Labile  Depressed  Other         Cognitive:  x Alert x Oriented PPTP     Concentration x G  F  P   Attention Span x G  F  P   Short-Term Memory  G  F  P   Long-Term Memory  G  F  P   ProblemSolving/  Decision Making x G  F  P   Ability to Process  Information x G  F  P      Contributing Factors             Delusional             Hallucinating             Flight of Ideas             Other:       Modes of Intervention:  x Education x Support x Exploration    Clarifying x Problem Solving  Confrontation    Socialization  Limit Setting  Reality Testing    Activity  Movement  Media    Other:            Response to Learning:  x Able to verbalize current knowledge/experience    Able to verbalize/acknowledge new learning    Able to retain information    Capable of insight    Able to change behavior    Progressing to goal    Other:        Comments:

## 2018-06-16 NOTE — PLAN OF CARE
Problem: Altered Mood, Depressive Behavior:  Goal: Able to verbalize acceptance of life and situations over which he or she has no control  Able to verbalize acceptance of life and situations over which he or she has no control   Outcome: Ongoing  PSYCHOEDUCATION GROUP NOTE    Date: 6/16/18  Start Time: 11 am  End Time: 1145am    Number Participants in Group:  9    Goal:  Patient will demonstrate increased interpersonal interaction   Topic: Identifying  Emotions     Discipline Responsible:   OT  AT X SW  Nsg.  RT MHP Other       Participation Level:     None  Minimal    Active Listener x Interactive    Monopolizing         Participation Quality:  x Appropriate  Inappropriate   x       Attentive        Intrusive   x       Sharing        Resistant   x       Supportive        Lethargic       Affective:   x Congruent  Incongruent  Blunted  Flat    Constricted  Anxious  Elated  Angry    Labile  Depressed  Other         Cognitive:  x Alert x Oriented PPTP     Concentration  G x F  P   Attention Span  G x F  P   Short-Term Memory  G x F  P   Long-Term Memory  G x F  P   ProblemSolving/  Decision Making  G x F  P   Ability to Process  Information  G x F  P      Contributing Factors             Delusional             Hallucinating             Flight of Ideas             Other:       Modes of Intervention:  x Education x Support x Exploration   x Clarifying  Problem Solving  Confrontation   x Socialization  Limit Setting  Reality Testing   x Activity  Movement  Media    Other:            Response to Learning:  x Able to verbalize current knowledge/experience    Able to verbalize/acknowledge new learning    Able to retain information    Capable of insight    Able to change behavior    Progressing to goal    Other:        Comments:

## 2018-06-16 NOTE — PLAN OF CARE
Problem: Altered Mood, Depressive Behavior:  Goal: Able to verbalize and/or display a decrease in depressive symptoms  Able to verbalize and/or display a decrease in depressive symptoms   Outcome: Ongoing  Psychoeducation Group Note    Date: 6/16/2018  Start Time: 1330  End Time: 1415    Number Participants in Group:  13    Goal:  Patient will demonstrate increased interpersonal interaction.    Topic:  Team Building / Recalling / Leisure Skills and Awareness    Discipline Responsible:   OT  AT  [facility]. X RT  Other       Participation Level:     None  Minimal   x Active Listener x Interactive    Monopolizing         Participation Quality:  x Appropriate  Inappropriate   x       Attentive        Intrusive   x       Sharing        Resistant   x       Supportive        Lethargic       Affective:   x Congruent  Incongruent  Blunted  Flat    Constricted  Anxious  Elated  Angry    Labile  Depressed  Other  Bright       Cognitive:  x Alert x Oriented PPTP     Concentration x G  F  P   Attention Span x G  F  P   Short-Term Memory x G  F  P   Long-Term Memory  G  F  P   ProblemSolving/  Decision Making x G  F  P   Ability to Process  Information x G  F  P      Contributing Factors             Delusional             Hallucinating             Flight of Ideas             Other:       Modes of Intervention:  x Education  Support x Exploration   x Clarifying x Problem Solving x Confrontation   x Socialization x Limit Setting x Reality Testing   x Activity  Movement  Media    Other:            Response to Learning:  x Able to verbalize current knowledge/experience   x Able to verbalize/acknowledge new learning   x Able to retain information   x Capable of insight    Able to change behavior   x Progressing to goal    Other:        Comments:

## 2018-06-17 LAB
GLUCOSE BLD-MCNC: 230 MG/DL (ref 75–110)
GLUCOSE BLD-MCNC: 302 MG/DL (ref 75–110)
GLUCOSE BLD-MCNC: 330 MG/DL (ref 75–110)

## 2018-06-17 PROCEDURE — 6370000000 HC RX 637 (ALT 250 FOR IP): Performed by: INTERNAL MEDICINE

## 2018-06-17 PROCEDURE — 6370000000 HC RX 637 (ALT 250 FOR IP): Performed by: PSYCHIATRY & NEUROLOGY

## 2018-06-17 PROCEDURE — 1240000000 HC EMOTIONAL WELLNESS R&B

## 2018-06-17 PROCEDURE — 6370000000 HC RX 637 (ALT 250 FOR IP): Performed by: REGISTERED NURSE

## 2018-06-17 PROCEDURE — 82947 ASSAY GLUCOSE BLOOD QUANT: CPT

## 2018-06-17 PROCEDURE — 99232 SBSQ HOSP IP/OBS MODERATE 35: CPT | Performed by: REGISTERED NURSE

## 2018-06-17 RX ORDER — INSULIN GLARGINE 100 [IU]/ML
12 INJECTION, SOLUTION SUBCUTANEOUS 2 TIMES DAILY
Status: DISCONTINUED | OUTPATIENT
Start: 2018-06-17 | End: 2018-06-18

## 2018-06-17 RX ORDER — INSULIN GLARGINE 100 [IU]/ML
20 INJECTION, SOLUTION SUBCUTANEOUS NIGHTLY
Status: DISCONTINUED | OUTPATIENT
Start: 2018-06-17 | End: 2018-06-17

## 2018-06-17 RX ORDER — MIRTAZAPINE 30 MG/1
30 TABLET, FILM COATED ORAL NIGHTLY
Status: DISCONTINUED | OUTPATIENT
Start: 2018-06-17 | End: 2018-06-28

## 2018-06-17 RX ADMIN — HYDROXYZINE HYDROCHLORIDE 100 MG: 25 TABLET, FILM COATED ORAL at 07:10

## 2018-06-17 RX ADMIN — INSULIN GLARGINE 12 UNITS: 100 INJECTION, SOLUTION SUBCUTANEOUS at 10:00

## 2018-06-17 RX ADMIN — PANCRELIPASE 2 CAPSULE: 24000; 76000; 120000 CAPSULE, DELAYED RELEASE PELLETS ORAL at 12:00

## 2018-06-17 RX ADMIN — QUETIAPINE FUMARATE 100 MG: 100 TABLET ORAL at 16:40

## 2018-06-17 RX ADMIN — PANCRELIPASE 2 CAPSULE: 24000; 76000; 120000 CAPSULE, DELAYED RELEASE PELLETS ORAL at 07:54

## 2018-06-17 RX ADMIN — MULTIPLE VITAMINS W/ MINERALS TAB 1 TABLET: TAB at 07:55

## 2018-06-17 RX ADMIN — ACETAMINOPHEN 650 MG: 325 TABLET, FILM COATED ORAL at 07:10

## 2018-06-17 RX ADMIN — INSULIN LISPRO 8 UNITS: 100 INJECTION, SOLUTION INTRAVENOUS; SUBCUTANEOUS at 12:16

## 2018-06-17 RX ADMIN — FOLIC ACID 1 MG: 1 TABLET ORAL at 07:58

## 2018-06-17 RX ADMIN — MELOXICAM 15 MG: 7.5 TABLET ORAL at 07:55

## 2018-06-17 RX ADMIN — QUETIAPINE FUMARATE 400 MG: 200 TABLET ORAL at 20:55

## 2018-06-17 RX ADMIN — MIRTAZAPINE 30 MG: 30 TABLET, FILM COATED ORAL at 20:56

## 2018-06-17 RX ADMIN — INSULIN LISPRO 8 UNITS: 100 INJECTION, SOLUTION INTRAVENOUS; SUBCUTANEOUS at 16:44

## 2018-06-17 RX ADMIN — FENOFIBRATE 54 MG: 54 TABLET ORAL at 07:54

## 2018-06-17 RX ADMIN — METOPROLOL TARTRATE 25 MG: 25 TABLET ORAL at 20:56

## 2018-06-17 RX ADMIN — GLIMEPIRIDE 4 MG: 4 TABLET ORAL at 07:55

## 2018-06-17 RX ADMIN — PANCRELIPASE 2 CAPSULE: 24000; 76000; 120000 CAPSULE, DELAYED RELEASE PELLETS ORAL at 16:41

## 2018-06-17 RX ADMIN — METOPROLOL TARTRATE 25 MG: 25 TABLET ORAL at 07:54

## 2018-06-17 RX ADMIN — HYDROXYZINE HYDROCHLORIDE 100 MG: 25 TABLET, FILM COATED ORAL at 13:13

## 2018-06-17 RX ADMIN — HYDROXYZINE HYDROCHLORIDE 100 MG: 25 TABLET, FILM COATED ORAL at 20:55

## 2018-06-17 RX ADMIN — GABAPENTIN 800 MG: 400 CAPSULE ORAL at 07:54

## 2018-06-17 RX ADMIN — INSULIN LISPRO 4 UNITS: 100 INJECTION, SOLUTION INTRAVENOUS; SUBCUTANEOUS at 07:58

## 2018-06-17 RX ADMIN — INSULIN LISPRO 4 UNITS: 100 INJECTION, SOLUTION INTRAVENOUS; SUBCUTANEOUS at 20:59

## 2018-06-17 RX ADMIN — VENLAFAXINE 225 MG: 75 TABLET ORAL at 07:55

## 2018-06-17 RX ADMIN — ATORVASTATIN CALCIUM 40 MG: 20 TABLET, FILM COATED ORAL at 07:54

## 2018-06-17 RX ADMIN — MELOXICAM 15 MG: 7.5 TABLET ORAL at 20:55

## 2018-06-17 RX ADMIN — PANTOPRAZOLE SODIUM 40 MG: 40 TABLET, DELAYED RELEASE ORAL at 07:55

## 2018-06-17 RX ADMIN — GABAPENTIN 800 MG: 400 CAPSULE ORAL at 13:13

## 2018-06-17 RX ADMIN — TIZANIDINE 4 MG: 4 TABLET ORAL at 20:56

## 2018-06-17 RX ADMIN — INSULIN GLARGINE 12 UNITS: 100 INJECTION, SOLUTION SUBCUTANEOUS at 20:59

## 2018-06-17 RX ADMIN — GABAPENTIN 800 MG: 400 CAPSULE ORAL at 20:55

## 2018-06-17 RX ADMIN — QUETIAPINE FUMARATE 100 MG: 100 TABLET ORAL at 07:55

## 2018-06-17 RX ADMIN — CLONAZEPAM 1 MG: 1 TABLET ORAL at 20:56

## 2018-06-17 ASSESSMENT — PAIN DESCRIPTION - FREQUENCY: FREQUENCY: INTERMITTENT

## 2018-06-17 ASSESSMENT — PAIN SCALES - GENERAL
PAINLEVEL_OUTOF10: 7
PAINLEVEL_OUTOF10: 3
PAINLEVEL_OUTOF10: 7

## 2018-06-17 ASSESSMENT — PAIN DESCRIPTION - DESCRIPTORS: DESCRIPTORS: THROBBING

## 2018-06-17 ASSESSMENT — PAIN DESCRIPTION - LOCATION
LOCATION: BACK;NECK
LOCATION: BACK;NECK

## 2018-06-17 ASSESSMENT — PAIN DESCRIPTION - PROGRESSION: CLINICAL_PROGRESSION: NOT CHANGED

## 2018-06-17 ASSESSMENT — PAIN DESCRIPTION - PAIN TYPE
TYPE: CHRONIC PAIN
TYPE: CHRONIC PAIN

## 2018-06-17 ASSESSMENT — PAIN DESCRIPTION - ONSET: ONSET: ON-GOING

## 2018-06-17 NOTE — PLAN OF CARE
Problem: Falls - Risk of:  Goal: Will remain free from falls  Will remain free from falls   Outcome: Ongoing  Pt remains free of falls and verbalizes understanding of individual fall risks. Pt gait is steady. Pt wearing non-skid footwear and encouraged to seek out staff for any assistance needed. Pt safety maintained per safety checks every 15 minutes and irregular rounding.

## 2018-06-17 NOTE — PLAN OF CARE
Problem: Anger Management/Homicidal Ideation:  Goal: Absence of homicidal ideation  Absence of homicidal ideation   Outcome: Ongoing  Pt denies homicidal ideation. Pt has been calm, controlled, cooperative. Pt has blunt, flat affect but brightens upon approach. Pt aloof of peers when in the milieu. Pt safety maintained per safety checks every 15 minutes and irregular rounding. Problem: Altered Mood, Depressive Behavior:  Goal: Able to verbalize and/or display a decrease in depressive symptoms  Able to verbalize and/or display a decrease in depressive symptoms   Outcome: Ongoing  Pt denies SI - no self harm has occurred, states he feels safe in the hospital. Pt is flat, depressed, hopeless, helpless. Pt aloof of peers. Pt admits to auditory hallucinations - states he mainly hears voices at night, does not elaborate on the content. Pt is medication compliant. Pt safety maintained per safety checks every 15 minutes and irregular rounding.

## 2018-06-17 NOTE — PLAN OF CARE
Problem: Altered Mood, Depressive Behavior:  Goal: Able to verbalize acceptance of life and situations over which he or she has no control  Able to verbalize acceptance of life and situations over which he or she has no control   Outcome: Ongoing  Pt did not participate in Goal Setting / Community Meeting group at 0900 despite staff encouragement.

## 2018-06-17 NOTE — BH NOTE
Psychoeducation Group Note    Date: 06/17/18 Start Time: 1100  End Time: 1145    Number Participants in Group:  15/21    Goal:  Patient will demonstrate increased interpersonal interaction   Topic: Safety group -- SMART principles    Discipline Responsible:   OT  AT   x Nsg.  RT  Other       Participation Level:     None  Minimal    Active Listener x Interactive    Monopolizing         Participation Quality:  x Appropriate  Inappropriate   x       Attentive        Intrusive   x       Sharing        Resistant   x       Supportive        Lethargic       Affective:   x Congruent  Incongruent  Blunted  Flat    Constricted  Anxious  Elated  Angry    Labile  Depressed  Other         Cognitive:  x Alert x Oriented PPTP     Concentration x G  F  P   Attention Span x G  F  P   Short-Term Memory  G  F  P   Long-Term Memory  G  F  P   ProblemSolving/  Decision Making x G  F  P   Ability to Process  Information x G  F  P      Contributing Factors             Delusional             Hallucinating             Flight of Ideas             Other:       Modes of Intervention:  x Education x Support x Exploration    Clarifying x Problem Solving  Confrontation    Socialization  Limit Setting  Reality Testing    Activity  Movement  Media    Other:            Response to Learning:  x Able to verbalize current knowledge/experience   x Able to verbalize/acknowledge new learning   x Able to retain information   x Capable of insight    Able to change behavior    Progressing to goal    Other:        Comments:

## 2018-06-18 LAB
GLUCOSE BLD-MCNC: 196 MG/DL (ref 75–110)
GLUCOSE BLD-MCNC: 300 MG/DL (ref 75–110)
GLUCOSE BLD-MCNC: 310 MG/DL (ref 75–110)
GLUCOSE BLD-MCNC: 341 MG/DL (ref 75–110)
GLUCOSE BLD-MCNC: 409 MG/DL (ref 75–110)

## 2018-06-18 PROCEDURE — 6370000000 HC RX 637 (ALT 250 FOR IP): Performed by: INTERNAL MEDICINE

## 2018-06-18 PROCEDURE — 6370000000 HC RX 637 (ALT 250 FOR IP): Performed by: PSYCHIATRY & NEUROLOGY

## 2018-06-18 PROCEDURE — 1240000000 HC EMOTIONAL WELLNESS R&B

## 2018-06-18 PROCEDURE — 99232 SBSQ HOSP IP/OBS MODERATE 35: CPT | Performed by: REGISTERED NURSE

## 2018-06-18 PROCEDURE — 6370000000 HC RX 637 (ALT 250 FOR IP): Performed by: REGISTERED NURSE

## 2018-06-18 PROCEDURE — 99231 SBSQ HOSP IP/OBS SF/LOW 25: CPT | Performed by: INTERNAL MEDICINE

## 2018-06-18 PROCEDURE — 82947 ASSAY GLUCOSE BLOOD QUANT: CPT

## 2018-06-18 RX ORDER — INSULIN GLARGINE 100 [IU]/ML
20 INJECTION, SOLUTION SUBCUTANEOUS 2 TIMES DAILY
Status: DISCONTINUED | OUTPATIENT
Start: 2018-06-18 | End: 2018-06-19

## 2018-06-18 RX ORDER — QUETIAPINE FUMARATE 200 MG/1
200 TABLET, FILM COATED ORAL NIGHTLY
Status: DISCONTINUED | OUTPATIENT
Start: 2018-06-18 | End: 2018-06-21

## 2018-06-18 RX ORDER — QUETIAPINE FUMARATE 50 MG/1
50 TABLET, FILM COATED ORAL 2 TIMES DAILY
Status: DISCONTINUED | OUTPATIENT
Start: 2018-06-19 | End: 2018-06-21

## 2018-06-18 RX ORDER — RISPERIDONE 1 MG/1
1 TABLET, FILM COATED ORAL DAILY
Status: DISCONTINUED | OUTPATIENT
Start: 2018-06-18 | End: 2018-06-21

## 2018-06-18 RX ADMIN — METOPROLOL TARTRATE 25 MG: 25 TABLET ORAL at 08:45

## 2018-06-18 RX ADMIN — METOPROLOL TARTRATE 25 MG: 25 TABLET ORAL at 21:21

## 2018-06-18 RX ADMIN — FENOFIBRATE 54 MG: 54 TABLET ORAL at 08:45

## 2018-06-18 RX ADMIN — ACETAMINOPHEN 650 MG: 325 TABLET, FILM COATED ORAL at 05:59

## 2018-06-18 RX ADMIN — PANCRELIPASE 2 CAPSULE: 24000; 76000; 120000 CAPSULE, DELAYED RELEASE PELLETS ORAL at 11:55

## 2018-06-18 RX ADMIN — INSULIN GLARGINE 12 UNITS: 100 INJECTION, SOLUTION SUBCUTANEOUS at 09:19

## 2018-06-18 RX ADMIN — MELOXICAM 15 MG: 7.5 TABLET ORAL at 21:20

## 2018-06-18 RX ADMIN — MIRTAZAPINE 30 MG: 30 TABLET, FILM COATED ORAL at 21:20

## 2018-06-18 RX ADMIN — HYDROXYZINE HYDROCHLORIDE 100 MG: 25 TABLET, FILM COATED ORAL at 05:59

## 2018-06-18 RX ADMIN — PANCRELIPASE 2 CAPSULE: 24000; 76000; 120000 CAPSULE, DELAYED RELEASE PELLETS ORAL at 16:49

## 2018-06-18 RX ADMIN — VENLAFAXINE 225 MG: 75 TABLET ORAL at 08:45

## 2018-06-18 RX ADMIN — QUETIAPINE FUMARATE 100 MG: 100 TABLET ORAL at 08:45

## 2018-06-18 RX ADMIN — HYDROXYZINE HYDROCHLORIDE 100 MG: 25 TABLET, FILM COATED ORAL at 21:20

## 2018-06-18 RX ADMIN — ATORVASTATIN CALCIUM 40 MG: 20 TABLET, FILM COATED ORAL at 08:44

## 2018-06-18 RX ADMIN — HYDROXYZINE HYDROCHLORIDE 100 MG: 25 TABLET, FILM COATED ORAL at 14:23

## 2018-06-18 RX ADMIN — GLIMEPIRIDE 4 MG: 4 TABLET ORAL at 08:45

## 2018-06-18 RX ADMIN — MULTIPLE VITAMINS W/ MINERALS TAB 1 TABLET: TAB at 08:45

## 2018-06-18 RX ADMIN — GABAPENTIN 800 MG: 400 CAPSULE ORAL at 14:23

## 2018-06-18 RX ADMIN — PANCRELIPASE 2 CAPSULE: 24000; 76000; 120000 CAPSULE, DELAYED RELEASE PELLETS ORAL at 08:44

## 2018-06-18 RX ADMIN — PANTOPRAZOLE SODIUM 40 MG: 40 TABLET, DELAYED RELEASE ORAL at 05:59

## 2018-06-18 RX ADMIN — INSULIN LISPRO 4 UNITS: 100 INJECTION, SOLUTION INTRAVENOUS; SUBCUTANEOUS at 21:24

## 2018-06-18 RX ADMIN — QUETIAPINE FUMARATE 200 MG: 200 TABLET ORAL at 21:21

## 2018-06-18 RX ADMIN — INSULIN LISPRO 12 UNITS: 100 INJECTION, SOLUTION INTRAVENOUS; SUBCUTANEOUS at 16:50

## 2018-06-18 RX ADMIN — QUETIAPINE FUMARATE 100 MG: 100 TABLET ORAL at 14:24

## 2018-06-18 RX ADMIN — FOLIC ACID 1 MG: 1 TABLET ORAL at 08:45

## 2018-06-18 RX ADMIN — INSULIN GLARGINE 20 UNITS: 100 INJECTION, SOLUTION SUBCUTANEOUS at 21:23

## 2018-06-18 RX ADMIN — CLONAZEPAM 1 MG: 1 TABLET ORAL at 21:20

## 2018-06-18 RX ADMIN — INSULIN LISPRO 8 UNITS: 100 INJECTION, SOLUTION INTRAVENOUS; SUBCUTANEOUS at 11:49

## 2018-06-18 RX ADMIN — GABAPENTIN 800 MG: 400 CAPSULE ORAL at 21:20

## 2018-06-18 RX ADMIN — RISPERIDONE 1 MG: 1 TABLET ORAL at 17:17

## 2018-06-18 RX ADMIN — ACETAMINOPHEN 650 MG: 325 TABLET, FILM COATED ORAL at 14:25

## 2018-06-18 RX ADMIN — GABAPENTIN 800 MG: 400 CAPSULE ORAL at 08:45

## 2018-06-18 RX ADMIN — INSULIN LISPRO 2 UNITS: 100 INJECTION, SOLUTION INTRAVENOUS; SUBCUTANEOUS at 08:48

## 2018-06-18 RX ADMIN — MELOXICAM 15 MG: 7.5 TABLET ORAL at 08:45

## 2018-06-18 RX ADMIN — TIZANIDINE 4 MG: 4 TABLET ORAL at 21:21

## 2018-06-18 ASSESSMENT — PAIN DESCRIPTION - PAIN TYPE
TYPE: CHRONIC PAIN
TYPE: CHRONIC PAIN

## 2018-06-18 ASSESSMENT — PAIN DESCRIPTION - DESCRIPTORS: DESCRIPTORS: THROBBING

## 2018-06-18 ASSESSMENT — PAIN DESCRIPTION - LOCATION
LOCATION: BACK;NECK
LOCATION: BACK;NECK;HIP
LOCATION: BACK;NECK
LOCATION: BACK;NECK

## 2018-06-18 ASSESSMENT — PAIN SCALES - GENERAL
PAINLEVEL_OUTOF10: 3
PAINLEVEL_OUTOF10: 2
PAINLEVEL_OUTOF10: 7
PAINLEVEL_OUTOF10: 0
PAINLEVEL_OUTOF10: 1
PAINLEVEL_OUTOF10: 7

## 2018-06-18 ASSESSMENT — PAIN DESCRIPTION - PROGRESSION: CLINICAL_PROGRESSION: NOT CHANGED

## 2018-06-18 ASSESSMENT — PAIN DESCRIPTION - ONSET: ONSET: ON-GOING

## 2018-06-18 ASSESSMENT — PAIN DESCRIPTION - ORIENTATION: ORIENTATION: LOWER

## 2018-06-18 ASSESSMENT — PAIN DESCRIPTION - FREQUENCY: FREQUENCY: INTERMITTENT

## 2018-06-18 NOTE — PLAN OF CARE
Problem: Anger Management/Homicidal Ideation:  Goal: Absence of angry outbursts  Absence of angry outbursts   Outcome: Ongoing  PSYCHOTHERAPY GROUP NOTE    Date: 6/18/18  Start Time: 10 AM  End Time: 1045AM    Number Participants in Group:  14    Goal:  Patient will demonstrate increased interpersonal interaction   Topic: Support    Discipline Responsible:   OT  AT x SW  Nsg.  RT MHP Other       Participation Level:     None  Minimal    Active Listener x Interactive    Monopolizing         Participation Quality:  x Appropriate  Inappropriate   x       Attentive        Intrusive          Sharing        Resistant          Supportive        Lethargic       Affective:   x Congruent  Incongruent  Blunted  Flat    Constricted  Anxious  Elated  Angry    Labile  Depressed  Other         Cognitive:  x Alert x Oriented PPTP     Concentration  G x F  P   Attention Span  G x F  P   Short-Term Memory  G x F  P   Long-Term Memory  G x F  P   ProblemSolving/  Decision Making  G x F  P   Ability to Process  Information  G x F  P      Contributing Factors             Delusional             Hallucinating             Flight of Ideas             Other:       Modes of Intervention:   Education x Support  Exploration    Clarifying  Problem Solving  Confrontation    Socialization  Limit Setting  Reality Testing    Activity  Movement  Media    Other:            Response to Learning:  x Able to verbalize current knowledge/experience    Able to verbalize/acknowledge new learning    Able to retain information   x Capable of insight    Able to change behavior    Progressing to goal    Other:        Comments:

## 2018-06-18 NOTE — PLAN OF CARE
Problem: Anger Management/Homicidal Ideation:  Goal: Absence of angry outbursts  Absence of angry outbursts   Outcome: Ongoing  Psychoeducation Group Note    Date: 6/18/2018  Start Time: 1430  End Time: 3546    Number Participants in Group:  10    Goal:  Patient will demonstrate increased interpersonal interaction. Topic:  Self Esteem / Coping Skills / Emotions    Discipline Responsible:   OT  AT  [facility]. X RT  Other       Participation Level:    x None  Minimal    Active Listener  Interactive    Monopolizing         Participation Quality:   Appropriate  Inappropriate          Attentive x       Intrusive          Sharing x       Resistant          Supportive        Lethargic       Affective:   x Congruent  Incongruent  Blunted  Flat    Constricted  Anxious  Elated  Angry    Labile  Depressed  Other  Bright       Cognitive:  x Alert x Oriented PPTP     Concentration  G x F  P   Attention Span  G x F  P   Short-Term Memory  G  F  P   Long-Term Memory  G  F  P   ProblemSolving/  Decision Making  G  F  P   Ability to Process  Information  G  F  P      Contributing Factors             Delusional             Hallucinating             Flight of Ideas             Other:       Modes of Intervention:  x Education x Support x Exploration   x Clarifying x Problem Solving x Confrontation   x Socialization x Limit Setting x Reality Testing   x Activity  Movement  Media    Other:            Response to Learning:   Able to verbalize current knowledge/experience    Able to verbalize/acknowledge new learning    Able to retain information    Capable of insight    Able to change behavior    Progressing to goal    Other:        Comments:  Patient did not actively participate in task at hand. Patient sat on outskirts of group and observed. Patient needed frequent redirection from writer due to cross talking with peer and being disruptive towards group.

## 2018-06-18 NOTE — BH NOTE
PRN VISTARIL:  Pt requests PRN Vistaril for complaints of feeling tense and restless. Administered PRN Vistaril 100mg PO per physician order. Pt safety maintained.

## 2018-06-18 NOTE — BH NOTE
PRN VISTARIL:  Pt requests PRN Vistaril with HS medications. Pt complains of feeling restless, tense, and having racing thoughts. Administered PRN Vistaril 100mg PO per physician order. Pt safety maintained.

## 2018-06-18 NOTE — PLAN OF CARE
Problem: Altered Mood, Depressive Behavior:  Goal: Able to verbalize and/or display a decrease in depressive symptoms  Able to verbalize and/or display a decrease in depressive symptoms   Outcome: Ongoing  Psychoeducation Group Note    Date: 6/18/18  Start Time: 0845  End Time: 2299    Number Participants in Group:  15    Goal:  Patient will demonstrate increased interpersonal interaction. Topic:  Goal Setting and [employer]    Discipline Responsible:   OT  AT  [facility]. X RT  Other       Participation Level:     None  Minimal   X Active Listener X Interactive    Monopolizing         Participation Quality:  X Appropriate  Inappropriate   X       Attentive        Intrusive   X       Sharing        Resistant   X       Supportive        Lethargic       Affective:   X Congruent  Incongruent  Blunted  Flat    Constricted  Anxious  Elated  Angry    Labile  Depressed  Other  Bright       Cognitive:  X Alert X Oriented PPTP     Concentration X G  F  P   Attention Span X G  F  P   Short-Term Memory X G  F  P   Long-Term Memory X G  F  P   ProblemSolving/  Decision Making X G  F  P   Ability to Process  Information X G  F  P      Contributing Factors             Delusional             Hallucinating             Flight of Ideas             Other:       Modes of Intervention:  X Education X Support X Exploration   X Clarifying X Problem Solving  Confrontation   X Socialization X Limit Setting  Reality Testing    Activity  Movement  Media    Other:            Response to Learning:  X Able to verbalize current knowledge/experience   X Able to verbalize/acknowledge new learning   X Able to retain information   X Capable of insight    Able to change behavior   X Progressing to goal    Other:        Comments: Pt developed daily goal \"Keep my head straight\" use coping skills, manage frustrations.

## 2018-06-18 NOTE — PLAN OF CARE
Problem: Altered Mood, Depressive Behavior:  Goal: Able to verbalize acceptance of life and situations over which he or she has no control  Able to verbalize acceptance of life and situations over which he or she has no control   1:1 with pt x ten minutes. Pt encouraged to attend unit programming and interact with peers and staff. Pt also encouraged to tend to hygiene and ADLs. Pt encouraged to discuss feelings with staff and feedback and reassurance provided. Pt denies thoughts of self harm and is agreeable to seeking out should thoughts of self harm arise. Safe environment maintained. Q15 minute checks for safety cont per unit policy. Will cont to monitor for safety and provides support and reassurance as needed. Pt is seclusive to room for long intervals. Pt is controlled in behaviors. Social with select peers. Compliant with medications and shows no side effects. Pt is non compliant with diabetic diet and needs further educations to comply.

## 2018-06-18 NOTE — PLAN OF CARE
Problem: Anger Management/Homicidal Ideation:  Goal: Absence of homicidal ideation  Absence of homicidal ideation   Outcome: Ongoing  Pt denies SI/HI - no self harm has occurred. Pt has remained calm, controlled, and cooperative. Pt is aloof of most peers, only minimally social when in the milieu. Pt is medication compliant. Pt safety maintained per safety checks every 15 minutes and irregular rounding. Problem: Altered Mood, Depressive Behavior:  Goal: Able to verbalize and/or display a decrease in depressive symptoms  Able to verbalize and/or display a decrease in depressive symptoms   Outcome: Ongoing  Pt reports his depression has improved some but he remains anxious and worrisome about dealing with his high blood sugars and adapting to a diabetic diet. Pt states he still hears voices at bedtime but they are only mumbles now. Pt reports sleep and appetite are WNL. Pt is medication compliant. Pt safety maintained per safety checks every 15 minutes and irregular rounding. Problem: Falls - Risk of:  Goal: Will remain free from falls  Will remain free from falls   Outcome: Ongoing  Pt remains free of falls and verbalizes understanding of individual fall risks. Pt gait is steady. Pt wearing non-skid footwear and encouraged to seek out staff for any assistance needed. Pt safety maintained per safety checks every 15 minutes and irregular rounding.

## 2018-06-19 LAB
GLUCOSE BLD-MCNC: 224 MG/DL (ref 75–110)
GLUCOSE BLD-MCNC: 249 MG/DL (ref 75–110)
GLUCOSE BLD-MCNC: 285 MG/DL (ref 75–110)
GLUCOSE BLD-MCNC: 302 MG/DL (ref 75–110)

## 2018-06-19 PROCEDURE — 6370000000 HC RX 637 (ALT 250 FOR IP): Performed by: PSYCHIATRY & NEUROLOGY

## 2018-06-19 PROCEDURE — 99232 SBSQ HOSP IP/OBS MODERATE 35: CPT | Performed by: REGISTERED NURSE

## 2018-06-19 PROCEDURE — 6370000000 HC RX 637 (ALT 250 FOR IP): Performed by: INTERNAL MEDICINE

## 2018-06-19 PROCEDURE — 82947 ASSAY GLUCOSE BLOOD QUANT: CPT

## 2018-06-19 PROCEDURE — 99231 SBSQ HOSP IP/OBS SF/LOW 25: CPT | Performed by: INTERNAL MEDICINE

## 2018-06-19 PROCEDURE — 1240000000 HC EMOTIONAL WELLNESS R&B

## 2018-06-19 PROCEDURE — 6370000000 HC RX 637 (ALT 250 FOR IP): Performed by: REGISTERED NURSE

## 2018-06-19 RX ORDER — INSULIN GLARGINE 100 [IU]/ML
25 INJECTION, SOLUTION SUBCUTANEOUS 2 TIMES DAILY
Status: DISCONTINUED | OUTPATIENT
Start: 2018-06-19 | End: 2018-07-03 | Stop reason: HOSPADM

## 2018-06-19 RX ADMIN — GABAPENTIN 800 MG: 400 CAPSULE ORAL at 08:25

## 2018-06-19 RX ADMIN — HYDROXYZINE HYDROCHLORIDE 100 MG: 25 TABLET, FILM COATED ORAL at 05:00

## 2018-06-19 RX ADMIN — ATORVASTATIN CALCIUM 40 MG: 20 TABLET, FILM COATED ORAL at 08:25

## 2018-06-19 RX ADMIN — ACETAMINOPHEN 650 MG: 325 TABLET, FILM COATED ORAL at 19:35

## 2018-06-19 RX ADMIN — RISPERIDONE 1 MG: 1 TABLET ORAL at 08:25

## 2018-06-19 RX ADMIN — METOPROLOL TARTRATE 25 MG: 25 TABLET ORAL at 08:26

## 2018-06-19 RX ADMIN — INSULIN LISPRO 6 UNITS: 100 INJECTION, SOLUTION INTRAVENOUS; SUBCUTANEOUS at 16:53

## 2018-06-19 RX ADMIN — MULTIPLE VITAMINS W/ MINERALS TAB 1 TABLET: TAB at 08:26

## 2018-06-19 RX ADMIN — PANTOPRAZOLE SODIUM 40 MG: 40 TABLET, DELAYED RELEASE ORAL at 06:22

## 2018-06-19 RX ADMIN — GABAPENTIN 800 MG: 400 CAPSULE ORAL at 13:07

## 2018-06-19 RX ADMIN — ACETAMINOPHEN 650 MG: 325 TABLET, FILM COATED ORAL at 05:00

## 2018-06-19 RX ADMIN — MELOXICAM 15 MG: 7.5 TABLET ORAL at 21:16

## 2018-06-19 RX ADMIN — FENOFIBRATE 54 MG: 54 TABLET ORAL at 08:25

## 2018-06-19 RX ADMIN — INSULIN GLARGINE 25 UNITS: 100 INJECTION, SOLUTION SUBCUTANEOUS at 21:21

## 2018-06-19 RX ADMIN — GABAPENTIN 800 MG: 400 CAPSULE ORAL at 21:15

## 2018-06-19 RX ADMIN — MIRTAZAPINE 30 MG: 30 TABLET, FILM COATED ORAL at 21:16

## 2018-06-19 RX ADMIN — METOPROLOL TARTRATE 25 MG: 25 TABLET ORAL at 21:16

## 2018-06-19 RX ADMIN — ACETAMINOPHEN 650 MG: 325 TABLET, FILM COATED ORAL at 13:06

## 2018-06-19 RX ADMIN — HYDROXYZINE HYDROCHLORIDE 100 MG: 25 TABLET, FILM COATED ORAL at 21:47

## 2018-06-19 RX ADMIN — QUETIAPINE FUMARATE 50 MG: 50 TABLET ORAL at 16:55

## 2018-06-19 RX ADMIN — INSULIN LISPRO 4 UNITS: 100 INJECTION, SOLUTION INTRAVENOUS; SUBCUTANEOUS at 21:21

## 2018-06-19 RX ADMIN — QUETIAPINE FUMARATE 50 MG: 50 TABLET ORAL at 08:25

## 2018-06-19 RX ADMIN — INSULIN GLARGINE 20 UNITS: 100 INJECTION, SOLUTION SUBCUTANEOUS at 08:33

## 2018-06-19 RX ADMIN — PANCRELIPASE 2 CAPSULE: 24000; 76000; 120000 CAPSULE, DELAYED RELEASE PELLETS ORAL at 08:25

## 2018-06-19 RX ADMIN — VENLAFAXINE 225 MG: 75 TABLET ORAL at 08:25

## 2018-06-19 RX ADMIN — PANCRELIPASE 2 CAPSULE: 24000; 76000; 120000 CAPSULE, DELAYED RELEASE PELLETS ORAL at 16:53

## 2018-06-19 RX ADMIN — PANCRELIPASE 2 CAPSULE: 24000; 76000; 120000 CAPSULE, DELAYED RELEASE PELLETS ORAL at 12:05

## 2018-06-19 RX ADMIN — CLONAZEPAM 1 MG: 1 TABLET ORAL at 21:16

## 2018-06-19 RX ADMIN — HYDROXYZINE HYDROCHLORIDE 100 MG: 25 TABLET, FILM COATED ORAL at 13:06

## 2018-06-19 RX ADMIN — TIZANIDINE 4 MG: 4 TABLET ORAL at 21:16

## 2018-06-19 RX ADMIN — GLIMEPIRIDE 4 MG: 4 TABLET ORAL at 08:26

## 2018-06-19 RX ADMIN — INSULIN LISPRO 4 UNITS: 100 INJECTION, SOLUTION INTRAVENOUS; SUBCUTANEOUS at 08:28

## 2018-06-19 RX ADMIN — QUETIAPINE FUMARATE 200 MG: 200 TABLET ORAL at 21:15

## 2018-06-19 RX ADMIN — FOLIC ACID 1 MG: 1 TABLET ORAL at 08:26

## 2018-06-19 RX ADMIN — MELOXICAM 15 MG: 7.5 TABLET ORAL at 08:26

## 2018-06-19 RX ADMIN — INSULIN LISPRO 4 UNITS: 100 INJECTION, SOLUTION INTRAVENOUS; SUBCUTANEOUS at 12:06

## 2018-06-19 ASSESSMENT — PAIN SCALES - GENERAL
PAINLEVEL_OUTOF10: 3
PAINLEVEL_OUTOF10: 7
PAINLEVEL_OUTOF10: 7
PAINLEVEL_OUTOF10: 1
PAINLEVEL_OUTOF10: 8
PAINLEVEL_OUTOF10: 7
PAINLEVEL_OUTOF10: 0
PAINLEVEL_OUTOF10: 8
PAINLEVEL_OUTOF10: 3

## 2018-06-19 ASSESSMENT — PAIN DESCRIPTION - PAIN TYPE
TYPE: CHRONIC PAIN
TYPE: ACUTE PAIN

## 2018-06-19 ASSESSMENT — PAIN DESCRIPTION - LOCATION
LOCATION: HIP
LOCATION: BACK;NECK

## 2018-06-19 ASSESSMENT — PAIN DESCRIPTION - ORIENTATION: ORIENTATION: LOWER

## 2018-06-19 NOTE — PLAN OF CARE
Problem: Anger Management/Homicidal Ideation:  Goal: Absence of angry outbursts  Absence of angry outbursts   Outcome: Ongoing  Pt had no angry outbursts at this time. Goal: Absence of homicidal ideation  Absence of homicidal ideation   Outcome: Ongoing  Pt denies any homicidal ideations. Problem: Altered Mood, Depressive Behavior:  Goal: Able to verbalize acceptance of life and situations over which he or she has no control  Able to verbalize acceptance of life and situations over which he or she has no control   Outcome: Ongoing  Pt encouraged to participate in unit group programming/work with staff to identify any other life situations that are not within ability of control and to learn how to accept and deal with them. Goal: Able to verbalize and/or display a decrease in depressive symptoms  Able to verbalize and/or display a decrease in depressive symptoms   Outcome: Ongoing  Pt admits to depression. Pt was out in dayroom most of the evening but aloof of peers. Pt is free of self harm at this time. Pt agrees to seek out staff if thoughts to harm self arise. Staff will provide support and reassurance as needed. Safety checks maintained every 15 minutes. Problem: Falls - Risk of:  Goal: Will remain free from falls  Will remain free from falls   Outcome: Ongoing  Pt is free of falls at this time. Pt gait is steady. Pt is wearing non skid footwear and agrees to seek out staff for assistance as needed.

## 2018-06-19 NOTE — PROGRESS NOTES
Department of Psychiatry  Nurse Practitioner Progress Note      SUBJECTIVE:  Patient was seen in the day room. He reports he has a depressed mood but the anger and irritability may be slightly less with Risperdal. He states he has hostile feelings again. He states sleep has been better with the increase in Remeron. Voices are no longer command in nature. Denies VH. SI continues with no plan. Denies HI. Appetite is normal.  Attending groups. Medication complaint and attending groups. Affect remains flat and poorly reactive. Charting and medications reviewed. Therapeutic support provided. There is still no ability to formulate safe plan other than continued hospitalization.     OBJECTIVE    Physical  VITALS:  /66   Pulse 70   Temp 97.9 °F (36.6 °C) (Oral)   Resp 14   Ht 5' 8\" (1.727 m)   Wt 221 lb (100.2 kg)   SpO2 96%   BMI 33.60 kg/m²         Mental Status Examination:  Level of consciousness: alert  Appearance:  hospital attire  Behavior/Motor:  psychomotor agitation  Attitude toward examiner:  pleasant  Speech:  rapid and loud  Mood:  constricted  Affect:  mood congruent  Thought processes:  linear  Thought content:  Homocidal ideation denies  Suicidal Ideation: reports without plan  Delusions:  no evidence of delusions  Perceptual Disturbance:  auditory hallucinations but less  Cognition:  oriented to person, place, and time  Memory impaired immediate recall  Insight:  fair  Judgment:  fair      Medications  Current Facility-Administered Medications: QUEtiapine (SEROQUEL) tablet 50 mg, 50 mg, Oral, BID  QUEtiapine (SEROQUEL) tablet 200 mg, 200 mg, Oral, Nightly  risperiDONE (RISPERDAL) tablet 1 mg, 1 mg, Oral, Daily  insulin glargine (LANTUS) injection vial 20 Units, 20 Units, Subcutaneous, BID  mirtazapine (REMERON) tablet 30 mg, 30 mg, Oral, Nightly  insulin lispro (HUMALOG) injection vial 0-12 Units, 0-12 Units, Subcutaneous, TID WC  insulin lispro (HUMALOG) injection vial 0-6 Units, 0-6 Units, Subcutaneous, Nightly  glimepiride (AMARYL) tablet 4 mg, 4 mg, Oral, Daily with breakfast  glucose (GLUTOSE) 40 % oral gel 15 g, 15 g, Oral, PRN  dextrose 50 % solution 12.5 g, 12.5 g, Intravenous, PRN  glucagon (rDNA) injection 1 mg, 1 mg, Intramuscular, PRN  dextrose 5 % solution, 100 mL/hr, Intravenous, PRN  meloxicam (MOBIC) tablet 15 mg, 15 mg, Oral, BID  acetaminophen (TYLENOL) tablet 650 mg, 650 mg, Oral, Q4H PRN  benztropine mesylate (COGENTIN) injection 2 mg, 2 mg, Intramuscular, BID PRN  magnesium hydroxide (MILK OF MAGNESIA) 400 MG/5ML suspension 30 mL, 30 mL, Oral, Daily PRN  aluminum & magnesium hydroxide-simethicone (MAALOX) 200-200-20 MG/5ML suspension 30 mL, 30 mL, Oral, Q6H PRN  nicotine (NICODERM CQ) 14 MG/24HR 1 patch, 1 patch, Transdermal, Daily  nicotine polacrilex (NICORETTE) gum 2 mg, 2 mg, Oral, Q2H PRN  atorvastatin (LIPITOR) tablet 40 mg, 40 mg, Oral, Daily  clonazePAM (KLONOPIN) tablet 1 mg, 1 mg, Oral, Nightly  lipase-protease-amylase (CREON) 68947-92981 units delayed release capsule 2 capsule, 2 capsule, Oral, TID WC  fenofibrate (TRICOR) tablet 54 mg, 54 mg, Oral, Daily  folic acid (FOLVITE) tablet 1 mg, 1 mg, Oral, Daily  gabapentin (NEURONTIN) capsule 800 mg, 800 mg, Oral, TID  metoprolol tartrate (LOPRESSOR) tablet 25 mg, 25 mg, Oral, BID  therapeutic multivitamin-minerals 1 tablet, 1 tablet, Oral, Daily  pantoprazole (PROTONIX) tablet 40 mg, 40 mg, Oral, QAM AC  tiZANidine (ZANAFLEX) tablet 4 mg, 4 mg, Oral, Nightly  venlafaxine (EFFEXOR) tablet 225 mg, 225 mg, Oral, Daily with breakfast  hydrOXYzine (ATARAX) tablet 100 mg, 100 mg, Oral, TID PRN    ASSESSMENT AND PLAN    Plan to wean off Seroquel and itrate Risperdal.   Remeron increased for sleep 6/17/18.    group psychotherapy ,unit milieu and discharge planning

## 2018-06-19 NOTE — PLAN OF CARE
Problem: Anger Management/Homicidal Ideation:  Goal: Absence of angry outbursts  Absence of angry outbursts   Outcome: Ongoing  PSYCHOTHERAPY GROUP NOTE    Date: 6/19/18  Start Time: 10 AM  End Time: 1045AM    Number Participants in Group:  11    Goal:  Patient will demonstrate increased interpersonal interaction   Topic: Support     Discipline Responsible:   OT  AT x SW  Nsg.  RT MHP Other       Participation Level:     None  Minimal    Active Listener x Interactive    Monopolizing         Participation Quality:  x Appropriate  Inappropriate   x       Attentive        Intrusive   x       Sharing        Resistant          Supportive        Lethargic       Affective:   x Congruent  Incongruent  Blunted  Flat    Constricted  Anxious  Elated  Angry    Labile  Depressed  Other         Cognitive:  x Alert x Oriented PPTP     Concentration  G x F  P   Attention Span  G x F  P   Short-Term Memory  G x F  P   Long-Term Memory  G x F  P   ProblemSolving/  Decision Making  G x F  P   Ability to Process  Information  G x F  P      Contributing Factors             Delusional             Hallucinating             Flight of Ideas             Other:       Modes of Intervention:   Education x Support  Exploration    Clarifying  Problem Solving  Confrontation    Socialization  Limit Setting  Reality Testing    Activity  Movement  Media    Other:            Response to Learning:  x Able to verbalize current knowledge/experience    Able to verbalize/acknowledge new learning    Able to retain information   x Capable of insight    Able to change behavior    Progressing to goal    Other:        Comments:

## 2018-06-19 NOTE — PLAN OF CARE
Problem: Altered Mood, Depressive Behavior:  Goal: Able to verbalize and/or display a decrease in depressive symptoms  Able to verbalize and/or display a decrease in depressive symptoms   Outcome: Ongoing  Pt did not attend Community Meeting/Goal Setting group at [address] [place] [address] resting in room despite staff invitation to attend.

## 2018-06-19 NOTE — BH NOTE
Patient is complaining of anxiety at this time. Stating that they feel restless and are having trouble sleeping and claming down in order to rest this evening. Medication was given as prescribed for increased anxiety.

## 2018-06-19 NOTE — PLAN OF CARE
Problem: Altered Mood, Depressive Behavior:  Goal: Able to verbalize acceptance of life and situations over which he or she has no control  Able to verbalize acceptance of life and situations over which he or she has no control   Outcome: Ongoing  Psychoeducation Group Note    Date: 6/19/18  Start Time: 1330  End Time: 1405    Number Participants in Group:  11    Goal:  Patient will demonstrate increased interpersonal interaction.    Topic:  Cognitive Skills Therapeutic Group    Discipline Responsible:   OT  AT  [facility]. X RT  Other       Participation Level:     None  Minimal   X Active Listener X Interactive    Monopolizing         Participation Quality:  X Appropriate  Inappropriate   X       Attentive        Intrusive   X       Sharing        Resistant   X       Supportive        Lethargic       Affective:   X Congruent  Incongruent  Blunted  Flat    Constricted  Anxious  Elated  Angry    Labile  Depressed  Other  Bright       Cognitive:  X Alert X Oriented PPTP     Concentration X G  F  P   Attention Span X G  F  P   Short-Term Memory X G  F  P   Long-Term Memory X G  F  P   ProblemSolving/  Decision Making X G  F  P   Ability to Process  Information X G  F  P      Contributing Factors             Delusional             Hallucinating             Flight of Ideas             Other:       Modes of Intervention:  X Education X Support X Exploration    Clarifying X Problem Solving  Confrontation   X Socialization X Limit Setting  Reality Testing   X Activity  Movement  Media    Other:            Response to Learning:  X Able to verbalize current knowledge/experience   X Able to verbalize/acknowledge new learning   X Able to retain information   X Capable of insight    Able to change behavior   X Progressing to goal    Other:        Comments:

## 2018-06-19 NOTE — PLAN OF CARE
Problem: Altered Mood, Depressive Behavior:  Goal: Able to verbalize and/or display a decrease in depressive symptoms  Able to verbalize and/or display a decrease in depressive symptoms   Outcome: Ongoing  PSYCHOEDUCATION GROUP NOTE    Date: 6/19/2018  Start Time: 1100  End Time: 1145    Number Participants in Group:  13/17    Goal:  Patient will demonstrate increased interpersonal interaction   Topic: Leisure Skills/Problem Solving    Discipline Responsible:   OT  AT    Ns. x RT MHP Other       Participation Level:     None  Minimal   x Active Listener x Interactive    Monopolizing         Participation Quality:  x Appropriate  Inappropriate   x       Attentive        Intrusive   x       Sharing        Resistant          Supportive        Lethargic       Affective:    Congruent  Incongruent  Blunted  Flat   x Constricted  Anxious  Elated  Angry    Labile  Depressed  Other         Cognitive:  x Alert x Oriented PPTP     Concentration  G x F  P   Attention Span  G x F  P   Short-Term Memory  G x F  P   Long-Term Memory  G x F  P   ProblemSolving/  Decision Making  G x F  P   Ability to Process  Information  G x F  P      Contributing Factors             Delusional             Hallucinating             Flight of Ideas             Other:       Modes of Intervention:  x Education x Support x Exploration   x Clarifying x Problem Solving x Confrontation   x Socialization x Limit Setting x Reality Testing   x Activity  Movement  Media    Other:            Response to Learning:  x Able to verbalize current knowledge/experience   x Able to verbalize/acknowledge new learning   x Able to retain information    Capable of insight    Able to change behavior   x Progressing to goal    Other:        Comments:

## 2018-06-20 LAB
GLUCOSE BLD-MCNC: 184 MG/DL (ref 75–110)
GLUCOSE BLD-MCNC: 220 MG/DL (ref 75–110)
GLUCOSE BLD-MCNC: 297 MG/DL (ref 75–110)
GLUCOSE BLD-MCNC: 304 MG/DL (ref 75–110)

## 2018-06-20 PROCEDURE — 6370000000 HC RX 637 (ALT 250 FOR IP): Performed by: INTERNAL MEDICINE

## 2018-06-20 PROCEDURE — 6370000000 HC RX 637 (ALT 250 FOR IP): Performed by: REGISTERED NURSE

## 2018-06-20 PROCEDURE — 99231 SBSQ HOSP IP/OBS SF/LOW 25: CPT | Performed by: INTERNAL MEDICINE

## 2018-06-20 PROCEDURE — 6370000000 HC RX 637 (ALT 250 FOR IP): Performed by: PSYCHIATRY & NEUROLOGY

## 2018-06-20 PROCEDURE — 82947 ASSAY GLUCOSE BLOOD QUANT: CPT

## 2018-06-20 PROCEDURE — 99232 SBSQ HOSP IP/OBS MODERATE 35: CPT | Performed by: REGISTERED NURSE

## 2018-06-20 PROCEDURE — 1240000000 HC EMOTIONAL WELLNESS R&B

## 2018-06-20 RX ADMIN — INSULIN LISPRO 4 UNITS: 100 INJECTION, SOLUTION INTRAVENOUS; SUBCUTANEOUS at 07:36

## 2018-06-20 RX ADMIN — METOPROLOL TARTRATE 25 MG: 25 TABLET ORAL at 21:19

## 2018-06-20 RX ADMIN — GABAPENTIN 800 MG: 400 CAPSULE ORAL at 14:15

## 2018-06-20 RX ADMIN — TIZANIDINE 4 MG: 4 TABLET ORAL at 21:19

## 2018-06-20 RX ADMIN — MELOXICAM 15 MG: 7.5 TABLET ORAL at 21:19

## 2018-06-20 RX ADMIN — ATORVASTATIN CALCIUM 40 MG: 20 TABLET, FILM COATED ORAL at 08:33

## 2018-06-20 RX ADMIN — GLIMEPIRIDE 4 MG: 4 TABLET ORAL at 07:39

## 2018-06-20 RX ADMIN — INSULIN GLARGINE 25 UNITS: 100 INJECTION, SOLUTION SUBCUTANEOUS at 22:26

## 2018-06-20 RX ADMIN — INSULIN LISPRO 3 UNITS: 100 INJECTION, SOLUTION INTRAVENOUS; SUBCUTANEOUS at 21:19

## 2018-06-20 RX ADMIN — CLONAZEPAM 1 MG: 1 TABLET ORAL at 21:19

## 2018-06-20 RX ADMIN — MULTIPLE VITAMINS W/ MINERALS TAB 1 TABLET: TAB at 08:33

## 2018-06-20 RX ADMIN — QUETIAPINE FUMARATE 50 MG: 50 TABLET ORAL at 16:30

## 2018-06-20 RX ADMIN — PANCRELIPASE 2 CAPSULE: 24000; 76000; 120000 CAPSULE, DELAYED RELEASE PELLETS ORAL at 16:29

## 2018-06-20 RX ADMIN — PANCRELIPASE 2 CAPSULE: 24000; 76000; 120000 CAPSULE, DELAYED RELEASE PELLETS ORAL at 07:35

## 2018-06-20 RX ADMIN — QUETIAPINE FUMARATE 200 MG: 200 TABLET ORAL at 21:19

## 2018-06-20 RX ADMIN — RISPERIDONE 1 MG: 1 TABLET ORAL at 08:34

## 2018-06-20 RX ADMIN — INSULIN GLARGINE 25 UNITS: 100 INJECTION, SOLUTION SUBCUTANEOUS at 10:42

## 2018-06-20 RX ADMIN — VENLAFAXINE 225 MG: 75 TABLET ORAL at 08:04

## 2018-06-20 RX ADMIN — QUETIAPINE FUMARATE 50 MG: 50 TABLET ORAL at 08:33

## 2018-06-20 RX ADMIN — METOPROLOL TARTRATE 25 MG: 25 TABLET ORAL at 08:34

## 2018-06-20 RX ADMIN — FENOFIBRATE 54 MG: 54 TABLET ORAL at 08:33

## 2018-06-20 RX ADMIN — ACETAMINOPHEN 650 MG: 325 TABLET, FILM COATED ORAL at 14:17

## 2018-06-20 RX ADMIN — MIRTAZAPINE 30 MG: 30 TABLET, FILM COATED ORAL at 21:19

## 2018-06-20 RX ADMIN — PANTOPRAZOLE SODIUM 40 MG: 40 TABLET, DELAYED RELEASE ORAL at 07:39

## 2018-06-20 RX ADMIN — MELOXICAM 15 MG: 7.5 TABLET ORAL at 08:34

## 2018-06-20 RX ADMIN — PANCRELIPASE 2 CAPSULE: 24000; 76000; 120000 CAPSULE, DELAYED RELEASE PELLETS ORAL at 12:22

## 2018-06-20 RX ADMIN — HYDROXYZINE HYDROCHLORIDE 100 MG: 25 TABLET, FILM COATED ORAL at 05:49

## 2018-06-20 RX ADMIN — GABAPENTIN 800 MG: 400 CAPSULE ORAL at 08:34

## 2018-06-20 RX ADMIN — GABAPENTIN 800 MG: 400 CAPSULE ORAL at 21:19

## 2018-06-20 RX ADMIN — FOLIC ACID 1 MG: 1 TABLET ORAL at 08:34

## 2018-06-20 RX ADMIN — INSULIN LISPRO 2 UNITS: 100 INJECTION, SOLUTION INTRAVENOUS; SUBCUTANEOUS at 12:24

## 2018-06-20 RX ADMIN — ACETAMINOPHEN 650 MG: 325 TABLET, FILM COATED ORAL at 05:49

## 2018-06-20 RX ADMIN — HYDROXYZINE HYDROCHLORIDE 100 MG: 25 TABLET, FILM COATED ORAL at 22:27

## 2018-06-20 RX ADMIN — INSULIN LISPRO 8 UNITS: 100 INJECTION, SOLUTION INTRAVENOUS; SUBCUTANEOUS at 17:20

## 2018-06-20 ASSESSMENT — PAIN SCALES - GENERAL
PAINLEVEL_OUTOF10: 0
PAINLEVEL_OUTOF10: 7
PAINLEVEL_OUTOF10: 0
PAINLEVEL_OUTOF10: 0
PAINLEVEL_OUTOF10: 3
PAINLEVEL_OUTOF10: 8

## 2018-06-20 ASSESSMENT — PAIN DESCRIPTION - LOCATION
LOCATION: BACK
LOCATION: HEAD
LOCATION: HEAD
LOCATION: GENERALIZED
LOCATION: NECK

## 2018-06-20 ASSESSMENT — PAIN DESCRIPTION - PAIN TYPE
TYPE: CHRONIC PAIN
TYPE: ACUTE PAIN
TYPE: ACUTE PAIN
TYPE: CHRONIC PAIN

## 2018-06-20 ASSESSMENT — PAIN DESCRIPTION - DESCRIPTORS
DESCRIPTORS: HEADACHE
DESCRIPTORS: HEADACHE

## 2018-06-20 NOTE — PLAN OF CARE
Problem: Falls - Risk of:  Goal: Will remain free from falls  Will remain free from falls   Outcome: Ongoing  Pt has remained free from falls at this time.
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
## 2018-06-20 NOTE — PLAN OF CARE
Problem: Altered Mood, Depressive Behavior:  Goal: Able to verbalize and/or display a decrease in depressive symptoms  Able to verbalize and/or display a decrease in depressive symptoms   Outcome: Ongoing  PSYCHOEDUCATION GROUP NOTE    Date: 6/20/2018  Start Time: 1000  End Time: 1045    Number Participants in Group:  9/17    Goal:  Patient will demonstrate increased interpersonal interaction   Topic: Leisure Skills/Problem Solving    Discipline Responsible:   OT  AT  [facility]. x RT MHP Other       Participation Level:     None  Minimal   x Active Listener x Interactive    Monopolizing         Participation Quality:  x Appropriate  Inappropriate   x       Attentive        Intrusive   x       Sharing        Resistant          Supportive        Lethargic       Affective:   x Congruent  Incongruent  Blunted  Flat    Constricted  Anxious  Elated  Angry    Labile  Depressed  Other         Cognitive:  x Alert x Oriented PPTP     Concentration x G  F  P   Attention Span x G  F  P   Short-Term Memory x G  F  P   Long-Term Memory x G  F  P   ProblemSolving/  Decision Making x G  F  P   Ability to Process  Information x G  F  P      Contributing Factors             Delusional             Hallucinating             Flight of Ideas             Other:       Modes of Intervention:  x Education x Support x Exploration   x Clarifying x Problem Solving x Confrontation   x Socialization  Limit Setting x Reality Testing   x Activity  Movement  Media    Other:            Response to Learning:  x Able to verbalize current knowledge/experience   x Able to verbalize/acknowledge new learning   x Able to retain information    Capable of insight    Able to change behavior   x Progressing to goal    Other:        Comments: Pt arrived late to group, but fully participated and engaged in activity when present.

## 2018-06-20 NOTE — PLAN OF CARE
Problem: Altered Mood, Depressive Behavior:  Goal: Able to verbalize and/or display a decrease in depressive symptoms  Able to verbalize and/or display a decrease in depressive symptoms   Outcome: Ongoing  Pt did not participate in Goal Setting and Community Meeting at [address] [place] [address] despite staff encouragement.

## 2018-06-20 NOTE — PROGRESS NOTES
Pharmacy Med Education Group Note    Date: 6/20/18  Start Time: 36  End Time: 9404    Number Participants in Group:  12    Goal:  Patient will demonstrate an understanding of the medications intended purpose and possible adverse effects  Topic: [place] for Pharmacy Med Ed Group    Discipline Responsible:     OT  AT  [facility].  RT     X Other       Participation Level:     None  Minimal      X Active Listener    X Interactive    Monopolizing         Participation Quality:    X Appropriate  Inappropriate     X       Attentive        Intrusive          Sharing        Resistant          Supportive        Lethargic       Affective:     X Congruent  Incongruent  Blunted  Flat    Constricted  Anxious  Elated  Angry    Labile  Depressed  Other         Cognitive:    X Alert  Oriented PPTP     Concentration   X G  F  P   Attention Span   X G  F  P   Short-Term Memory   X G  F  P   Long-Term Memory  G  F  P   ProblemSolving/  Decision Making  G  F  P   Ability to Process  Information   X G  F  P      Contributing Factors             Delusional             Hallucinating             Flight of Ideas             Other:       Modes of Intervention:    X Education   X Support  Exploration    Clarifying  Problem Solving  Confrontation    Socialization  Limit Setting  Reality Testing    Activity  Movement  Media    Other:            Response to Learning:    X Able to verbalize current knowledge/experience    Able to verbalize/acknowledge new learning    Able to retain information    Capable of insight    Able to change behavior    Progressing to goal    Other:        Comments:     Denice Falk,PharmD, 6/20/2018, 5:11 PM

## 2018-06-20 NOTE — PROGRESS NOTES
Schizophrenia ([facility] [address]    Mixed hyperlipidemia       A/P  DM poor control   HBA1c is 8.8 in 6/18  FS more than 400   On sliding scale   Cont lipitor fnofibrate last TG more than 1000  Cont creon     DM add amaryl 4 mg daily cotn sliding scale     6/19  Increase to 25 units bid         MD DOTTIE Crawford[address], [address] [place] [address].    Phone [phone]   Fax: [phone]  Answering Service: [phone]

## 2018-06-20 NOTE — PLAN OF CARE
Problem: Altered Mood, Depressive Behavior:  Goal: Able to verbalize and/or display a decrease in depressive symptoms  Able to verbalize and/or display a decrease in depressive symptoms   Outcome: Ongoing  PSYCHOEDUCATION GROUP NOTE    Date: 6/20/2018  Start Time: 1430  End Time: 1500    Number Participants in Group:  10/18    Goal:  Patient will demonstrate increased interpersonal interaction   Topic: Positive Thinking/Coping Skills    Discipline Responsible:   OT  AT    Ns. x RT MHP Other       Participation Level:     None  Minimal   x Active Listener x Interactive    Monopolizing         Participation Quality:  x Appropriate  Inappropriate   x       Attentive        Intrusive          Sharing        Resistant          Supportive        Lethargic       Affective:    Congruent  Incongruent  Blunted  Flat   x Constricted  Anxious  Elated  Angry    Labile  Depressed  Other         Cognitive:  x Alert x Oriented PPTP     Concentration  G x F  P   Attention Span  G x F  P   Short-Term Memory  G x F  P   Long-Term Memory  G x F  P   ProblemSolving/  Decision Making  G x F  P   Ability to Process  Information  G x F  P      Contributing Factors             Delusional             Hallucinating             Flight of Ideas             Other:       Modes of Intervention:  x Education x Support x Exploration   x Clarifying x Problem Solving x Confrontation   x Socialization x Limit Setting x Reality Testing   x Activity  Movement  Media    Other:            Response to Learning:  x Able to verbalize current knowledge/experience   x Able to verbalize/acknowledge new learning   x Able to retain information    Capable of insight    Able to change behavior   x Progressing to goal    Other:        Comments:

## 2018-06-20 NOTE — PLAN OF CARE
Problem: Anger Management/Homicidal Ideation:  Goal: Absence of angry outbursts  Absence of angry outbursts   Outcome: Ongoing  No angry outbursts noted at this time. Q 15 minute safety checks continue  Goal: Absence of homicidal ideation  Absence of homicidal ideation   Outcome: Ongoing  Denies homicidal ideations.  Q 15 minute safety checks continue

## 2018-06-21 LAB
GLUCOSE BLD-MCNC: 226 MG/DL (ref 75–110)
GLUCOSE BLD-MCNC: 226 MG/DL (ref 75–110)
GLUCOSE BLD-MCNC: 257 MG/DL (ref 75–110)
GLUCOSE BLD-MCNC: 316 MG/DL (ref 75–110)

## 2018-06-21 PROCEDURE — 6370000000 HC RX 637 (ALT 250 FOR IP): Performed by: INTERNAL MEDICINE

## 2018-06-21 PROCEDURE — 82947 ASSAY GLUCOSE BLOOD QUANT: CPT

## 2018-06-21 PROCEDURE — 6370000000 HC RX 637 (ALT 250 FOR IP): Performed by: PSYCHIATRY & NEUROLOGY

## 2018-06-21 PROCEDURE — 6370000000 HC RX 637 (ALT 250 FOR IP): Performed by: REGISTERED NURSE

## 2018-06-21 PROCEDURE — 99232 SBSQ HOSP IP/OBS MODERATE 35: CPT | Performed by: REGISTERED NURSE

## 2018-06-21 PROCEDURE — 1240000000 HC EMOTIONAL WELLNESS R&B

## 2018-06-21 RX ORDER — QUETIAPINE FUMARATE 25 MG/1
25 TABLET, FILM COATED ORAL 2 TIMES DAILY
Status: DISCONTINUED | OUTPATIENT
Start: 2018-06-21 | End: 2018-06-22

## 2018-06-21 RX ORDER — RISPERIDONE 1 MG/1
1 TABLET, FILM COATED ORAL 2 TIMES DAILY
Status: DISCONTINUED | OUTPATIENT
Start: 2018-06-21 | End: 2018-06-22

## 2018-06-21 RX ORDER — QUETIAPINE FUMARATE 100 MG/1
100 TABLET, FILM COATED ORAL NIGHTLY
Status: DISCONTINUED | OUTPATIENT
Start: 2018-06-21 | End: 2018-06-22

## 2018-06-21 RX ADMIN — MELOXICAM 15 MG: 7.5 TABLET ORAL at 07:53

## 2018-06-21 RX ADMIN — GABAPENTIN 800 MG: 400 CAPSULE ORAL at 14:31

## 2018-06-21 RX ADMIN — PANCRELIPASE 2 CAPSULE: 24000; 76000; 120000 CAPSULE, DELAYED RELEASE PELLETS ORAL at 16:53

## 2018-06-21 RX ADMIN — QUETIAPINE FUMARATE 25 MG: 25 TABLET ORAL at 14:31

## 2018-06-21 RX ADMIN — FOLIC ACID 1 MG: 1 TABLET ORAL at 07:54

## 2018-06-21 RX ADMIN — METOPROLOL TARTRATE 25 MG: 25 TABLET ORAL at 07:54

## 2018-06-21 RX ADMIN — INSULIN LISPRO 8 UNITS: 100 INJECTION, SOLUTION INTRAVENOUS; SUBCUTANEOUS at 16:53

## 2018-06-21 RX ADMIN — GLIMEPIRIDE 4 MG: 4 TABLET ORAL at 07:53

## 2018-06-21 RX ADMIN — CLONAZEPAM 1 MG: 1 TABLET ORAL at 21:32

## 2018-06-21 RX ADMIN — VENLAFAXINE 225 MG: 75 TABLET ORAL at 07:53

## 2018-06-21 RX ADMIN — METOPROLOL TARTRATE 25 MG: 25 TABLET ORAL at 21:31

## 2018-06-21 RX ADMIN — PANCRELIPASE 2 CAPSULE: 24000; 76000; 120000 CAPSULE, DELAYED RELEASE PELLETS ORAL at 12:07

## 2018-06-21 RX ADMIN — TIZANIDINE 4 MG: 4 TABLET ORAL at 21:31

## 2018-06-21 RX ADMIN — RISPERIDONE 1 MG: 1 TABLET ORAL at 07:53

## 2018-06-21 RX ADMIN — RISPERIDONE 1 MG: 1 TABLET ORAL at 21:31

## 2018-06-21 RX ADMIN — ACETAMINOPHEN 650 MG: 325 TABLET, FILM COATED ORAL at 13:29

## 2018-06-21 RX ADMIN — PANTOPRAZOLE SODIUM 40 MG: 40 TABLET, DELAYED RELEASE ORAL at 07:54

## 2018-06-21 RX ADMIN — HYDROXYZINE HYDROCHLORIDE 100 MG: 25 TABLET, FILM COATED ORAL at 14:31

## 2018-06-21 RX ADMIN — GABAPENTIN 800 MG: 400 CAPSULE ORAL at 21:31

## 2018-06-21 RX ADMIN — FENOFIBRATE 54 MG: 54 TABLET ORAL at 07:53

## 2018-06-21 RX ADMIN — INSULIN GLARGINE 25 UNITS: 100 INJECTION, SOLUTION SUBCUTANEOUS at 10:45

## 2018-06-21 RX ADMIN — MULTIPLE VITAMINS W/ MINERALS TAB 1 TABLET: TAB at 07:53

## 2018-06-21 RX ADMIN — MELOXICAM 15 MG: 7.5 TABLET ORAL at 21:31

## 2018-06-21 RX ADMIN — MIRTAZAPINE 30 MG: 30 TABLET, FILM COATED ORAL at 21:31

## 2018-06-21 RX ADMIN — INSULIN GLARGINE 25 UNITS: 100 INJECTION, SOLUTION SUBCUTANEOUS at 21:30

## 2018-06-21 RX ADMIN — GABAPENTIN 800 MG: 400 CAPSULE ORAL at 07:54

## 2018-06-21 RX ADMIN — ACETAMINOPHEN 650 MG: 325 TABLET, FILM COATED ORAL at 06:31

## 2018-06-21 RX ADMIN — ATORVASTATIN CALCIUM 40 MG: 20 TABLET, FILM COATED ORAL at 07:53

## 2018-06-21 RX ADMIN — HYDROXYZINE HYDROCHLORIDE 100 MG: 25 TABLET, FILM COATED ORAL at 21:31

## 2018-06-21 RX ADMIN — HYDROXYZINE HYDROCHLORIDE 100 MG: 25 TABLET, FILM COATED ORAL at 06:31

## 2018-06-21 RX ADMIN — INSULIN LISPRO 3 UNITS: 100 INJECTION, SOLUTION INTRAVENOUS; SUBCUTANEOUS at 21:30

## 2018-06-21 RX ADMIN — INSULIN LISPRO 4 UNITS: 100 INJECTION, SOLUTION INTRAVENOUS; SUBCUTANEOUS at 07:57

## 2018-06-21 RX ADMIN — INSULIN LISPRO 4 UNITS: 100 INJECTION, SOLUTION INTRAVENOUS; SUBCUTANEOUS at 12:06

## 2018-06-21 RX ADMIN — QUETIAPINE FUMARATE 100 MG: 100 TABLET ORAL at 21:31

## 2018-06-21 RX ADMIN — PANCRELIPASE 2 CAPSULE: 24000; 76000; 120000 CAPSULE, DELAYED RELEASE PELLETS ORAL at 07:53

## 2018-06-21 RX ADMIN — QUETIAPINE FUMARATE 50 MG: 50 TABLET ORAL at 07:54

## 2018-06-21 ASSESSMENT — PAIN DESCRIPTION - LOCATION
LOCATION: GENERALIZED
LOCATION: NECK
LOCATION: NECK

## 2018-06-21 ASSESSMENT — PAIN SCALES - GENERAL
PAINLEVEL_OUTOF10: 0
PAINLEVEL_OUTOF10: 3
PAINLEVEL_OUTOF10: 3
PAINLEVEL_OUTOF10: 7
PAINLEVEL_OUTOF10: 1
PAINLEVEL_OUTOF10: 0
PAINLEVEL_OUTOF10: 3
PAINLEVEL_OUTOF10: 3

## 2018-06-21 ASSESSMENT — PAIN DESCRIPTION - PAIN TYPE
TYPE: CHRONIC PAIN
TYPE: CHRONIC PAIN

## 2018-06-21 NOTE — PROGRESS NOTES
Charting and assessments done this shift reviewed by Electronically signed by Diya Landaverde RN on 6/20/2018 at 10:17 PM

## 2018-06-21 NOTE — PLAN OF CARE
Problem: Altered Mood, Depressive Behavior:  Goal: Able to verbalize acceptance of life and situations over which he or she has no control  Able to verbalize acceptance of life and situations over which he or she has no control   Outcome: Ongoing  1:1 with pt x ten minutes. Pt encouraged to attend unit programming and interact with peers and staff. Pt also encouraged to tend to hygiene and ADLs. Pt encouraged to discuss feelings with staff and feedback and reassurance provided. Pt admits to having thoughts of self harm. Agreeable to seeking out staff should feelings increase. Able to identify positive coping skills and plan for safety. Will cont to monitor q15 minutes for safety and provide support and reassurance as needed. Pt is controlled in behaviors. When discussing discharge plans with pt, he became suicidal and stated he is not ready to leave hospital and face court date. Attended groups and is very social with peers et staff.

## 2018-06-21 NOTE — PROGRESS NOTES
Department of Psychiatry  Nurse Practitioner Progress Note      SUBJECTIVE:  Patient was seen in the treatment team meeting. He reports he has a depressed mood. He states he has had thoughts of hanging himself with the phone cords. The anger and irritability may be slightly less with Risperdal. He reports his anxiety continues to be bad. He states sleep has been better with the increase in Remeron. Voices are no less. Denies VH. Appetite is normal.  Attending groups. Medication complaint and attending groups. Affect remains flat and poorly reactive. Charting and medications reviewed. Therapeutic support provided. There is still no ability to formulate safe plan other than continued hospitalization. We discussed that his mood may be worsening due to length of stay. He is encouraged to consider if he could be safely discharged and then treated in the outpatient setting. Continue to wean and titrate Seroquel and Risperdal. He is encouraged to use coping skills for anxiety.      OBJECTIVE    Physical  VITALS:  BP (!) 142/105   Pulse 68   Temp 97.8 °F (36.6 °C) (Oral)   Resp 14   Ht 5' 8\" (1.727 m)   Wt 221 lb (100.2 kg)   SpO2 100%   BMI 33.60 kg/m²         Mental Status Examination:  Level of consciousness: alert  Appearance:  hospital attire  Behavior/Motor:  psychomotor agitation  Attitude toward examiner:  pleasant  Speech:  normal  Mood:  constricted  Affect:  mood congruent  Thought processes:  linear  Thought content:  Homocidal ideation denies  Suicidal Ideation: reports without plan  Delusions:  no evidence of delusions  Perceptual Disturbance:  auditory hallucinations but less  Cognition:  oriented to person, place, and time  Memory impaired immediate recall  Insight:  fair  Judgment:  fair      Medications  Current Facility-Administered Medications: QUEtiapine (SEROQUEL) tablet 25 mg, 25 mg, Oral, BID  QUEtiapine (SEROQUEL) tablet 100 mg, 100 mg, Oral, Nightly  risperiDONE (RISPERDAL) tablet 1 mg, 1 mg,
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
## 2018-06-21 NOTE — FLOWSHEET NOTE
*Patient participated in the [facility]       06/21/18 1538   Encounter Summary   Services provided to: Patient   Referral/Consult From: Rounding   Continue Visiting (6/21/18)   Complexity of Encounter Low   Length of Encounter 30 minutes   Spiritual Assessment Completed Yes   Spiritual/Orthodoxy   Type Spiritual support   Assessment Calm   Intervention Active listening   Outcome Receptive

## 2018-06-21 NOTE — PLAN OF CARE
Problem: Anger Management/Homicidal Ideation:  Goal: Absence of homicidal ideation  Absence of homicidal ideation   Outcome: Ongoing  Relates is feeling safe at this time. , no anger outbursts noted. Keila Monteisnos Pt checked q 15 min. Problem: Altered Mood, Depressive Behavior:  Goal: Able to verbalize acceptance of life and situations over which he or she has no control  Able to verbalize acceptance of life and situations over which he or she has no control   Outcome: Ongoing  Coop. With vitals taken. Pleasant on approach. Attends all unit groups et unit activities. Looking forward to being d/c on Friday. Feeling better. meds were changed. To early to tell how well they will work. Depression is at about a 5. Anxiety is at about a 7. Likes to be by himself at times. Able to relax. Nourishment taken well. Accepting of all medications provided.

## 2018-06-22 LAB
GLUCOSE BLD-MCNC: 189 MG/DL (ref 75–110)
GLUCOSE BLD-MCNC: 241 MG/DL (ref 75–110)
GLUCOSE BLD-MCNC: 253 MG/DL (ref 75–110)
GLUCOSE BLD-MCNC: 271 MG/DL (ref 75–110)

## 2018-06-22 PROCEDURE — 6370000000 HC RX 637 (ALT 250 FOR IP): Performed by: PSYCHIATRY & NEUROLOGY

## 2018-06-22 PROCEDURE — 6370000000 HC RX 637 (ALT 250 FOR IP): Performed by: INTERNAL MEDICINE

## 2018-06-22 PROCEDURE — 6370000000 HC RX 637 (ALT 250 FOR IP): Performed by: REGISTERED NURSE

## 2018-06-22 PROCEDURE — 1240000000 HC EMOTIONAL WELLNESS R&B

## 2018-06-22 PROCEDURE — 82947 ASSAY GLUCOSE BLOOD QUANT: CPT

## 2018-06-22 PROCEDURE — 99232 SBSQ HOSP IP/OBS MODERATE 35: CPT | Performed by: PSYCHIATRY & NEUROLOGY

## 2018-06-22 RX ORDER — CLONAZEPAM 0.5 MG/1
0.5 TABLET ORAL NIGHTLY
Status: DISCONTINUED | OUTPATIENT
Start: 2018-06-22 | End: 2018-07-03 | Stop reason: HOSPADM

## 2018-06-22 RX ORDER — QUETIAPINE FUMARATE 25 MG/1
TABLET, FILM COATED ORAL
Qty: 60 TABLET | Refills: 3 | Status: SHIPPED | OUTPATIENT
Start: 2018-06-22 | End: 2018-07-03 | Stop reason: HOSPADM

## 2018-06-22 RX ORDER — VENLAFAXINE 75 MG/1
75 TABLET ORAL 2 TIMES DAILY WITH MEALS
Status: DISCONTINUED | OUTPATIENT
Start: 2018-06-22 | End: 2018-07-03 | Stop reason: HOSPADM

## 2018-06-22 RX ADMIN — INSULIN GLARGINE 25 UNITS: 100 INJECTION, SOLUTION SUBCUTANEOUS at 21:23

## 2018-06-22 RX ADMIN — PANTOPRAZOLE SODIUM 40 MG: 40 TABLET, DELAYED RELEASE ORAL at 06:01

## 2018-06-22 RX ADMIN — PANCRELIPASE 2 CAPSULE: 24000; 76000; 120000 CAPSULE, DELAYED RELEASE PELLETS ORAL at 12:40

## 2018-06-22 RX ADMIN — RISPERIDONE 1 MG: 1 TABLET ORAL at 08:28

## 2018-06-22 RX ADMIN — INSULIN LISPRO 3 UNITS: 100 INJECTION, SOLUTION INTRAVENOUS; SUBCUTANEOUS at 21:25

## 2018-06-22 RX ADMIN — PANCRELIPASE 2 CAPSULE: 24000; 76000; 120000 CAPSULE, DELAYED RELEASE PELLETS ORAL at 08:29

## 2018-06-22 RX ADMIN — MIRTAZAPINE 30 MG: 30 TABLET, FILM COATED ORAL at 21:24

## 2018-06-22 RX ADMIN — QUETIAPINE FUMARATE 25 MG: 25 TABLET ORAL at 08:30

## 2018-06-22 RX ADMIN — GABAPENTIN 800 MG: 400 CAPSULE ORAL at 14:16

## 2018-06-22 RX ADMIN — METOPROLOL TARTRATE 25 MG: 25 TABLET ORAL at 08:30

## 2018-06-22 RX ADMIN — VENLAFAXINE 75 MG: 75 TABLET ORAL at 18:08

## 2018-06-22 RX ADMIN — MELOXICAM 15 MG: 7.5 TABLET ORAL at 08:30

## 2018-06-22 RX ADMIN — TIZANIDINE 4 MG: 4 TABLET ORAL at 21:24

## 2018-06-22 RX ADMIN — GABAPENTIN 800 MG: 400 CAPSULE ORAL at 08:31

## 2018-06-22 RX ADMIN — HYDROXYZINE HYDROCHLORIDE 100 MG: 25 TABLET, FILM COATED ORAL at 04:21

## 2018-06-22 RX ADMIN — MELOXICAM 15 MG: 7.5 TABLET ORAL at 21:24

## 2018-06-22 RX ADMIN — PANCRELIPASE 2 CAPSULE: 24000; 76000; 120000 CAPSULE, DELAYED RELEASE PELLETS ORAL at 17:16

## 2018-06-22 RX ADMIN — ATORVASTATIN CALCIUM 40 MG: 20 TABLET, FILM COATED ORAL at 08:45

## 2018-06-22 RX ADMIN — GLIMEPIRIDE 4 MG: 4 TABLET ORAL at 08:32

## 2018-06-22 RX ADMIN — FOLIC ACID 1 MG: 1 TABLET ORAL at 08:31

## 2018-06-22 RX ADMIN — INSULIN LISPRO 6 UNITS: 100 INJECTION, SOLUTION INTRAVENOUS; SUBCUTANEOUS at 17:19

## 2018-06-22 RX ADMIN — METOPROLOL TARTRATE 25 MG: 25 TABLET ORAL at 21:24

## 2018-06-22 RX ADMIN — HYDROXYZINE HYDROCHLORIDE 100 MG: 25 TABLET, FILM COATED ORAL at 11:07

## 2018-06-22 RX ADMIN — VENLAFAXINE 225 MG: 75 TABLET ORAL at 08:28

## 2018-06-22 RX ADMIN — ACETAMINOPHEN 650 MG: 325 TABLET, FILM COATED ORAL at 04:21

## 2018-06-22 RX ADMIN — MULTIPLE VITAMINS W/ MINERALS TAB 1 TABLET: TAB at 08:31

## 2018-06-22 RX ADMIN — GABAPENTIN 800 MG: 400 CAPSULE ORAL at 21:24

## 2018-06-22 RX ADMIN — INSULIN LISPRO 2 UNITS: 100 INJECTION, SOLUTION INTRAVENOUS; SUBCUTANEOUS at 08:36

## 2018-06-22 RX ADMIN — LURASIDONE HYDROCHLORIDE 40 MG: 40 TABLET, FILM COATED ORAL at 17:17

## 2018-06-22 RX ADMIN — CLONAZEPAM 0.5 MG: 0.5 TABLET ORAL at 21:24

## 2018-06-22 RX ADMIN — INSULIN GLARGINE 25 UNITS: 100 INJECTION, SOLUTION SUBCUTANEOUS at 11:08

## 2018-06-22 RX ADMIN — FENOFIBRATE 54 MG: 54 TABLET ORAL at 08:31

## 2018-06-22 RX ADMIN — INSULIN LISPRO 4 UNITS: 100 INJECTION, SOLUTION INTRAVENOUS; SUBCUTANEOUS at 12:26

## 2018-06-22 ASSESSMENT — PAIN SCALES - GENERAL
PAINLEVEL_OUTOF10: 3
PAINLEVEL_OUTOF10: 0
PAINLEVEL_OUTOF10: 4
PAINLEVEL_OUTOF10: 8
PAINLEVEL_OUTOF10: 7
PAINLEVEL_OUTOF10: 7

## 2018-06-22 ASSESSMENT — PAIN DESCRIPTION - PAIN TYPE: TYPE: CHRONIC PAIN

## 2018-06-22 ASSESSMENT — PAIN DESCRIPTION - LOCATION
LOCATION: GENERALIZED
LOCATION: BACK;NECK;SHOULDER
LOCATION: NECK

## 2018-06-22 ASSESSMENT — PAIN DESCRIPTION - ORIENTATION: ORIENTATION: RIGHT

## 2018-06-22 NOTE — PLAN OF CARE
Problem: Altered Mood, Depressive Behavior:  Goal: Able to verbalize acceptance of life and situations over which he or she has no control  Able to verbalize acceptance of life and situations over which he or she has no control   Outcome: Ongoing  Psychoeducation Group Note    Date: 6/22/2018  Start Time: 2437  End Time: 0915    Number Participants in Group:   11    Goal:  Patient will demonstrate increased interpersonal interaction. Topic:  Goal Setting / Community Meeting    Discipline Responsible:   OT  AT  [facility]. X RT  Other       Participation Level:     None  Minimal   x Active Listener x Interactive    Monopolizing         Participation Quality:  x Appropriate  Inappropriate   x       Attentive        Intrusive   x       Sharing        Resistant          Supportive        Lethargic       Affective:   x Congruent  Incongruent  Blunted  Flat    Constricted  Anxious  Elated  Angry    Labile  Depressed  Other  Bright       Cognitive:  x Alert x Oriented PPTP     Concentration x G  F  P   Attention Span x G  F  P   Short-Term Memory x G  F  P   Long-Term Memory  G  F  P   ProblemSolving/  Decision Making x G  F  P   Ability to Process  Information x G  F  P      Contributing Factors             Delusional             Hallucinating             Flight of Ideas             Other:       Modes of Intervention:  x Education x Support x Exploration   x Clarifying x Problem Solving x Confrontation   x Socialization x Limit Setting x Reality Testing   x Activity  Movement  Media    Other:            Response to Learning:  x Able to verbalize current knowledge/experience   x Able to verbalize/acknowledge new learning   x Able to retain information   x Capable of insight    Able to change behavior   x Progressing to goal    Other:        Goal for today:  Have self control.

## 2018-06-22 NOTE — PLAN OF CARE
Problem: Altered Mood, Depressive Behavior:  Goal: Able to verbalize acceptance of life and situations over which he or she has no control  Able to verbalize acceptance of life and situations over which he or she has no control   Outcome: Ongoing  Psychoeducation Group Note    Date: 6/22/18  Start Time: 1115  End Time: 1155    Number Participants in Group:  10    Goal:  Patient will demonstrate increased interpersonal interaction.    Topic:  Therapeutic Leisure Skills Group    Discipline Responsible:   OT  AT  [facility]. X RT  Other       Participation Level:     None  Minimal   X Active Listener X Interactive    Monopolizing         Participation Quality:  X Appropriate  Inappropriate   X       Attentive        Intrusive   X       Sharing        Resistant   X       Supportive        Lethargic       Affective:   X Congruent  Incongruent  Blunted  Flat    Constricted  Anxious  Elated  Angry    Labile  Depressed  Other  Bright       Cognitive:  X Alert X Oriented PPTP     Concentration X G  F  P   Attention Span X G  F  P   Short-Term Memory X G  F  P   Long-Term Memory X G  F  P   ProblemSolving/  Decision Making X G  F  P   Ability to Process  Information X G  F  P      Contributing Factors             Delusional             Hallucinating             Flight of Ideas             Other:       Modes of Intervention:  X Education X Support X Exploration    Clarifying X Problem Solving  Confrontation   X Socialization X Limit Setting  Reality Testing   X Activity  Movement  Media    Other:            Response to Learning:  X Able to verbalize current knowledge/experience   X Able to verbalize/acknowledge new learning   X Able to retain information   X Capable of insight    Able to change behavior   X Progressing to goal    Other:        Comments:

## 2018-06-22 NOTE — PLAN OF CARE
Problem: Anger Management/Homicidal Ideation:  Goal: Absence of angry outbursts  Absence of angry outbursts   Outcome: Met This Shift  Pt has been pleasant, cooperative with directions. No aggressive behaviors exhibited this shift. Pt denies H/I. Relating he has been working on being more assertive & speaking his mind in a calm manner. Problem: Altered Mood, Depressive Behavior:  Goal: Able to verbalize acceptance of life and situations over which he or she has no control  Able to verbalize acceptance of life and situations over which he or she has no control   Outcome: Met This Shift  Pt relating about his depression & pain. States, \"the pain is something I have to learn to manage, it is never going away\". Pt rates his depression at a level 5 (1-10). Does relate he is feeling better. He is active, social with peers. Compliant with medicine, has a good appetite. Denies S/I, no self harm behaviors exhibited. Attends group with good participation. Pt is working on increasing his coping skills.

## 2018-06-22 NOTE — PLAN OF CARE
Problem: Anger Management/Homicidal Ideation:  Goal: Absence of angry outbursts  Absence of angry outbursts   Outcome: Ongoing  PSYCHOTHERAPY GROUP NOTE    Date: 6/22/18  Start Time: 10 AM  End Time: 1055 AM    Number Participants in Group:  11    Goal:  Patient will demonstrate increased interpersonal interaction   Topic: Support     Discipline Responsible:   OT  AT X SW  Nsg.  RT MHP Other       Participation Level:     None  Minimal    Active Listener x Interactive    Monopolizing         Participation Quality:  x Appropriate  Inappropriate   x       Attentive        Intrusive   x       Sharing        Resistant   x       Supportive        Lethargic       Affective:   x Congruent  Incongruent  Blunted  Flat    Constricted  Anxious  Elated  Angry    Labile  Depressed  Other         Cognitive:  x Alert x Oriented PPTP     Concentration  G x F  P   Attention Span  G x F  P   Short-Term Memory  G x F  P   Long-Term Memory  G x F  P   ProblemSolving/  Decision Making  G x F  P   Ability to Process  Information  G x F  P      Contributing Factors             Delusional             Hallucinating             Flight of Ideas             Other:       Modes of Intervention:   Education x Support  Exploration    Clarifying  Problem Solving  Confrontation    Socialization  Limit Setting  Reality Testing    Activity  Movement  Media    Other:            Response to Learning:  x Able to verbalize current knowledge/experience    Able to verbalize/acknowledge new learning    Able to retain information    Capable of insight    Able to change behavior    Progressing to goal    Other:        Comments:

## 2018-06-22 NOTE — PROGRESS NOTES
with breakfast  glucose (GLUTOSE) 40 % oral gel 15 g, 15 g, Oral, PRN  dextrose 50 % solution 12.5 g, 12.5 g, Intravenous, PRN  glucagon (rDNA) injection 1 mg, 1 mg, Intramuscular, PRN  dextrose 5 % solution, 100 mL/hr, Intravenous, PRN  meloxicam (MOBIC) tablet 15 mg, 15 mg, Oral, BID  acetaminophen (TYLENOL) tablet 650 mg, 650 mg, Oral, Q4H PRN  benztropine mesylate (COGENTIN) injection 2 mg, 2 mg, Intramuscular, BID PRN  magnesium hydroxide (MILK OF MAGNESIA) 400 MG/5ML suspension 30 mL, 30 mL, Oral, Daily PRN  aluminum & magnesium hydroxide-simethicone (MAALOX) 200-200-20 MG/5ML suspension 30 mL, 30 mL, Oral, Q6H PRN  nicotine (NICODERM CQ) 14 MG/24HR 1 patch, 1 patch, Transdermal, Daily  nicotine polacrilex (NICORETTE) gum 2 mg, 2 mg, Oral, Q2H PRN  atorvastatin (LIPITOR) tablet 40 mg, 40 mg, Oral, Daily  lipase-protease-amylase (CREON) 95638-09041 units delayed release capsule 2 capsule, 2 capsule, Oral, TID WC  fenofibrate (TRICOR) tablet 54 mg, 54 mg, Oral, Daily  folic acid (FOLVITE) tablet 1 mg, 1 mg, Oral, Daily  gabapentin (NEURONTIN) capsule 800 mg, 800 mg, Oral, TID  metoprolol tartrate (LOPRESSOR) tablet 25 mg, 25 mg, Oral, BID  therapeutic multivitamin-minerals 1 tablet, 1 tablet, Oral, Daily  pantoprazole (PROTONIX) tablet 40 mg, 40 mg, Oral, QAM AC  tiZANidine (ZANAFLEX) tablet 4 mg, 4 mg, Oral, Nightly  hydrOXYzine (ATARAX) tablet 100 mg, 100 mg, Oral, TID PRN    ASSESSMENT AND PLAN    Discontinuing Risperdal and Seroquel due to the metabolic actions and worsening of blood sugar levels, substituting with the Latuda 40 mg daily with titrating for effect  group psychotherapy ,unit milieu and discharge planning

## 2018-06-23 LAB
GLUCOSE BLD-MCNC: 155 MG/DL (ref 75–110)
GLUCOSE BLD-MCNC: 209 MG/DL (ref 75–110)
GLUCOSE BLD-MCNC: 226 MG/DL (ref 75–110)
GLUCOSE BLD-MCNC: 262 MG/DL (ref 75–110)

## 2018-06-23 PROCEDURE — 6370000000 HC RX 637 (ALT 250 FOR IP): Performed by: INTERNAL MEDICINE

## 2018-06-23 PROCEDURE — 6370000000 HC RX 637 (ALT 250 FOR IP): Performed by: PSYCHIATRY & NEUROLOGY

## 2018-06-23 PROCEDURE — 6370000000 HC RX 637 (ALT 250 FOR IP): Performed by: REGISTERED NURSE

## 2018-06-23 PROCEDURE — 99232 SBSQ HOSP IP/OBS MODERATE 35: CPT | Performed by: REGISTERED NURSE

## 2018-06-23 PROCEDURE — 1240000000 HC EMOTIONAL WELLNESS R&B

## 2018-06-23 PROCEDURE — 82947 ASSAY GLUCOSE BLOOD QUANT: CPT

## 2018-06-23 RX ADMIN — PANCRELIPASE 2 CAPSULE: 24000; 76000; 120000 CAPSULE, DELAYED RELEASE PELLETS ORAL at 07:44

## 2018-06-23 RX ADMIN — GABAPENTIN 800 MG: 400 CAPSULE ORAL at 13:24

## 2018-06-23 RX ADMIN — MULTIPLE VITAMINS W/ MINERALS TAB 1 TABLET: TAB at 08:37

## 2018-06-23 RX ADMIN — ATORVASTATIN CALCIUM 40 MG: 20 TABLET, FILM COATED ORAL at 08:35

## 2018-06-23 RX ADMIN — INSULIN LISPRO 4 UNITS: 100 INJECTION, SOLUTION INTRAVENOUS; SUBCUTANEOUS at 07:48

## 2018-06-23 RX ADMIN — TIZANIDINE 4 MG: 4 TABLET ORAL at 20:36

## 2018-06-23 RX ADMIN — MIRTAZAPINE 30 MG: 30 TABLET, FILM COATED ORAL at 21:19

## 2018-06-23 RX ADMIN — LURASIDONE HYDROCHLORIDE 40 MG: 40 TABLET, FILM COATED ORAL at 08:37

## 2018-06-23 RX ADMIN — PANCRELIPASE 2 CAPSULE: 24000; 76000; 120000 CAPSULE, DELAYED RELEASE PELLETS ORAL at 16:41

## 2018-06-23 RX ADMIN — VENLAFAXINE 75 MG: 75 TABLET ORAL at 08:37

## 2018-06-23 RX ADMIN — INSULIN LISPRO 4 UNITS: 100 INJECTION, SOLUTION INTRAVENOUS; SUBCUTANEOUS at 16:44

## 2018-06-23 RX ADMIN — METOPROLOL TARTRATE 25 MG: 25 TABLET ORAL at 20:36

## 2018-06-23 RX ADMIN — GABAPENTIN 800 MG: 400 CAPSULE ORAL at 20:35

## 2018-06-23 RX ADMIN — PANCRELIPASE 2 CAPSULE: 24000; 76000; 120000 CAPSULE, DELAYED RELEASE PELLETS ORAL at 12:39

## 2018-06-23 RX ADMIN — ACETAMINOPHEN 650 MG: 325 TABLET, FILM COATED ORAL at 06:28

## 2018-06-23 RX ADMIN — FOLIC ACID 1 MG: 1 TABLET ORAL at 08:37

## 2018-06-23 RX ADMIN — GLIMEPIRIDE 4 MG: 4 TABLET ORAL at 07:44

## 2018-06-23 RX ADMIN — MELOXICAM 15 MG: 7.5 TABLET ORAL at 08:35

## 2018-06-23 RX ADMIN — HYDROXYZINE HYDROCHLORIDE 100 MG: 25 TABLET, FILM COATED ORAL at 16:47

## 2018-06-23 RX ADMIN — HYDROXYZINE HYDROCHLORIDE 100 MG: 25 TABLET, FILM COATED ORAL at 06:28

## 2018-06-23 RX ADMIN — INSULIN LISPRO 2 UNITS: 100 INJECTION, SOLUTION INTRAVENOUS; SUBCUTANEOUS at 12:40

## 2018-06-23 RX ADMIN — VENLAFAXINE 75 MG: 75 TABLET ORAL at 16:41

## 2018-06-23 RX ADMIN — INSULIN LISPRO 3 UNITS: 100 INJECTION, SOLUTION INTRAVENOUS; SUBCUTANEOUS at 21:19

## 2018-06-23 RX ADMIN — INSULIN GLARGINE 25 UNITS: 100 INJECTION, SOLUTION SUBCUTANEOUS at 08:00

## 2018-06-23 RX ADMIN — FENOFIBRATE 54 MG: 54 TABLET ORAL at 07:44

## 2018-06-23 RX ADMIN — INSULIN GLARGINE 25 UNITS: 100 INJECTION, SOLUTION SUBCUTANEOUS at 21:18

## 2018-06-23 RX ADMIN — CLONAZEPAM 0.5 MG: 0.5 TABLET ORAL at 20:36

## 2018-06-23 RX ADMIN — MELOXICAM 15 MG: 7.5 TABLET ORAL at 20:35

## 2018-06-23 RX ADMIN — HYDROXYZINE HYDROCHLORIDE 100 MG: 25 TABLET, FILM COATED ORAL at 20:35

## 2018-06-23 RX ADMIN — GABAPENTIN 800 MG: 400 CAPSULE ORAL at 08:37

## 2018-06-23 RX ADMIN — METOPROLOL TARTRATE 25 MG: 25 TABLET ORAL at 08:37

## 2018-06-23 RX ADMIN — PANTOPRAZOLE SODIUM 40 MG: 40 TABLET, DELAYED RELEASE ORAL at 06:28

## 2018-06-23 ASSESSMENT — PAIN SCALES - GENERAL
PAINLEVEL_OUTOF10: 7
PAINLEVEL_OUTOF10: 3
PAINLEVEL_OUTOF10: 7
PAINLEVEL_OUTOF10: 3

## 2018-06-23 ASSESSMENT — PAIN DESCRIPTION - LOCATION
LOCATION: GENERALIZED
LOCATION: GENERALIZED
LOCATION: NECK

## 2018-06-23 ASSESSMENT — PAIN DESCRIPTION - DESCRIPTORS: DESCRIPTORS: ACHING

## 2018-06-23 ASSESSMENT — PAIN DESCRIPTION - PAIN TYPE: TYPE: CHRONIC PAIN

## 2018-06-23 NOTE — PLAN OF CARE
Problem: Altered Mood, Depressive Behavior:  Goal: Able to verbalize acceptance of life and situations over which he or she has no control  Able to verbalize acceptance of life and situations over which he or she has no control   PATIENT DID  [address] AND PROMPTS.

## 2018-06-23 NOTE — BH NOTE
Wellness Group Note    Date: 6/23  Start Time: 1330  End Time: 1400    Number Participants in Group:  8/20    Goal:  Coping with Life Stressor     Discipline Responsible:   OT  AT   X Nsg.  RT  Other       Participation Level:     None  Minimal   X Active Listener X Interactive    Monopolizing         Participation Quality:  X Appropriate  Inappropriate   X       Attentive        Intrusive   X       Sharing        Resistant   X       Supportive        Lethargic       Affective:   X Congruent  Incongruent  Blunted  Flat    Constricted  Anxious  Elated  Angry    Labile  Depressed  Other         Cognitive:  X Alert  Oriented PPTP     Concentration X G  F  P   Attention Span X G  F  P   Short-Term Memory  G  F  P   Long-Term Memory  G  F  P   ProblemSolving/  Decision Making X G  F  P   Ability to Process  Information  G  F  P       Modes of Intervention:  X Education X Support  Exploration    Clarifying  Problem Solving  Confrontation   X Socialization  Limit Setting  Reality Testing    Activity  Movement  Media    Other:            Response to Learning:  X Able to verbalize current knowledge/experience    Able to verbalize/acknowledge new learning    Able to retain information    Capable of insight    Able to change behavior    Progressing to goal    Other:

## 2018-06-23 NOTE — PLAN OF CARE
Problem: Altered Mood, Depressive Behavior:  Goal: Able to verbalize acceptance of life and situations over which he or she has no control  Able to verbalize acceptance of life and situations over which he or she has no control   Outcome: Ongoing  PSYCHOEDUCATION GROUP NOTE    Date: 6/23/18  Start Time: 1100  End Time: 1140    Number Participants in Group:  11/21    Goal:  Patient will demonstrate increased interpersonal interaction   Topic: Problem Solving, Critical Thinking, Socialization     Discipline Responsible:   OT  AT  [facility]. x RT MHP Other       Participation Level:     None  Minimal   x Active Listener x Interactive    Monopolizing         Participation Quality:  x Appropriate  Inappropriate   x       Attentive        Intrusive   x       Sharing        Resistant          Supportive        Lethargic       Affective:   x Congruent  Incongruent  Blunted  Flat    Constricted  Anxious  Elated  Angry    Labile  Depressed  Other         Cognitive:  x Alert x Oriented PPTP     Concentration x G  F  P   Attention Span x G  F  P   Short-Term Memory x G  F  P   Long-Term Memory x G  F  P   ProblemSolving/  Decision Making x G  F  P   Ability to Process  Information x G  F  P      Contributing Factors             Delusional             Hallucinating             Flight of Ideas             Other:       Modes of Intervention:  x Education x Support x Exploration    Clarifying x Problem Solving  Confrontation   x Socialization  Limit Setting x Reality Testing    Activity  Movement x Media    Other:            Response to Learning:  x Able to verbalize current knowledge/experience   x Able to verbalize/acknowledge new learning    Able to retain information    Capable of insight    Able to change behavior   x Progressing to goal    Other:        Comments:

## 2018-06-23 NOTE — PLAN OF CARE
Problem: Anger Management/Homicidal Ideation:  Goal: Absence of angry outbursts  Absence of angry outbursts   Psychoeducation Group Note    Date: 6/23/18  Start Time: 0900  End Time: 0930    Number Participants in Group:  7/21    Goal:  Patient will demonstrate increased interpersonal interaction   Topic: [address]    Discipline Responsible:   OT  AT  [facility]. x RT  Other       Participation Level:     None  Minimal   x Active Listener x Interactive    Monopolizing         Participation Quality:  x Appropriate  Inappropriate   x       Attentive        Intrusive          Sharing        Resistant          Supportive        Lethargic       Affective:   x Congruent  Incongruent  Blunted  Flat    Constricted  Anxious  Elated  Angry    Labile  Depressed  Other         Cognitive:  x Alert x Oriented PPTP     Concentration x G  F  P   Attention Span x G  F  P   Short-Term Memory  G  F  P   Long-Term Memory  G  F  P   ProblemSolving/  Decision Making x G  F  P   Ability to Process  Information x G  F  P      Contributing Factors             Delusional             Hallucinating             Flight of Ideas             Other:       Modes of Intervention:  x Education x Support x Exploration    Clarifying x Problem Solving  Confrontation   x Socialization  Limit Setting  Reality Testing   x Activity  Movement  Media    Other:            Response to Learning:  x Able to verbalize current knowledge/experience    Able to verbalize/acknowledge new learning    Able to retain information   x Capable of insight   x Able to change behavior    Progressing to goal    Other:        Comments:

## 2018-06-24 LAB
GLUCOSE BLD-MCNC: 174 MG/DL (ref 75–110)
GLUCOSE BLD-MCNC: 187 MG/DL (ref 75–110)
GLUCOSE BLD-MCNC: 210 MG/DL (ref 75–110)
GLUCOSE BLD-MCNC: 266 MG/DL (ref 75–110)

## 2018-06-24 PROCEDURE — 6370000000 HC RX 637 (ALT 250 FOR IP): Performed by: INTERNAL MEDICINE

## 2018-06-24 PROCEDURE — 1240000000 HC EMOTIONAL WELLNESS R&B

## 2018-06-24 PROCEDURE — 6370000000 HC RX 637 (ALT 250 FOR IP): Performed by: PSYCHIATRY & NEUROLOGY

## 2018-06-24 PROCEDURE — 82947 ASSAY GLUCOSE BLOOD QUANT: CPT

## 2018-06-24 PROCEDURE — 6370000000 HC RX 637 (ALT 250 FOR IP): Performed by: REGISTERED NURSE

## 2018-06-24 RX ADMIN — LURASIDONE HYDROCHLORIDE 40 MG: 40 TABLET, FILM COATED ORAL at 08:28

## 2018-06-24 RX ADMIN — VENLAFAXINE 75 MG: 75 TABLET ORAL at 17:00

## 2018-06-24 RX ADMIN — INSULIN LISPRO 4 UNITS: 100 INJECTION, SOLUTION INTRAVENOUS; SUBCUTANEOUS at 16:51

## 2018-06-24 RX ADMIN — PANTOPRAZOLE SODIUM 40 MG: 40 TABLET, DELAYED RELEASE ORAL at 07:54

## 2018-06-24 RX ADMIN — PANCRELIPASE 2 CAPSULE: 24000; 76000; 120000 CAPSULE, DELAYED RELEASE PELLETS ORAL at 16:50

## 2018-06-24 RX ADMIN — METOPROLOL TARTRATE 25 MG: 25 TABLET ORAL at 21:06

## 2018-06-24 RX ADMIN — MIRTAZAPINE 30 MG: 30 TABLET, FILM COATED ORAL at 21:56

## 2018-06-24 RX ADMIN — ACETAMINOPHEN 650 MG: 325 TABLET, FILM COATED ORAL at 06:37

## 2018-06-24 RX ADMIN — METOPROLOL TARTRATE 25 MG: 25 TABLET ORAL at 08:27

## 2018-06-24 RX ADMIN — GLIMEPIRIDE 4 MG: 4 TABLET ORAL at 07:54

## 2018-06-24 RX ADMIN — TIZANIDINE 4 MG: 4 TABLET ORAL at 21:06

## 2018-06-24 RX ADMIN — VENLAFAXINE 75 MG: 75 TABLET ORAL at 07:54

## 2018-06-24 RX ADMIN — HYDROXYZINE HYDROCHLORIDE 100 MG: 25 TABLET, FILM COATED ORAL at 21:05

## 2018-06-24 RX ADMIN — FENOFIBRATE 54 MG: 54 TABLET ORAL at 07:54

## 2018-06-24 RX ADMIN — GABAPENTIN 800 MG: 400 CAPSULE ORAL at 21:06

## 2018-06-24 RX ADMIN — CLONAZEPAM 0.5 MG: 0.5 TABLET ORAL at 21:05

## 2018-06-24 RX ADMIN — INSULIN LISPRO 2 UNITS: 100 INJECTION, SOLUTION INTRAVENOUS; SUBCUTANEOUS at 08:03

## 2018-06-24 RX ADMIN — INSULIN LISPRO 2 UNITS: 100 INJECTION, SOLUTION INTRAVENOUS; SUBCUTANEOUS at 11:32

## 2018-06-24 RX ADMIN — GABAPENTIN 800 MG: 400 CAPSULE ORAL at 13:56

## 2018-06-24 RX ADMIN — INSULIN GLARGINE 25 UNITS: 100 INJECTION, SOLUTION SUBCUTANEOUS at 21:05

## 2018-06-24 RX ADMIN — GABAPENTIN 800 MG: 400 CAPSULE ORAL at 08:27

## 2018-06-24 RX ADMIN — MELOXICAM 15 MG: 7.5 TABLET ORAL at 21:05

## 2018-06-24 RX ADMIN — INSULIN GLARGINE 25 UNITS: 100 INJECTION, SOLUTION SUBCUTANEOUS at 08:06

## 2018-06-24 RX ADMIN — MELOXICAM 15 MG: 7.5 TABLET ORAL at 08:27

## 2018-06-24 RX ADMIN — HYDROXYZINE HYDROCHLORIDE 100 MG: 25 TABLET, FILM COATED ORAL at 13:58

## 2018-06-24 RX ADMIN — HYDROXYZINE HYDROCHLORIDE 100 MG: 25 TABLET, FILM COATED ORAL at 06:37

## 2018-06-24 RX ADMIN — FOLIC ACID 1 MG: 1 TABLET ORAL at 08:28

## 2018-06-24 RX ADMIN — INSULIN LISPRO 3 UNITS: 100 INJECTION, SOLUTION INTRAVENOUS; SUBCUTANEOUS at 21:03

## 2018-06-24 RX ADMIN — PANCRELIPASE 2 CAPSULE: 24000; 76000; 120000 CAPSULE, DELAYED RELEASE PELLETS ORAL at 07:54

## 2018-06-24 RX ADMIN — MULTIPLE VITAMINS W/ MINERALS TAB 1 TABLET: TAB at 08:28

## 2018-06-24 RX ADMIN — ATORVASTATIN CALCIUM 40 MG: 20 TABLET, FILM COATED ORAL at 08:27

## 2018-06-24 RX ADMIN — PANCRELIPASE 2 CAPSULE: 24000; 76000; 120000 CAPSULE, DELAYED RELEASE PELLETS ORAL at 11:31

## 2018-06-24 ASSESSMENT — PAIN DESCRIPTION - PAIN TYPE
TYPE: CHRONIC PAIN

## 2018-06-24 ASSESSMENT — PAIN SCALES - GENERAL
PAINLEVEL_OUTOF10: 7
PAINLEVEL_OUTOF10: 3
PAINLEVEL_OUTOF10: 2
PAINLEVEL_OUTOF10: 3
PAINLEVEL_OUTOF10: 7
PAINLEVEL_OUTOF10: 7
PAINLEVEL_OUTOF10: 3

## 2018-06-24 ASSESSMENT — PAIN DESCRIPTION - LOCATION
LOCATION: BACK;NECK

## 2018-06-24 NOTE — PLAN OF CARE
Problem: Altered Mood, Depressive Behavior:  Goal: Able to verbalize and/or display a decrease in depressive symptoms  Able to verbalize and/or display a decrease in depressive symptoms   PATIENT DID NOT [facility] [id number] DESPITE STAFF ENCOURAGEMENT AND PROMPTS.

## 2018-06-24 NOTE — PROGRESS NOTES
Pt did not attend 1020 creative expression group d/t resting in room despite staff invitation to attend.

## 2018-06-25 LAB
GLUCOSE BLD-MCNC: 132 MG/DL (ref 75–110)
GLUCOSE BLD-MCNC: 173 MG/DL (ref 75–110)
GLUCOSE BLD-MCNC: 186 MG/DL (ref 75–110)
GLUCOSE BLD-MCNC: 195 MG/DL (ref 75–110)

## 2018-06-25 PROCEDURE — 1240000000 HC EMOTIONAL WELLNESS R&B

## 2018-06-25 PROCEDURE — 6370000000 HC RX 637 (ALT 250 FOR IP): Performed by: PSYCHIATRY & NEUROLOGY

## 2018-06-25 PROCEDURE — 6370000000 HC RX 637 (ALT 250 FOR IP): Performed by: INTERNAL MEDICINE

## 2018-06-25 PROCEDURE — 82947 ASSAY GLUCOSE BLOOD QUANT: CPT

## 2018-06-25 PROCEDURE — 6370000000 HC RX 637 (ALT 250 FOR IP): Performed by: REGISTERED NURSE

## 2018-06-25 PROCEDURE — 99232 SBSQ HOSP IP/OBS MODERATE 35: CPT | Performed by: NURSE PRACTITIONER

## 2018-06-25 RX ADMIN — MELOXICAM 15 MG: 7.5 TABLET ORAL at 08:47

## 2018-06-25 RX ADMIN — FOLIC ACID 1 MG: 1 TABLET ORAL at 08:47

## 2018-06-25 RX ADMIN — PANCRELIPASE 2 CAPSULE: 24000; 76000; 120000 CAPSULE, DELAYED RELEASE PELLETS ORAL at 08:15

## 2018-06-25 RX ADMIN — GABAPENTIN 800 MG: 400 CAPSULE ORAL at 14:25

## 2018-06-25 RX ADMIN — MIRTAZAPINE 30 MG: 30 TABLET, FILM COATED ORAL at 21:26

## 2018-06-25 RX ADMIN — VENLAFAXINE 75 MG: 75 TABLET ORAL at 17:54

## 2018-06-25 RX ADMIN — PANTOPRAZOLE SODIUM 40 MG: 40 TABLET, DELAYED RELEASE ORAL at 08:15

## 2018-06-25 RX ADMIN — GABAPENTIN 800 MG: 400 CAPSULE ORAL at 08:47

## 2018-06-25 RX ADMIN — MULTIPLE VITAMINS W/ MINERALS TAB 1 TABLET: TAB at 08:47

## 2018-06-25 RX ADMIN — TIZANIDINE 4 MG: 4 TABLET ORAL at 21:26

## 2018-06-25 RX ADMIN — ACETAMINOPHEN 650 MG: 325 TABLET, FILM COATED ORAL at 05:01

## 2018-06-25 RX ADMIN — HYDROXYZINE HYDROCHLORIDE 100 MG: 25 TABLET, FILM COATED ORAL at 14:25

## 2018-06-25 RX ADMIN — INSULIN LISPRO 1 UNITS: 100 INJECTION, SOLUTION INTRAVENOUS; SUBCUTANEOUS at 21:32

## 2018-06-25 RX ADMIN — ACETAMINOPHEN 650 MG: 325 TABLET, FILM COATED ORAL at 09:40

## 2018-06-25 RX ADMIN — PANCRELIPASE 2 CAPSULE: 24000; 76000; 120000 CAPSULE, DELAYED RELEASE PELLETS ORAL at 21:31

## 2018-06-25 RX ADMIN — HYDROXYZINE HYDROCHLORIDE 100 MG: 25 TABLET, FILM COATED ORAL at 21:25

## 2018-06-25 RX ADMIN — INSULIN GLARGINE 25 UNITS: 100 INJECTION, SOLUTION SUBCUTANEOUS at 08:48

## 2018-06-25 RX ADMIN — METOPROLOL TARTRATE 25 MG: 25 TABLET ORAL at 21:26

## 2018-06-25 RX ADMIN — INSULIN LISPRO 2 UNITS: 100 INJECTION, SOLUTION INTRAVENOUS; SUBCUTANEOUS at 12:01

## 2018-06-25 RX ADMIN — LURASIDONE HYDROCHLORIDE 40 MG: 40 TABLET, FILM COATED ORAL at 08:47

## 2018-06-25 RX ADMIN — METOPROLOL TARTRATE 25 MG: 25 TABLET ORAL at 08:47

## 2018-06-25 RX ADMIN — ACETAMINOPHEN 650 MG: 325 TABLET, FILM COATED ORAL at 21:26

## 2018-06-25 RX ADMIN — INSULIN LISPRO 2 UNITS: 100 INJECTION, SOLUTION INTRAVENOUS; SUBCUTANEOUS at 16:56

## 2018-06-25 RX ADMIN — MELOXICAM 15 MG: 7.5 TABLET ORAL at 21:25

## 2018-06-25 RX ADMIN — PANCRELIPASE 2 CAPSULE: 24000; 76000; 120000 CAPSULE, DELAYED RELEASE PELLETS ORAL at 12:01

## 2018-06-25 RX ADMIN — FENOFIBRATE 54 MG: 54 TABLET ORAL at 08:47

## 2018-06-25 RX ADMIN — INSULIN GLARGINE 25 UNITS: 100 INJECTION, SOLUTION SUBCUTANEOUS at 21:32

## 2018-06-25 RX ADMIN — HYDROXYZINE HYDROCHLORIDE 100 MG: 25 TABLET, FILM COATED ORAL at 05:01

## 2018-06-25 RX ADMIN — GABAPENTIN 800 MG: 400 CAPSULE ORAL at 22:07

## 2018-06-25 RX ADMIN — GLIMEPIRIDE 4 MG: 4 TABLET ORAL at 08:47

## 2018-06-25 RX ADMIN — CLONAZEPAM 0.5 MG: 0.5 TABLET ORAL at 21:25

## 2018-06-25 RX ADMIN — VENLAFAXINE 75 MG: 75 TABLET ORAL at 08:47

## 2018-06-25 RX ADMIN — ATORVASTATIN CALCIUM 40 MG: 20 TABLET, FILM COATED ORAL at 08:47

## 2018-06-25 ASSESSMENT — PAIN SCALES - GENERAL
PAINLEVEL_OUTOF10: 6
PAINLEVEL_OUTOF10: 7
PAINLEVEL_OUTOF10: 0
PAINLEVEL_OUTOF10: 3
PAINLEVEL_OUTOF10: 4
PAINLEVEL_OUTOF10: 7
PAINLEVEL_OUTOF10: 7
PAINLEVEL_OUTOF10: 2

## 2018-06-25 ASSESSMENT — PAIN DESCRIPTION - PAIN TYPE
TYPE: CHRONIC PAIN

## 2018-06-25 ASSESSMENT — PAIN DESCRIPTION - LOCATION
LOCATION: BACK;HEAD
LOCATION: HEAD
LOCATION: HEAD
LOCATION: BACK;HEAD
LOCATION: HEAD;BACK

## 2018-06-25 NOTE — PLAN OF CARE
Problem: Altered Mood, Depressive Behavior:  Goal: Able to verbalize and/or display a decrease in depressive symptoms  Able to verbalize and/or display a decrease in depressive symptoms   Outcome: Ongoing  Psychoeducation Group Note    Date: 6/25/2018  Start Time: 1430  End Time: 1515    Number Participants in Group:  10    Goal:  Patient will demonstrate increased interpersonal interaction.    Topic:  Benefits of Exercise / Walking / Positive Coping Skill    Discipline Responsible:   OT  AT  [facility]. X RT  Other       Participation Level:     None x Minimal   x Active Listener  Interactive    Monopolizing         Participation Quality:  x Appropriate  Inappropriate   x       Attentive        Intrusive   x       Sharing        Resistant          Supportive        Lethargic       Affective:   x Congruent  Incongruent  Blunted  Flat    Constricted  Anxious  Elated  Angry    Labile  Depressed  Other  Bright       Cognitive:  x Alert x Oriented PPTP     Concentration  G x F  P   Attention Span  G x F  P   Short-Term Memory x G  F  P   Long-Term Memory  G  F  P   ProblemSolving/  Decision Making  G x F  P   Ability to Process  Information x G  F  P      Contributing Factors             Delusional             Hallucinating             Flight of Ideas             Other:       Modes of Intervention:  x Education x Support x Exploration   x Clarifying x Problem Solving x Confrontation   x Socialization x Limit Setting x Reality Testing   x Activity  Movement  Media    Other:            Response to Learning:  x Able to verbalize current knowledge/experience   x Able to verbalize/acknowledge new learning   x Able to retain information   x Capable of insight    Able to change behavior   x Progressing to goal    Other:        Comments:

## 2018-06-25 NOTE — PROGRESS NOTES
breakfast  glucose (GLUTOSE) 40 % oral gel 15 g, 15 g, Oral, PRN  dextrose 50 % solution 12.5 g, 12.5 g, Intravenous, PRN  glucagon (rDNA) injection 1 mg, 1 mg, Intramuscular, PRN  dextrose 5 % solution, 100 mL/hr, Intravenous, PRN  meloxicam (MOBIC) tablet 15 mg, 15 mg, Oral, BID  acetaminophen (TYLENOL) tablet 650 mg, 650 mg, Oral, Q4H PRN  benztropine mesylate (COGENTIN) injection 2 mg, 2 mg, Intramuscular, BID PRN  magnesium hydroxide (MILK OF MAGNESIA) 400 MG/5ML suspension 30 mL, 30 mL, Oral, Daily PRN  aluminum & magnesium hydroxide-simethicone (MAALOX) 200-200-20 MG/5ML suspension 30 mL, 30 mL, Oral, Q6H PRN  nicotine (NICODERM CQ) 14 MG/24HR 1 patch, 1 patch, Transdermal, Daily  nicotine polacrilex (NICORETTE) gum 2 mg, 2 mg, Oral, Q2H PRN  atorvastatin (LIPITOR) tablet 40 mg, 40 mg, Oral, Daily  lipase-protease-amylase (CREON) 65344-92506 units delayed release capsule 2 capsule, 2 capsule, Oral, TID WC  fenofibrate (TRICOR) tablet 54 mg, 54 mg, Oral, Daily  folic acid (FOLVITE) tablet 1 mg, 1 mg, Oral, Daily  gabapentin (NEURONTIN) capsule 800 mg, 800 mg, Oral, TID  metoprolol tartrate (LOPRESSOR) tablet 25 mg, 25 mg, Oral, BID  therapeutic multivitamin-minerals 1 tablet, 1 tablet, Oral, Daily  pantoprazole (PROTONIX) tablet 40 mg, 40 mg, Oral, QAM AC  tiZANidine (ZANAFLEX) tablet 4 mg, 4 mg, Oral, Nightly  hydrOXYzine (ATARAX) tablet 100 mg, 100 mg, Oral, TID PRN    ASSESSMENT AND PLAN    Increase lurasidone to 60 mg daily and will titrating for effect.   group psychotherapy ,unit milieu and discharge planning

## 2018-06-25 NOTE — PLAN OF CARE
Problem: Altered Mood, Depressive Behavior:  Goal: Able to verbalize and/or display a decrease in depressive symptoms  Able to verbalize and/or display a decrease in depressive symptoms   Outcome: Ongoing  Psychoeducation Group Note    Date: 6/25/18  Start Time: 0845  End Time: 0915    Number Participants in Group:  8    Goal:  Patient will demonstrate increased interpersonal interaction. Topic:  Goal Setting and [employer]    Discipline Responsible:   OT  AT  [facility]. X RT  Other       Participation Level:     None  Minimal   X Active Listener X Interactive    Monopolizing         Participation Quality:  X Appropriate  Inappropriate   X       Attentive        Intrusive   X       Sharing        Resistant   X       Supportive        Lethargic       Affective:   X Congruent  Incongruent  Blunted  Flat    Constricted  Anxious  Elated  Angry    Labile  Depressed  Other  Bright       Cognitive:  X Alert X Oriented PPTP     Concentration X G  F  P   Attention Span X G  F  P   Short-Term Memory X G  F  P   Long-Term Memory X G  F  P   ProblemSolving/  Decision Making X G  F  P   Ability to Process  Information X G  F  P      Contributing Factors             Delusional             Hallucinating             Flight of Ideas             Other:       Modes of Intervention:  X Education X Support X Exploration    Clarifying X Problem Solving  Confrontation   X Socialization X Limit Setting  Reality Testing   X Activity  Movement  Media    Other:            Response to Learning:  X Able to verbalize current knowledge/experience   X Able to verbalize/acknowledge new learning   X Able to retain information   X Capable of insight    Able to change behavior   X Progressing to goal    Other:        Comments: Pt developed daily goal to have self control, go to groups, decrease bad thoughts, use coping skills, listen to music.

## 2018-06-26 LAB
GLUCOSE BLD-MCNC: 134 MG/DL (ref 75–110)
GLUCOSE BLD-MCNC: 150 MG/DL (ref 75–110)
GLUCOSE BLD-MCNC: 154 MG/DL (ref 75–110)
GLUCOSE BLD-MCNC: 197 MG/DL (ref 75–110)

## 2018-06-26 PROCEDURE — 6370000000 HC RX 637 (ALT 250 FOR IP): Performed by: INTERNAL MEDICINE

## 2018-06-26 PROCEDURE — 6370000000 HC RX 637 (ALT 250 FOR IP): Performed by: PSYCHIATRY & NEUROLOGY

## 2018-06-26 PROCEDURE — 6370000000 HC RX 637 (ALT 250 FOR IP): Performed by: NURSE PRACTITIONER

## 2018-06-26 PROCEDURE — 6370000000 HC RX 637 (ALT 250 FOR IP): Performed by: REGISTERED NURSE

## 2018-06-26 PROCEDURE — 1240000000 HC EMOTIONAL WELLNESS R&B

## 2018-06-26 PROCEDURE — 99232 SBSQ HOSP IP/OBS MODERATE 35: CPT | Performed by: NURSE PRACTITIONER

## 2018-06-26 PROCEDURE — 82947 ASSAY GLUCOSE BLOOD QUANT: CPT

## 2018-06-26 RX ORDER — GABAPENTIN 400 MG/1
800 CAPSULE ORAL 3 TIMES DAILY
Status: DISCONTINUED | OUTPATIENT
Start: 2018-06-26 | End: 2018-06-26 | Stop reason: CLARIF

## 2018-06-26 RX ADMIN — ACETAMINOPHEN 650 MG: 325 TABLET, FILM COATED ORAL at 15:50

## 2018-06-26 RX ADMIN — ATORVASTATIN CALCIUM 40 MG: 20 TABLET, FILM COATED ORAL at 09:52

## 2018-06-26 RX ADMIN — INSULIN LISPRO 2 UNITS: 100 INJECTION, SOLUTION INTRAVENOUS; SUBCUTANEOUS at 17:09

## 2018-06-26 RX ADMIN — FENOFIBRATE 54 MG: 54 TABLET ORAL at 10:49

## 2018-06-26 RX ADMIN — MELOXICAM 15 MG: 7.5 TABLET ORAL at 09:52

## 2018-06-26 RX ADMIN — GLIMEPIRIDE 4 MG: 4 TABLET ORAL at 10:49

## 2018-06-26 RX ADMIN — ACETAMINOPHEN 650 MG: 325 TABLET, FILM COATED ORAL at 05:39

## 2018-06-26 RX ADMIN — FOLIC ACID 1 MG: 1 TABLET ORAL at 09:53

## 2018-06-26 RX ADMIN — GABAPENTIN 800 MG: 300 CAPSULE ORAL at 10:49

## 2018-06-26 RX ADMIN — METOPROLOL TARTRATE 25 MG: 25 TABLET ORAL at 21:15

## 2018-06-26 RX ADMIN — METOPROLOL TARTRATE 25 MG: 25 TABLET ORAL at 09:52

## 2018-06-26 RX ADMIN — INSULIN GLARGINE 25 UNITS: 100 INJECTION, SOLUTION SUBCUTANEOUS at 21:16

## 2018-06-26 RX ADMIN — VENLAFAXINE 75 MG: 75 TABLET ORAL at 09:53

## 2018-06-26 RX ADMIN — PANCRELIPASE 4 CAPSULE: 60000; 12000; 38000 CAPSULE, DELAYED RELEASE PELLETS ORAL at 21:14

## 2018-06-26 RX ADMIN — MULTIPLE VITAMINS W/ MINERALS TAB 1 TABLET: TAB at 09:53

## 2018-06-26 RX ADMIN — HYDROXYZINE HYDROCHLORIDE 100 MG: 25 TABLET, FILM COATED ORAL at 05:39

## 2018-06-26 RX ADMIN — INSULIN LISPRO 0 UNITS: 100 INJECTION, SOLUTION INTRAVENOUS; SUBCUTANEOUS at 09:52

## 2018-06-26 RX ADMIN — HYDROXYZINE HYDROCHLORIDE 100 MG: 25 TABLET, FILM COATED ORAL at 15:51

## 2018-06-26 RX ADMIN — LURASIDONE HYDROCHLORIDE 60 MG: 60 TABLET, FILM COATED ORAL at 09:52

## 2018-06-26 RX ADMIN — GABAPENTIN 800 MG: 300 CAPSULE ORAL at 21:14

## 2018-06-26 RX ADMIN — INSULIN LISPRO 1 UNITS: 100 INJECTION, SOLUTION INTRAVENOUS; SUBCUTANEOUS at 21:16

## 2018-06-26 RX ADMIN — MELOXICAM 15 MG: 7.5 TABLET ORAL at 21:15

## 2018-06-26 RX ADMIN — TIZANIDINE 4 MG: 4 TABLET ORAL at 21:15

## 2018-06-26 RX ADMIN — CLONAZEPAM 0.5 MG: 0.5 TABLET ORAL at 21:15

## 2018-06-26 RX ADMIN — MIRTAZAPINE 30 MG: 30 TABLET, FILM COATED ORAL at 21:15

## 2018-06-26 RX ADMIN — INSULIN GLARGINE 25 UNITS: 100 INJECTION, SOLUTION SUBCUTANEOUS at 09:51

## 2018-06-26 RX ADMIN — PANTOPRAZOLE SODIUM 40 MG: 40 TABLET, DELAYED RELEASE ORAL at 09:52

## 2018-06-26 RX ADMIN — HYDROXYZINE HYDROCHLORIDE 100 MG: 25 TABLET, FILM COATED ORAL at 21:15

## 2018-06-26 RX ADMIN — VENLAFAXINE 75 MG: 75 TABLET ORAL at 17:10

## 2018-06-26 RX ADMIN — PANCRELIPASE 2 CAPSULE: 24000; 76000; 120000 CAPSULE, DELAYED RELEASE PELLETS ORAL at 12:06

## 2018-06-26 ASSESSMENT — PAIN DESCRIPTION - FREQUENCY: FREQUENCY: INTERMITTENT

## 2018-06-26 ASSESSMENT — PAIN DESCRIPTION - PAIN TYPE
TYPE: CHRONIC PAIN

## 2018-06-26 ASSESSMENT — PAIN DESCRIPTION - LOCATION
LOCATION: BACK
LOCATION: BACK;NECK;HEAD
LOCATION: GENERALIZED
LOCATION: BACK
LOCATION: BACK

## 2018-06-26 ASSESSMENT — PAIN SCALES - GENERAL
PAINLEVEL_OUTOF10: 0
PAINLEVEL_OUTOF10: 3
PAINLEVEL_OUTOF10: 1
PAINLEVEL_OUTOF10: 0
PAINLEVEL_OUTOF10: 5
PAINLEVEL_OUTOF10: 3
PAINLEVEL_OUTOF10: 3
PAINLEVEL_OUTOF10: 1

## 2018-06-26 ASSESSMENT — PAIN DESCRIPTION - ORIENTATION: ORIENTATION: LOWER

## 2018-06-26 ASSESSMENT — PAIN DESCRIPTION - DESCRIPTORS: DESCRIPTORS: ACHING;DISCOMFORT;HEADACHE

## 2018-06-26 NOTE — PROGRESS NOTES
Charting and assessments done this shift reviewed by Electronically signed by Tiffanie Ramesh RN on 6/26/2018 at 5:04 AM

## 2018-06-26 NOTE — PLAN OF CARE
Problem: Altered Mood, Depressive Behavior:  Goal: Able to verbalize and/or display a decrease in depressive symptoms  Able to verbalize and/or display a decrease in depressive symptoms   Outcome: Ongoing  Psychoeducation Group Note    Date: 6/26/18  Start Time: 1330  End Time: 1405    Number Participants in Group:  8    Goal:  Patient will demonstrate increased interpersonal interaction.    Topic:  Cognitive Skills Therapeutic Group Activity     Discipline Responsible:   OT  AT  [facility]. X RT  Other       Participation Level:     None  Minimal   X Active Listener X Interactive    Monopolizing         Participation Quality:  X Appropriate  Inappropriate   X       Attentive        Intrusive          Sharing        Resistant          Supportive        Lethargic       Affective:   X Congruent  Incongruent  Blunted  Flat    Constricted  Anxious  Elated  Angry    Labile  Depressed  Other  Bright       Cognitive:  X Alert X Oriented PPTP     Concentration X G  F  P   Attention Span X G  F  P   Short-Term Memory X G  F  P   Long-Term Memory X G  F  P   ProblemSolving/  Decision Making X G  F  P   Ability to Process  Information X G  F  P      Contributing Factors             Delusional             Hallucinating             Flight of Ideas             Other:       Modes of Intervention:  X Education  Support X Exploration    Clarifying X Problem Solving  Confrontation   X Socialization X Limit Setting  Reality Testing   X Activity  Movement  Media    Other:            Response to Learning:  X Able to verbalize current knowledge/experience    Able to verbalize/acknowledge new learning    Able to retain information   X Capable of insight    Able to change behavior   X Progressing to goal    Other:        Comments:

## 2018-06-26 NOTE — PLAN OF CARE
Problem: Anger Management/Homicidal Ideation:  Goal: Absence of angry outbursts  Absence of angry outbursts   Outcome: Ongoing  Pt had no angry outbursts, calm, cooperative, social with peers and staff; safety ensured via protocol and random checks; crystal, bc. Goal: Absence of homicidal ideation  Absence of homicidal ideation   Outcome: Ongoing  Admits to fleeting HI; 1:1 talk effective; safety measures ensured; pt is pleasant, calm, cooperative and med compliant.

## 2018-06-26 NOTE — PROGRESS NOTES
Daily with breakfast  glucose (GLUTOSE) 40 % oral gel 15 g, 15 g, Oral, PRN  dextrose 50 % solution 12.5 g, 12.5 g, Intravenous, PRN  glucagon (rDNA) injection 1 mg, 1 mg, Intramuscular, PRN  dextrose 5 % solution, 100 mL/hr, Intravenous, PRN  meloxicam (MOBIC) tablet 15 mg, 15 mg, Oral, BID  acetaminophen (TYLENOL) tablet 650 mg, 650 mg, Oral, Q4H PRN  benztropine mesylate (COGENTIN) injection 2 mg, 2 mg, Intramuscular, BID PRN  magnesium hydroxide (MILK OF MAGNESIA) 400 MG/5ML suspension 30 mL, 30 mL, Oral, Daily PRN  aluminum & magnesium hydroxide-simethicone (MAALOX) 200-200-20 MG/5ML suspension 30 mL, 30 mL, Oral, Q6H PRN  nicotine (NICODERM CQ) 14 MG/24HR 1 patch, 1 patch, Transdermal, Daily  nicotine polacrilex (NICORETTE) gum 2 mg, 2 mg, Oral, Q2H PRN  atorvastatin (LIPITOR) tablet 40 mg, 40 mg, Oral, Daily  lipase-protease-amylase (CREON) 36542-82009 units delayed release capsule 2 capsule, 2 capsule, Oral, TID WC  fenofibrate (TRICOR) tablet 54 mg, 54 mg, Oral, Daily  folic acid (FOLVITE) tablet 1 mg, 1 mg, Oral, Daily  metoprolol tartrate (LOPRESSOR) tablet 25 mg, 25 mg, Oral, BID  therapeutic multivitamin-minerals 1 tablet, 1 tablet, Oral, Daily  pantoprazole (PROTONIX) tablet 40 mg, 40 mg, Oral, QAM AC  tiZANidine (ZANAFLEX) tablet 4 mg, 4 mg, Oral, Nightly  hydrOXYzine (ATARAX) tablet 100 mg, 100 mg, Oral, TID PRN    ASSESSMENT AND PLAN    Continue current medication regimen   group psychotherapy ,unit milieu and discharge planning

## 2018-06-26 NOTE — PLAN OF CARE
Problem: Altered Mood, Depressive Behavior:  Goal: Able to verbalize and/or display a decrease in depressive symptoms  Able to verbalize and/or display a decrease in depressive symptoms   Outcome: Ongoing  Pt did not participate in Goal Setting and Community Meeting at 0253 despite staff encouragement.

## 2018-06-26 NOTE — BH NOTE
Psychoeducation Group Note    Date: 06/26/18  Start Time: 1615  End Time: 1700    Number Participants in Group:  10/22    Goal:  Patient will demonstrate increased interpersonal interaction   Topic: Green Behavior Scale -- Life Enhancing Behaviors    Discipline Responsible:   OT  AT   x Nsg.  RT  Other       Participation Level:     None  Minimal    Active Listener x Interactive    Monopolizing         Participation Quality:  x Appropriate  Inappropriate   x       Attentive        Intrusive   x       Sharing        Resistant   x       Supportive        Lethargic       Affective:   x Congruent  Incongruent  Blunted  Flat    Constricted  Anxious  Elated  Angry    Labile  Depressed  Other         Cognitive:  x Alert x Oriented PPTP     Concentration x G  F  P   Attention Span x G  F  P   Short-Term Memory  G  F  P   Long-Term Memory  G  F  P   ProblemSolving/  Decision Making x G  F  P   Ability to Process  Information x G  F  P      Contributing Factors             Delusional             Hallucinating             Flight of Ideas             Other:       Modes of Intervention:  x Education x Support x Exploration    Clarifying x Problem Solving  Confrontation    Socialization  Limit Setting  Reality Testing    Activity  Movement  Media    Other:            Response to Learning:  x Able to verbalize current knowledge/experience    Able to verbalize/acknowledge new learning    Able to retain information    Capable of insight    Able to change behavior    Progressing to goal    Other:        Comments:

## 2018-06-27 LAB
GLUCOSE BLD-MCNC: 138 MG/DL (ref 75–110)
GLUCOSE BLD-MCNC: 146 MG/DL (ref 75–110)
GLUCOSE BLD-MCNC: 176 MG/DL (ref 75–110)
GLUCOSE BLD-MCNC: 286 MG/DL (ref 75–110)

## 2018-06-27 PROCEDURE — 6370000000 HC RX 637 (ALT 250 FOR IP): Performed by: NURSE PRACTITIONER

## 2018-06-27 PROCEDURE — 6370000000 HC RX 637 (ALT 250 FOR IP): Performed by: PSYCHIATRY & NEUROLOGY

## 2018-06-27 PROCEDURE — 6370000000 HC RX 637 (ALT 250 FOR IP): Performed by: REGISTERED NURSE

## 2018-06-27 PROCEDURE — 82947 ASSAY GLUCOSE BLOOD QUANT: CPT

## 2018-06-27 PROCEDURE — 6370000000 HC RX 637 (ALT 250 FOR IP): Performed by: INTERNAL MEDICINE

## 2018-06-27 PROCEDURE — 1240000000 HC EMOTIONAL WELLNESS R&B

## 2018-06-27 PROCEDURE — 99232 SBSQ HOSP IP/OBS MODERATE 35: CPT | Performed by: NURSE PRACTITIONER

## 2018-06-27 RX ORDER — GABAPENTIN 400 MG/1
800 CAPSULE ORAL 3 TIMES DAILY
Status: DISCONTINUED | OUTPATIENT
Start: 2018-06-27 | End: 2018-07-03 | Stop reason: HOSPADM

## 2018-06-27 RX ORDER — MELOXICAM 7.5 MG/1
7.5 TABLET ORAL 2 TIMES DAILY
Status: DISCONTINUED | OUTPATIENT
Start: 2018-06-27 | End: 2018-07-03 | Stop reason: HOSPADM

## 2018-06-27 RX ADMIN — GABAPENTIN 800 MG: 300 CAPSULE ORAL at 15:02

## 2018-06-27 RX ADMIN — ACETAMINOPHEN 650 MG: 325 TABLET, FILM COATED ORAL at 05:32

## 2018-06-27 RX ADMIN — VENLAFAXINE 75 MG: 75 TABLET ORAL at 17:29

## 2018-06-27 RX ADMIN — INSULIN LISPRO 1 UNITS: 100 INJECTION, SOLUTION INTRAVENOUS; SUBCUTANEOUS at 21:17

## 2018-06-27 RX ADMIN — FENOFIBRATE 54 MG: 54 TABLET ORAL at 09:08

## 2018-06-27 RX ADMIN — TIZANIDINE 4 MG: 4 TABLET ORAL at 21:15

## 2018-06-27 RX ADMIN — PANCRELIPASE 4 CAPSULE: 60000; 12000; 38000 CAPSULE, DELAYED RELEASE PELLETS ORAL at 13:08

## 2018-06-27 RX ADMIN — HYDROXYZINE HYDROCHLORIDE 100 MG: 25 TABLET, FILM COATED ORAL at 21:15

## 2018-06-27 RX ADMIN — LURASIDONE HYDROCHLORIDE 60 MG: 60 TABLET, FILM COATED ORAL at 09:07

## 2018-06-27 RX ADMIN — METOPROLOL TARTRATE 25 MG: 25 TABLET ORAL at 21:16

## 2018-06-27 RX ADMIN — GABAPENTIN 800 MG: 300 CAPSULE ORAL at 09:21

## 2018-06-27 RX ADMIN — MELOXICAM 7.5 MG: 7.5 TABLET ORAL at 21:16

## 2018-06-27 RX ADMIN — ATORVASTATIN CALCIUM 40 MG: 20 TABLET, FILM COATED ORAL at 09:07

## 2018-06-27 RX ADMIN — VENLAFAXINE 75 MG: 75 TABLET ORAL at 09:07

## 2018-06-27 RX ADMIN — INSULIN GLARGINE 25 UNITS: 100 INJECTION, SOLUTION SUBCUTANEOUS at 21:16

## 2018-06-27 RX ADMIN — CLONAZEPAM 0.5 MG: 0.5 TABLET ORAL at 21:16

## 2018-06-27 RX ADMIN — GABAPENTIN 800 MG: 400 CAPSULE ORAL at 21:16

## 2018-06-27 RX ADMIN — ACETAMINOPHEN 650 MG: 325 TABLET, FILM COATED ORAL at 15:01

## 2018-06-27 RX ADMIN — PANCRELIPASE 4 CAPSULE: 60000; 12000; 38000 CAPSULE, DELAYED RELEASE PELLETS ORAL at 17:29

## 2018-06-27 RX ADMIN — PANCRELIPASE 4 CAPSULE: 60000; 12000; 38000 CAPSULE, DELAYED RELEASE PELLETS ORAL at 09:07

## 2018-06-27 RX ADMIN — INSULIN GLARGINE 25 UNITS: 100 INJECTION, SOLUTION SUBCUTANEOUS at 09:20

## 2018-06-27 RX ADMIN — MELOXICAM 15 MG: 7.5 TABLET ORAL at 09:07

## 2018-06-27 RX ADMIN — MIRTAZAPINE 30 MG: 30 TABLET, FILM COATED ORAL at 21:16

## 2018-06-27 RX ADMIN — HYDROXYZINE HYDROCHLORIDE 100 MG: 25 TABLET, FILM COATED ORAL at 05:32

## 2018-06-27 RX ADMIN — PANTOPRAZOLE SODIUM 40 MG: 40 TABLET, DELAYED RELEASE ORAL at 06:40

## 2018-06-27 RX ADMIN — INSULIN LISPRO 6 UNITS: 100 INJECTION, SOLUTION INTRAVENOUS; SUBCUTANEOUS at 17:29

## 2018-06-27 RX ADMIN — HYDROXYZINE HYDROCHLORIDE 100 MG: 25 TABLET, FILM COATED ORAL at 15:01

## 2018-06-27 RX ADMIN — GLIMEPIRIDE 4 MG: 4 TABLET ORAL at 09:07

## 2018-06-27 RX ADMIN — INSULIN LISPRO 2 UNITS: 100 INJECTION, SOLUTION INTRAVENOUS; SUBCUTANEOUS at 12:37

## 2018-06-27 RX ADMIN — FOLIC ACID 1 MG: 1 TABLET ORAL at 09:06

## 2018-06-27 RX ADMIN — METOPROLOL TARTRATE 25 MG: 25 TABLET ORAL at 09:06

## 2018-06-27 RX ADMIN — MULTIPLE VITAMINS W/ MINERALS TAB 1 TABLET: TAB at 09:06

## 2018-06-27 ASSESSMENT — PAIN SCALES - GENERAL
PAINLEVEL_OUTOF10: 5
PAINLEVEL_OUTOF10: 0
PAINLEVEL_OUTOF10: 6
PAINLEVEL_OUTOF10: 1
PAINLEVEL_OUTOF10: 3
PAINLEVEL_OUTOF10: 0
PAINLEVEL_OUTOF10: 3
PAINLEVEL_OUTOF10: 7

## 2018-06-27 ASSESSMENT — PAIN DESCRIPTION - LOCATION
LOCATION: BACK

## 2018-06-27 ASSESSMENT — PAIN DESCRIPTION - PAIN TYPE
TYPE: CHRONIC PAIN
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
## 2018-06-27 NOTE — PROGRESS NOTES
Pharmacy Med Education Group Note    Date: 6/27/18  Start Time: 36  End Time: 2562    Number Participants in Group:  8    Goal:  Patient will demonstrate an understanding of the medications intended purpose and possible adverse effects  Topic: [place] for Pharmacy Med Ed Group    Discipline Responsible:     OT  AT  [facility].  RT     X Other       Participation Level:     None  Minimal      X Active Listener    X Interactive    Monopolizing         Participation Quality:    X Appropriate  Inappropriate     X       Attentive        Intrusive          Sharing        Resistant          Supportive        Lethargic       Affective:     X Congruent  Incongruent  Blunted  Flat    Constricted  Anxious  Elated  Angry    Labile  Depressed  Other         Cognitive:    X Alert  Oriented PPTP     Concentration   X G  F  P   Attention Span   X G  F  P   Short-Term Memory   X G  F  P   Long-Term Memory  G  F  P   ProblemSolving/  Decision Making  G  F  P   Ability to Process  Information   X G  F  P      Contributing Factors             Delusional             Hallucinating             Flight of Ideas             Other:       Modes of Intervention:    X Education   X Support  Exploration    Clarifying  Problem Solving  Confrontation    Socialization  Limit Setting  Reality Testing    Activity  Movement  Media    Other:            Response to Learning:    X Able to verbalize current knowledge/experience    Able to verbalize/acknowledge new learning    Able to retain information    Capable of insight    Able to change behavior    Progressing to goal    Other:        Comments:     Denice Falk,PharmD, 6/27/2018, 5:44 PM

## 2018-06-27 NOTE — PLAN OF CARE
Problem: Altered Mood, Depressive Behavior:  Goal: Able to verbalize and/or display a decrease in depressive symptoms  Able to verbalize and/or display a decrease in depressive symptoms   Outcome: Ongoing  Pt out in milieu at irregular intervals. Pt denies suicidal ideations at this time and agrees to feeling safe on the unit. Pt is not attending scheduled group activities despite encouragement to do so. Pt is disheveled and encouraged to tend to ADL's. Pt is medication compliant. Pt. Remains on q15 min checks and frequent spontaneous checks throughout shift. Pt. Safety maintained.

## 2018-06-27 NOTE — PLAN OF CARE
Problem: Altered Mood, Depressive Behavior:  Goal: Able to verbalize acceptance of life and situations over which he or she has no control  Able to verbalize acceptance of life and situations over which he or she has no control   Outcome: Ongoing  Psychoeducation Group Note    Date: 6/27/2018  Start Time: 0900  End Time: 0920    Number Participants in Group:  9    Goal:  Patient will demonstrate increased interpersonal interaction. Topic:  Goal Setting / Community Meeting    Discipline Responsible:   OT  AT  [facility]. X RT  Other       Participation Level:     None x Minimal    Active Listener  Interactive    Monopolizing         Participation Quality:  x Appropriate  Inappropriate          Attentive        Intrusive   x       Sharing        Resistant          Supportive        Lethargic       Affective:   x Congruent  Incongruent  Blunted  Flat    Constricted  Anxious  Elated  Angry    Labile  Depressed  Other  Bright       Cognitive:  x Alert x Oriented PPTP     Concentration  G x F  P   Attention Span  G x F  P   Short-Term Memory x G  F  P   Long-Term Memory  G  F  P   ProblemSolving/  Decision Making  G x F  P   Ability to Process  Information x G  F  P      Contributing Factors             Delusional             Hallucinating             Flight of Ideas             Other:       Modes of Intervention:  x Education x Support x Exploration   x Clarifying x Problem Solving x Confrontation   x Socialization x Limit Setting x Reality Testing   x Activity  Movement  Media    Other:            Response to Learning:  x Able to verbalize current knowledge/experience   x Able to verbalize/acknowledge new learning   x Able to retain information   x Capable of insight    Able to change behavior   x Progressing to goal    Other:        Goal for today: Get along with others. Attend groups. Patient was only present in group for the last five minutes.

## 2018-06-27 NOTE — PROGRESS NOTES
Department of Psychiatry  Nurse Practitioner Progress Note      SUBJECTIVE:  Gopal Villa is seen in his room today. He reports ongoing irritability and agitation. Depression and suicidal ideation remain with plan to hang self with phone cord in unit. He denies homicidal ideation currently. Reports anxiety and ongoing auditory hallucinations, voices talking around him at times calling his name. Charting and medications reviewed. Therapeutic support provided. There is still no ability to formulate safe plan other than continued hospitalization.     OBJECTIVE    Physical  VITALS:  /71   Pulse 90   Temp 97.9 °F (36.6 °C) (Oral)   Resp 16   Ht 5' 8\" (1.727 m)   Wt 221 lb (100.2 kg)   SpO2 98%   BMI 33.60 kg/m²         Mental Status Examination:  Level of consciousness: alert    Appearance:  casually dressed  Behavior/Motor:  psychomotor agitation  Attitude toward examiner: Cooperative  Speech:  normal  Mood: irritable, dysphoric  Affect:  mood congruent, constricted  Thought processes:  linear  Thought content:  Homicidal ideation and suicidal ideation recently  Suicidal Ideation: active today with plan to hang self with phone cord in unit  Homicidal Ideation: denies today  Delusions:  no evidence of delusions  Perceptual Disturbance:  auditory hallucinations  Cognition:  oriented to person, place, and time  Memory impaired immediate recall  Insight:  fair  Judgment:  fair      Medications  Current Facility-Administered Medications: meloxicam (MOBIC) tablet 7.5 mg, 7.5 mg, Oral, BID  gabapentin (NEURONTIN) capsule 800 mg, 800 mg, Oral, TID  lipase-protease-amylase (CREON) 76661 units delayed release capsule 4 capsule, 4 capsule, Oral, TID WC  lurasidone (LATUDA) tablet 60 mg, 60 mg, Oral, Daily  venlafaxine (EFFEXOR) tablet 75 mg, 75 mg, Oral, BID WC  clonazePAM (KLONOPIN) tablet 0.5 mg, 0.5 mg, Oral, Nightly  insulin glargine (LANTUS) injection vial 25 Units, 25 Units, Subcutaneous, BID  mirtazapine (REMERON)

## 2018-06-27 NOTE — PLAN OF CARE
Problem: Anger Management/Homicidal Ideation:  Goal: Absence of angry outbursts  Absence of angry outbursts   Outcome: Ongoing  No angry outburts this shift. Goal: Absence of homicidal ideation  Absence of homicidal ideation   Outcome: Ongoing  Denies HI at this time. Problem: Altered Mood, Depressive Behavior:  Goal: Able to verbalize acceptance of life and situations over which he or she has no control  Able to verbalize acceptance of life and situations over which he or she has no control   Outcome: Ongoing  Pt relates he feels better, is aloof of what lead him to be admitted.    Goal: Able to verbalize and/or display a decrease in depressive symptoms  Able to verbalize and/or display a decrease in depressive symptoms   Outcome: Ongoing  Pt relates lingering depression but that he feels better

## 2018-06-28 LAB
GLUCOSE BLD-MCNC: 119 MG/DL (ref 75–110)
GLUCOSE BLD-MCNC: 157 MG/DL (ref 75–110)
GLUCOSE BLD-MCNC: 179 MG/DL (ref 75–110)
GLUCOSE BLD-MCNC: 239 MG/DL (ref 75–110)

## 2018-06-28 PROCEDURE — 6370000000 HC RX 637 (ALT 250 FOR IP): Performed by: NURSE PRACTITIONER

## 2018-06-28 PROCEDURE — 1240000000 HC EMOTIONAL WELLNESS R&B

## 2018-06-28 PROCEDURE — 82947 ASSAY GLUCOSE BLOOD QUANT: CPT

## 2018-06-28 PROCEDURE — 99232 SBSQ HOSP IP/OBS MODERATE 35: CPT | Performed by: NURSE PRACTITIONER

## 2018-06-28 PROCEDURE — 6370000000 HC RX 637 (ALT 250 FOR IP): Performed by: INTERNAL MEDICINE

## 2018-06-28 PROCEDURE — 6370000000 HC RX 637 (ALT 250 FOR IP): Performed by: PSYCHIATRY & NEUROLOGY

## 2018-06-28 RX ADMIN — FOLIC ACID 1 MG: 1 TABLET ORAL at 08:57

## 2018-06-28 RX ADMIN — HYDROXYZINE HYDROCHLORIDE 100 MG: 25 TABLET, FILM COATED ORAL at 05:00

## 2018-06-28 RX ADMIN — LURASIDONE HYDROCHLORIDE 20 MG: 40 TABLET, FILM COATED ORAL at 18:27

## 2018-06-28 RX ADMIN — HYDROXYZINE HYDROCHLORIDE 100 MG: 25 TABLET, FILM COATED ORAL at 21:22

## 2018-06-28 RX ADMIN — VENLAFAXINE 75 MG: 75 TABLET ORAL at 08:57

## 2018-06-28 RX ADMIN — MULTIPLE VITAMINS W/ MINERALS TAB 1 TABLET: TAB at 08:57

## 2018-06-28 RX ADMIN — GABAPENTIN 800 MG: 400 CAPSULE ORAL at 21:21

## 2018-06-28 RX ADMIN — MELOXICAM 7.5 MG: 7.5 TABLET ORAL at 08:57

## 2018-06-28 RX ADMIN — VENLAFAXINE 75 MG: 75 TABLET ORAL at 16:52

## 2018-06-28 RX ADMIN — MELOXICAM 7.5 MG: 7.5 TABLET ORAL at 21:21

## 2018-06-28 RX ADMIN — CLONAZEPAM 0.5 MG: 0.5 TABLET ORAL at 21:21

## 2018-06-28 RX ADMIN — HYDROXYZINE HYDROCHLORIDE 100 MG: 25 TABLET, FILM COATED ORAL at 15:24

## 2018-06-28 RX ADMIN — INSULIN LISPRO 2 UNITS: 100 INJECTION, SOLUTION INTRAVENOUS; SUBCUTANEOUS at 22:20

## 2018-06-28 RX ADMIN — LURASIDONE HYDROCHLORIDE 60 MG: 60 TABLET, FILM COATED ORAL at 08:57

## 2018-06-28 RX ADMIN — ATORVASTATIN CALCIUM 40 MG: 20 TABLET, FILM COATED ORAL at 08:57

## 2018-06-28 RX ADMIN — INSULIN GLARGINE 25 UNITS: 100 INJECTION, SOLUTION SUBCUTANEOUS at 22:19

## 2018-06-28 RX ADMIN — PANCRELIPASE 4 CAPSULE: 60000; 12000; 38000 CAPSULE, DELAYED RELEASE PELLETS ORAL at 07:40

## 2018-06-28 RX ADMIN — INSULIN GLARGINE 25 UNITS: 100 INJECTION, SOLUTION SUBCUTANEOUS at 08:59

## 2018-06-28 RX ADMIN — MIRTAZAPINE 45 MG: 30 TABLET, FILM COATED ORAL at 21:21

## 2018-06-28 RX ADMIN — ACETAMINOPHEN 650 MG: 325 TABLET, FILM COATED ORAL at 04:52

## 2018-06-28 RX ADMIN — INSULIN LISPRO 2 UNITS: 100 INJECTION, SOLUTION INTRAVENOUS; SUBCUTANEOUS at 16:52

## 2018-06-28 RX ADMIN — METOPROLOL TARTRATE 25 MG: 25 TABLET ORAL at 21:21

## 2018-06-28 RX ADMIN — GLIMEPIRIDE 4 MG: 4 TABLET ORAL at 07:40

## 2018-06-28 RX ADMIN — INSULIN LISPRO 2 UNITS: 100 INJECTION, SOLUTION INTRAVENOUS; SUBCUTANEOUS at 11:49

## 2018-06-28 RX ADMIN — FENOFIBRATE 54 MG: 54 TABLET ORAL at 07:40

## 2018-06-28 RX ADMIN — GABAPENTIN 800 MG: 400 CAPSULE ORAL at 14:30

## 2018-06-28 RX ADMIN — METOPROLOL TARTRATE 25 MG: 25 TABLET ORAL at 09:04

## 2018-06-28 RX ADMIN — PANCRELIPASE 4 CAPSULE: 60000; 12000; 38000 CAPSULE, DELAYED RELEASE PELLETS ORAL at 16:51

## 2018-06-28 RX ADMIN — PANTOPRAZOLE SODIUM 40 MG: 40 TABLET, DELAYED RELEASE ORAL at 05:02

## 2018-06-28 RX ADMIN — GABAPENTIN 800 MG: 400 CAPSULE ORAL at 08:57

## 2018-06-28 RX ADMIN — TIZANIDINE 4 MG: 4 TABLET ORAL at 22:19

## 2018-06-28 RX ADMIN — PANCRELIPASE 4 CAPSULE: 60000; 12000; 38000 CAPSULE, DELAYED RELEASE PELLETS ORAL at 11:44

## 2018-06-28 ASSESSMENT — PAIN SCALES - GENERAL
PAINLEVEL_OUTOF10: 3
PAINLEVEL_OUTOF10: 0
PAINLEVEL_OUTOF10: 3
PAINLEVEL_OUTOF10: 3
PAINLEVEL_OUTOF10: 7

## 2018-06-28 ASSESSMENT — PAIN DESCRIPTION - LOCATION: LOCATION: BACK
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
## 2018-06-28 NOTE — PLAN OF CARE
Problem: Altered Mood, Depressive Behavior:  Goal: Able to verbalize and/or display a decrease in depressive symptoms  Able to verbalize and/or display a decrease in depressive symptoms   Outcome: Ongoing  Psychoeducation Group Note    Date: 6/28/2018  Start Time: 1000  End Time: 1035    Number Participants in Group:  11    Goal:  Patient will demonstrate increased interpersonal interaction   Topic: Leisure/ social interaction     Discipline Responsible:   OT  AT  [facility]. x RT  Other       Participation Level:     None  Minimal   x Active Listener  Interactive    Monopolizing         Participation Quality:   Appropriate  Inappropriate          Attentive        Intrusive          Sharing x       Resistant          Supportive        Lethargic       Affective:    Congruent  Incongruent  Blunted  Flat    Constricted  Anxious  Elated x Angry    Labile  Depressed  Other         Cognitive:  x Alert x Oriented PPTP     Concentration  G  F  P   Attention Span  G  F  P   Short-Term Memory  G  F  P   Long-Term Memory  G  F  P   ProblemSolving/  Decision Making  G  F  P   Ability to Process  Information  G  F  P      Contributing Factors             Delusional             Hallucinating             Flight of Ideas             Other:       Modes of Intervention:  x Education x Support x Exploration   x Clarifying x Problem Solving  Confrontation   x Socialization  Limit Setting x Reality Testing   x Activity  Movement  Media    Other:            Response to Learning:   Able to verbalize current knowledge/experience    Able to verbalize/acknowledge new learning    Able to retain information    Capable of insight    Able to change behavior   x Progressing to goal    Other:        Comments: Pt came to group but did not participate. Pt left group and stated \"I'm done with this. \"

## 2018-06-28 NOTE — PLAN OF CARE
Problem: Altered Mood, Depressive Behavior:  Goal: Able to verbalize and/or display a decrease in depressive symptoms  Able to verbalize and/or display a decrease in depressive symptoms   Outcome: Ongoing  Pt denied thoughts of SI/HI/self-harm and agreed to seek out staff should thoughts of SI/HI/self-harm arise. Safe environment maintained. Q15 minute checks for safety continued per unit policy. Will continue to monitor for safety and provide support and reassurance as needed.

## 2018-06-28 NOTE — PLAN OF CARE
Problem: Altered Mood, Depressive Behavior:  Goal: Able to verbalize and/or display a decrease in depressive symptoms  Able to verbalize and/or display a decrease in depressive symptoms   Outcome: Ongoing  Pt did not participate in Positive Thinking / Positive Self Talk / Therapeutic Self Disclosure group at 1330 despite staff encouragement.

## 2018-06-28 NOTE — PLAN OF CARE
Problem: Altered Mood, Depressive Behavior:  Goal: Able to verbalize acceptance of life and situations over which he or she has no control  Able to verbalize acceptance of life and situations over which he or she has no control   Outcome: Ongoing  PSYCHOTHERAPY GROUP NOTE    Date: 6/28/18  Start Time: 11 AM  End Time: 1150 AM    Number Participants in Group:  12    Goal:  Patient will demonstrate increased interpersonal interaction   Topic: Support     Discipline Responsible:   OT  AT  [facility].  RT [facility] Other       Participation Level:     None  Minimal    Active Listener x Interactive    Monopolizing         Participation Quality:  x Appropriate  Inappropriate   x       Attentive        Intrusive          Sharing        Resistant          Supportive        Lethargic       Affective:   x Congruent  Incongruent  Blunted  Flat    Constricted  Anxious  Elated  Angry    Labile  Depressed  Other         Cognitive:  x Alert x Oriented PPTP     Concentration  G x F  P   Attention Span  G x F  P   Short-Term Memory  G x F  P   Long-Term Memory  G x F  P   ProblemSolving/  Decision Making  G x F  P   Ability to Process  Information  G x F  P      Contributing Factors             Delusional             Hallucinating             Flight of Ideas             Other:       Modes of Intervention:   Education x Support  Exploration    Clarifying  Problem Solving  Confrontation    Socialization  Limit Setting  Reality Testing    Activity  Movement  Media    Other:            Response to Learning:  x Able to verbalize current knowledge/experience    Able to verbalize/acknowledge new learning    Able to retain information    Capable of insight    Able to change behavior    Progressing to goal    Other:        Comments:

## 2018-06-29 LAB
ABSOLUTE EOS #: 0.2 K/UL (ref 0–0.4)
ABSOLUTE IMMATURE GRANULOCYTE: NORMAL K/UL (ref 0–0.3)
ABSOLUTE LYMPH #: 3.1 K/UL (ref 1–4.8)
ABSOLUTE MONO #: 0.6 K/UL (ref 0.1–1.3)
ANION GAP SERPL CALCULATED.3IONS-SCNC: 11 MMOL/L (ref 9–17)
BASOPHILS # BLD: 1 % (ref 0–2)
BASOPHILS ABSOLUTE: 0.1 K/UL (ref 0–0.2)
BUN BLDV-MCNC: 15 MG/DL (ref 6–20)
BUN/CREAT BLD: ABNORMAL (ref 9–20)
CALCIUM SERPL-MCNC: 9.7 MG/DL (ref 8.6–10.4)
CHLORIDE BLD-SCNC: 103 MMOL/L (ref 98–107)
CO2: 28 MMOL/L (ref 20–31)
CREAT SERPL-MCNC: 0.81 MG/DL (ref 0.7–1.2)
DIFFERENTIAL TYPE: NORMAL
EOSINOPHILS RELATIVE PERCENT: 3 % (ref 0–4)
GFR AFRICAN AMERICAN: >60 ML/MIN
GFR NON-AFRICAN AMERICAN: >60 ML/MIN
GFR SERPL CREATININE-BSD FRML MDRD: ABNORMAL ML/MIN/{1.73_M2}
GFR SERPL CREATININE-BSD FRML MDRD: ABNORMAL ML/MIN/{1.73_M2}
GLUCOSE BLD-MCNC: 147 MG/DL (ref 75–110)
GLUCOSE BLD-MCNC: 178 MG/DL (ref 70–99)
GLUCOSE BLD-MCNC: 179 MG/DL (ref 75–110)
GLUCOSE BLD-MCNC: 196 MG/DL (ref 75–110)
GLUCOSE BLD-MCNC: 200 MG/DL (ref 75–110)
HCT VFR BLD CALC: 42.4 % (ref 41–53)
HEMOGLOBIN: 14.1 G/DL (ref 13.5–17.5)
IMMATURE GRANULOCYTES: NORMAL %
LYMPHOCYTES # BLD: 37 % (ref 24–44)
MCH RBC QN AUTO: 28.9 PG (ref 26–34)
MCHC RBC AUTO-ENTMCNC: 33.2 G/DL (ref 31–37)
MCV RBC AUTO: 87.2 FL (ref 80–100)
MONOCYTES # BLD: 7 % (ref 1–7)
NRBC AUTOMATED: NORMAL PER 100 WBC
PDW BLD-RTO: 14.3 % (ref 11.5–14.9)
PLATELET # BLD: 309 K/UL (ref 150–450)
PLATELET ESTIMATE: NORMAL
PMV BLD AUTO: 8.9 FL (ref 6–12)
POTASSIUM SERPL-SCNC: 4.5 MMOL/L (ref 3.7–5.3)
RBC # BLD: 4.86 M/UL (ref 4.5–5.9)
RBC # BLD: NORMAL 10*6/UL
SEG NEUTROPHILS: 52 % (ref 36–66)
SEGMENTED NEUTROPHILS ABSOLUTE COUNT: 4.5 K/UL (ref 1.3–9.1)
SODIUM BLD-SCNC: 142 MMOL/L (ref 135–144)
WBC # BLD: 8.4 K/UL (ref 3.5–11)
WBC # BLD: NORMAL 10*3/UL

## 2018-06-29 PROCEDURE — 6370000000 HC RX 637 (ALT 250 FOR IP): Performed by: INTERNAL MEDICINE

## 2018-06-29 PROCEDURE — 99232 SBSQ HOSP IP/OBS MODERATE 35: CPT | Performed by: NURSE PRACTITIONER

## 2018-06-29 PROCEDURE — 80048 BASIC METABOLIC PNL TOTAL CA: CPT

## 2018-06-29 PROCEDURE — 6370000000 HC RX 637 (ALT 250 FOR IP): Performed by: PSYCHIATRY & NEUROLOGY

## 2018-06-29 PROCEDURE — 1240000000 HC EMOTIONAL WELLNESS R&B

## 2018-06-29 PROCEDURE — 6370000000 HC RX 637 (ALT 250 FOR IP): Performed by: NURSE PRACTITIONER

## 2018-06-29 PROCEDURE — 36415 COLL VENOUS BLD VENIPUNCTURE: CPT

## 2018-06-29 PROCEDURE — 85025 COMPLETE CBC W/AUTO DIFF WBC: CPT

## 2018-06-29 PROCEDURE — 82947 ASSAY GLUCOSE BLOOD QUANT: CPT

## 2018-06-29 RX ADMIN — GLIMEPIRIDE 4 MG: 4 TABLET ORAL at 08:59

## 2018-06-29 RX ADMIN — GABAPENTIN 800 MG: 400 CAPSULE ORAL at 14:28

## 2018-06-29 RX ADMIN — HYDROXYZINE HYDROCHLORIDE 100 MG: 25 TABLET, FILM COATED ORAL at 21:36

## 2018-06-29 RX ADMIN — GABAPENTIN 800 MG: 400 CAPSULE ORAL at 21:37

## 2018-06-29 RX ADMIN — HYDROXYZINE HYDROCHLORIDE 100 MG: 25 TABLET, FILM COATED ORAL at 06:28

## 2018-06-29 RX ADMIN — PANCRELIPASE 4 CAPSULE: 60000; 12000; 38000 CAPSULE, DELAYED RELEASE PELLETS ORAL at 12:01

## 2018-06-29 RX ADMIN — VENLAFAXINE 75 MG: 75 TABLET ORAL at 16:54

## 2018-06-29 RX ADMIN — FENOFIBRATE 54 MG: 54 TABLET ORAL at 09:00

## 2018-06-29 RX ADMIN — ACETAMINOPHEN 650 MG: 325 TABLET, FILM COATED ORAL at 06:27

## 2018-06-29 RX ADMIN — ACETAMINOPHEN 650 MG: 325 TABLET, FILM COATED ORAL at 14:30

## 2018-06-29 RX ADMIN — METOPROLOL TARTRATE 25 MG: 25 TABLET ORAL at 21:38

## 2018-06-29 RX ADMIN — INSULIN LISPRO 4 UNITS: 100 INJECTION, SOLUTION INTRAVENOUS; SUBCUTANEOUS at 16:55

## 2018-06-29 RX ADMIN — INSULIN GLARGINE 25 UNITS: 100 INJECTION, SOLUTION SUBCUTANEOUS at 21:36

## 2018-06-29 RX ADMIN — MIRTAZAPINE 45 MG: 30 TABLET, FILM COATED ORAL at 21:37

## 2018-06-29 RX ADMIN — INSULIN LISPRO 2 UNITS: 100 INJECTION, SOLUTION INTRAVENOUS; SUBCUTANEOUS at 09:03

## 2018-06-29 RX ADMIN — FOLIC ACID 1 MG: 1 TABLET ORAL at 09:00

## 2018-06-29 RX ADMIN — ATORVASTATIN CALCIUM 40 MG: 20 TABLET, FILM COATED ORAL at 09:00

## 2018-06-29 RX ADMIN — PANCRELIPASE 4 CAPSULE: 60000; 12000; 38000 CAPSULE, DELAYED RELEASE PELLETS ORAL at 16:54

## 2018-06-29 RX ADMIN — VENLAFAXINE 75 MG: 75 TABLET ORAL at 09:00

## 2018-06-29 RX ADMIN — MULTIPLE VITAMINS W/ MINERALS TAB 1 TABLET: TAB at 09:00

## 2018-06-29 RX ADMIN — MELOXICAM 7.5 MG: 7.5 TABLET ORAL at 21:41

## 2018-06-29 RX ADMIN — LURASIDONE HYDROCHLORIDE 80 MG: 80 TABLET, FILM COATED ORAL at 16:54

## 2018-06-29 RX ADMIN — INSULIN GLARGINE 25 UNITS: 100 INJECTION, SOLUTION SUBCUTANEOUS at 09:07

## 2018-06-29 RX ADMIN — PANTOPRAZOLE SODIUM 40 MG: 40 TABLET, DELAYED RELEASE ORAL at 06:27

## 2018-06-29 RX ADMIN — CLONAZEPAM 0.5 MG: 0.5 TABLET ORAL at 21:37

## 2018-06-29 RX ADMIN — HYDROXYZINE HYDROCHLORIDE 100 MG: 25 TABLET, FILM COATED ORAL at 14:29

## 2018-06-29 RX ADMIN — TIZANIDINE 4 MG: 4 TABLET ORAL at 21:38

## 2018-06-29 RX ADMIN — MELOXICAM 7.5 MG: 7.5 TABLET ORAL at 09:00

## 2018-06-29 RX ADMIN — METOPROLOL TARTRATE 25 MG: 25 TABLET ORAL at 09:00

## 2018-06-29 RX ADMIN — PANCRELIPASE 4 CAPSULE: 60000; 12000; 38000 CAPSULE, DELAYED RELEASE PELLETS ORAL at 08:59

## 2018-06-29 RX ADMIN — INSULIN LISPRO 2 UNITS: 100 INJECTION, SOLUTION INTRAVENOUS; SUBCUTANEOUS at 11:59

## 2018-06-29 RX ADMIN — GABAPENTIN 800 MG: 400 CAPSULE ORAL at 08:59

## 2018-06-29 ASSESSMENT — PAIN SCALES - GENERAL
PAINLEVEL_OUTOF10: 4
PAINLEVEL_OUTOF10: 2
PAINLEVEL_OUTOF10: 6
PAINLEVEL_OUTOF10: 0
PAINLEVEL_OUTOF10: 7
PAINLEVEL_OUTOF10: 2
PAINLEVEL_OUTOF10: 3

## 2018-06-29 ASSESSMENT — PAIN DESCRIPTION - LOCATION
LOCATION: BACK
LOCATION: HEAD
LOCATION: HEAD

## 2018-06-29 ASSESSMENT — PAIN DESCRIPTION - PAIN TYPE
TYPE: CHRONIC PAIN

## 2018-06-30 LAB
GLUCOSE BLD-MCNC: 135 MG/DL (ref 75–110)
GLUCOSE BLD-MCNC: 140 MG/DL (ref 75–110)
GLUCOSE BLD-MCNC: 199 MG/DL (ref 75–110)
GLUCOSE BLD-MCNC: 208 MG/DL (ref 75–110)

## 2018-06-30 PROCEDURE — 6370000000 HC RX 637 (ALT 250 FOR IP): Performed by: PSYCHIATRY & NEUROLOGY

## 2018-06-30 PROCEDURE — 82947 ASSAY GLUCOSE BLOOD QUANT: CPT

## 2018-06-30 PROCEDURE — 6370000000 HC RX 637 (ALT 250 FOR IP): Performed by: NURSE PRACTITIONER

## 2018-06-30 PROCEDURE — 99232 SBSQ HOSP IP/OBS MODERATE 35: CPT | Performed by: NURSE PRACTITIONER

## 2018-06-30 PROCEDURE — 1240000000 HC EMOTIONAL WELLNESS R&B

## 2018-06-30 PROCEDURE — 6370000000 HC RX 637 (ALT 250 FOR IP): Performed by: INTERNAL MEDICINE

## 2018-06-30 RX ADMIN — PANCRELIPASE 4 CAPSULE: 60000; 12000; 38000 CAPSULE, DELAYED RELEASE PELLETS ORAL at 08:10

## 2018-06-30 RX ADMIN — PANCRELIPASE 4 CAPSULE: 60000; 12000; 38000 CAPSULE, DELAYED RELEASE PELLETS ORAL at 11:50

## 2018-06-30 RX ADMIN — HYDROXYZINE HYDROCHLORIDE 100 MG: 25 TABLET, FILM COATED ORAL at 13:40

## 2018-06-30 RX ADMIN — PANTOPRAZOLE SODIUM 40 MG: 40 TABLET, DELAYED RELEASE ORAL at 06:39

## 2018-06-30 RX ADMIN — LURASIDONE HYDROCHLORIDE 80 MG: 80 TABLET, FILM COATED ORAL at 16:49

## 2018-06-30 RX ADMIN — GABAPENTIN 800 MG: 400 CAPSULE ORAL at 13:41

## 2018-06-30 RX ADMIN — HYDROXYZINE HYDROCHLORIDE 100 MG: 25 TABLET, FILM COATED ORAL at 21:19

## 2018-06-30 RX ADMIN — FENOFIBRATE 54 MG: 54 TABLET ORAL at 08:10

## 2018-06-30 RX ADMIN — MIRTAZAPINE 45 MG: 30 TABLET, FILM COATED ORAL at 21:19

## 2018-06-30 RX ADMIN — PANCRELIPASE 4 CAPSULE: 60000; 12000; 38000 CAPSULE, DELAYED RELEASE PELLETS ORAL at 16:46

## 2018-06-30 RX ADMIN — GABAPENTIN 800 MG: 400 CAPSULE ORAL at 09:02

## 2018-06-30 RX ADMIN — FOLIC ACID 1 MG: 1 TABLET ORAL at 09:03

## 2018-06-30 RX ADMIN — GLIMEPIRIDE 4 MG: 4 TABLET ORAL at 08:10

## 2018-06-30 RX ADMIN — TIZANIDINE 4 MG: 4 TABLET ORAL at 21:19

## 2018-06-30 RX ADMIN — VENLAFAXINE 75 MG: 75 TABLET ORAL at 09:03

## 2018-06-30 RX ADMIN — INSULIN LISPRO 2 UNITS: 100 INJECTION, SOLUTION INTRAVENOUS; SUBCUTANEOUS at 08:11

## 2018-06-30 RX ADMIN — ACETAMINOPHEN 650 MG: 325 TABLET, FILM COATED ORAL at 11:57

## 2018-06-30 RX ADMIN — MELOXICAM 7.5 MG: 7.5 TABLET ORAL at 09:03

## 2018-06-30 RX ADMIN — METOPROLOL TARTRATE 25 MG: 25 TABLET ORAL at 21:19

## 2018-06-30 RX ADMIN — MELOXICAM 7.5 MG: 7.5 TABLET ORAL at 21:19

## 2018-06-30 RX ADMIN — INSULIN GLARGINE 25 UNITS: 100 INJECTION, SOLUTION SUBCUTANEOUS at 21:20

## 2018-06-30 RX ADMIN — HYDROXYZINE HYDROCHLORIDE 100 MG: 25 TABLET, FILM COATED ORAL at 06:39

## 2018-06-30 RX ADMIN — VENLAFAXINE 75 MG: 75 TABLET ORAL at 16:49

## 2018-06-30 RX ADMIN — METOPROLOL TARTRATE 25 MG: 25 TABLET ORAL at 09:03

## 2018-06-30 RX ADMIN — ATORVASTATIN CALCIUM 40 MG: 20 TABLET, FILM COATED ORAL at 09:03

## 2018-06-30 RX ADMIN — MULTIPLE VITAMINS W/ MINERALS TAB 1 TABLET: TAB at 09:03

## 2018-06-30 RX ADMIN — ACETAMINOPHEN 650 MG: 325 TABLET, FILM COATED ORAL at 06:41

## 2018-06-30 RX ADMIN — INSULIN GLARGINE 25 UNITS: 100 INJECTION, SOLUTION SUBCUTANEOUS at 09:06

## 2018-06-30 RX ADMIN — INSULIN LISPRO 2 UNITS: 100 INJECTION, SOLUTION INTRAVENOUS; SUBCUTANEOUS at 16:57

## 2018-06-30 RX ADMIN — INSULIN LISPRO 2 UNITS: 100 INJECTION, SOLUTION INTRAVENOUS; SUBCUTANEOUS at 21:19

## 2018-06-30 RX ADMIN — GABAPENTIN 800 MG: 400 CAPSULE ORAL at 21:19

## 2018-06-30 RX ADMIN — CLONAZEPAM 0.5 MG: 0.5 TABLET ORAL at 21:19

## 2018-06-30 ASSESSMENT — PAIN SCALES - GENERAL
PAINLEVEL_OUTOF10: 7
PAINLEVEL_OUTOF10: 3
PAINLEVEL_OUTOF10: 0
PAINLEVEL_OUTOF10: 7
PAINLEVEL_OUTOF10: 3
PAINLEVEL_OUTOF10: 0
PAINLEVEL_OUTOF10: 7
PAINLEVEL_OUTOF10: 3

## 2018-06-30 ASSESSMENT — PAIN DESCRIPTION - PAIN TYPE
TYPE: CHRONIC PAIN
TYPE: CHRONIC PAIN
TYPE: ACUTE PAIN
TYPE: ACUTE PAIN
TYPE: CHRONIC PAIN
TYPE: CHRONIC PAIN

## 2018-06-30 ASSESSMENT — PAIN DESCRIPTION - DESCRIPTORS
DESCRIPTORS: HEADACHE
DESCRIPTORS: HEADACHE
DESCRIPTORS: ACHING;DISCOMFORT

## 2018-06-30 ASSESSMENT — PAIN DESCRIPTION - LOCATION
LOCATION: HEAD
LOCATION: HEAD
LOCATION: BACK
LOCATION: BACK
LOCATION: NECK
LOCATION: NECK

## 2018-06-30 ASSESSMENT — PAIN DESCRIPTION - ONSET: ONSET: ON-GOING

## 2018-06-30 ASSESSMENT — PAIN DESCRIPTION - FREQUENCY
FREQUENCY: CONTINUOUS

## 2018-06-30 ASSESSMENT — PAIN DESCRIPTION - ORIENTATION: ORIENTATION: LOWER

## 2018-06-30 NOTE — BH NOTE
Psychoeducation Group Note    Date: 06/30/18  Start Time: 1600  End Time: 1640      Number Participants in Group: 13/21    Goal:  Patient will demonstrate increased interpersonal interaction   Topic: Wellness group Hope and Recovery    Discipline Responsible:   OT  AT   X Nsg.  RT  Other       Participation Level:     None  Minimal   X Active Listener  Interactive    Monopolizing         Participation Quality:  X Appropriate  Inappropriate          Attentive        Intrusive          Sharing        Resistant   X       Supportive        Lethargic       Affective:   X Congruent  Incongruent  Blunted  Flat    Constricted  Anxious  Elated  Angry    Labile  Depressed  Other         Cognitive:  X Alert  Oriented PPTP     Concentration  G X F  P   Attention Span  G X F  P   Short-Term Memory  G X F  P   Long-Term Memory  G X F  P   ProblemSolving/  Decision Making  G X F  P   Ability to Process  Information  G X F  P      Contributing Factors             Delusional             Hallucinating             Flight of Ideas             Other:       Modes of Intervention:  X Education X Support  Exploration    Clarifying  Problem Solving  Confrontation   X Socialization  Limit Setting  Reality Testing   X Activity  Movement  Media    Other:            Response to Learning:  X Able to verbalize current knowledge/experience    Able to verbalize/acknowledge new learning    Able to retain information    Capable of insight    Able to change behavior   X Progressing to goal    Other:        Comments: Pt discussed hope and recovery and listed positive things to do and to hope for

## 2018-06-30 NOTE — PROGRESS NOTES
Department of Psychiatry  Nurse Practitioner Progress Note      SUBJECTIVE:  Mau Caicedo is seen in the day room today. Continues to struggle with irritability. Depression with SI continues with plan to hang self with phone cord in unit, exacerbated when irritated with staff or other patients. He denies homicidal ideation currently. AH are absent today but he states they were present and loud last night. Sleep is better with increased Remeron dose. Charting and medications reviewed. Therapeutic support provided. There is still no ability to formulate safe plan other than continued hospitalization.       OBJECTIVE    Physical  VITALS:  /81   Pulse 96   Temp 97.7 °F (36.5 °C) (Oral)   Resp 14   Ht 5' 8\" (1.727 m)   Wt 221 lb (100.2 kg)   SpO2 98%   BMI 33.60 kg/m²         Mental Status Examination:  Level of consciousness: alert    Appearance:  casually dressed  Behavior/Motor:  WNL  Attitude toward examiner: Cooperative  Speech:  normal  Mood: irritable, dysphoric  Affect:  mood congruent, constricted  Thought processes:  linear  Thought content:  Homicidal ideation and suicidal ideation recently  Suicidal Ideation: active today with plan to hang self with phone cord in unit  Homicidal Ideation: denies today  Delusions:  no evidence of delusions  Perceptual Disturbance:  auditory hallucinations  Cognition:  oriented to person, place, and time  Memory impaired immediate recall  Insight:  fair  Judgment:  fair      Medications  Current Facility-Administered Medications: lurasidone (LATUDA) tablet 80 mg, 80 mg, Oral, Dinner  mirtazapine (REMERON) tablet 45 mg, 45 mg, Oral, Nightly  meloxicam (MOBIC) tablet 7.5 mg, 7.5 mg, Oral, BID  gabapentin (NEURONTIN) capsule 800 mg, 800 mg, Oral, TID  lipase-protease-amylase (CREON) 60764 units delayed release capsule 4 capsule, 4 capsule, Oral, TID WC  venlafaxine (EFFEXOR) tablet 75 mg, 75 mg, Oral, BID WC  clonazePAM (KLONOPIN) tablet 0.5 mg, 0.5 mg, Oral,

## 2018-06-30 NOTE — PLAN OF CARE
Problem: Altered Mood, Depressive Behavior:  Goal: Able to verbalize acceptance of life and situations over which he or she has no control  Able to verbalize acceptance of life and situations over which he or she has no control   Outcome: Ongoing  Pt did not participate in Goal Setting and [employer] [facility] [employer] despite staff encouragement.

## 2018-06-30 NOTE — PLAN OF CARE
Problem: Anger Management/Homicidal Ideation:  Goal: Absence of angry outbursts  Absence of angry outbursts   Outcome: Ongoing  Pt. Has not had any anger outbursts this morning. Pt is often irritable. Admits he reacts with anger no matter how small an issues is because he says he knows nothing else. Med complaint. Selectively attends groups. Fair self care noted. Encouraged pt to shower. Pt. Remains safe on the unit. Q 15 minute checks for safety maintained. Goal: Absence of homicidal ideation  Absence of homicidal ideation   Outcome: Ongoing  Pt. Denies any homicidal thoughts or suicidal thoughts. Problem: Altered Mood, Depressive Behavior:  Goal: Able to verbalize acceptance of life and situations over which he or she has no control  Able to verbalize acceptance of life and situations over which he or she has no control   Outcome: Ongoing  Pt. Remains irritable but controlled. Pt. Declines to discuss pending court date. Only selectively social. Pt. Remains safe on the unit. Q 15 minute checks for safety maintained. Goal: Able to verbalize and/or display a decrease in depressive symptoms  Able to verbalize and/or display a decrease in depressive symptoms   Outcome: Ongoing  Pt. Is more anxious than depressed he says. Denies suicidal thoughts. On phone often between groups. Pt. Remains safe on the unit. Q 15 minute checks for safety maintained. Problem: Falls - Risk of:  Goal: Will remain free from falls  Will remain free from falls   Outcome: Ongoing  Pt. Remains free of falls. Ambulates with steady gait. Pt. Remains safe on the unit. Q 15 minute checks for safety maintained.

## 2018-06-30 NOTE — PLAN OF CARE
Problem: Altered Mood, Depressive Behavior:  Goal: Able to verbalize and/or display a decrease in depressive symptoms  Able to verbalize and/or display a decrease in depressive symptoms   Outcome: Ongoing  Pt did not participate in Therapeutic Leisure Skills Group at 1430 despite staff encouragement.

## 2018-07-01 LAB
GLUCOSE BLD-MCNC: 159 MG/DL (ref 75–110)
GLUCOSE BLD-MCNC: 195 MG/DL (ref 75–110)
GLUCOSE BLD-MCNC: 265 MG/DL (ref 75–110)
GLUCOSE BLD-MCNC: 271 MG/DL (ref 75–110)

## 2018-07-01 PROCEDURE — 82947 ASSAY GLUCOSE BLOOD QUANT: CPT

## 2018-07-01 PROCEDURE — 1240000000 HC EMOTIONAL WELLNESS R&B

## 2018-07-01 PROCEDURE — 6370000000 HC RX 637 (ALT 250 FOR IP): Performed by: PSYCHIATRY & NEUROLOGY

## 2018-07-01 PROCEDURE — 6370000000 HC RX 637 (ALT 250 FOR IP): Performed by: INTERNAL MEDICINE

## 2018-07-01 PROCEDURE — 6370000000 HC RX 637 (ALT 250 FOR IP): Performed by: NURSE PRACTITIONER

## 2018-07-01 RX ADMIN — ATORVASTATIN CALCIUM 40 MG: 20 TABLET, FILM COATED ORAL at 08:04

## 2018-07-01 RX ADMIN — MELOXICAM 7.5 MG: 7.5 TABLET ORAL at 08:03

## 2018-07-01 RX ADMIN — MIRTAZAPINE 45 MG: 30 TABLET, FILM COATED ORAL at 21:09

## 2018-07-01 RX ADMIN — PANTOPRAZOLE SODIUM 40 MG: 40 TABLET, DELAYED RELEASE ORAL at 06:17

## 2018-07-01 RX ADMIN — GLIMEPIRIDE 4 MG: 4 TABLET ORAL at 07:30

## 2018-07-01 RX ADMIN — GABAPENTIN 800 MG: 400 CAPSULE ORAL at 08:04

## 2018-07-01 RX ADMIN — TIZANIDINE 4 MG: 4 TABLET ORAL at 21:09

## 2018-07-01 RX ADMIN — INSULIN LISPRO 3 UNITS: 100 INJECTION, SOLUTION INTRAVENOUS; SUBCUTANEOUS at 21:10

## 2018-07-01 RX ADMIN — MULTIPLE VITAMINS W/ MINERALS TAB 1 TABLET: TAB at 08:04

## 2018-07-01 RX ADMIN — INSULIN LISPRO 6 UNITS: 100 INJECTION, SOLUTION INTRAVENOUS; SUBCUTANEOUS at 16:51

## 2018-07-01 RX ADMIN — PANCRELIPASE 4 CAPSULE: 60000; 12000; 38000 CAPSULE, DELAYED RELEASE PELLETS ORAL at 16:48

## 2018-07-01 RX ADMIN — HYDROXYZINE HYDROCHLORIDE 100 MG: 25 TABLET, FILM COATED ORAL at 21:09

## 2018-07-01 RX ADMIN — METOPROLOL TARTRATE 25 MG: 25 TABLET ORAL at 21:10

## 2018-07-01 RX ADMIN — CLONAZEPAM 0.5 MG: 0.5 TABLET ORAL at 21:09

## 2018-07-01 RX ADMIN — LURASIDONE HYDROCHLORIDE 80 MG: 80 TABLET, FILM COATED ORAL at 16:48

## 2018-07-01 RX ADMIN — VENLAFAXINE 75 MG: 75 TABLET ORAL at 08:04

## 2018-07-01 RX ADMIN — GABAPENTIN 800 MG: 400 CAPSULE ORAL at 14:23

## 2018-07-01 RX ADMIN — GABAPENTIN 800 MG: 400 CAPSULE ORAL at 21:09

## 2018-07-01 RX ADMIN — ACETAMINOPHEN 650 MG: 325 TABLET, FILM COATED ORAL at 14:27

## 2018-07-01 RX ADMIN — PANCRELIPASE 4 CAPSULE: 60000; 12000; 38000 CAPSULE, DELAYED RELEASE PELLETS ORAL at 11:50

## 2018-07-01 RX ADMIN — HYDROXYZINE HYDROCHLORIDE 100 MG: 25 TABLET, FILM COATED ORAL at 14:27

## 2018-07-01 RX ADMIN — FOLIC ACID 1 MG: 1 TABLET ORAL at 08:03

## 2018-07-01 RX ADMIN — INSULIN GLARGINE 25 UNITS: 100 INJECTION, SOLUTION SUBCUTANEOUS at 08:47

## 2018-07-01 RX ADMIN — MELOXICAM 7.5 MG: 7.5 TABLET ORAL at 21:09

## 2018-07-01 RX ADMIN — INSULIN LISPRO 2 UNITS: 100 INJECTION, SOLUTION INTRAVENOUS; SUBCUTANEOUS at 11:51

## 2018-07-01 RX ADMIN — INSULIN GLARGINE 25 UNITS: 100 INJECTION, SOLUTION SUBCUTANEOUS at 21:10

## 2018-07-01 RX ADMIN — INSULIN LISPRO 2 UNITS: 100 INJECTION, SOLUTION INTRAVENOUS; SUBCUTANEOUS at 07:30

## 2018-07-01 RX ADMIN — HYDROXYZINE HYDROCHLORIDE 100 MG: 25 TABLET, FILM COATED ORAL at 06:16

## 2018-07-01 RX ADMIN — PANCRELIPASE 4 CAPSULE: 60000; 12000; 38000 CAPSULE, DELAYED RELEASE PELLETS ORAL at 07:30

## 2018-07-01 RX ADMIN — FENOFIBRATE 54 MG: 54 TABLET ORAL at 07:30

## 2018-07-01 RX ADMIN — ACETAMINOPHEN 650 MG: 325 TABLET, FILM COATED ORAL at 06:17

## 2018-07-01 RX ADMIN — VENLAFAXINE 75 MG: 75 TABLET ORAL at 16:48

## 2018-07-01 RX ADMIN — METOPROLOL TARTRATE 25 MG: 25 TABLET ORAL at 08:03

## 2018-07-01 ASSESSMENT — PAIN SCALES - GENERAL
PAINLEVEL_OUTOF10: 0
PAINLEVEL_OUTOF10: 3
PAINLEVEL_OUTOF10: 0
PAINLEVEL_OUTOF10: 0
PAINLEVEL_OUTOF10: 3
PAINLEVEL_OUTOF10: 0
PAINLEVEL_OUTOF10: 3
PAINLEVEL_OUTOF10: 8

## 2018-07-01 ASSESSMENT — PAIN DESCRIPTION - PAIN TYPE
TYPE: CHRONIC PAIN

## 2018-07-01 ASSESSMENT — PAIN DESCRIPTION - LOCATION
LOCATION: BACK
LOCATION: NECK
LOCATION: NECK
LOCATION: BACK
LOCATION: NECK
LOCATION: NECK

## 2018-07-01 ASSESSMENT — PAIN DESCRIPTION - DESCRIPTORS
DESCRIPTORS: ACHING
DESCRIPTORS: ACHING;DISCOMFORT
DESCRIPTORS: ACHING

## 2018-07-01 ASSESSMENT — PAIN DESCRIPTION - FREQUENCY
FREQUENCY: CONTINUOUS

## 2018-07-01 ASSESSMENT — PAIN DESCRIPTION - ONSET: ONSET: ON-GOING

## 2018-07-01 ASSESSMENT — PAIN DESCRIPTION - ORIENTATION: ORIENTATION: LOWER

## 2018-07-01 NOTE — PLAN OF CARE
Problem: Altered Mood, Depressive Behavior:  Goal: Able to verbalize and/or display a decrease in depressive symptoms  Able to verbalize and/or display a decrease in depressive symptoms   Outcome: Ongoing  Psychoeducation Group Note    Date: 7/1/18  Start Time: 1330  End Time: 1425    Number Participants in Group:  14    Goal:  Patient will demonstrate increased interpersonal interaction.    Topic:  Relapse Prevention Plan Therapeutic Group    Discipline Responsible:   OT  AT  [facility]. X RT  Other       Participation Level:     None  Minimal   X Active Listener X Interactive    Monopolizing         Participation Quality:  X Appropriate  Inappropriate   X       Attentive        Intrusive   X       Sharing        Resistant   X       Supportive        Lethargic       Affective:   X Congruent  Incongruent  Blunted  Flat    Constricted  Anxious  Elated  Angry    Labile  Depressed  Other  Bright       Cognitive:  X Alert X Oriented PPTP     Concentration X G  F  P   Attention Span X G  F  P   Short-Term Memory X G  F  P   Long-Term Memory X G  F  P   ProblemSolving/  Decision Making X G  F  P   Ability to Process  Information X G  F  P      Contributing Factors             Delusional             Hallucinating             Flight of Ideas             Other:       Modes of Intervention:  X Education X Support X Exploration   X Clarifying X Problem Solving  Confrontation    Socialization X Limit Setting  Reality Testing    Activity  Movement  Media    Other:            Response to Learning:  X Able to verbalize current knowledge/experience   X Able to verbalize/acknowledge new learning   X Able to retain information   X Capable of insight    Able to change behavior   X Progressing to goal    Other:        Comments:

## 2018-07-01 NOTE — PLAN OF CARE
Problem: Anger Management/Homicidal Ideation:  Goal: Absence of angry outbursts  Absence of angry outbursts   Outcome: Ongoing  Pt has irritability towards another pt and makes racist remarks about pt. To staff. Pt told to use appropriate language and he walks away from staff. Pt able to remain free from outbursts. Goal: Absence of homicidal ideation  Absence of homicidal ideation   Outcome: Ongoing  No HI this shift. Problem: Altered Mood, Depressive Behavior:  Goal: Able to verbalize acceptance of life and situations over which he or she has no control  Able to verbalize acceptance of life and situations over which he or she has no control   Outcome: Ongoing  Pt does not discuss any subject other than his irritability with pt. Goal: Able to verbalize and/or display a decrease in depressive symptoms  Able to verbalize and/or display a decrease in depressive symptoms   Outcome: Ongoing  Pt relates depression continues does not state if getting any better with medication.

## 2018-07-02 LAB
GLUCOSE BLD-MCNC: 135 MG/DL (ref 75–110)
GLUCOSE BLD-MCNC: 187 MG/DL (ref 75–110)
GLUCOSE BLD-MCNC: 208 MG/DL (ref 75–110)
GLUCOSE BLD-MCNC: 279 MG/DL (ref 75–110)

## 2018-07-02 PROCEDURE — 82947 ASSAY GLUCOSE BLOOD QUANT: CPT

## 2018-07-02 PROCEDURE — 6370000000 HC RX 637 (ALT 250 FOR IP): Performed by: INTERNAL MEDICINE

## 2018-07-02 PROCEDURE — 99232 SBSQ HOSP IP/OBS MODERATE 35: CPT | Performed by: PSYCHIATRY & NEUROLOGY

## 2018-07-02 PROCEDURE — 6370000000 HC RX 637 (ALT 250 FOR IP): Performed by: PSYCHIATRY & NEUROLOGY

## 2018-07-02 PROCEDURE — 6370000000 HC RX 637 (ALT 250 FOR IP): Performed by: NURSE PRACTITIONER

## 2018-07-02 PROCEDURE — 1240000000 HC EMOTIONAL WELLNESS R&B

## 2018-07-02 RX ADMIN — HYDROXYZINE HYDROCHLORIDE 100 MG: 25 TABLET, FILM COATED ORAL at 21:05

## 2018-07-02 RX ADMIN — INSULIN LISPRO 2 UNITS: 100 INJECTION, SOLUTION INTRAVENOUS; SUBCUTANEOUS at 21:05

## 2018-07-02 RX ADMIN — PANCRELIPASE 4 CAPSULE: 60000; 12000; 38000 CAPSULE, DELAYED RELEASE PELLETS ORAL at 17:11

## 2018-07-02 RX ADMIN — FENOFIBRATE 54 MG: 54 TABLET ORAL at 07:37

## 2018-07-02 RX ADMIN — METOPROLOL TARTRATE 25 MG: 25 TABLET ORAL at 21:06

## 2018-07-02 RX ADMIN — PANCRELIPASE 4 CAPSULE: 60000; 12000; 38000 CAPSULE, DELAYED RELEASE PELLETS ORAL at 07:37

## 2018-07-02 RX ADMIN — HYDROXYZINE HYDROCHLORIDE 100 MG: 25 TABLET, FILM COATED ORAL at 12:40

## 2018-07-02 RX ADMIN — MELOXICAM 7.5 MG: 7.5 TABLET ORAL at 21:06

## 2018-07-02 RX ADMIN — PANTOPRAZOLE SODIUM 40 MG: 40 TABLET, DELAYED RELEASE ORAL at 05:30

## 2018-07-02 RX ADMIN — GABAPENTIN 800 MG: 400 CAPSULE ORAL at 08:39

## 2018-07-02 RX ADMIN — MELOXICAM 7.5 MG: 7.5 TABLET ORAL at 08:39

## 2018-07-02 RX ADMIN — GABAPENTIN 800 MG: 400 CAPSULE ORAL at 13:34

## 2018-07-02 RX ADMIN — METOPROLOL TARTRATE 25 MG: 25 TABLET ORAL at 08:39

## 2018-07-02 RX ADMIN — VENLAFAXINE 75 MG: 75 TABLET ORAL at 17:11

## 2018-07-02 RX ADMIN — HYDROXYZINE HYDROCHLORIDE 100 MG: 25 TABLET, FILM COATED ORAL at 05:30

## 2018-07-02 RX ADMIN — ATORVASTATIN CALCIUM 40 MG: 20 TABLET, FILM COATED ORAL at 08:39

## 2018-07-02 RX ADMIN — GABAPENTIN 800 MG: 400 CAPSULE ORAL at 21:06

## 2018-07-02 RX ADMIN — INSULIN GLARGINE 25 UNITS: 100 INJECTION, SOLUTION SUBCUTANEOUS at 08:37

## 2018-07-02 RX ADMIN — FOLIC ACID 1 MG: 1 TABLET ORAL at 08:39

## 2018-07-02 RX ADMIN — GLIMEPIRIDE 4 MG: 4 TABLET ORAL at 07:37

## 2018-07-02 RX ADMIN — PANCRELIPASE 4 CAPSULE: 60000; 12000; 38000 CAPSULE, DELAYED RELEASE PELLETS ORAL at 11:59

## 2018-07-02 RX ADMIN — INSULIN LISPRO 2 UNITS: 100 INJECTION, SOLUTION INTRAVENOUS; SUBCUTANEOUS at 12:02

## 2018-07-02 RX ADMIN — MIRTAZAPINE 45 MG: 30 TABLET, FILM COATED ORAL at 21:05

## 2018-07-02 RX ADMIN — INSULIN LISPRO 6 UNITS: 100 INJECTION, SOLUTION INTRAVENOUS; SUBCUTANEOUS at 17:15

## 2018-07-02 RX ADMIN — TIZANIDINE 4 MG: 4 TABLET ORAL at 21:06

## 2018-07-02 RX ADMIN — INSULIN GLARGINE 25 UNITS: 100 INJECTION, SOLUTION SUBCUTANEOUS at 21:07

## 2018-07-02 RX ADMIN — MULTIPLE VITAMINS W/ MINERALS TAB 1 TABLET: TAB at 08:39

## 2018-07-02 RX ADMIN — ACETAMINOPHEN 650 MG: 325 TABLET, FILM COATED ORAL at 05:31

## 2018-07-02 RX ADMIN — VENLAFAXINE 75 MG: 75 TABLET ORAL at 08:39

## 2018-07-02 RX ADMIN — CLONAZEPAM 0.5 MG: 0.5 TABLET ORAL at 21:06

## 2018-07-02 RX ADMIN — LURASIDONE HYDROCHLORIDE 80 MG: 80 TABLET, FILM COATED ORAL at 17:11

## 2018-07-02 ASSESSMENT — PAIN DESCRIPTION - PAIN TYPE
TYPE: CHRONIC PAIN

## 2018-07-02 ASSESSMENT — PAIN SCALES - GENERAL
PAINLEVEL_OUTOF10: 7
PAINLEVEL_OUTOF10: 3
PAINLEVEL_OUTOF10: 6
PAINLEVEL_OUTOF10: 2
PAINLEVEL_OUTOF10: 1
PAINLEVEL_OUTOF10: 7

## 2018-07-02 ASSESSMENT — PAIN DESCRIPTION - LOCATION
LOCATION: BACK
LOCATION: BACK;NECK
LOCATION: BACK;NECK
LOCATION: BACK

## 2018-07-02 NOTE — PLAN OF CARE
Problem: Anger Management/Homicidal Ideation:  Goal: Absence of angry outbursts  Absence of angry outbursts   Outcome: Ongoing  Pt did not participate in Positive Coping Skills / Benefits of Music / Team Building group at [phone] despite staff encouragement.

## 2018-07-02 NOTE — PLAN OF CARE
Problem: Altered Mood, Depressive Behavior:  Goal: Able to verbalize acceptance of life and situations over which he or she has no control  Able to verbalize acceptance of life and situations over which he or she has no control   Outcome: Ongoing  Pt did not participate in Stress Management Therapeutic Recovery Group at 1435 despite staff encouragement.

## 2018-07-02 NOTE — PROGRESS NOTES
Nightly  glimepiride (AMARYL) tablet 4 mg, 4 mg, Oral, Daily with breakfast  glucose (GLUTOSE) 40 % oral gel 15 g, 15 g, Oral, PRN  dextrose 50 % solution 12.5 g, 12.5 g, Intravenous, PRN  glucagon (rDNA) injection 1 mg, 1 mg, Intramuscular, PRN  dextrose 5 % solution, 100 mL/hr, Intravenous, PRN  acetaminophen (TYLENOL) tablet 650 mg, 650 mg, Oral, Q4H PRN  benztropine mesylate (COGENTIN) injection 2 mg, 2 mg, Intramuscular, BID PRN  magnesium hydroxide (MILK OF MAGNESIA) 400 MG/5ML suspension 30 mL, 30 mL, Oral, Daily PRN  aluminum & magnesium hydroxide-simethicone (MAALOX) 200-200-20 MG/5ML suspension 30 mL, 30 mL, Oral, Q6H PRN  nicotine (NICODERM CQ) 14 MG/24HR 1 patch, 1 patch, Transdermal, Daily  nicotine polacrilex (NICORETTE) gum 2 mg, 2 mg, Oral, Q2H PRN  atorvastatin (LIPITOR) tablet 40 mg, 40 mg, Oral, Daily  fenofibrate (TRICOR) tablet 54 mg, 54 mg, Oral, Daily  folic acid (FOLVITE) tablet 1 mg, 1 mg, Oral, Daily  metoprolol tartrate (LOPRESSOR) tablet 25 mg, 25 mg, Oral, BID  therapeutic multivitamin-minerals 1 tablet, 1 tablet, Oral, Daily  pantoprazole (PROTONIX) tablet 40 mg, 40 mg, Oral, QAM AC  tiZANidine (ZANAFLEX) tablet 4 mg, 4 mg, Oral, Nightly  hydrOXYzine (ATARAX) tablet 100 mg, 100 mg, Oral, TID PRN    ASSESSMENT AND PLAN    Continue to adjust medications accordingly

## 2018-07-03 VITALS
RESPIRATION RATE: 14 BRPM | HEIGHT: 68 IN | SYSTOLIC BLOOD PRESSURE: 112 MMHG | TEMPERATURE: 97.6 F | DIASTOLIC BLOOD PRESSURE: 58 MMHG | OXYGEN SATURATION: 98 % | HEART RATE: 64 BPM | BODY MASS INDEX: 33.49 KG/M2 | WEIGHT: 221 LBS

## 2018-07-03 LAB
GLUCOSE BLD-MCNC: 168 MG/DL (ref 75–110)
GLUCOSE BLD-MCNC: 173 MG/DL (ref 75–110)

## 2018-07-03 PROCEDURE — 99238 HOSP IP/OBS DSCHRG MGMT 30/<: CPT | Performed by: PSYCHIATRY & NEUROLOGY

## 2018-07-03 PROCEDURE — 6370000000 HC RX 637 (ALT 250 FOR IP): Performed by: NURSE PRACTITIONER

## 2018-07-03 PROCEDURE — 82947 ASSAY GLUCOSE BLOOD QUANT: CPT

## 2018-07-03 PROCEDURE — 6370000000 HC RX 637 (ALT 250 FOR IP): Performed by: PSYCHIATRY & NEUROLOGY

## 2018-07-03 PROCEDURE — 6370000000 HC RX 637 (ALT 250 FOR IP): Performed by: INTERNAL MEDICINE

## 2018-07-03 RX ORDER — VENLAFAXINE 75 MG/1
75 TABLET ORAL 2 TIMES DAILY WITH MEALS
Qty: 90 TABLET | Refills: 0 | Status: SHIPPED | OUTPATIENT
Start: 2018-07-03 | End: 2018-07-31 | Stop reason: SDUPTHER

## 2018-07-03 RX ORDER — MIRTAZAPINE 45 MG/1
45 TABLET, FILM COATED ORAL NIGHTLY
Qty: 30 TABLET | Refills: 0 | Status: ON HOLD | OUTPATIENT
Start: 2018-07-03 | End: 2021-01-01

## 2018-07-03 RX ORDER — GLIMEPIRIDE 4 MG/1
4 TABLET ORAL
Qty: 30 TABLET | Refills: 3 | Status: SHIPPED | OUTPATIENT
Start: 2018-07-04 | End: 2019-04-08 | Stop reason: SDUPTHER

## 2018-07-03 RX ADMIN — GLIMEPIRIDE 4 MG: 4 TABLET ORAL at 07:59

## 2018-07-03 RX ADMIN — PANCRELIPASE 4 CAPSULE: 60000; 12000; 38000 CAPSULE, DELAYED RELEASE PELLETS ORAL at 07:59

## 2018-07-03 RX ADMIN — ACETAMINOPHEN 650 MG: 325 TABLET, FILM COATED ORAL at 13:19

## 2018-07-03 RX ADMIN — INSULIN GLARGINE 25 UNITS: 100 INJECTION, SOLUTION SUBCUTANEOUS at 08:18

## 2018-07-03 RX ADMIN — MULTIPLE VITAMINS W/ MINERALS TAB 1 TABLET: TAB at 08:20

## 2018-07-03 RX ADMIN — VENLAFAXINE 75 MG: 75 TABLET ORAL at 08:20

## 2018-07-03 RX ADMIN — HYDROXYZINE HYDROCHLORIDE 100 MG: 25 TABLET, FILM COATED ORAL at 13:19

## 2018-07-03 RX ADMIN — ACETAMINOPHEN 650 MG: 325 TABLET, FILM COATED ORAL at 05:51

## 2018-07-03 RX ADMIN — INSULIN LISPRO 2 UNITS: 100 INJECTION, SOLUTION INTRAVENOUS; SUBCUTANEOUS at 08:17

## 2018-07-03 RX ADMIN — PANCRELIPASE 4 CAPSULE: 60000; 12000; 38000 CAPSULE, DELAYED RELEASE PELLETS ORAL at 11:53

## 2018-07-03 RX ADMIN — MELOXICAM 7.5 MG: 7.5 TABLET ORAL at 08:20

## 2018-07-03 RX ADMIN — METOPROLOL TARTRATE 25 MG: 25 TABLET ORAL at 08:21

## 2018-07-03 RX ADMIN — GABAPENTIN 800 MG: 400 CAPSULE ORAL at 13:18

## 2018-07-03 RX ADMIN — INSULIN LISPRO 2 UNITS: 100 INJECTION, SOLUTION INTRAVENOUS; SUBCUTANEOUS at 11:55

## 2018-07-03 RX ADMIN — FOLIC ACID 1 MG: 1 TABLET ORAL at 08:21

## 2018-07-03 RX ADMIN — GABAPENTIN 800 MG: 400 CAPSULE ORAL at 08:21

## 2018-07-03 RX ADMIN — HYDROXYZINE HYDROCHLORIDE 100 MG: 25 TABLET, FILM COATED ORAL at 05:51

## 2018-07-03 RX ADMIN — PANTOPRAZOLE SODIUM 40 MG: 40 TABLET, DELAYED RELEASE ORAL at 08:20

## 2018-07-03 RX ADMIN — FENOFIBRATE 54 MG: 54 TABLET ORAL at 07:59

## 2018-07-03 RX ADMIN — ATORVASTATIN CALCIUM 40 MG: 20 TABLET, FILM COATED ORAL at 08:20

## 2018-07-03 ASSESSMENT — PAIN DESCRIPTION - PAIN TYPE
TYPE: CHRONIC PAIN

## 2018-07-03 ASSESSMENT — PAIN SCALES - GENERAL
PAINLEVEL_OUTOF10: 7
PAINLEVEL_OUTOF10: 7
PAINLEVEL_OUTOF10: 1
PAINLEVEL_OUTOF10: 0
PAINLEVEL_OUTOF10: 2
PAINLEVEL_OUTOF10: 7
PAINLEVEL_OUTOF10: 3

## 2018-07-03 ASSESSMENT — PAIN DESCRIPTION - LOCATION
LOCATION: HEAD;BACK
LOCATION: BACK;NECK
LOCATION: BACK

## 2018-07-03 ASSESSMENT — PAIN DESCRIPTION - DESCRIPTORS
DESCRIPTORS: ACHING
DESCRIPTORS: ACHING

## 2018-07-03 NOTE — PLAN OF CARE
Problem: Anger Management/Homicidal Ideation:  Goal: Absence of homicidal ideation  Absence of homicidal ideation   Outcome: Ongoing  Pt denied thoughts of SI/HI/self-harm and agreed to seek out staff should thoughts of SI/HI/self-harm arise. Safe environment maintained. Q15 minute checks for safety continued per unit policy. Will continue to monitor for safety and provide support and reassurance as needed. Writer encouraged pt to attend groups, continue taking prescribed medications and report any side effects to staff and go to all follow up appointments.

## 2018-07-03 NOTE — PLAN OF CARE
Problem: Altered Mood, Depressive Behavior:  Goal: Able to verbalize acceptance of life and situations over which he or she has no control  Able to verbalize acceptance of life and situations over which he or she has no control   Outcome: Ongoing  Psychoeducation Group Note    Date: 7/3/18  Start Time: 0850  End Time: 0945    Number Participants in Group:  16    Goal:  Patient will demonstrate increased interpersonal interaction.    Topic:  [address]    Discipline Responsible:   OT  AT  [facility]. X RT  Other       Participation Level:     None  Minimal   X Active Listener  Interactive    Monopolizing         Participation Quality:  X Appropriate  Inappropriate   X       Attentive        Intrusive          Sharing        Resistant          Supportive        Lethargic       Affective:   X Congruent  Incongruent  Blunted  Flat    Constricted  Anxious  Elated  Angry    Labile  Depressed  Other  Bright       Cognitive:  X Alert X Oriented PPTP     Concentration X G  F  P   Attention Span X G  F  P   Short-Term Memory  G  F  P   Long-Term Memory  G  F  P   ProblemSolving/  Decision Making  G  F  P   Ability to Process  Information  G  F  P      Contributing Factors             Delusional             Hallucinating             Flight of Ideas             Other:       Modes of Intervention:  X Education  Support X Exploration   X Clarifying  Problem Solving  Confrontation    Socialization X Limit Setting  Reality Testing    Activity  Movement  Media   X Other: Community Resources            Response to Learning:   Able to verbalize current knowledge/experience    Able to verbalize/acknowledge new learning    Able to retain information    Capable of insight    Able to change behavior   X Progressing to goal    Other:        Comments:
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
## 2018-07-03 NOTE — PLAN OF CARE
Problem: Altered Mood, Depressive Behavior:  Goal: Able to verbalize and/or display a decrease in depressive symptoms  Able to verbalize and/or display a decrease in depressive symptoms   Outcome: Ongoing  Pt did not participate in Progressive Muscle Relaxation / Stress Reduction / Positive Coping Skills group at 1330 despite staff encouragement.

## 2018-07-03 NOTE — PROGRESS NOTES
Department of Psychiatry  Attending Progress Note      SUBJECTIVE: Found him some what better. Denies any active suicidal ideation. Sleep is better with increased Remeron dose. Charting and medications reviewed. Therapeutic support provided. There is still no ability to formulate safe plan other than continued hospitalization.       OBJECTIVE    Physical  VITALS:  BP (!) 112/58   Pulse 64   Temp 97.6 °F (36.4 °C) (Oral)   Resp 14   Ht 5' 8\" (1.727 m)   Wt 221 lb (100.2 kg)   SpO2 98%   BMI 33.60 kg/m²         Mental Status Examination:  Level of consciousness: alert    Appearance:  casually dressed  Behavior/Motor:  WNL  Attitude toward examiner: Cooperative  Speech:  normal  Mood: irritable, dysphoric  Affect:  mood congruent, constricted  Thought processes:  linear  Thought content:  Homicidal ideation and suicidal ideation recently  Suicidal Ideation: active today with plan to hang self with phone cord in unit  Homicidal Ideation: denies today  Delusions:  no evidence of delusions  Perceptual Disturbance:  auditory hallucinations  Cognition:  oriented to person, place, and time  Memory impaired immediate recall  Insight:  fair  Judgment:  fair      Medications  Current Facility-Administered Medications: lurasidone (LATUDA) tablet 80 mg, 80 mg, Oral, Dinner  mirtazapine (REMERON) tablet 45 mg, 45 mg, Oral, Nightly  meloxicam (MOBIC) tablet 7.5 mg, 7.5 mg, Oral, BID  gabapentin (NEURONTIN) capsule 800 mg, 800 mg, Oral, TID  lipase-protease-amylase (CREON) 21736 units delayed release capsule 4 capsule, 4 capsule, Oral, TID WC  venlafaxine (EFFEXOR) tablet 75 mg, 75 mg, Oral, BID WC  clonazePAM (KLONOPIN) tablet 0.5 mg, 0.5 mg, Oral, Nightly  insulin glargine (LANTUS) injection vial 25 Units, 25 Units, Subcutaneous, BID  insulin lispro (HUMALOG) injection vial 0-12 Units, 0-12 Units, Subcutaneous, TID WC  insulin lispro (HUMALOG) injection vial 0-6 Units, 0-6 Units, Subcutaneous, Nightly  glimepiride (AMARYL)
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
## 2018-07-31 RX ORDER — VENLAFAXINE 75 MG/1
75 TABLET ORAL 2 TIMES DAILY WITH MEALS
Qty: 90 TABLET | Refills: 0 | Status: ON HOLD | OUTPATIENT
Start: 2018-07-31 | End: 2021-01-01

## 2018-08-06 RX ORDER — TIZANIDINE 4 MG/1
TABLET ORAL
Qty: 30 TABLET | Refills: 1 | Status: ON HOLD | OUTPATIENT
Start: 2018-08-06 | End: 2021-01-01
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
## 2018-08-20 NOTE — TELEPHONE ENCOUNTER
Health Maintenance   Topic Date Due    Diabetic foot exam  07/26/1977    Diabetic retinal exam  07/26/1977    Pneumococcal med risk (1 of 1 - PPSV23) 07/26/1986    Colon Cancer Screen FIT/FOBT  07/26/2017    Shingles Vaccine (1 of 2 - 2 Dose Series) 07/26/2017    Flu vaccine (1) 09/01/2018    Diabetic microalbuminuria test  09/21/2018    A1C test (Diabetic or Prediabetic)  06/06/2019    Lipid screen  06/06/2019    Potassium monitoring  06/29/2019    Creatinine monitoring  06/29/2019    DTaP/Tdap/Td vaccine (2 - Td) 04/27/2026    HIV screen  Completed             (applicable per patient's age: Cancer Screenings, Depression Screening, Fall Risk Screening, Immunizations)    Hemoglobin A1C (%)   Date Value   06/06/2018 8.8 (H)   03/27/2017 6.7 (H)   04/27/2016 5.2     Microalb/Crt.  Ratio (mcg/mg creat)   Date Value   08/29/2011 25     LDL Cholesterol (mg/dL)   Date Value   06/06/2018          AST (U/L)   Date Value   06/13/2018 15     ALT (U/L)   Date Value   06/13/2018 25     BUN (mg/dL)   Date Value   06/29/2018 15      (goal A1C is < 7)   (goal LDL is <100) need 30-50% reduction from baseline     BP Readings from Last 3 Encounters:   06/05/18 127/76   06/04/18 (!) 173/97   05/17/18 (!) 150/58    (goal /80)      All Future Testing planned in [facility]:  Lab Frequency Next Occurrence       Next Visit Date:  Future Appointments  Date Time Provider Bob Serrano   10/3/2018 2:00 PM Sharon Larry MD [facility]            Patient Active Problem List:     Essential hypertension     Chronic bilateral low back pain with bilateral sciatica     Generalized abdominal pain     Diarrhea of presumed infectious origin     Metabolic acidosis, increased anion gap (IAG)     C. difficile diarrhea     SIRS (systemic inflammatory response syndrome) (HCC)     Chronic diarrhea     Pancreatic insufficiency     Alcohol-induced chronic pancreatitis ([facility] [address]     Esophagitis     Type 2 diabetes mellitus with
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
## 2018-08-21 RX ORDER — MELOXICAM 7.5 MG/1
TABLET ORAL
Qty: 60 TABLET | Refills: 2 | Status: SHIPPED | OUTPATIENT
Start: 2018-08-21 | End: 2019-01-29 | Stop reason: SDUPTHER

## 2018-08-27 RX ORDER — AMITRIPTYLINE HYDROCHLORIDE 25 MG/1
TABLET, FILM COATED ORAL
Qty: 30 TABLET | Refills: 2 | Status: ON HOLD | OUTPATIENT
Start: 2018-08-27 | End: 2021-01-01
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
## 2018-09-19 ENCOUNTER — OFFICE VISIT (OUTPATIENT)
Dept: ORTHOPEDIC SURGERY | Age: 53
End: 2018-09-19
Payer: MEDICARE

## 2018-09-19 VITALS — HEART RATE: 80 BPM | RESPIRATION RATE: 16 BRPM

## 2018-09-19 DIAGNOSIS — M17.12 PRIMARY OSTEOARTHRITIS OF LEFT KNEE: Primary | ICD-10-CM

## 2018-09-19 DIAGNOSIS — G89.29 CHRONIC PAIN OF LEFT KNEE: Primary | ICD-10-CM

## 2018-09-19 DIAGNOSIS — M25.562 CHRONIC PAIN OF LEFT KNEE: Primary | ICD-10-CM

## 2018-09-19 PROCEDURE — 4004F PT TOBACCO SCREEN RCVD TLK: CPT | Performed by: ORTHOPAEDIC SURGERY

## 2018-09-19 PROCEDURE — 3017F COLORECTAL CA SCREEN DOC REV: CPT | Performed by: ORTHOPAEDIC SURGERY

## 2018-09-19 PROCEDURE — 99213 OFFICE O/P EST LOW 20 MIN: CPT | Performed by: ORTHOPAEDIC SURGERY

## 2018-09-19 PROCEDURE — G8417 CALC BMI ABV UP PARAM F/U: HCPCS | Performed by: ORTHOPAEDIC SURGERY

## 2018-09-19 PROCEDURE — G8427 DOCREV CUR MEDS BY ELIG CLIN: HCPCS | Performed by: ORTHOPAEDIC SURGERY

## 2018-09-19 RX ORDER — TRAMADOL HYDROCHLORIDE 50 MG/1
50 TABLET ORAL EVERY 6 HOURS PRN
Qty: 28 TABLET | Refills: 0 | Status: SHIPPED | OUTPATIENT
Start: 2018-09-19 | End: 2018-09-26

## 2018-09-19 ASSESSMENT — KOOS JR
STANDING UPRIGHT: 3
RISING FROM SITTING: 3
STRAIGHTENING KNEE FULLY: 2
GOING UP OR DOWN STAIRS: 3
TWISING OR PIVOTING ON KNEE: 3
BENDING TO THE FLOOR TO PICK UP OBJECT: 3
HOW SEVERE IS YOUR KNEE STIFFNESS AFTER FIRST WAKING IN MORNING: 3

## 2018-09-19 ASSESSMENT — PROMIS GLOBAL HEALTH SCALE
WHO IS THE PERSON COMPLETING THE PROMIS V1.1 SURVEY?: 0
IN THE PAST 7 DAYS, HOW WOULD YOU RATE YOUR PAIN ON AVERAGE [ON A SCALE FROM 0 (NO PAIN) TO 10 (WORST IMAGINABLE PAIN)]?: 10
IN GENERAL, HOW WOULD YOU RATE YOUR PHYSICAL HEALTH [ON A SCALE OF 1 (POOR) TO 5 (EXCELLENT)]?: 2
IN GENERAL, HOW WOULD YOU RATE YOUR MENTAL HEALTH, INCLUDING YOUR MOOD AND YOUR ABILITY TO THINK [ON A SCALE OF 1 (POOR) TO 5 (EXCELLENT)]?: 2
IN GENERAL, WOULD YOU SAY YOUR HEALTH IS...[ON A SCALE OF 1 (POOR) TO 5 (EXCELLENT)]: 3
IN THE PAST 7 DAYS, HOW OFTEN HAVE YOU BEEN BOTHERED BY EMOTIONAL PROBLEMS, SUCH AS FEELING ANXIOUS, DEPRESSED, OR IRRITABLE [ON A SCALE FROM 1 (NEVER) TO 5 (ALWAYS)]?: 4
SUM OF RESPONSES TO QUESTIONS 2, 4, 5, & 10: 10
TO WHAT EXTENT ARE YOU ABLE TO CARRY OUT YOUR EVERYDAY PHYSICAL ACTIVITIES SUCH AS WALKING, CLIMBING STAIRS, CARRYING GROCERIES, OR MOVING A CHAIR [ON A SCALE OF 1 (NOT AT ALL) TO 5 (COMPLETELY)]?: 1
IN THE PAST 7 DAYS, HOW WOULD YOU RATE YOUR FATIGUE ON AVERAGE [ON A SCALE FROM 1 (NONE) TO 5 (VERY SEVERE)]?: 3
IN GENERAL, PLEASE RATE HOW WELL YOU CARRY OUT YOUR USUAL SOCIAL ACTIVITIES (INCLUDES ACTIVITIES AT HOME, AT WORK, AND IN YOUR COMMUNITY, AND RESPONSIBILITIES AS A PARENT, CHILD, SPOUSE, EMPLOYEE, FRIEND, ETC) [ON A SCALE OF 1 (POOR) TO 5 (EXCELLENT)]?: 2
SUM OF RESPONSES TO QUESTIONS 3, 6, 7, & 8: 16
HOW IS THE PROMIS V1.1 BEING ADMINISTERED?: 0
IN GENERAL, WOULD YOU SAY YOUR QUALITY OF LIFE IS...[ON A SCALE OF 1 (POOR) TO 5 (EXCELLENT)]: 2
IN GENERAL, HOW WOULD YOU RATE YOUR SATISFACTION WITH YOUR SOCIAL ACTIVITIES AND RELATIONSHIPS [ON A SCALE OF 1 (POOR) TO 5 (EXCELLENT)]?: 2

## 2018-09-19 NOTE — PROGRESS NOTES
Jesus Manuel Roy M.D.            [address] [facility], [address] [facility]             Dept Phone: [phone]             Dept Fax:  [phone]  Xavier Jo returns today. He has significant degenerative joint disease of his left knee. The patient has tried Motrin as well as Modic. He's also had several injections to his knee including if physical therapy regimen. He's at the point where he has the point really barely ambulate. He states his knee. Give way on him. Examination notes the patient has new anklet. His knee has a varus disposition. He has a 7° flexion contracture. He flexes to about 130°. Ligamentously's grossly intact. He does have a lot of patellofemoral pain as well. No further examination that is contributory    X-rays taken today reviewed by me show the patient essentially bone-on-bone the medial compartment left knee. He also has spur formations laterally. He is pretty significant narrowing on the lateral view was patellofemoral joint and this coincides with his patellofemoral pain with compression    Impression  Significant this severe DJD left knee    Plan  Patient was asked if he like to have another injection he states that it really hasn't helped him out much. He's ready to move on surgically. Unfortunately and ultimately to get patient is a good candidate for unicompartmental as he does have a fair amount of patellofemoral pain. I we discussed total knee arthroplasty as well as a risk and benefits. He is willing to proceed with good knowledge of all risks and benefits. He has no personal or family history for DVT. We'll initiate the process for scheduling of her left total knee arthroplasty. He has some legal issues to workout prior to this time          Review of Systems   Constitutional: Negative. HENT: Negative. Respiratory: Negative. Cardiovascular: Negative.     Musculoskeletal:
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
## 2018-09-24 ENCOUNTER — HOSPITAL ENCOUNTER (OUTPATIENT)
Dept: PREADMISSION TESTING | Age: 53
Discharge: HOME | End: 2018-09-28
Payer: MEDICARE

## 2018-09-24 VITALS
HEIGHT: 69 IN | HEART RATE: 83 BPM | RESPIRATION RATE: 12 BRPM | BODY MASS INDEX: 35.59 KG/M2 | SYSTOLIC BLOOD PRESSURE: 125 MMHG | OXYGEN SATURATION: 100 % | WEIGHT: 240.3 LBS | TEMPERATURE: 97.9 F | DIASTOLIC BLOOD PRESSURE: 89 MMHG

## 2018-09-24 LAB
ABSOLUTE EOS #: 0.2 K/UL (ref 0–0.4)
ABSOLUTE IMMATURE GRANULOCYTE: NORMAL K/UL (ref 0–0.3)
ABSOLUTE LYMPH #: 2.5 K/UL (ref 1–4.8)
ABSOLUTE MONO #: 0.5 K/UL (ref 0.1–1.3)
ANION GAP SERPL CALCULATED.3IONS-SCNC: 12 MMOL/L (ref 9–17)
BASOPHILS # BLD: 1 % (ref 0–2)
BASOPHILS ABSOLUTE: 0.1 K/UL (ref 0–0.2)
BILIRUBIN URINE: NEGATIVE
BUN BLDV-MCNC: 6 MG/DL (ref 6–20)
BUN/CREAT BLD: ABNORMAL (ref 9–20)
CALCIUM SERPL-MCNC: 8.6 MG/DL (ref 8.6–10.4)
CHLORIDE BLD-SCNC: 101 MMOL/L (ref 98–107)
CO2: 24 MMOL/L (ref 20–31)
COLOR: YELLOW
COMMENT UA: ABNORMAL
CREAT SERPL-MCNC: 0.74 MG/DL (ref 0.7–1.2)
DIFFERENTIAL TYPE: NORMAL
EOSINOPHILS RELATIVE PERCENT: 2 % (ref 0–4)
GFR AFRICAN AMERICAN: >60 ML/MIN
GFR NON-AFRICAN AMERICAN: >60 ML/MIN
GFR SERPL CREATININE-BSD FRML MDRD: ABNORMAL ML/MIN/{1.73_M2}
GFR SERPL CREATININE-BSD FRML MDRD: ABNORMAL ML/MIN/{1.73_M2}
GLUCOSE BLD-MCNC: 207 MG/DL (ref 70–99)
GLUCOSE URINE: ABNORMAL
HCT VFR BLD CALC: 41.4 % (ref 41–53)
HEMOGLOBIN: 14.1 G/DL (ref 13.5–17.5)
IMMATURE GRANULOCYTES: NORMAL %
KETONES, URINE: NEGATIVE
LEUKOCYTE ESTERASE, URINE: NEGATIVE
LYMPHOCYTES # BLD: 31 % (ref 24–44)
MCH RBC QN AUTO: 28.9 PG (ref 26–34)
MCHC RBC AUTO-ENTMCNC: 34.1 G/DL (ref 31–37)
MCV RBC AUTO: 84.8 FL (ref 80–100)
MONOCYTES # BLD: 6 % (ref 1–7)
MRSA, DNA, NASAL: NORMAL
NITRITE, URINE: NEGATIVE
NRBC AUTOMATED: NORMAL PER 100 WBC
PDW BLD-RTO: 13.3 % (ref 11.5–14.9)
PH UA: 6 (ref 5–8)
PLATELET # BLD: 259 K/UL (ref 150–450)
PLATELET ESTIMATE: NORMAL
PMV BLD AUTO: 8.2 FL (ref 6–12)
POTASSIUM SERPL-SCNC: 3.4 MMOL/L (ref 3.7–5.3)
PROTEIN UA: NEGATIVE
RBC # BLD: 4.88 M/UL (ref 4.5–5.9)
RBC # BLD: NORMAL 10*6/UL
SEG NEUTROPHILS: 60 % (ref 36–66)
SEGMENTED NEUTROPHILS ABSOLUTE COUNT: 5 K/UL (ref 1.3–9.1)
SODIUM BLD-SCNC: 137 MMOL/L (ref 135–144)
SPECIFIC GRAVITY UA: 1.01 (ref 1–1.03)
SPECIMEN DESCRIPTION: NORMAL
TURBIDITY: CLEAR
URINE HGB: NEGATIVE
UROBILINOGEN, URINE: NORMAL
WBC # BLD: 8.2 K/UL (ref 3.5–11)
WBC # BLD: NORMAL 10*3/UL

## 2018-09-24 PROCEDURE — 87641 MR-STAPH DNA AMP PROBE: CPT

## 2018-09-24 PROCEDURE — 36415 COLL VENOUS BLD VENIPUNCTURE: CPT

## 2018-09-24 PROCEDURE — 81003 URINALYSIS AUTO W/O SCOPE: CPT

## 2018-09-24 PROCEDURE — 85025 COMPLETE CBC W/AUTO DIFF WBC: CPT

## 2018-09-24 PROCEDURE — 80048 BASIC METABOLIC PNL TOTAL CA: CPT

## 2018-09-24 RX ORDER — DIPHENHYDRAMINE HCL 25 MG
25 CAPSULE ORAL EVERY 6 HOURS PRN
Status: ON HOLD | COMMUNITY
End: 2021-01-01 | Stop reason: HOSPADM

## 2018-09-25 ENCOUNTER — ANESTHESIA EVENT (OUTPATIENT)
Dept: OPERATING ROOM | Age: 53
End: 2018-09-25
Payer: MEDICARE

## 2018-09-25 RX ORDER — SODIUM CHLORIDE, SODIUM LACTATE, POTASSIUM CHLORIDE, CALCIUM CHLORIDE 600; 310; 30; 20 MG/100ML; MG/100ML; MG/100ML; MG/100ML
INJECTION, SOLUTION INTRAVENOUS CONTINUOUS
Status: CANCELLED | OUTPATIENT
Start: 2018-09-25

## 2018-09-25 RX ORDER — SODIUM CHLORIDE 0.9 % (FLUSH) 0.9 %
10 SYRINGE (ML) INJECTION PRN
Status: CANCELLED | OUTPATIENT
Start: 2018-09-25

## 2018-09-25 RX ORDER — SODIUM CHLORIDE 0.9 % (FLUSH) 0.9 %
10 SYRINGE (ML) INJECTION EVERY 12 HOURS SCHEDULED
Status: CANCELLED | OUTPATIENT
Start: 2018-09-25

## 2018-09-25 RX ORDER — LIDOCAINE HYDROCHLORIDE 10 MG/ML
1 INJECTION, SOLUTION EPIDURAL; INFILTRATION; INTRACAUDAL; PERINEURAL
Status: CANCELLED | OUTPATIENT
Start: 2018-09-25 | End: 2018-09-25

## 2018-10-04 ENCOUNTER — TELEPHONE (OUTPATIENT)
Dept: INTERNAL MEDICINE | Age: 53
End: 2018-10-04

## 2018-10-04 RX ORDER — OMEPRAZOLE 20 MG/1
CAPSULE, DELAYED RELEASE ORAL
Qty: 30 CAPSULE | Refills: 3 | Status: SHIPPED | OUTPATIENT
Start: 2018-10-04 | End: 2019-05-19 | Stop reason: SDUPTHER
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
## 2018-10-05 ENCOUNTER — OFFICE VISIT (OUTPATIENT)
Dept: INTERNAL MEDICINE | Age: 53
End: 2018-10-05
Payer: MEDICARE

## 2018-10-05 VITALS
HEART RATE: 71 BPM | SYSTOLIC BLOOD PRESSURE: 135 MMHG | HEIGHT: 69 IN | WEIGHT: 237.6 LBS | BODY MASS INDEX: 35.19 KG/M2 | OXYGEN SATURATION: 97 % | DIASTOLIC BLOOD PRESSURE: 85 MMHG | RESPIRATION RATE: 12 BRPM

## 2018-10-05 DIAGNOSIS — Z23 NEED FOR PROPHYLACTIC VACCINATION AGAINST STREPTOCOCCUS PNEUMONIAE (PNEUMOCOCCUS): ICD-10-CM

## 2018-10-05 DIAGNOSIS — Z23 NEED FOR PROPHYLACTIC VACCINATION AND INOCULATION AGAINST INFLUENZA: ICD-10-CM

## 2018-10-05 DIAGNOSIS — I10 ESSENTIAL HYPERTENSION: Primary | ICD-10-CM

## 2018-10-05 DIAGNOSIS — E11.65 TYPE 2 DIABETES MELLITUS WITH HYPERGLYCEMIA, WITHOUT LONG-TERM CURRENT USE OF INSULIN (HCC): ICD-10-CM

## 2018-10-05 PROCEDURE — 4004F PT TOBACCO SCREEN RCVD TLK: CPT | Performed by: INTERNAL MEDICINE

## 2018-10-05 PROCEDURE — G8427 DOCREV CUR MEDS BY ELIG CLIN: HCPCS | Performed by: INTERNAL MEDICINE

## 2018-10-05 PROCEDURE — 3045F PR MOST RECENT HEMOGLOBIN A1C LEVEL 7.0-9.0%: CPT | Performed by: INTERNAL MEDICINE

## 2018-10-05 PROCEDURE — G8417 CALC BMI ABV UP PARAM F/U: HCPCS | Performed by: INTERNAL MEDICINE

## 2018-10-05 PROCEDURE — 2022F DILAT RTA XM EVC RTNOPTHY: CPT | Performed by: INTERNAL MEDICINE

## 2018-10-05 PROCEDURE — 90471 IMMUNIZATION ADMIN: CPT | Performed by: INTERNAL MEDICINE

## 2018-10-05 PROCEDURE — G8482 FLU IMMUNIZE ORDER/ADMIN: HCPCS | Performed by: INTERNAL MEDICINE

## 2018-10-05 PROCEDURE — 90732 PPSV23 VACC 2 YRS+ SUBQ/IM: CPT | Performed by: INTERNAL MEDICINE

## 2018-10-05 PROCEDURE — 99214 OFFICE O/P EST MOD 30 MIN: CPT | Performed by: INTERNAL MEDICINE

## 2018-10-05 PROCEDURE — 90686 IIV4 VACC NO PRSV 0.5 ML IM: CPT | Performed by: INTERNAL MEDICINE

## 2018-10-05 PROCEDURE — 90472 IMMUNIZATION ADMIN EACH ADD: CPT | Performed by: INTERNAL MEDICINE

## 2018-10-05 PROCEDURE — 3017F COLORECTAL CA SCREEN DOC REV: CPT | Performed by: INTERNAL MEDICINE

## 2018-10-05 RX ORDER — TRAMADOL HYDROCHLORIDE 50 MG/1
50 TABLET ORAL EVERY 6 HOURS PRN
Status: ON HOLD | COMMUNITY
End: 2021-01-01 | Stop reason: HOSPADM

## 2018-10-05 ASSESSMENT — ENCOUNTER SYMPTOMS
EYES NEGATIVE: 1
GASTROINTESTINAL NEGATIVE: 1
RESPIRATORY NEGATIVE: 1
ALLERGIC/IMMUNOLOGIC NEGATIVE: 1

## 2018-10-05 ASSESSMENT — PATIENT HEALTH QUESTIONNAIRE - PHQ9
SUM OF ALL RESPONSES TO PHQ QUESTIONS 1-9: 0
1. LITTLE INTEREST OR PLEASURE IN DOING THINGS: 0
SUM OF ALL RESPONSES TO PHQ QUESTIONS 1-9: 0
2. FEELING DOWN, DEPRESSED OR HOPELESS: 0
SUM OF ALL RESPONSES TO PHQ9 QUESTIONS 1 & 2: 0

## 2018-10-08 ENCOUNTER — HOSPITAL ENCOUNTER (OUTPATIENT)
Age: 53
Setting detail: OBSERVATION
Discharge: HOME | End: 2018-10-10
Attending: ORTHOPAEDIC SURGERY | Admitting: ORTHOPAEDIC SURGERY
Payer: MEDICARE

## 2018-10-08 ENCOUNTER — ANESTHESIA (OUTPATIENT)
Dept: OPERATING ROOM | Age: 53
End: 2018-10-08
Payer: MEDICARE

## 2018-10-08 ENCOUNTER — APPOINTMENT (OUTPATIENT)
Dept: GENERAL RADIOLOGY | Age: 53
End: 2018-10-08
Attending: ORTHOPAEDIC SURGERY
Payer: MEDICARE

## 2018-10-08 VITALS — OXYGEN SATURATION: 92 % | TEMPERATURE: 100 F | SYSTOLIC BLOOD PRESSURE: 146 MMHG | DIASTOLIC BLOOD PRESSURE: 73 MMHG

## 2018-10-08 DIAGNOSIS — M17.12 PRIMARY OSTEOARTHRITIS OF LEFT KNEE: Primary | ICD-10-CM

## 2018-10-08 LAB
GLUCOSE BLD-MCNC: 141 MG/DL (ref 75–110)
GLUCOSE BLD-MCNC: 150 MG/DL (ref 75–110)
GLUCOSE BLD-MCNC: 194 MG/DL (ref 75–110)
GLUCOSE BLD-MCNC: 359 MG/DL (ref 75–110)
GLUCOSE BLD-MCNC: 360 MG/DL (ref 75–110)

## 2018-10-08 PROCEDURE — 6370000000 HC RX 637 (ALT 250 FOR IP): Performed by: ORTHOPAEDIC SURGERY

## 2018-10-08 PROCEDURE — 96375 TX/PRO/DX INJ NEW DRUG ADDON: CPT

## 2018-10-08 PROCEDURE — 3700000001 HC ADD 15 MINUTES (ANESTHESIA): Performed by: ORTHOPAEDIC SURGERY

## 2018-10-08 PROCEDURE — 6360000002 HC RX W HCPCS: Performed by: ORTHOPAEDIC SURGERY

## 2018-10-08 PROCEDURE — 64447 NJX AA&/STRD FEMORAL NRV IMG: CPT | Performed by: ANESTHESIOLOGY

## 2018-10-08 PROCEDURE — 2500000003 HC RX 250 WO HCPCS: Performed by: ORTHOPAEDIC SURGERY

## 2018-10-08 PROCEDURE — 2580000003 HC RX 258: Performed by: ORTHOPAEDIC SURGERY

## 2018-10-08 PROCEDURE — 6370000000 HC RX 637 (ALT 250 FOR IP): Performed by: INTERNAL MEDICINE

## 2018-10-08 PROCEDURE — 3700000000 HC ANESTHESIA ATTENDED CARE: Performed by: ORTHOPAEDIC SURGERY

## 2018-10-08 PROCEDURE — 2709999900 HC NON-CHARGEABLE SUPPLY: Performed by: ORTHOPAEDIC SURGERY

## 2018-10-08 PROCEDURE — G0378 HOSPITAL OBSERVATION PER HR: HCPCS

## 2018-10-08 PROCEDURE — 6360000002 HC RX W HCPCS: Performed by: ANESTHESIOLOGY

## 2018-10-08 PROCEDURE — 97161 PT EVAL LOW COMPLEX 20 MIN: CPT

## 2018-10-08 PROCEDURE — 2580000003 HC RX 258: Performed by: NURSE ANESTHETIST, CERTIFIED REGISTERED

## 2018-10-08 PROCEDURE — 2580000003 HC RX 258: Performed by: ANESTHESIOLOGY

## 2018-10-08 PROCEDURE — 96374 THER/PROPH/DIAG INJ IV PUSH: CPT

## 2018-10-08 PROCEDURE — C1713 ANCHOR/SCREW BN/BN,TIS/BN: HCPCS | Performed by: ORTHOPAEDIC SURGERY

## 2018-10-08 PROCEDURE — G8979 MOBILITY GOAL STATUS: HCPCS

## 2018-10-08 PROCEDURE — G8987 SELF CARE CURRENT STATUS: HCPCS

## 2018-10-08 PROCEDURE — 94664 DEMO&/EVAL PT USE INHALER: CPT

## 2018-10-08 PROCEDURE — G8978 MOBILITY CURRENT STATUS: HCPCS

## 2018-10-08 PROCEDURE — 3600000004 HC SURGERY LEVEL 4 BASE: Performed by: ORTHOPAEDIC SURGERY

## 2018-10-08 PROCEDURE — 97165 OT EVAL LOW COMPLEX 30 MIN: CPT

## 2018-10-08 PROCEDURE — C1776 JOINT DEVICE (IMPLANTABLE): HCPCS | Performed by: ORTHOPAEDIC SURGERY

## 2018-10-08 PROCEDURE — 2500000003 HC RX 250 WO HCPCS: Performed by: NURSE ANESTHETIST, CERTIFIED REGISTERED

## 2018-10-08 PROCEDURE — 27447 TOTAL KNEE ARTHROPLASTY: CPT | Performed by: ORTHOPAEDIC SURGERY

## 2018-10-08 PROCEDURE — 97110 THERAPEUTIC EXERCISES: CPT

## 2018-10-08 PROCEDURE — 6360000002 HC RX W HCPCS: Performed by: NURSE ANESTHETIST, CERTIFIED REGISTERED

## 2018-10-08 PROCEDURE — 7100000000 HC PACU RECOVERY - FIRST 15 MIN: Performed by: ORTHOPAEDIC SURGERY

## 2018-10-08 PROCEDURE — 82947 ASSAY GLUCOSE BLOOD QUANT: CPT

## 2018-10-08 PROCEDURE — 3600000014 HC SURGERY LEVEL 4 ADDTL 15MIN: Performed by: ORTHOPAEDIC SURGERY

## 2018-10-08 PROCEDURE — G8988 SELF CARE GOAL STATUS: HCPCS

## 2018-10-08 PROCEDURE — 96376 TX/PRO/DX INJ SAME DRUG ADON: CPT

## 2018-10-08 PROCEDURE — 73560 X-RAY EXAM OF KNEE 1 OR 2: CPT

## 2018-10-08 PROCEDURE — 7100000001 HC PACU RECOVERY - ADDTL 15 MIN: Performed by: ORTHOPAEDIC SURGERY

## 2018-10-08 PROCEDURE — 2720000010 HC SURG SUPPLY STERILE: Performed by: ORTHOPAEDIC SURGERY

## 2018-10-08 DEVICE — COMPONENT PAT DIA37MM THK10MM STD KNEE TI ALLY S STL UHMWPE: Type: IMPLANTABLE DEVICE | Site: PATELLA | Status: FUNCTIONAL

## 2018-10-08 DEVICE — TRAY TIB L75MM KNEE CO CHROM FIN MOD INTLOK CEM VANGUARD: Type: IMPLANTABLE DEVICE | Site: KNEE | Status: FUNCTIONAL

## 2018-10-08 DEVICE — IMPLANTABLE DEVICE: Type: IMPLANTABLE DEVICE | Site: KNEE | Status: FUNCTIONAL

## 2018-10-08 DEVICE — DISCONTINUED USE 416978 CEMENT PALACOS R SING DOSE 40GR: Type: IMPLANTABLE DEVICE | Site: KNEE | Status: FUNCTIONAL

## 2018-10-08 DEVICE — COMPONENT FEM 67.5MM L KNEE CO CHROM NP INTLOK PRI CRUCE: Type: IMPLANTABLE DEVICE | Site: KNEE | Status: FUNCTIONAL

## 2018-10-08 RX ORDER — MORPHINE SULFATE 2 MG/ML
2 INJECTION, SOLUTION INTRAMUSCULAR; INTRAVENOUS EVERY 5 MIN PRN
Status: DISCONTINUED | OUTPATIENT
Start: 2018-10-08 | End: 2018-10-08 | Stop reason: HOSPADM

## 2018-10-08 RX ORDER — AMITRIPTYLINE HYDROCHLORIDE 25 MG/1
25 TABLET, FILM COATED ORAL NIGHTLY
Status: DISCONTINUED | OUTPATIENT
Start: 2018-10-08 | End: 2018-10-10 | Stop reason: HOSPADM

## 2018-10-08 RX ORDER — FENOFIBRATE 54 MG/1
1 TABLET ORAL DAILY
Status: CANCELLED | OUTPATIENT
Start: 2018-10-08

## 2018-10-08 RX ORDER — SUCCINYLCHOLINE CHLORIDE 20 MG/ML
INJECTION INTRAMUSCULAR; INTRAVENOUS PRN
Status: DISCONTINUED | OUTPATIENT
Start: 2018-10-08 | End: 2018-10-08 | Stop reason: SDUPTHER

## 2018-10-08 RX ORDER — TRANEXAMIC ACID 100 MG/ML
INJECTION, SOLUTION INTRAVENOUS PRN
Status: DISCONTINUED | OUTPATIENT
Start: 2018-10-08 | End: 2018-10-08 | Stop reason: SDUPTHER

## 2018-10-08 RX ORDER — CEFAZOLIN SODIUM 1 G/3ML
INJECTION, POWDER, FOR SOLUTION INTRAMUSCULAR; INTRAVENOUS
Status: DISPENSED
Start: 2018-10-08 | End: 2018-10-08

## 2018-10-08 RX ORDER — DOCUSATE SODIUM 100 MG/1
100 CAPSULE, LIQUID FILLED ORAL 2 TIMES DAILY
Status: DISCONTINUED | OUTPATIENT
Start: 2018-10-08 | End: 2018-10-10 | Stop reason: HOSPADM

## 2018-10-08 RX ORDER — MEPERIDINE HYDROCHLORIDE 50 MG/ML
12.5 INJECTION INTRAMUSCULAR; INTRAVENOUS; SUBCUTANEOUS EVERY 5 MIN PRN
Status: DISCONTINUED | OUTPATIENT
Start: 2018-10-08 | End: 2018-10-08 | Stop reason: HOSPADM

## 2018-10-08 RX ORDER — DEXAMETHASONE SODIUM PHOSPHATE 10 MG/ML
10 INJECTION INTRAMUSCULAR; INTRAVENOUS ONCE
Status: COMPLETED | OUTPATIENT
Start: 2018-10-08 | End: 2018-10-08

## 2018-10-08 RX ORDER — ONDANSETRON 2 MG/ML
4 INJECTION INTRAMUSCULAR; INTRAVENOUS
Status: DISCONTINUED | OUTPATIENT
Start: 2018-10-08 | End: 2018-10-08 | Stop reason: HOSPADM

## 2018-10-08 RX ORDER — NICOTINE POLACRILEX 4 MG
15 LOZENGE BUCCAL PRN
Status: DISCONTINUED | OUTPATIENT
Start: 2018-10-08 | End: 2018-10-10 | Stop reason: HOSPADM

## 2018-10-08 RX ORDER — PROPOFOL 10 MG/ML
INJECTION, EMULSION INTRAVENOUS PRN
Status: DISCONTINUED | OUTPATIENT
Start: 2018-10-08 | End: 2018-10-08 | Stop reason: SDUPTHER

## 2018-10-08 RX ORDER — SODIUM CHLORIDE, SODIUM LACTATE, POTASSIUM CHLORIDE, CALCIUM CHLORIDE 600; 310; 30; 20 MG/100ML; MG/100ML; MG/100ML; MG/100ML
INJECTION, SOLUTION INTRAVENOUS CONTINUOUS PRN
Status: DISCONTINUED | OUTPATIENT
Start: 2018-10-08 | End: 2018-10-08 | Stop reason: SDUPTHER

## 2018-10-08 RX ORDER — FENTANYL CITRATE 50 UG/ML
INJECTION, SOLUTION INTRAMUSCULAR; INTRAVENOUS PRN
Status: DISCONTINUED | OUTPATIENT
Start: 2018-10-08 | End: 2018-10-08 | Stop reason: SDUPTHER

## 2018-10-08 RX ORDER — ATORVASTATIN CALCIUM 40 MG/1
40 TABLET, FILM COATED ORAL DAILY
Status: DISCONTINUED | OUTPATIENT
Start: 2018-10-08 | End: 2018-10-10 | Stop reason: HOSPADM

## 2018-10-08 RX ORDER — DIPHENHYDRAMINE HYDROCHLORIDE 50 MG/ML
12.5 INJECTION INTRAMUSCULAR; INTRAVENOUS
Status: COMPLETED | OUTPATIENT
Start: 2018-10-08 | End: 2018-10-08

## 2018-10-08 RX ORDER — LABETALOL HYDROCHLORIDE 5 MG/ML
5 INJECTION, SOLUTION INTRAVENOUS EVERY 10 MIN PRN
Status: DISCONTINUED | OUTPATIENT
Start: 2018-10-08 | End: 2018-10-08 | Stop reason: HOSPADM

## 2018-10-08 RX ORDER — SCOLOPAMINE TRANSDERMAL SYSTEM 1 MG/1
1 PATCH, EXTENDED RELEASE TRANSDERMAL ONCE
Status: DISCONTINUED | OUTPATIENT
Start: 2018-10-08 | End: 2018-10-10 | Stop reason: HOSPADM

## 2018-10-08 RX ORDER — DEXTROSE MONOHYDRATE 50 MG/ML
100 INJECTION, SOLUTION INTRAVENOUS PRN
Status: DISCONTINUED | OUTPATIENT
Start: 2018-10-08 | End: 2018-10-10 | Stop reason: HOSPADM

## 2018-10-08 RX ORDER — FOLIC ACID 1 MG/1
1 TABLET ORAL DAILY
Status: DISCONTINUED | OUTPATIENT
Start: 2018-10-08 | End: 2018-10-10 | Stop reason: HOSPADM

## 2018-10-08 RX ORDER — DEXTROSE MONOHYDRATE 25 G/50ML
12.5 INJECTION, SOLUTION INTRAVENOUS PRN
Status: DISCONTINUED | OUTPATIENT
Start: 2018-10-08 | End: 2018-10-10 | Stop reason: HOSPADM

## 2018-10-08 RX ORDER — ACETAMINOPHEN 500 MG
1000 TABLET ORAL EVERY 8 HOURS SCHEDULED
Status: COMPLETED | OUTPATIENT
Start: 2018-10-08 | End: 2018-10-09

## 2018-10-08 RX ORDER — PANTOPRAZOLE SODIUM 40 MG/1
40 TABLET, DELAYED RELEASE ORAL ONCE
Status: COMPLETED | OUTPATIENT
Start: 2018-10-08 | End: 2018-10-08

## 2018-10-08 RX ORDER — SODIUM CHLORIDE, SODIUM LACTATE, POTASSIUM CHLORIDE, CALCIUM CHLORIDE 600; 310; 30; 20 MG/100ML; MG/100ML; MG/100ML; MG/100ML
INJECTION, SOLUTION INTRAVENOUS CONTINUOUS
Status: DISCONTINUED | OUTPATIENT
Start: 2018-10-08 | End: 2018-10-10 | Stop reason: HOSPADM

## 2018-10-08 RX ORDER — NEOSTIGMINE METHYLSULFATE 1 MG/ML
INJECTION, SOLUTION INTRAVENOUS PRN
Status: DISCONTINUED | OUTPATIENT
Start: 2018-10-08 | End: 2018-10-08 | Stop reason: SDUPTHER

## 2018-10-08 RX ORDER — M-VIT,TX,IRON,MINS/CALC/FOLIC 27MG-0.4MG
1 TABLET ORAL DAILY
Status: DISCONTINUED | OUTPATIENT
Start: 2018-10-08 | End: 2018-10-10 | Stop reason: HOSPADM

## 2018-10-08 RX ORDER — PANTOPRAZOLE SODIUM 40 MG/1
40 TABLET, DELAYED RELEASE ORAL
Status: DISCONTINUED | OUTPATIENT
Start: 2018-10-09 | End: 2018-10-10 | Stop reason: HOSPADM

## 2018-10-08 RX ORDER — MIDAZOLAM HYDROCHLORIDE 1 MG/ML
INJECTION INTRAMUSCULAR; INTRAVENOUS PRN
Status: DISCONTINUED | OUTPATIENT
Start: 2018-10-08 | End: 2018-10-08 | Stop reason: SDUPTHER

## 2018-10-08 RX ORDER — GABAPENTIN 400 MG/1
800 CAPSULE ORAL ONCE
Status: COMPLETED | OUTPATIENT
Start: 2018-10-08 | End: 2018-10-08

## 2018-10-08 RX ORDER — GABAPENTIN 400 MG/1
800 CAPSULE ORAL 3 TIMES DAILY
Status: DISCONTINUED | OUTPATIENT
Start: 2018-10-08 | End: 2018-10-10 | Stop reason: HOSPADM

## 2018-10-08 RX ORDER — VENLAFAXINE 75 MG/1
225 TABLET ORAL
Status: DISCONTINUED | OUTPATIENT
Start: 2018-10-09 | End: 2018-10-10 | Stop reason: HOSPADM

## 2018-10-08 RX ORDER — KETOROLAC TROMETHAMINE 30 MG/ML
15 INJECTION, SOLUTION INTRAMUSCULAR; INTRAVENOUS EVERY 8 HOURS SCHEDULED
Status: DISCONTINUED | OUTPATIENT
Start: 2018-10-08 | End: 2018-10-09

## 2018-10-08 RX ORDER — GLYCOPYRROLATE 1 MG/5 ML
SYRINGE (ML) INTRAVENOUS PRN
Status: DISCONTINUED | OUTPATIENT
Start: 2018-10-08 | End: 2018-10-08 | Stop reason: SDUPTHER

## 2018-10-08 RX ORDER — DIPHENHYDRAMINE HYDROCHLORIDE 50 MG/ML
12.5 INJECTION INTRAMUSCULAR; INTRAVENOUS ONCE
Status: COMPLETED | OUTPATIENT
Start: 2018-10-08 | End: 2018-10-08

## 2018-10-08 RX ORDER — SODIUM CHLORIDE 0.9 % (FLUSH) 0.9 %
10 SYRINGE (ML) INJECTION EVERY 12 HOURS SCHEDULED
Status: DISCONTINUED | OUTPATIENT
Start: 2018-10-08 | End: 2018-10-08 | Stop reason: HOSPADM

## 2018-10-08 RX ORDER — SODIUM CHLORIDE, SODIUM LACTATE, POTASSIUM CHLORIDE, CALCIUM CHLORIDE 600; 310; 30; 20 MG/100ML; MG/100ML; MG/100ML; MG/100ML
INJECTION, SOLUTION INTRAVENOUS CONTINUOUS
Status: DISCONTINUED | OUTPATIENT
Start: 2018-10-08 | End: 2018-10-09

## 2018-10-08 RX ORDER — ONDANSETRON 2 MG/ML
INJECTION INTRAMUSCULAR; INTRAVENOUS PRN
Status: DISCONTINUED | OUTPATIENT
Start: 2018-10-08 | End: 2018-10-08 | Stop reason: SDUPTHER

## 2018-10-08 RX ORDER — CALCIUM CHLORIDE 100 MG/ML
INJECTION INTRAVENOUS; INTRAVENTRICULAR PRN
Status: DISCONTINUED | OUTPATIENT
Start: 2018-10-08 | End: 2018-10-08 | Stop reason: HOSPADM

## 2018-10-08 RX ORDER — OXYCODONE HYDROCHLORIDE 5 MG/1
10 TABLET ORAL EVERY 4 HOURS PRN
Status: DISCONTINUED | OUTPATIENT
Start: 2018-10-08 | End: 2018-10-10 | Stop reason: HOSPADM

## 2018-10-08 RX ORDER — DIPHENHYDRAMINE HCL 25 MG
25 TABLET ORAL EVERY 6 HOURS PRN
Status: DISCONTINUED | OUTPATIENT
Start: 2018-10-08 | End: 2018-10-10 | Stop reason: HOSPADM

## 2018-10-08 RX ORDER — ACETAMINOPHEN 500 MG
1000 TABLET ORAL ONCE
Status: COMPLETED | OUTPATIENT
Start: 2018-10-08 | End: 2018-10-08

## 2018-10-08 RX ORDER — PROMETHAZINE HYDROCHLORIDE 25 MG/ML
6.25 INJECTION, SOLUTION INTRAMUSCULAR; INTRAVENOUS ONCE
Status: DISCONTINUED | OUTPATIENT
Start: 2018-10-08 | End: 2018-10-08 | Stop reason: HOSPADM

## 2018-10-08 RX ORDER — OXYCODONE HYDROCHLORIDE 5 MG/1
5 TABLET ORAL EVERY 4 HOURS PRN
Status: DISCONTINUED | OUTPATIENT
Start: 2018-10-08 | End: 2018-10-10 | Stop reason: HOSPADM

## 2018-10-08 RX ORDER — LIDOCAINE HYDROCHLORIDE 10 MG/ML
1 INJECTION, SOLUTION EPIDURAL; INFILTRATION; INTRACAUDAL; PERINEURAL
Status: DISCONTINUED | OUTPATIENT
Start: 2018-10-08 | End: 2018-10-08 | Stop reason: HOSPADM

## 2018-10-08 RX ORDER — SODIUM CHLORIDE 0.9 % (FLUSH) 0.9 %
10 SYRINGE (ML) INJECTION PRN
Status: DISCONTINUED | OUTPATIENT
Start: 2018-10-08 | End: 2018-10-10 | Stop reason: HOSPADM

## 2018-10-08 RX ORDER — GLIMEPIRIDE 4 MG/1
4 TABLET ORAL
Status: DISCONTINUED | OUTPATIENT
Start: 2018-10-09 | End: 2018-10-10 | Stop reason: HOSPADM

## 2018-10-08 RX ORDER — SODIUM CHLORIDE 0.9 % (FLUSH) 0.9 %
10 SYRINGE (ML) INJECTION EVERY 12 HOURS SCHEDULED
Status: DISCONTINUED | OUTPATIENT
Start: 2018-10-08 | End: 2018-10-10 | Stop reason: HOSPADM

## 2018-10-08 RX ORDER — ONDANSETRON 2 MG/ML
4 INJECTION INTRAMUSCULAR; INTRAVENOUS EVERY 6 HOURS PRN
Status: DISCONTINUED | OUTPATIENT
Start: 2018-10-08 | End: 2018-10-10 | Stop reason: HOSPADM

## 2018-10-08 RX ADMIN — SODIUM CHLORIDE, POTASSIUM CHLORIDE, SODIUM LACTATE AND CALCIUM CHLORIDE: 600; 310; 30; 20 INJECTION, SOLUTION INTRAVENOUS at 12:02

## 2018-10-08 RX ADMIN — ACETAMINOPHEN 1000 MG: 500 TABLET, FILM COATED ORAL at 07:04

## 2018-10-08 RX ADMIN — GABAPENTIN 800 MG: 400 CAPSULE ORAL at 07:04

## 2018-10-08 RX ADMIN — DIPHENHYDRAMINE HYDROCHLORIDE 12.5 MG: 50 INJECTION, SOLUTION INTRAMUSCULAR; INTRAVENOUS at 10:12

## 2018-10-08 RX ADMIN — SERTRALINE HYDROCHLORIDE 50 MG: 50 TABLET ORAL at 14:46

## 2018-10-08 RX ADMIN — GABAPENTIN 800 MG: 400 CAPSULE ORAL at 20:16

## 2018-10-08 RX ADMIN — ASPIRIN 325 MG: 325 TABLET, DELAYED RELEASE ORAL at 12:00

## 2018-10-08 RX ADMIN — MIDAZOLAM 2 MG: 1 INJECTION INTRAMUSCULAR; INTRAVENOUS at 07:15

## 2018-10-08 RX ADMIN — Medication 2 G: at 16:52

## 2018-10-08 RX ADMIN — ACETAMINOPHEN 1000 MG: 500 TABLET, FILM COATED ORAL at 14:46

## 2018-10-08 RX ADMIN — SODIUM CHLORIDE, POTASSIUM CHLORIDE, SODIUM LACTATE AND CALCIUM CHLORIDE: 600; 310; 30; 20 INJECTION, SOLUTION INTRAVENOUS at 07:04

## 2018-10-08 RX ADMIN — ATORVASTATIN CALCIUM 40 MG: 40 TABLET, FILM COATED ORAL at 12:00

## 2018-10-08 RX ADMIN — NEOSTIGMINE METHYLSULFATE 3 MG: 1 INJECTION, SOLUTION INTRAVENOUS at 08:45

## 2018-10-08 RX ADMIN — TRANEXAMIC ACID 1000 MG: 100 INJECTION, SOLUTION INTRAVENOUS at 08:54

## 2018-10-08 RX ADMIN — OXYCODONE HYDROCHLORIDE 10 MG: 5 TABLET ORAL at 12:00

## 2018-10-08 RX ADMIN — KETOROLAC TROMETHAMINE 15 MG: 30 INJECTION, SOLUTION INTRAMUSCULAR; INTRAVENOUS at 14:47

## 2018-10-08 RX ADMIN — HYDROMORPHONE HYDROCHLORIDE 0.5 MG: 1 INJECTION, SOLUTION INTRAMUSCULAR; INTRAVENOUS; SUBCUTANEOUS at 09:36

## 2018-10-08 RX ADMIN — ONDANSETRON 4 MG: 2 INJECTION INTRAMUSCULAR; INTRAVENOUS at 09:14

## 2018-10-08 RX ADMIN — HYDROMORPHONE HYDROCHLORIDE 1 MG: 1 INJECTION, SOLUTION INTRAMUSCULAR; INTRAVENOUS; SUBCUTANEOUS at 08:22

## 2018-10-08 RX ADMIN — Medication 2 G: at 07:58

## 2018-10-08 RX ADMIN — INSULIN LISPRO 10 UNITS: 100 INJECTION, SOLUTION INTRAVENOUS; SUBCUTANEOUS at 16:52

## 2018-10-08 RX ADMIN — OXYCODONE HYDROCHLORIDE 10 MG: 5 TABLET ORAL at 22:28

## 2018-10-08 RX ADMIN — FENTANYL CITRATE 100 MCG: 50 INJECTION, SOLUTION INTRAMUSCULAR; INTRAVENOUS at 08:09

## 2018-10-08 RX ADMIN — DOCUSATE SODIUM 100 MG: 100 CAPSULE, LIQUID FILLED ORAL at 12:00

## 2018-10-08 RX ADMIN — HYDROMORPHONE HYDROCHLORIDE 1 MG: 1 INJECTION, SOLUTION INTRAMUSCULAR; INTRAVENOUS; SUBCUTANEOUS at 09:14

## 2018-10-08 RX ADMIN — DOCUSATE SODIUM 100 MG: 100 CAPSULE, LIQUID FILLED ORAL at 20:16

## 2018-10-08 RX ADMIN — DEXAMETHASONE SODIUM PHOSPHATE 10 MG: 10 INJECTION INTRAMUSCULAR; INTRAVENOUS at 07:04

## 2018-10-08 RX ADMIN — DIPHENHYDRAMINE HCL 25 MG: 25 TABLET ORAL at 20:16

## 2018-10-08 RX ADMIN — INSULIN LISPRO 5 UNITS: 100 INJECTION, SOLUTION INTRAVENOUS; SUBCUTANEOUS at 20:17

## 2018-10-08 RX ADMIN — TRANEXAMIC ACID 1000 MG: 100 INJECTION, SOLUTION INTRAVENOUS at 07:53

## 2018-10-08 RX ADMIN — SUCCINYLCHOLINE CHLORIDE 160 MG: 20 INJECTION INTRAMUSCULAR; INTRAVENOUS at 07:42

## 2018-10-08 RX ADMIN — Medication 0.4 MG: at 08:45

## 2018-10-08 RX ADMIN — PANCRELIPASE 2 CAPSULE: 24000; 76000; 120000 CAPSULE, DELAYED RELEASE PELLETS ORAL at 13:23

## 2018-10-08 RX ADMIN — PANTOPRAZOLE SODIUM 40 MG: 40 TABLET, DELAYED RELEASE ORAL at 17:01

## 2018-10-08 RX ADMIN — GABAPENTIN 800 MG: 400 CAPSULE ORAL at 14:50

## 2018-10-08 RX ADMIN — HYDROMORPHONE HYDROCHLORIDE 0.5 MG: 1 INJECTION, SOLUTION INTRAMUSCULAR; INTRAVENOUS; SUBCUTANEOUS at 09:28

## 2018-10-08 RX ADMIN — MULTIPLE VITAMINS W/ MINERALS TAB 1 TABLET: TAB at 12:00

## 2018-10-08 RX ADMIN — KETOROLAC TROMETHAMINE 15 MG: 30 INJECTION, SOLUTION INTRAMUSCULAR; INTRAVENOUS at 20:17

## 2018-10-08 RX ADMIN — FENTANYL CITRATE 100 MCG: 50 INJECTION, SOLUTION INTRAMUSCULAR; INTRAVENOUS at 07:15

## 2018-10-08 RX ADMIN — FOLIC ACID 1 MG: 1 TABLET ORAL at 12:00

## 2018-10-08 RX ADMIN — PANCRELIPASE 2 CAPSULE: 24000; 76000; 120000 CAPSULE, DELAYED RELEASE PELLETS ORAL at 16:52

## 2018-10-08 RX ADMIN — INSULIN LISPRO 2 UNITS: 100 INJECTION, SOLUTION INTRAVENOUS; SUBCUTANEOUS at 12:05

## 2018-10-08 RX ADMIN — METOPROLOL TARTRATE 25 MG: 25 TABLET ORAL at 20:16

## 2018-10-08 RX ADMIN — PROPOFOL 200 MG: 10 INJECTION, EMULSION INTRAVENOUS at 07:42

## 2018-10-08 RX ADMIN — SODIUM CHLORIDE, POTASSIUM CHLORIDE, SODIUM LACTATE AND CALCIUM CHLORIDE: 600; 310; 30; 20 INJECTION, SOLUTION INTRAVENOUS at 07:38

## 2018-10-08 RX ADMIN — ASPIRIN 325 MG: 325 TABLET, DELAYED RELEASE ORAL at 20:16

## 2018-10-08 RX ADMIN — LURASIDONE HYDROCHLORIDE 80 MG: 80 TABLET, FILM COATED ORAL at 16:52

## 2018-10-08 RX ADMIN — OXYCODONE HYDROCHLORIDE 10 MG: 5 TABLET ORAL at 18:34

## 2018-10-08 RX ADMIN — AMITRIPTYLINE HYDROCHLORIDE 25 MG: 25 TABLET, FILM COATED ORAL at 20:21

## 2018-10-08 RX ADMIN — ACETAMINOPHEN 1000 MG: 500 TABLET, FILM COATED ORAL at 20:16

## 2018-10-08 RX ADMIN — MIRTAZAPINE 45 MG: 30 TABLET, FILM COATED ORAL at 20:16

## 2018-10-08 RX ADMIN — DIPHENHYDRAMINE HYDROCHLORIDE 12.5 MG: 50 INJECTION, SOLUTION INTRAMUSCULAR; INTRAVENOUS at 09:36

## 2018-10-08 ASSESSMENT — PULMONARY FUNCTION TESTS
PIF_VALUE: 28
PIF_VALUE: 26
PIF_VALUE: 47
PIF_VALUE: 28
PIF_VALUE: 3
PIF_VALUE: 1
PIF_VALUE: 20
PIF_VALUE: 27
PIF_VALUE: 28
PIF_VALUE: 3
PIF_VALUE: 17
PIF_VALUE: 25
PIF_VALUE: 19
PIF_VALUE: 25
PIF_VALUE: 3
PIF_VALUE: 24
PIF_VALUE: 3
PIF_VALUE: 27
PIF_VALUE: 27
PIF_VALUE: 19
PIF_VALUE: 27
PIF_VALUE: 3
PIF_VALUE: 3
PIF_VALUE: 27
PIF_VALUE: 25
PIF_VALUE: 26
PIF_VALUE: 17
PIF_VALUE: 3
PIF_VALUE: 28
PIF_VALUE: 19
PIF_VALUE: 3
PIF_VALUE: 27
PIF_VALUE: 1
PIF_VALUE: 27
PIF_VALUE: 27
PIF_VALUE: 16
PIF_VALUE: 25
PIF_VALUE: 27
PIF_VALUE: 26
PIF_VALUE: 25
PIF_VALUE: 1
PIF_VALUE: 26
PIF_VALUE: 27
PIF_VALUE: 32
PIF_VALUE: 3
PIF_VALUE: 33
PIF_VALUE: 28
PIF_VALUE: 27
PIF_VALUE: 29
PIF_VALUE: 28
PIF_VALUE: 17
PIF_VALUE: 27
PIF_VALUE: 2
PIF_VALUE: 19
PIF_VALUE: 17
PIF_VALUE: 0
PIF_VALUE: 6
PIF_VALUE: 25
PIF_VALUE: 3
PIF_VALUE: 3
PIF_VALUE: 25
PIF_VALUE: 25
PIF_VALUE: 15
PIF_VALUE: 27
PIF_VALUE: 28
PIF_VALUE: 2
PIF_VALUE: 28
PIF_VALUE: 14
PIF_VALUE: 17
PIF_VALUE: 3
PIF_VALUE: 27
PIF_VALUE: 3
PIF_VALUE: 5
PIF_VALUE: 27
PIF_VALUE: 0
PIF_VALUE: 25
PIF_VALUE: 19
PIF_VALUE: 27
PIF_VALUE: 17
PIF_VALUE: 27
PIF_VALUE: 29
PIF_VALUE: 7
PIF_VALUE: 4
PIF_VALUE: 6
PIF_VALUE: 3
PIF_VALUE: 2
PIF_VALUE: 31
PIF_VALUE: 28
PIF_VALUE: 25
PIF_VALUE: 26
PIF_VALUE: 27
PIF_VALUE: 20
PIF_VALUE: 28

## 2018-10-08 ASSESSMENT — PAIN DESCRIPTION - LOCATION
LOCATION: KNEE
LOCATION: KNEE
LOCATION: BACK;KNEE
LOCATION: KNEE

## 2018-10-08 ASSESSMENT — PAIN DESCRIPTION - ORIENTATION
ORIENTATION: LEFT

## 2018-10-08 ASSESSMENT — PAIN DESCRIPTION - PAIN TYPE
TYPE: ACUTE PAIN;SURGICAL PAIN
TYPE: ACUTE PAIN;SURGICAL PAIN
TYPE: SURGICAL PAIN
TYPE: ACUTE PAIN;CHRONIC PAIN;SURGICAL PAIN
TYPE: SURGICAL PAIN

## 2018-10-08 ASSESSMENT — PAIN SCALES - GENERAL
PAINLEVEL_OUTOF10: 6
PAINLEVEL_OUTOF10: 6
PAINLEVEL_OUTOF10: 4
PAINLEVEL_OUTOF10: 6
PAINLEVEL_OUTOF10: 7
PAINLEVEL_OUTOF10: 6
PAINLEVEL_OUTOF10: 5
PAINLEVEL_OUTOF10: 6
PAINLEVEL_OUTOF10: 7
PAINLEVEL_OUTOF10: 5
PAINLEVEL_OUTOF10: 4
PAINLEVEL_OUTOF10: 8
PAINLEVEL_OUTOF10: 5

## 2018-10-08 ASSESSMENT — PAIN DESCRIPTION - FREQUENCY: FREQUENCY: CONTINUOUS

## 2018-10-08 ASSESSMENT — ENCOUNTER SYMPTOMS: STRIDOR: 0

## 2018-10-08 ASSESSMENT — PAIN DESCRIPTION - DESCRIPTORS
DESCRIPTORS: ACHING
DESCRIPTORS: DISCOMFORT

## 2018-10-08 ASSESSMENT — PAIN - FUNCTIONAL ASSESSMENT: PAIN_FUNCTIONAL_ASSESSMENT: 0-10

## 2018-10-08 ASSESSMENT — PAIN DESCRIPTION - PROGRESSION: CLINICAL_PROGRESSION: NOT CHANGED

## 2018-10-08 ASSESSMENT — PAIN DESCRIPTION - ONSET: ONSET: ON-GOING

## 2018-10-08 NOTE — PROGRESS NOTES
WBAT  More Ambulation?: No  Ambulation 1  Surface: level tile  Device: Rolling Walker  Assistance: Minimal assistance  Quality of Gait: wide RAGHU, short step length  Distance: 10 ft  Comments: assist with use of rw, not following through on sequencing cues  Stairs/Curb  Stairs?: No     Balance  Posture: Good  Sitting - Static: Good  Sitting - Dynamic: Good;-  Standing - Static: Fair;+  Standing - Dynamic: Fair  Comments: standing balance with UE support    Total Knee Arthroplasty Exercise Program: Quad sets, glut sets, ankle pumps, heel slides, short arc quads, straight leg raise. Reps: 10 with assist fro SLR and heel slides    Pt educated on proper positioning of L knee extended in bed and up to chair with RN later, importance of mobility and there ex for ROM and return home    Assessment   Body structures, Functions, Activity limitations: Decreased functional mobility ; Decreased ROM; Decreased endurance  Assessment: Pt limited by pain and tolerance for gait. Pt may benefit from short stay in SNF due to stairs in home and elderly parents unable to assist  Prognosis: Good  Decision Making: Low Complexity  Clinical Presentation: s/p L TKA  Patient Education: PT POC, ROM ,ther ex  REQUIRES PT FOLLOW UP: Yes  Activity Tolerance  Activity Tolerance: Patient Tolerated treatment well  Activity Tolerance: limited by anxiety         Plan   Plan  Times per week: BID, 7 days a week  Current Treatment Recommendations: Strengthening, Transfer Training, Endurance Training, Gait Training, Balance Training, Stair training, Functional Mobility Training  Safety Devices  Type of devices: Call light within reach, Left in bed, Gait belt, Nurse notified  Restraints  Initially in place: No    G-Code  PT G-Codes  Functional Assessment Tool Used: Warren  Pac  Score: 17/24  Functional Limitation: Mobility: Walking and moving around  Mobility: Walking and Moving Around Current Status ():  At least 40 percent but less than 60 percent

## 2018-10-08 NOTE — ANESTHESIA PRE PROCEDURE
REMOVED FROM HI. EYES, WORK INJURY.  KNEE ARTHROSCOPY Bilateral     several each knee    KNEE SURGERY Bilateral     right x 3, left x2, scopes plus    NERVE BLOCK  05/25/2017    caudal #1 decadron 10mg    NERVE BLOCK  06/09/2017    bilateral mbnb, marcaine only    NERVE BLOCK Bilateral 06/16/2017    Bilat MBNB #2 marcaine only    NERVE BLOCK Right 06/26/2017    right lumbar RFA decadron  10mg    NERVE BLOCK Left 07/21/2017    left lumbar RF, decadron 4mg and marcaine    NOSE SURGERY      MI EGD TRANSORAL BIOPSY SINGLE/MULTIPLE N/A 3/27/2017    EGD BIOPSY performed by Alejandro Roberts DO at [facility]    WISDOM TOOTH EXTRACTION         Social History:    Social History   Substance Use Topics    Smoking status: Current Every Day Smoker     Packs/day: 0.50     Years: 34.00    Smokeless tobacco: Never Used    Alcohol use 1.8 oz/week     3 Cans of beer per week      Comment: \"HARDLY DRINK AT ALL NOW, 1 BEER A MONTH. \"                                Ready to quit: Not Answered  Counseling given: Not Answered      Vital Signs (Current):   Vitals:    10/08/18 0645   BP: (!) 182/95   Pulse: 79   Resp: 16   Temp: 97.3 °F (36.3 °C)   TempSrc: Oral   SpO2: 97%   Weight: 240 lb (108.9 kg)   Height: 5' 9\" (1.753 m)                                              BP Readings from Last 3 Encounters:   10/08/18 (!) 182/95   10/05/18 135/85   09/24/18 125/89       NPO Status:                                                                                 BMI:   Wt Readings from Last 3 Encounters:   10/08/18 240 lb (108.9 kg)   10/05/18 237 lb 9.6 oz (107.8 kg)   09/24/18 240 lb 4.8 oz (109 kg)     Body mass index is 35.44 kg/m².     CBC:   Lab Results   Component Value Date    WBC 8.2 09/24/2018    RBC 4.88 09/24/2018    RBC 4.41 02/23/2012    HGB 14.1 09/24/2018    HCT 41.4 09/24/2018    MCV 84.8 09/24/2018    RDW 13.3 09/24/2018     09/24/2018     02/23/2012       CMP:   Lab Results   Component Value Date
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
## 2018-10-08 NOTE — OP NOTE
Operative dictation  Diagnosis: Severe DJD left knee  Postop diagnosis: Same  Procedure: Left total knee arthro plasty utilizing a Biomet Vanguard prosthesis size 67.5 cruciate retaining femur with a 75 tibia and a 12 mm anterior stabilized D1 probably with a 40 state her patella  Surgeon: Shon Ayala  Assistant: Farm[place] Show  Anesthesia: Gen. with adductor canal block    Gilberto Luis is a 51-year-old patient whose had several surgeries to his left knee in the past who had presented my office with significant degenerative joint disease left knee. X-rays revealed severe degenerative degenerative joint disease in medial compartment as well as significant patellofemoral involvement as well. Patient has failed conservative treatment and having done so his activities restricted a felt to benefit from total knee arthroplasty consent was obtained good comprehension of hours and benefits patient received 2 g Ancef holding area as well as an adductor canal block. He was then taken operative suite where general anesthesia was measured. The tourniquet was applied to left eye and the left lower shoulder was prepped and draped in usual fashion. The leg was exsanguinated and the tourniquet inflated to 300 mmHg. Sterile body exhaust suits were throughout the procedure. The midline incision centered over the patella was taken down subcu right mid vastus splitting approach was performed. After synovectomy was completed the patellas placement side and calibrated at 26 mm in thickness. Freehand resection was made with an oscillating saw so that there were symmetric remaining bone. A 37 patella drill guide again the best overall fit though slightly medialized the triple pegs were drilled and a 37 standard patella was selected and this was then replaced with a protective patellar plate    The knee was flexed reveal severe arthrosis medially as well as the trochlear groove. Osteophytes removed.   Drill holes made in the distal femur and the

## 2018-10-08 NOTE — PROGRESS NOTES
Hand AROM: WFL  RUE Strength  Gross RUE Strength: WFL  R Hand Grasp: 4+/5  RUE Strength Comment: Shoulder 3+/5, elbow 4/5      RUE Tone: Normotonic     RUE AROM (degrees)  RUE AROM : WFL     Right Hand AROM (degrees)  Right Hand AROM: WFL    Fine Motor Skills  Coordination  Movements Are Fluid And Coordinated: Yes     Mobility  Supine to Sit: Modified independent  Sit to Supine: Modified independent   Stand Pivot Transfers: Minimal assistance   Balance  Sitting Balance: Supervision  Standing Balance: Minimal assistance  Standing Balance  Time: ~2 minutes  Activity: standing at EOB  Sit to stand: Contact guard assistance  Stand to sit: Contact guard assistance  Functional Mobility  Functional - Mobility Device: Rolling Walker  Activity:  (5 ft x2)  Assist Level: Minimal assistance  Functional Mobility Comments: wide base of support noted with Min A to sequence RW  Bed mobility  Rolling to Left: Modified independent  Supine to Sit: Modified independent  Sit to Supine: Modified independent     Transfers  Stand Pivot Transfers: Minimal assistance  Sit to stand: Contact guard assistance  Stand to sit: Contact guard assistance  Functional Activity Tolerance  Functional Activity Tolerance: Tolerates < 10 Min exercise, no significant change in vital signs   Assessment  Performance deficits / Impairments: Decreased functional mobility , Decreased ADL status, Decreased strength, Decreased endurance, Decreased balance, Decreased high-level IADLs  Treatment Diagnosis: Impaired self-care status. Prognosis: Good  Decision Making: Low Complexity  Patient Education: OT POC  REQUIRES OT FOLLOW UP: Yes  Discharge Recommendations: Subacute/Skilled Nursing Facility  Activity Tolerance: Patient Tolerated treatment well, Patient limited by fatigue    G CODE  OT G-codes  Functional Assessment Tool Used: AM-PAC  Score: 16/24  Functional Limitation: Self care  Self Care Current Status ():  At least 40 percent but less than 60 percent

## 2018-10-08 NOTE — ANESTHESIA PROCEDURE NOTES
Peripheral Block    Patient location during procedure: PACU  Start time: 10/8/2018 7:12 AM  End time: 10/8/2018 7:27 AM  Staffing  Anesthesiologist: Afsaneh Johansen  Resident/CRNA: Lesley VELEZ  Preanesthetic Checklist  Completed: patient identified, site marked, surgical consent, pre-op evaluation, timeout performed, IV checked, risks and benefits discussed, monitors and equipment checked, anesthesia consent given, oxygen available and patient being monitored  Peripheral Block  Patient position: supine  Prep: Betadine  Patient monitoring: continuous pulse ox, cardiac monitor, frequent blood pressure checks and IV access  Block type: Femoral  Laterality: left  Injection technique: single-shot  Procedures: ultrasound guided  Local infiltration: ropivacaine  Infiltration strength: 0.5 %  Dose: 30 mL  Adductor canal  Provider prep: mask  Expiration date: 11/30/2019  Local infiltration: ropivacaine  Needle  Needle type:  Other   Needle gauge: 20 G  Needle length: 10 cm  Needle localization: ultrasound guidance  Needle insertion depth: 7.5 cm  Lot number: [id number] needle type: ON-Q quikbloc over the needle cathter  Assessment  Injection assessment: negative aspiration for heme, no paresthesia on injection and local visualized surrounding nerve on ultrasound  Paresthesia pain: none  Slow fractionated injection: yes  Hemodynamics: stable  Additional Notes  Cathter was placed no heme on aspiration,but after blood came through cathter so cathter removed  Reason for block: post-op pain management and at surgeon's request

## 2018-10-09 LAB
GLUCOSE BLD-MCNC: 203 MG/DL (ref 75–110)
GLUCOSE BLD-MCNC: 226 MG/DL (ref 75–110)
GLUCOSE BLD-MCNC: 228 MG/DL (ref 75–110)
GLUCOSE BLD-MCNC: 274 MG/DL (ref 75–110)

## 2018-10-09 PROCEDURE — 6370000000 HC RX 637 (ALT 250 FOR IP): Performed by: INTERNAL MEDICINE

## 2018-10-09 PROCEDURE — 82947 ASSAY GLUCOSE BLOOD QUANT: CPT

## 2018-10-09 PROCEDURE — 97530 THERAPEUTIC ACTIVITIES: CPT

## 2018-10-09 PROCEDURE — 97110 THERAPEUTIC EXERCISES: CPT

## 2018-10-09 PROCEDURE — 99225 PR SBSQ OBSERVATION CARE/DAY 25 MINUTES: CPT | Performed by: INTERNAL MEDICINE

## 2018-10-09 PROCEDURE — 97116 GAIT TRAINING THERAPY: CPT

## 2018-10-09 PROCEDURE — 6360000002 HC RX W HCPCS: Performed by: ORTHOPAEDIC SURGERY

## 2018-10-09 PROCEDURE — 99024 POSTOP FOLLOW-UP VISIT: CPT | Performed by: ORTHOPAEDIC SURGERY

## 2018-10-09 PROCEDURE — 97535 SELF CARE MNGMENT TRAINING: CPT

## 2018-10-09 PROCEDURE — 6370000000 HC RX 637 (ALT 250 FOR IP): Performed by: ORTHOPAEDIC SURGERY

## 2018-10-09 PROCEDURE — G0378 HOSPITAL OBSERVATION PER HR: HCPCS

## 2018-10-09 RX ORDER — OXYCODONE HYDROCHLORIDE 10 MG/1
10 TABLET ORAL EVERY 4 HOURS PRN
Qty: 40 TABLET | Refills: 0 | Status: SHIPPED | OUTPATIENT
Start: 2018-10-09 | End: 2018-10-16

## 2018-10-09 RX ORDER — KETOROLAC TROMETHAMINE 10 MG/1
10 TABLET, FILM COATED ORAL EVERY 8 HOURS SCHEDULED
Status: COMPLETED | OUTPATIENT
Start: 2018-10-09 | End: 2018-10-10

## 2018-10-09 RX ADMIN — AMITRIPTYLINE HYDROCHLORIDE 25 MG: 25 TABLET, FILM COATED ORAL at 21:10

## 2018-10-09 RX ADMIN — INSULIN LISPRO 3 UNITS: 100 INJECTION, SOLUTION INTRAVENOUS; SUBCUTANEOUS at 21:11

## 2018-10-09 RX ADMIN — ACETAMINOPHEN 1000 MG: 500 TABLET, FILM COATED ORAL at 06:27

## 2018-10-09 RX ADMIN — OXYCODONE HYDROCHLORIDE 10 MG: 5 TABLET ORAL at 23:03

## 2018-10-09 RX ADMIN — INSULIN LISPRO 4 UNITS: 100 INJECTION, SOLUTION INTRAVENOUS; SUBCUTANEOUS at 18:49

## 2018-10-09 RX ADMIN — OXYCODONE HYDROCHLORIDE 10 MG: 5 TABLET ORAL at 06:27

## 2018-10-09 RX ADMIN — Medication 2 G: at 01:20

## 2018-10-09 RX ADMIN — PANCRELIPASE 2 CAPSULE: 24000; 76000; 120000 CAPSULE, DELAYED RELEASE PELLETS ORAL at 08:15

## 2018-10-09 RX ADMIN — METOPROLOL TARTRATE 25 MG: 25 TABLET ORAL at 21:10

## 2018-10-09 RX ADMIN — PANCRELIPASE 2 CAPSULE: 24000; 76000; 120000 CAPSULE, DELAYED RELEASE PELLETS ORAL at 14:38

## 2018-10-09 RX ADMIN — OXYCODONE HYDROCHLORIDE 10 MG: 5 TABLET ORAL at 18:49

## 2018-10-09 RX ADMIN — GABAPENTIN 800 MG: 400 CAPSULE ORAL at 08:13

## 2018-10-09 RX ADMIN — PANTOPRAZOLE SODIUM 40 MG: 40 TABLET, DELAYED RELEASE ORAL at 06:27

## 2018-10-09 RX ADMIN — INSULIN LISPRO 4 UNITS: 100 INJECTION, SOLUTION INTRAVENOUS; SUBCUTANEOUS at 08:13

## 2018-10-09 RX ADMIN — ASPIRIN 325 MG: 325 TABLET, DELAYED RELEASE ORAL at 21:11

## 2018-10-09 RX ADMIN — GABAPENTIN 800 MG: 400 CAPSULE ORAL at 21:11

## 2018-10-09 RX ADMIN — VENLAFAXINE 225 MG: 75 TABLET ORAL at 06:27

## 2018-10-09 RX ADMIN — FOLIC ACID 1 MG: 1 TABLET ORAL at 08:14

## 2018-10-09 RX ADMIN — METOPROLOL TARTRATE 25 MG: 25 TABLET ORAL at 08:14

## 2018-10-09 RX ADMIN — MULTIPLE VITAMINS W/ MINERALS TAB 1 TABLET: TAB at 08:14

## 2018-10-09 RX ADMIN — ASPIRIN 325 MG: 325 TABLET, DELAYED RELEASE ORAL at 08:13

## 2018-10-09 RX ADMIN — OXYCODONE HYDROCHLORIDE 10 MG: 5 TABLET ORAL at 02:45

## 2018-10-09 RX ADMIN — GABAPENTIN 800 MG: 400 CAPSULE ORAL at 14:37

## 2018-10-09 RX ADMIN — OXYCODONE HYDROCHLORIDE 10 MG: 5 TABLET ORAL at 10:39

## 2018-10-09 RX ADMIN — LURASIDONE HYDROCHLORIDE 80 MG: 80 TABLET, FILM COATED ORAL at 18:23

## 2018-10-09 RX ADMIN — GLIMEPIRIDE 4 MG: 4 TABLET ORAL at 08:14

## 2018-10-09 RX ADMIN — OXYCODONE HYDROCHLORIDE 10 MG: 5 TABLET ORAL at 14:45

## 2018-10-09 RX ADMIN — ATORVASTATIN CALCIUM 40 MG: 40 TABLET, FILM COATED ORAL at 08:14

## 2018-10-09 RX ADMIN — ACETAMINOPHEN 1000 MG: 500 TABLET, FILM COATED ORAL at 21:10

## 2018-10-09 RX ADMIN — INSULIN LISPRO 4 UNITS: 100 INJECTION, SOLUTION INTRAVENOUS; SUBCUTANEOUS at 14:44

## 2018-10-09 RX ADMIN — KETOROLAC TROMETHAMINE 10 MG: 10 TABLET, FILM COATED ORAL at 14:37

## 2018-10-09 RX ADMIN — MIRTAZAPINE 45 MG: 30 TABLET, FILM COATED ORAL at 21:10

## 2018-10-09 RX ADMIN — ACETAMINOPHEN 1000 MG: 500 TABLET, FILM COATED ORAL at 14:37

## 2018-10-09 RX ADMIN — SERTRALINE HYDROCHLORIDE 50 MG: 50 TABLET ORAL at 08:14

## 2018-10-09 RX ADMIN — PANCRELIPASE 2 CAPSULE: 24000; 76000; 120000 CAPSULE, DELAYED RELEASE PELLETS ORAL at 18:22

## 2018-10-09 RX ADMIN — DOCUSATE SODIUM 100 MG: 100 CAPSULE, LIQUID FILLED ORAL at 08:13

## 2018-10-09 RX ADMIN — DOCUSATE SODIUM 100 MG: 100 CAPSULE, LIQUID FILLED ORAL at 21:10

## 2018-10-09 RX ADMIN — KETOROLAC TROMETHAMINE 10 MG: 10 TABLET, FILM COATED ORAL at 21:11

## 2018-10-09 ASSESSMENT — PAIN DESCRIPTION - ORIENTATION
ORIENTATION: LEFT

## 2018-10-09 ASSESSMENT — PAIN SCALES - GENERAL
PAINLEVEL_OUTOF10: 7
PAINLEVEL_OUTOF10: 6
PAINLEVEL_OUTOF10: 8
PAINLEVEL_OUTOF10: 7
PAINLEVEL_OUTOF10: 8
PAINLEVEL_OUTOF10: 7
PAINLEVEL_OUTOF10: 4
PAINLEVEL_OUTOF10: 8
PAINLEVEL_OUTOF10: 7
PAINLEVEL_OUTOF10: 6
PAINLEVEL_OUTOF10: 8
PAINLEVEL_OUTOF10: 7
PAINLEVEL_OUTOF10: 8
PAINLEVEL_OUTOF10: 5
PAINLEVEL_OUTOF10: 7

## 2018-10-09 ASSESSMENT — PAIN DESCRIPTION - LOCATION
LOCATION: KNEE

## 2018-10-09 ASSESSMENT — PAIN DESCRIPTION - PAIN TYPE
TYPE: SURGICAL PAIN
TYPE: ACUTE PAIN;SURGICAL PAIN
TYPE: SURGICAL PAIN
TYPE: ACUTE PAIN;SURGICAL PAIN

## 2018-10-09 ASSESSMENT — PAIN DESCRIPTION - PROGRESSION
CLINICAL_PROGRESSION: NOT CHANGED
CLINICAL_PROGRESSION: NOT CHANGED

## 2018-10-09 ASSESSMENT — PAIN DESCRIPTION - FREQUENCY
FREQUENCY: INTERMITTENT
FREQUENCY: CONTINUOUS

## 2018-10-09 ASSESSMENT — PAIN DESCRIPTION - ONSET
ONSET: ON-GOING
ONSET: GRADUAL

## 2018-10-09 ASSESSMENT — PAIN DESCRIPTION - DESCRIPTORS
DESCRIPTORS: SORE
DESCRIPTORS: DISCOMFORT

## 2018-10-10 VITALS
SYSTOLIC BLOOD PRESSURE: 141 MMHG | DIASTOLIC BLOOD PRESSURE: 83 MMHG | WEIGHT: 240 LBS | RESPIRATION RATE: 15 BRPM | TEMPERATURE: 97.6 F | HEART RATE: 101 BPM | HEIGHT: 69 IN | BODY MASS INDEX: 35.55 KG/M2 | OXYGEN SATURATION: 100 %

## 2018-10-10 LAB — GLUCOSE BLD-MCNC: 207 MG/DL (ref 75–110)

## 2018-10-10 PROCEDURE — 99225 PR SBSQ OBSERVATION CARE/DAY 25 MINUTES: CPT | Performed by: INTERNAL MEDICINE

## 2018-10-10 PROCEDURE — 82947 ASSAY GLUCOSE BLOOD QUANT: CPT

## 2018-10-10 PROCEDURE — G0378 HOSPITAL OBSERVATION PER HR: HCPCS

## 2018-10-10 PROCEDURE — 97530 THERAPEUTIC ACTIVITIES: CPT

## 2018-10-10 PROCEDURE — 6370000000 HC RX 637 (ALT 250 FOR IP): Performed by: ORTHOPAEDIC SURGERY

## 2018-10-10 PROCEDURE — 99024 POSTOP FOLLOW-UP VISIT: CPT | Performed by: ORTHOPAEDIC SURGERY

## 2018-10-10 PROCEDURE — 6370000000 HC RX 637 (ALT 250 FOR IP): Performed by: INTERNAL MEDICINE

## 2018-10-10 PROCEDURE — 97535 SELF CARE MNGMENT TRAINING: CPT

## 2018-10-10 PROCEDURE — 97116 GAIT TRAINING THERAPY: CPT

## 2018-10-10 RX ORDER — AMLODIPINE BESYLATE 5 MG/1
5 TABLET ORAL DAILY
Status: DISCONTINUED | OUTPATIENT
Start: 2018-10-10 | End: 2018-10-10 | Stop reason: HOSPADM

## 2018-10-10 RX ADMIN — OXYCODONE HYDROCHLORIDE 10 MG: 5 TABLET ORAL at 03:00

## 2018-10-10 RX ADMIN — ASPIRIN 325 MG: 325 TABLET, DELAYED RELEASE ORAL at 07:52

## 2018-10-10 RX ADMIN — SERTRALINE HYDROCHLORIDE 50 MG: 50 TABLET ORAL at 07:53

## 2018-10-10 RX ADMIN — GABAPENTIN 800 MG: 400 CAPSULE ORAL at 07:52

## 2018-10-10 RX ADMIN — OXYCODONE HYDROCHLORIDE 10 MG: 5 TABLET ORAL at 07:07

## 2018-10-10 RX ADMIN — FOLIC ACID 1 MG: 1 TABLET ORAL at 07:52

## 2018-10-10 RX ADMIN — GABAPENTIN 800 MG: 400 CAPSULE ORAL at 15:04

## 2018-10-10 RX ADMIN — PANCRELIPASE 2 CAPSULE: 24000; 76000; 120000 CAPSULE, DELAYED RELEASE PELLETS ORAL at 07:54

## 2018-10-10 RX ADMIN — PANCRELIPASE 2 CAPSULE: 24000; 76000; 120000 CAPSULE, DELAYED RELEASE PELLETS ORAL at 11:55

## 2018-10-10 RX ADMIN — INSULIN LISPRO 4 UNITS: 100 INJECTION, SOLUTION INTRAVENOUS; SUBCUTANEOUS at 11:54

## 2018-10-10 RX ADMIN — VENLAFAXINE 225 MG: 75 TABLET ORAL at 05:56

## 2018-10-10 RX ADMIN — MULTIPLE VITAMINS W/ MINERALS TAB 1 TABLET: TAB at 07:52

## 2018-10-10 RX ADMIN — DOCUSATE SODIUM 100 MG: 100 CAPSULE, LIQUID FILLED ORAL at 07:52

## 2018-10-10 RX ADMIN — OXYCODONE HYDROCHLORIDE 10 MG: 5 TABLET ORAL at 11:00

## 2018-10-10 RX ADMIN — AMLODIPINE BESYLATE 5 MG: 5 TABLET ORAL at 11:00

## 2018-10-10 RX ADMIN — METOPROLOL TARTRATE 25 MG: 25 TABLET ORAL at 07:53

## 2018-10-10 RX ADMIN — OXYCODONE HYDROCHLORIDE 10 MG: 5 TABLET ORAL at 15:04

## 2018-10-10 RX ADMIN — GLIMEPIRIDE 4 MG: 4 TABLET ORAL at 07:52

## 2018-10-10 RX ADMIN — KETOROLAC TROMETHAMINE 10 MG: 10 TABLET, FILM COATED ORAL at 05:55

## 2018-10-10 RX ADMIN — PANTOPRAZOLE SODIUM 40 MG: 40 TABLET, DELAYED RELEASE ORAL at 05:55

## 2018-10-10 RX ADMIN — LINAGLIPTIN 5 MG: 5 TABLET, FILM COATED ORAL at 10:59

## 2018-10-10 RX ADMIN — ATORVASTATIN CALCIUM 40 MG: 40 TABLET, FILM COATED ORAL at 07:52

## 2018-10-10 ASSESSMENT — PAIN DESCRIPTION - ORIENTATION
ORIENTATION: LEFT

## 2018-10-10 ASSESSMENT — PAIN DESCRIPTION - LOCATION
LOCATION: KNEE

## 2018-10-10 ASSESSMENT — PAIN DESCRIPTION - PAIN TYPE
TYPE: SURGICAL PAIN

## 2018-10-10 ASSESSMENT — PAIN SCALES - GENERAL
PAINLEVEL_OUTOF10: 6
PAINLEVEL_OUTOF10: 8
PAINLEVEL_OUTOF10: 6
PAINLEVEL_OUTOF10: 8
PAINLEVEL_OUTOF10: 7
PAINLEVEL_OUTOF10: 6
PAINLEVEL_OUTOF10: 8
PAINLEVEL_OUTOF10: 8
PAINLEVEL_OUTOF10: 6
PAINLEVEL_OUTOF10: 8
PAINLEVEL_OUTOF10: 6
PAINLEVEL_OUTOF10: 8
PAINLEVEL_OUTOF10: 8

## 2018-10-10 ASSESSMENT — PAIN DESCRIPTION - PROGRESSION: CLINICAL_PROGRESSION: NOT CHANGED

## 2018-10-10 ASSESSMENT — PAIN DESCRIPTION - DESCRIPTORS: DESCRIPTORS: SORE
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
## 2018-10-10 NOTE — PROGRESS NOTES
Arthoplasty  Pain is well controlled. He has no nausea and no vomiting.     Current activity is up with assistance        Plan:      1:  Continue Physical Therapy  2:  Continue Deep venous thrombosis prophylaxis  3:  Continue Pain Control  4:  Discharge plans home       Electronically signed by Britton Brown MD on 10/10/2018 at 1:10 PM

## 2018-10-10 NOTE — PROGRESS NOTES
assistance (utilizes reacher and sockaid)  Additional Comments: pt edu on AE/DME ie reacher, sock aid, LH sponge, TTB, pt demo understanding     Transfers  Sit to stand: Stand by assistance  Stand to sit: Stand by assistance  Toilet Transfers  Toilet - Technique: Ambulating  Equipment Used: Standard toilet  Toilet Transfer: Stand by assistance  Toilet Transfers Comments: standing at to urinate c 1 UE support on RW    Shower Transfers  Shower - Transfer From:  (RW)  Shower - Transfer Type: To and From  Shower - Transfer To: Shower seat with back  Shower - Technique: Ambulating  Shower Transfers: Stand by assistance  Shower Transfers Comments: VCs for tech and safety c G return      Assessment  Performance deficits / Impairments: Decreased functional mobility ; Decreased ADL status; Decreased strength;Decreased endurance;Decreased balance;Decreased high-level IADLs  Prognosis: Good  Discharge Recommendations: Subacute/Skilled Nursing Facility  Activity Tolerance: Patient Tolerated treatment well;Patient limited by fatigue  Safety Devices in place: Yes  Type of devices: Call light within reach;Gait belt;Left in bed  Equipment Recommendations  Equipment Needed:  (TBD)          Patient Education:  Patient Education: OT POC, EC/WS, review AE/DME, review home safety/fall prevention, review  RW safety and tech during func mobility and transfers, review cryo cuff mgt and application for pain swelling mgt.   Learner:patient  Method: demonstration, handout, and explanation       Outcome: acknowledged understanding, demonstrated understanding and needs reinforcement to slow pace during transfers    Plan  Safety Devices  Safety Devices in place: Yes  Type of devices: Call light within reach, Gait belt, Left in bed  Plan  Times per week: 5  Times per day: Twice a day  Current Treatment Recommendations: Strengthening, Balance Training, Functional Mobility Training, Endurance Training, Pain Management, Safety Education & Training, Patient/Caregiver Education & Training, Equipment Evaluation, Education, & procurement, Self-Care / ADL, Home Management Training      Goals  Short term goals  Time Frame for Short term goals: By discharge  Short term goal 1: Pt will actively participate in Total Joint Program - L Knee. Short term goal 2: Pt will V/D good understanding of Total Joint Program - L knee. Short term goal 3: Pt will V/D good understanding of AE/DME/modified techniques for increased IND with self-care and mobility. Short term goal 4: Pt will participate in 30+ minutes of therapeutic exercise/functional activities to promote increased IND with self-care and mobility.         Electronically signed by JERRY Johnson on 10/10/18 at 3:07 PM          10/10/18 1108 10/10/18 1505   OT Individual Minutes   Time In [id number]   FEUN QGS [id number]   [id number] 08 12

## 2018-10-10 NOTE — PLAN OF CARE
Problem: Falls - Risk of:  Goal: Will remain free from falls  Will remain free from falls   Outcome: Met This Shift  Pt. Free of falls and injuries this shift. Problem: Pain:  Goal: Pain level will decrease  Pain level will decrease   Outcome: Met This Shift  Adequate pain control achieved this shift. See MAR.

## 2018-10-13 LAB
GLUCOSE BLD-MCNC: 160 MG/DL (ref 75–110)
GLUCOSE BLD-MCNC: 237 MG/DL (ref 75–110)

## 2018-10-22 ENCOUNTER — OFFICE VISIT (OUTPATIENT)
Dept: ORTHOPEDIC SURGERY | Age: 53
End: 2018-10-22

## 2018-10-22 DIAGNOSIS — Z96.652 STATUS POST TOTAL LEFT KNEE REPLACEMENT: Primary | ICD-10-CM

## 2018-10-22 PROCEDURE — 99024 POSTOP FOLLOW-UP VISIT: CPT | Performed by: ORTHOPAEDIC SURGERY

## 2018-10-22 NOTE — PROGRESS NOTES
into the skin 3 times daily (before meals), Disp: 5 Cartridge, Rfl: 5    Insulin Pen Needle 31G X 6 MM MISC, 1 each by Does not apply route 3 times daily, Disp: 100 each, Rfl: 5    tiZANidine (ZANAFLEX) 4 MG tablet, TAKE ONE TABLET BY MOUTH EVERY NIGHT, Disp: 30 tablet, Rfl: 1    venlafaxine (EFFEXOR) 75 MG tablet, Take 1 tablet by mouth 2 times daily (with meals) (Patient taking differently: Take 75 mg by mouth every morning (before breakfast) TAKES 3 TABLETS IN AM ( 225 MG)), Disp: 90 tablet, Rfl: 0    sertraline (ZOLOFT) 50 MG tablet, TAKE ONE TABLET BY MOUTH DAILY, Disp: 30 tablet, Rfl: 3    mirtazapine (REMERON) 45 MG tablet, Take 1 tablet by mouth nightly, Disp: 30 tablet, Rfl: 0    glimepiride (AMARYL) 4 MG tablet, Take 1 tablet by mouth daily (with breakfast), Disp: 30 tablet, Rfl: 3    lurasidone (LATUDA) 80 MG TABS tablet, Take 1 tablet by mouth Daily with supper, Disp: 30 tablet, Rfl: 0    CREON 08347-65543 units delayed release capsule, TAKE TWO CAPSULES BY MOUTH THREE TIMES A DAY WITH MEALS, Disp: 180 capsule, Rfl: 3    gabapentin (NEURONTIN) 400 MG capsule, Take 800 mg by mouth 3 times daily. ., Disp: , Rfl:     Cholecalciferol 2000 units CAPS, Take 50,000 Units by mouth once a week Takes on Monday, Disp: , Rfl:     Multiple Vitamins-Minerals (CENTROVITE) TABS, Take 1 tablet by mouth daily, Disp: , Rfl:     fenofibrate (TRICOR) 54 MG tablet, TAKE ONE TABLET BY MOUTH DAILY, Disp: 90 tablet, Rfl: 2    atorvastatin (LIPITOR) 40 MG tablet, TAKE ONE TABLET BY MOUTH ONCE NIGHTLY, Disp: 90 tablet, Rfl: 2    folic acid (FOLVITE) 1 MG tablet, Take 1 mg by mouth daily , Disp: , Rfl:     No Known Allergies    Social History     Social History    Marital status:      Spouse name: N/A    Number of children: N/A    Years of education: N/A     Occupational History    Not on file.      Social History Main Topics    Smoking status: Current Every Day Smoker     Packs/day: 0.50     Years: 34.00 Mone Davis MD at [address] [place] [address] EXTRACTION       Family History   Problem Relation Age of Onset    Diabetes Mother     Heart Disease Father     Diabetes Sister            Orders Placed This Encounter   Procedures    XR KNEE LEFT (1-2 VIEWS)     Standing Status:   Future     Number of Occurrences:   1     Standing Expiration Date:   10/22/2019   Nina Hernandes [address].     Referral Priority:   Routine     Referral Type:   Eval and Treat     Referral Reason:   Specialty Services Required     Requested Specialty:   Physical Therapy     Number of Visits Requested:   1       1. Status post total left knee replacement            Jeffery Anderson MD    Please note that this chart was generated using voice recognition Dragon dictation software. Although every effort was made to ensure the accuracy of this automated transcription, some errors in transcription may have occurred.

## 2018-10-29 RX ORDER — QUETIAPINE FUMARATE 25 MG/1
TABLET, FILM COATED ORAL
Qty: 60 TABLET | Refills: 2 | Status: SHIPPED | OUTPATIENT
Start: 2018-10-29 | End: 2019-01-29 | Stop reason: SDUPTHER

## 2018-10-30 ENCOUNTER — CLINICAL DOCUMENTATION (OUTPATIENT)
Dept: PHYSICAL MEDICINE AND REHAB | Age: 53
End: 2018-10-30

## 2018-10-30 ENCOUNTER — PROCEDURE VISIT (OUTPATIENT)
Dept: PHYSICAL MEDICINE AND REHAB | Age: 53
End: 2018-10-30
Payer: MEDICARE

## 2018-10-30 DIAGNOSIS — G56.03 BILATERAL CARPAL TUNNEL SYNDROME: Primary | ICD-10-CM

## 2018-10-30 DIAGNOSIS — M54.12 CERVICAL RADICULOPATHY: ICD-10-CM

## 2018-10-30 PROCEDURE — 95886 MUSC TEST DONE W/N TEST COMP: CPT | Performed by: PHYSICAL MEDICINE & REHABILITATION

## 2018-10-30 PROCEDURE — 95909 NRV CNDJ TST 5-6 STUDIES: CPT | Performed by: PHYSICAL MEDICINE & REHABILITATION

## 2018-11-26 ENCOUNTER — TELEPHONE (OUTPATIENT)
Dept: PRIMARY CARE CLINIC | Age: 53
End: 2018-11-26

## 2018-11-26 RX ORDER — HYDROXYZINE PAMOATE 100 MG/1
100 CAPSULE ORAL 3 TIMES DAILY PRN
Qty: 90 CAPSULE | Refills: 2 | Status: ON HOLD | OUTPATIENT
Start: 2018-11-26 | End: 2021-01-01

## 2018-11-26 RX ORDER — DOCUSATE SODIUM 100 MG/1
100 CAPSULE, LIQUID FILLED ORAL 2 TIMES DAILY
Qty: 60 CAPSULE | Refills: 2 | Status: SHIPPED | OUTPATIENT
Start: 2018-11-26 | End: 2018-12-26

## 2019-01-29 RX ORDER — QUETIAPINE FUMARATE 25 MG/1
TABLET, FILM COATED ORAL
Qty: 60 TABLET | Refills: 1 | Status: ON HOLD | OUTPATIENT
Start: 2019-01-29 | End: 2021-01-01

## 2019-01-29 RX ORDER — MELOXICAM 7.5 MG/1
TABLET ORAL
Qty: 60 TABLET | Refills: 1 | Status: ON HOLD | OUTPATIENT
Start: 2019-01-29 | End: 2021-01-01 | Stop reason: HOSPADM

## 2019-02-25 RX ORDER — ATORVASTATIN CALCIUM 40 MG/1
TABLET, FILM COATED ORAL
Qty: 30 TABLET | Refills: 1 | Status: ON HOLD | OUTPATIENT
Start: 2019-02-25 | End: 2021-01-01 | Stop reason: HOSPADM

## 2019-04-08 RX ORDER — GLIMEPIRIDE 4 MG/1
4 TABLET ORAL
Qty: 30 TABLET | Refills: 3 | Status: ON HOLD | OUTPATIENT
Start: 2019-04-08 | End: 2021-01-01
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
## 2019-05-19 NOTE — TELEPHONE ENCOUNTER
Last visit: 10/2018  Last Med refill: 02/15/2019  Does patient have enough medication for 72 hours: Yes    Next Visit Date:  No future appointments. Health Maintenance   Topic Date Due    Diabetic foot exam  07/26/1977    Diabetic retinal exam  07/26/1977    Hepatitis B Vaccine (1 of 3 - Risk 3-dose series) 07/26/1986    Colon Cancer Screen FIT/FOBT  07/26/2017    Shingles Vaccine (1 of 2) 07/26/2017    Diabetic microalbuminuria test  09/21/2018    A1C test (Diabetic or Prediabetic)  06/06/2019    Lipid screen  06/06/2019    Potassium monitoring  09/24/2019    Creatinine monitoring  09/24/2019    DTaP/Tdap/Td vaccine (2 - Td) 04/27/2026    Flu vaccine  Completed    Pneumococcal 0-64 years Vaccine  Completed    HIV screen  Completed       Hemoglobin A1C (%)   Date Value   06/06/2018 8.8 (H)   03/27/2017 6.7 (H)   04/27/2016 5.2             ( goal A1C is < 7)   Microalb/Crt.  Ratio (mcg/mg creat)   Date Value   08/29/2011 25     LDL Cholesterol (mg/dL)   Date Value   06/06/2018 03/27/2017            (goal LDL is <100)   AST (U/L)   Date Value   06/13/2018 15     ALT (U/L)   Date Value   06/13/2018 25     BUN (mg/dL)   Date Value   09/24/2018 6     BP Readings from Last 3 Encounters:   10/10/18 (!) 141/83   10/08/18 (!) 146/73   10/05/18 135/85          (goal 120/80)    All Future Testing planned in [facility]  Lab Frequency Next Occurrence               Patient Active Problem List:     Essential hypertension     Chronic bilateral low back pain with bilateral sciatica     Generalized abdominal pain     Diarrhea of presumed infectious origin     Metabolic acidosis, increased anion gap (IAG)     C. difficile diarrhea     SIRS (systemic inflammatory response syndrome) (HCC)     Chronic diarrhea     Pancreatic insufficiency     Alcohol-induced chronic pancreatitis ([facility] [address]     Esophagitis     Type 2 diabetes mellitus with hyperglycemia, without long-term current use of insulin (HCC)     Neck pain,

## 2019-05-20 RX ORDER — OMEPRAZOLE 20 MG/1
CAPSULE, DELAYED RELEASE ORAL
Qty: 30 CAPSULE | Refills: 2 | Status: ON HOLD | OUTPATIENT
Start: 2019-05-20 | End: 2021-01-01 | Stop reason: HOSPADM

## 2019-06-26 DIAGNOSIS — E11.65 TYPE 2 DIABETES MELLITUS WITH HYPERGLYCEMIA, WITHOUT LONG-TERM CURRENT USE OF INSULIN (HCC): Primary | ICD-10-CM

## 2019-08-29 ENCOUNTER — TELEPHONE (OUTPATIENT)
Dept: PRIMARY CARE CLINIC | Age: 54
End: 2019-08-29

## 2020-03-04 ENCOUNTER — CARE COORDINATION (OUTPATIENT)
Dept: CARE COORDINATION | Age: 55
End: 2020-03-04

## 2021-01-01 ENCOUNTER — APPOINTMENT (OUTPATIENT)
Dept: NUCLEAR MEDICINE | Age: 56
DRG: 208 | End: 2021-01-01
Payer: COMMERCIAL

## 2021-01-01 ENCOUNTER — HOSPITAL ENCOUNTER (INPATIENT)
Age: 56
LOS: 2 days | DRG: 208 | End: 2021-11-22
Attending: EMERGENCY MEDICINE | Admitting: INTERNAL MEDICINE
Payer: COMMERCIAL

## 2021-01-01 ENCOUNTER — APPOINTMENT (OUTPATIENT)
Dept: CT IMAGING | Age: 56
DRG: 208 | End: 2021-01-01
Payer: COMMERCIAL

## 2021-01-01 ENCOUNTER — HOSPITAL ENCOUNTER (EMERGENCY)
Age: 56
Discharge: LEFT BEFORE DISCHARGE | End: 2021-11-09
Payer: MEDICAID

## 2021-01-01 ENCOUNTER — HOSPITAL ENCOUNTER (INPATIENT)
Age: 56
LOS: 3 days | Discharge: PSYCHIATRIC OR SUBSTANCE USE FACILITY | DRG: 640 | End: 2021-04-20
Attending: EMERGENCY MEDICINE | Admitting: INTERNAL MEDICINE
Payer: MEDICARE

## 2021-01-01 ENCOUNTER — APPOINTMENT (OUTPATIENT)
Dept: CT IMAGING | Age: 56
DRG: 640 | End: 2021-01-01

## 2021-01-01 ENCOUNTER — APPOINTMENT (OUTPATIENT)
Dept: GENERAL RADIOLOGY | Age: 56
DRG: 208 | End: 2021-01-01
Payer: COMMERCIAL

## 2021-01-01 ENCOUNTER — APPOINTMENT (OUTPATIENT)
Dept: INTERVENTIONAL RADIOLOGY/VASCULAR | Age: 56
DRG: 208 | End: 2021-01-01
Payer: COMMERCIAL

## 2021-01-01 ENCOUNTER — HOSPITAL ENCOUNTER (INPATIENT)
Age: 56
LOS: 3 days | Discharge: HOME | DRG: 754 | End: 2021-04-23
Attending: PSYCHIATRY & NEUROLOGY | Admitting: PSYCHIATRY & NEUROLOGY
Payer: MEDICAID

## 2021-01-01 ENCOUNTER — APPOINTMENT (OUTPATIENT)
Dept: GENERAL RADIOLOGY | Age: 56
DRG: 640 | End: 2021-01-01

## 2021-01-01 VITALS
RESPIRATION RATE: 18 BRPM | HEIGHT: 68 IN | TEMPERATURE: 98 F | SYSTOLIC BLOOD PRESSURE: 145 MMHG | BODY MASS INDEX: 33.57 KG/M2 | WEIGHT: 221.5 LBS | DIASTOLIC BLOOD PRESSURE: 83 MMHG | OXYGEN SATURATION: 96 % | HEART RATE: 88 BPM

## 2021-01-01 VITALS
HEIGHT: 68 IN | DIASTOLIC BLOOD PRESSURE: 59 MMHG | WEIGHT: 220 LBS | RESPIRATION RATE: 15 BRPM | HEART RATE: 90 BPM | TEMPERATURE: 98.5 F | BODY MASS INDEX: 33.34 KG/M2 | SYSTOLIC BLOOD PRESSURE: 111 MMHG

## 2021-01-01 VITALS
TEMPERATURE: 97.2 F | HEIGHT: 69 IN | OXYGEN SATURATION: 95 % | RESPIRATION RATE: 18 BRPM | WEIGHT: 176 LBS | DIASTOLIC BLOOD PRESSURE: 84 MMHG | HEART RATE: 98 BPM | BODY MASS INDEX: 26.07 KG/M2 | SYSTOLIC BLOOD PRESSURE: 123 MMHG

## 2021-01-01 DIAGNOSIS — I46.9 CARDIAC ARREST (HCC): ICD-10-CM

## 2021-01-01 DIAGNOSIS — E87.1 HYPONATREMIA: Primary | ICD-10-CM

## 2021-01-01 DIAGNOSIS — I48.91 ATRIAL FIBRILLATION WITH RVR (HCC): Primary | ICD-10-CM

## 2021-01-01 DIAGNOSIS — J18.9 PNEUMONIA OF LEFT LUNG DUE TO INFECTIOUS ORGANISM, UNSPECIFIED PART OF LUNG: ICD-10-CM

## 2021-01-01 LAB
-: NORMAL
ABSOLUTE EOS #: 0.07 K/UL (ref 0–0.44)
ABSOLUTE EOS #: 0.11 K/UL (ref 0–0.4)
ABSOLUTE IMMATURE GRANULOCYTE: 0.11 K/UL (ref 0–0.3)
ABSOLUTE IMMATURE GRANULOCYTE: 0.11 K/UL (ref 0–0.3)
ABSOLUTE LYMPH #: 1.09 K/UL (ref 1–4.8)
ABSOLUTE LYMPH #: 2.56 K/UL (ref 1.1–3.7)
ABSOLUTE MONO #: 1.2 K/UL (ref 0.2–0.8)
ABSOLUTE MONO #: 1.25 K/UL (ref 0.1–1.2)
ACETAMINOPHEN LEVEL: <5 UG/ML (ref 10–30)
ALBUMIN SERPL-MCNC: 3.6 G/DL (ref 3.5–5.2)
ALBUMIN SERPL-MCNC: 3.8 G/DL (ref 3.5–5.2)
ALBUMIN SERPL-MCNC: 4 G/DL (ref 3.5–5.2)
ALBUMIN SERPL-MCNC: 4.8 G/DL (ref 3.5–5.2)
ALBUMIN/GLOBULIN RATIO: 1.5 (ref 1–2.5)
ALBUMIN/GLOBULIN RATIO: 1.5 (ref 1–2.5)
ALBUMIN/GLOBULIN RATIO: ABNORMAL (ref 1–2.5)
ALBUMIN/GLOBULIN RATIO: ABNORMAL (ref 1–2.5)
ALP BLD-CCNC: 134 U/L (ref 40–129)
ALP BLD-CCNC: 154 U/L (ref 40–129)
ALP BLD-CCNC: 78 U/L (ref 40–129)
ALP BLD-CCNC: 94 U/L (ref 40–129)
ALT SERPL-CCNC: 20 U/L (ref 5–41)
ALT SERPL-CCNC: 20 U/L (ref 5–41)
ALT SERPL-CCNC: 24 U/L (ref 5–41)
ALT SERPL-CCNC: 29 U/L (ref 5–41)
AMMONIA: 33 UMOL/L (ref 16–60)
AMYLASE: 53 U/L (ref 28–100)
ANION GAP SERPL CALCULATED.3IONS-SCNC: 10 MMOL/L (ref 9–17)
ANION GAP SERPL CALCULATED.3IONS-SCNC: 11 MMOL/L (ref 9–17)
ANION GAP SERPL CALCULATED.3IONS-SCNC: 12 MMOL/L (ref 9–17)
ANION GAP SERPL CALCULATED.3IONS-SCNC: 13 MMOL/L (ref 9–17)
ANION GAP SERPL CALCULATED.3IONS-SCNC: 15 MMOL/L (ref 9–17)
ANION GAP SERPL CALCULATED.3IONS-SCNC: 17 MMOL/L (ref 9–17)
ANION GAP SERPL CALCULATED.3IONS-SCNC: 17 MMOL/L (ref 9–17)
ANION GAP SERPL CALCULATED.3IONS-SCNC: 18 MMOL/L (ref 9–17)
ANION GAP SERPL CALCULATED.3IONS-SCNC: 9 MMOL/L (ref 9–17)
ANTI-XA UNFRAC HEPARIN: 0.35 IU/L (ref 0.3–0.7)
ANTI-XA UNFRAC HEPARIN: 0.36 IU/L (ref 0.3–0.7)
ANTI-XA UNFRAC HEPARIN: 0.38 IU/L (ref 0.3–0.7)
ANTI-XA UNFRAC HEPARIN: 1.02 IU/L (ref 0.3–0.7)
AST SERPL-CCNC: 13 U/L
AST SERPL-CCNC: 13 U/L
AST SERPL-CCNC: 19 U/L
AST SERPL-CCNC: 24 U/L
BASOPHILS # BLD: 0 % (ref 0–2)
BASOPHILS # BLD: 1 %
BASOPHILS ABSOLUTE: 0.06 K/UL (ref 0–0.2)
BASOPHILS ABSOLUTE: 0.11 K/UL (ref 0–0.2)
BILIRUB SERPL-MCNC: 0.47 MG/DL (ref 0.3–1.2)
BILIRUB SERPL-MCNC: 0.54 MG/DL (ref 0.3–1.2)
BILIRUB SERPL-MCNC: 0.89 MG/DL (ref 0.3–1.2)
BILIRUB SERPL-MCNC: 0.98 MG/DL (ref 0.3–1.2)
BILIRUBIN DIRECT: 0.32 MG/DL
BILIRUBIN DIRECT: 0.44 MG/DL
BILIRUBIN, INDIRECT: 0.45 MG/DL (ref 0–1)
BILIRUBIN, INDIRECT: 0.66 MG/DL (ref 0–1)
BUN BLDV-MCNC: 11 MG/DL (ref 6–20)
BUN BLDV-MCNC: 11 MG/DL (ref 6–20)
BUN BLDV-MCNC: 4 MG/DL (ref 6–20)
BUN BLDV-MCNC: 4 MG/DL (ref 6–20)
BUN BLDV-MCNC: 5 MG/DL (ref 6–20)
BUN BLDV-MCNC: 5 MG/DL (ref 6–20)
BUN BLDV-MCNC: 6 MG/DL (ref 6–20)
BUN BLDV-MCNC: 7 MG/DL (ref 6–20)
BUN/CREAT BLD: 14 (ref 9–20)
BUN/CREAT BLD: 22 (ref 9–20)
BUN/CREAT BLD: 22 (ref 9–20)
BUN/CREAT BLD: ABNORMAL (ref 9–20)
CALCIUM SERPL-MCNC: 7.7 MG/DL (ref 8.6–10.4)
CALCIUM SERPL-MCNC: 8 MG/DL (ref 8.6–10.4)
CALCIUM SERPL-MCNC: 8.1 MG/DL (ref 8.6–10.4)
CALCIUM SERPL-MCNC: 8.1 MG/DL (ref 8.6–10.4)
CALCIUM SERPL-MCNC: 8.2 MG/DL (ref 8.6–10.4)
CALCIUM SERPL-MCNC: 8.3 MG/DL (ref 8.6–10.4)
CALCIUM SERPL-MCNC: 8.4 MG/DL (ref 8.6–10.4)
CALCIUM SERPL-MCNC: 8.5 MG/DL (ref 8.6–10.4)
CALCIUM SERPL-MCNC: 8.6 MG/DL (ref 8.6–10.4)
CALCIUM SERPL-MCNC: 8.6 MG/DL (ref 8.6–10.4)
CALCIUM SERPL-MCNC: 8.8 MG/DL (ref 8.6–10.4)
CALCIUM SERPL-MCNC: 8.9 MG/DL (ref 8.6–10.4)
CALCIUM SERPL-MCNC: 9 MG/DL (ref 8.6–10.4)
CALCIUM SERPL-MCNC: 9.2 MG/DL (ref 8.6–10.4)
CALCIUM SERPL-MCNC: 9.8 MG/DL (ref 8.6–10.4)
CHLORIDE BLD-SCNC: 101 MMOL/L (ref 98–107)
CHLORIDE BLD-SCNC: 85 MMOL/L (ref 98–107)
CHLORIDE BLD-SCNC: 89 MMOL/L (ref 98–107)
CHLORIDE BLD-SCNC: 89 MMOL/L (ref 98–107)
CHLORIDE BLD-SCNC: 92 MMOL/L (ref 98–107)
CHLORIDE BLD-SCNC: 93 MMOL/L (ref 98–107)
CHLORIDE BLD-SCNC: 93 MMOL/L (ref 98–107)
CHLORIDE BLD-SCNC: 97 MMOL/L (ref 98–107)
CHLORIDE BLD-SCNC: 97 MMOL/L (ref 98–107)
CHLORIDE BLD-SCNC: 99 MMOL/L (ref 98–107)
CHP ED QC CHECK: NORMAL
CO2: 20 MMOL/L (ref 20–31)
CO2: 21 MMOL/L (ref 20–31)
CO2: 22 MMOL/L (ref 20–31)
CO2: 23 MMOL/L (ref 20–31)
CO2: 24 MMOL/L (ref 20–31)
CO2: 25 MMOL/L (ref 20–31)
CREAT SERPL-MCNC: 0.46 MG/DL (ref 0.7–1.2)
CREAT SERPL-MCNC: 0.49 MG/DL (ref 0.7–1.2)
CREAT SERPL-MCNC: 0.49 MG/DL (ref 0.7–1.2)
CREAT SERPL-MCNC: 0.51 MG/DL (ref 0.7–1.2)
CREAT SERPL-MCNC: 0.51 MG/DL (ref 0.7–1.2)
CREAT SERPL-MCNC: 0.52 MG/DL (ref 0.7–1.2)
CREAT SERPL-MCNC: 0.54 MG/DL (ref 0.7–1.2)
CREAT SERPL-MCNC: 0.54 MG/DL (ref 0.7–1.2)
CREAT SERPL-MCNC: 0.55 MG/DL (ref 0.7–1.2)
CREAT SERPL-MCNC: 0.57 MG/DL (ref 0.7–1.2)
CREAT SERPL-MCNC: 0.57 MG/DL (ref 0.7–1.2)
CREAT SERPL-MCNC: 0.58 MG/DL (ref 0.7–1.2)
CREAT SERPL-MCNC: 0.59 MG/DL (ref 0.7–1.2)
CREAT SERPL-MCNC: 0.6 MG/DL (ref 0.7–1.2)
CREAT SERPL-MCNC: 0.67 MG/DL (ref 0.7–1.2)
DIFFERENTIAL TYPE: ABNORMAL
DIFFERENTIAL TYPE: ABNORMAL
EKG ATRIAL RATE: 129 BPM
EKG ATRIAL RATE: 174 BPM
EKG Q-T INTERVAL: 236 MS
EKG Q-T INTERVAL: 294 MS
EKG QRS DURATION: 52 MS
EKG QRS DURATION: 70 MS
EKG QTC CALCULATION (BAZETT): 402 MS
EKG QTC CALCULATION (BAZETT): 452 MS
EKG R AXIS: 20 DEGREES
EKG R AXIS: 9 DEGREES
EKG T AXIS: -173 DEGREES
EKG T AXIS: 85 DEGREES
EKG VENTRICULAR RATE: 142 BPM
EKG VENTRICULAR RATE: 175 BPM
EOSINOPHILS RELATIVE PERCENT: 0 % (ref 1–4)
EOSINOPHILS RELATIVE PERCENT: 1 % (ref 1–4)
ESTIMATED AVERAGE GLUCOSE: 131 MG/DL
ETHANOL PERCENT: <0.01 %
ETHANOL: <10 MG/DL
GFR AFRICAN AMERICAN: >60 ML/MIN
GFR NON-AFRICAN AMERICAN: >60 ML/MIN
GFR SERPL CREATININE-BSD FRML MDRD: ABNORMAL ML/MIN/{1.73_M2}
GLOBULIN: ABNORMAL G/DL (ref 1.5–3.8)
GLOBULIN: ABNORMAL G/DL (ref 1.5–3.8)
GLUCOSE BLD-MCNC: 107 MG/DL (ref 75–110)
GLUCOSE BLD-MCNC: 107 MG/DL (ref 75–110)
GLUCOSE BLD-MCNC: 111 MG/DL (ref 75–110)
GLUCOSE BLD-MCNC: 113 MG/DL (ref 70–99)
GLUCOSE BLD-MCNC: 115 MG/DL (ref 75–110)
GLUCOSE BLD-MCNC: 122 MG/DL (ref 70–99)
GLUCOSE BLD-MCNC: 122 MG/DL (ref 70–99)
GLUCOSE BLD-MCNC: 123 MG/DL (ref 70–99)
GLUCOSE BLD-MCNC: 123 MG/DL (ref 75–110)
GLUCOSE BLD-MCNC: 129 MG/DL (ref 75–110)
GLUCOSE BLD-MCNC: 130 MG/DL (ref 75–110)
GLUCOSE BLD-MCNC: 134 MG/DL (ref 70–99)
GLUCOSE BLD-MCNC: 134 MG/DL (ref 70–99)
GLUCOSE BLD-MCNC: 140 MG/DL (ref 75–110)
GLUCOSE BLD-MCNC: 141 MG/DL (ref 75–110)
GLUCOSE BLD-MCNC: 142 MG/DL (ref 75–110)
GLUCOSE BLD-MCNC: 144 MG/DL
GLUCOSE BLD-MCNC: 144 MG/DL (ref 70–99)
GLUCOSE BLD-MCNC: 144 MG/DL (ref 75–110)
GLUCOSE BLD-MCNC: 144 MG/DL (ref 75–110)
GLUCOSE BLD-MCNC: 145 MG/DL (ref 75–110)
GLUCOSE BLD-MCNC: 148 MG/DL (ref 70–99)
GLUCOSE BLD-MCNC: 151 MG/DL (ref 70–99)
GLUCOSE BLD-MCNC: 151 MG/DL (ref 75–110)
GLUCOSE BLD-MCNC: 157 MG/DL (ref 75–110)
GLUCOSE BLD-MCNC: 158 MG/DL (ref 70–99)
GLUCOSE BLD-MCNC: 158 MG/DL (ref 70–99)
GLUCOSE BLD-MCNC: 159 MG/DL (ref 75–110)
GLUCOSE BLD-MCNC: 160 MG/DL (ref 70–99)
GLUCOSE BLD-MCNC: 161 MG/DL (ref 75–110)
GLUCOSE BLD-MCNC: 163 MG/DL (ref 70–99)
GLUCOSE BLD-MCNC: 163 MG/DL (ref 75–110)
GLUCOSE BLD-MCNC: 166 MG/DL (ref 75–110)
GLUCOSE BLD-MCNC: 171 MG/DL (ref 70–99)
GLUCOSE BLD-MCNC: 172 MG/DL (ref 75–110)
GLUCOSE BLD-MCNC: 179 MG/DL (ref 75–110)
GLUCOSE BLD-MCNC: 180 MG/DL (ref 75–110)
GLUCOSE BLD-MCNC: 180 MG/DL (ref 75–110)
GLUCOSE BLD-MCNC: 188 MG/DL (ref 75–110)
GLUCOSE BLD-MCNC: 197 MG/DL (ref 70–99)
GLUCOSE BLD-MCNC: 218 MG/DL (ref 75–110)
GLUCOSE BLD-MCNC: 90 MG/DL (ref 75–110)
GLUCOSE BLD-MCNC: 93 MG/DL (ref 75–110)
GLUCOSE BLD-MCNC: 93 MG/DL (ref 75–110)
GLUCOSE BLD-MCNC: 98 MG/DL (ref 75–110)
HBA1C MFR BLD: 6.2 % (ref 4–6)
HCT VFR BLD CALC: 37.5 % (ref 40.7–50.3)
HCT VFR BLD CALC: 41.9 % (ref 40.7–50.3)
HCT VFR BLD CALC: 43.6 % (ref 40.7–50.3)
HCT VFR BLD CALC: 51.3 % (ref 40.7–50.3)
HEMOGLOBIN: 13.2 G/DL (ref 13–17)
HEMOGLOBIN: 13.6 G/DL (ref 13–17)
HEMOGLOBIN: 15.2 G/DL (ref 13–17)
HEMOGLOBIN: 16.1 G/DL (ref 13–17)
IMMATURE GRANULOCYTES: 1 %
IMMATURE GRANULOCYTES: 1 %
INR BLD: 1.2
LACTATE DEHYDROGENASE, FLUID: 208 U/L
LACTATE DEHYDROGENASE: 420 U/L (ref 135–225)
LACTIC ACID: 2.4 MMOL/L (ref 0.5–2.2)
LIPASE: 37 U/L (ref 13–60)
LV EF: 55 %
LV EF: 71 %
LVEF MODALITY: NORMAL
LVEF MODALITY: NORMAL
LYMPHOCYTES # BLD: 10 % (ref 24–44)
LYMPHOCYTES # BLD: 15 % (ref 24–43)
MAGNESIUM: 1.5 MG/DL (ref 1.6–2.6)
MAGNESIUM: 1.8 MG/DL (ref 1.6–2.6)
MCH RBC QN AUTO: 27.1 PG (ref 25.2–33.5)
MCH RBC QN AUTO: 27.5 PG (ref 25.2–33.5)
MCH RBC QN AUTO: 29.2 PG (ref 25.2–33.5)
MCH RBC QN AUTO: 29.4 PG (ref 25.2–33.5)
MCHC RBC AUTO-ENTMCNC: 31.4 G/DL (ref 28.4–34.8)
MCHC RBC AUTO-ENTMCNC: 32.5 G/DL (ref 28.4–34.8)
MCHC RBC AUTO-ENTMCNC: 34.9 G/DL (ref 28.4–34.8)
MCHC RBC AUTO-ENTMCNC: 35.2 G/DL (ref 28.4–34.8)
MCV RBC AUTO: 83.5 FL (ref 82.6–102.9)
MCV RBC AUTO: 83.7 FL (ref 82.6–102.9)
MCV RBC AUTO: 84.6 FL (ref 82.6–102.9)
MCV RBC AUTO: 86.2 FL (ref 82.6–102.9)
MONOCYTES # BLD: 11 % (ref 1–7)
MONOCYTES # BLD: 7 % (ref 3–12)
MORPHOLOGY: ABNORMAL
MYOGLOBIN: 24 NG/ML (ref 28–72)
MYOGLOBIN: <21 NG/ML (ref 28–72)
NRBC AUTOMATED: 0 PER 100 WBC
PARTIAL THROMBOPLASTIN TIME: 28.8 SEC (ref 23.9–33.8)
PDW BLD-RTO: 11.8 % (ref 11.8–14.4)
PDW BLD-RTO: 11.9 % (ref 11.8–14.4)
PDW BLD-RTO: 20.5 % (ref 11.8–14.4)
PDW BLD-RTO: 21.4 % (ref 11.8–14.4)
PLATELET # BLD: 391 K/UL (ref 138–453)
PLATELET # BLD: 425 K/UL (ref 138–453)
PLATELET # BLD: 476 K/UL (ref 138–453)
PLATELET # BLD: 554 K/UL (ref 138–453)
PLATELET ESTIMATE: ABNORMAL
PLATELET ESTIMATE: ABNORMAL
PMV BLD AUTO: 8.8 FL (ref 8.1–13.5)
PMV BLD AUTO: 8.9 FL (ref 8.1–13.5)
PMV BLD AUTO: 9.4 FL (ref 8.1–13.5)
PMV BLD AUTO: 9.6 FL (ref 8.1–13.5)
POTASSIUM SERPL-SCNC: 3.5 MMOL/L (ref 3.7–5.3)
POTASSIUM SERPL-SCNC: 3.5 MMOL/L (ref 3.7–5.3)
POTASSIUM SERPL-SCNC: 3.6 MMOL/L (ref 3.7–5.3)
POTASSIUM SERPL-SCNC: 3.7 MMOL/L (ref 3.7–5.3)
POTASSIUM SERPL-SCNC: 3.8 MMOL/L (ref 3.7–5.3)
POTASSIUM SERPL-SCNC: 3.9 MMOL/L (ref 3.7–5.3)
POTASSIUM SERPL-SCNC: 3.9 MMOL/L (ref 3.7–5.3)
POTASSIUM SERPL-SCNC: 4 MMOL/L (ref 3.7–5.3)
POTASSIUM SERPL-SCNC: 4.2 MMOL/L (ref 3.7–5.3)
POTASSIUM SERPL-SCNC: 4.3 MMOL/L (ref 3.7–5.3)
POTASSIUM SERPL-SCNC: 4.6 MMOL/L (ref 3.7–5.3)
PROTHROMBIN TIME: 14.6 SEC (ref 11.5–14.2)
RBC # BLD: 4.49 M/UL (ref 4.21–5.77)
RBC # BLD: 4.95 M/UL (ref 4.21–5.77)
RBC # BLD: 5.21 M/UL (ref 4.21–5.77)
RBC # BLD: 5.95 M/UL (ref 4.21–5.77)
RBC # BLD: ABNORMAL 10*6/UL
RBC # BLD: ABNORMAL 10*6/UL
REASON FOR REJECTION: NORMAL
SALICYLATE LEVEL: <1 MG/DL (ref 3–10)
SARS-COV-2, RAPID: NOT DETECTED
SARS-COV-2, RAPID: NOT DETECTED
SEG NEUTROPHILS: 76 % (ref 36–66)
SEG NEUTROPHILS: 77 % (ref 36–65)
SEGMENTED NEUTROPHILS ABSOLUTE COUNT: 13.32 K/UL (ref 1.5–8.1)
SEGMENTED NEUTROPHILS ABSOLUTE COUNT: 8.28 K/UL (ref 1.8–7.7)
SODIUM BLD-SCNC: 121 MMOL/L (ref 135–144)
SODIUM BLD-SCNC: 123 MMOL/L (ref 135–144)
SODIUM BLD-SCNC: 124 MMOL/L (ref 135–144)
SODIUM BLD-SCNC: 125 MMOL/L (ref 135–144)
SODIUM BLD-SCNC: 125 MMOL/L (ref 135–144)
SODIUM BLD-SCNC: 128 MMOL/L (ref 135–144)
SODIUM BLD-SCNC: 129 MMOL/L (ref 135–144)
SODIUM BLD-SCNC: 131 MMOL/L (ref 135–144)
SODIUM BLD-SCNC: 132 MMOL/L (ref 135–144)
SODIUM BLD-SCNC: 132 MMOL/L (ref 135–144)
SODIUM BLD-SCNC: 133 MMOL/L (ref 135–144)
SODIUM BLD-SCNC: 133 MMOL/L (ref 135–144)
SODIUM BLD-SCNC: 134 MMOL/L (ref 135–144)
SPECIMEN DESCRIPTION: NORMAL
SPECIMEN DESCRIPTION: NORMAL
SPECIMEN TYPE: NORMAL
SPECIMEN TYPE: NORMAL
TOTAL PROTEIN, BODY FLUID: 2.5 G/DL
TOTAL PROTEIN: 6.7 G/DL (ref 6.4–8.3)
TOTAL PROTEIN: 7 G/DL (ref 6.4–8.3)
TOTAL PROTEIN: 8 G/DL (ref 6.4–8.3)
TOTAL PROTEIN: 8 G/DL (ref 6.4–8.3)
TOXIC TRICYCLIC SC,BLOOD: NEGATIVE
TROPONIN INTERP: ABNORMAL
TROPONIN INTERP: ABNORMAL
TROPONIN T: ABNORMAL NG/ML
TROPONIN T: ABNORMAL NG/ML
TROPONIN, HIGH SENSITIVITY: 19 NG/L (ref 0–22)
TROPONIN, HIGH SENSITIVITY: 22 NG/L (ref 0–22)
TSH SERPL DL<=0.05 MIU/L-ACNC: 1.27 MIU/L (ref 0.3–5)
WBC # BLD: 10.9 K/UL (ref 3.5–11.3)
WBC # BLD: 14.3 K/UL (ref 3.5–11.3)
WBC # BLD: 17.4 K/UL (ref 3.5–11.3)
WBC # BLD: 7.9 K/UL (ref 3.5–11.3)
WBC # BLD: ABNORMAL 10*3/UL
WBC # BLD: ABNORMAL 10*3/UL
ZZ NTE CLEAN UP: ORDERED TEST: NORMAL
ZZ NTE WITH NAME CLEAN UP: SPECIMEN SOURCE: NORMAL

## 2021-01-01 PROCEDURE — 6370000000 HC RX 637 (ALT 250 FOR IP): Performed by: INTERNAL MEDICINE

## 2021-01-01 PROCEDURE — 2500000003 HC RX 250 WO HCPCS: Performed by: INTERNAL MEDICINE

## 2021-01-01 PROCEDURE — 99232 SBSQ HOSP IP/OBS MODERATE 35: CPT | Performed by: PSYCHIATRY & NEUROLOGY

## 2021-01-01 PROCEDURE — 87040 BLOOD CULTURE FOR BACTERIA: CPT

## 2021-01-01 PROCEDURE — 97163 PT EVAL HIGH COMPLEX 45 MIN: CPT

## 2021-01-01 PROCEDURE — 96361 HYDRATE IV INFUSION ADD-ON: CPT

## 2021-01-01 PROCEDURE — 2709999900 IR CHEST TUBE INSERTION

## 2021-01-01 PROCEDURE — 85027 COMPLETE CBC AUTOMATED: CPT

## 2021-01-01 PROCEDURE — 1240000000 HC EMOTIONAL WELLNESS R&B

## 2021-01-01 PROCEDURE — 99285 EMERGENCY DEPT VISIT HI MDM: CPT

## 2021-01-01 PROCEDURE — 99222 1ST HOSP IP/OBS MODERATE 55: CPT | Performed by: INTERNAL MEDICINE

## 2021-01-01 PROCEDURE — 97167 OT EVAL HIGH COMPLEX 60 MIN: CPT

## 2021-01-01 PROCEDURE — 80076 HEPATIC FUNCTION PANEL: CPT

## 2021-01-01 PROCEDURE — 87635 SARS-COV-2 COVID-19 AMP PRB: CPT

## 2021-01-01 PROCEDURE — 1200000000 HC SEMI PRIVATE

## 2021-01-01 PROCEDURE — 71260 CT THORAX DX C+: CPT

## 2021-01-01 PROCEDURE — 80048 BASIC METABOLIC PNL TOTAL CA: CPT

## 2021-01-01 PROCEDURE — 88112 CYTOPATH CELL ENHANCE TECH: CPT

## 2021-01-01 PROCEDURE — 6370000000 HC RX 637 (ALT 250 FOR IP): Performed by: NURSE PRACTITIONER

## 2021-01-01 PROCEDURE — 89051 BODY FLUID CELL COUNT: CPT

## 2021-01-01 PROCEDURE — 80053 COMPREHEN METABOLIC PANEL: CPT

## 2021-01-01 PROCEDURE — 82947 ASSAY GLUCOSE BLOOD QUANT: CPT

## 2021-01-01 PROCEDURE — 6370000000 HC RX 637 (ALT 250 FOR IP): Performed by: PSYCHIATRY & NEUROLOGY

## 2021-01-01 PROCEDURE — 83735 ASSAY OF MAGNESIUM: CPT

## 2021-01-01 PROCEDURE — 71045 X-RAY EXAM CHEST 1 VIEW: CPT

## 2021-01-01 PROCEDURE — 2580000003 HC RX 258: Performed by: INTERNAL MEDICINE

## 2021-01-01 PROCEDURE — 2500000003 HC RX 250 WO HCPCS: Performed by: STUDENT IN AN ORGANIZED HEALTH CARE EDUCATION/TRAINING PROGRAM

## 2021-01-01 PROCEDURE — 99222 1ST HOSP IP/OBS MODERATE 55: CPT | Performed by: PSYCHIATRY & NEUROLOGY

## 2021-01-01 PROCEDURE — 6360000002 HC RX W HCPCS: Performed by: EMERGENCY MEDICINE

## 2021-01-01 PROCEDURE — 85025 COMPLETE CBC W/AUTO DIFF WBC: CPT

## 2021-01-01 PROCEDURE — 0W9B3ZX DRAINAGE OF LEFT PLEURAL CAVITY, PERCUTANEOUS APPROACH, DIAGNOSTIC: ICD-10-PCS | Performed by: RADIOLOGY

## 2021-01-01 PROCEDURE — 36415 COLL VENOUS BLD VENIPUNCTURE: CPT

## 2021-01-01 PROCEDURE — 32557 INSERT CATH PLEURA W/ IMAGE: CPT | Performed by: RADIOLOGY

## 2021-01-01 PROCEDURE — 2580000003 HC RX 258: Performed by: NURSE PRACTITIONER

## 2021-01-01 PROCEDURE — 83874 ASSAY OF MYOGLOBIN: CPT

## 2021-01-01 PROCEDURE — 6360000002 HC RX W HCPCS: Performed by: INTERNAL MEDICINE

## 2021-01-01 PROCEDURE — 87075 CULTR BACTERIA EXCEPT BLOOD: CPT

## 2021-01-01 PROCEDURE — 6360000002 HC RX W HCPCS: Performed by: NURSE PRACTITIONER

## 2021-01-01 PROCEDURE — 99223 1ST HOSP IP/OBS HIGH 75: CPT | Performed by: INTERNAL MEDICINE

## 2021-01-01 PROCEDURE — 2500000003 HC RX 250 WO HCPCS

## 2021-01-01 PROCEDURE — 78452 HT MUSCLE IMAGE SPECT MULT: CPT

## 2021-01-01 PROCEDURE — A9500 TC99M SESTAMIBI: HCPCS | Performed by: INTERNAL MEDICINE

## 2021-01-01 PROCEDURE — 96374 THER/PROPH/DIAG INJ IV PUSH: CPT

## 2021-01-01 PROCEDURE — 3430000000 HC RX DIAGNOSTIC RADIOPHARMACEUTICAL: Performed by: INTERNAL MEDICINE

## 2021-01-01 PROCEDURE — 2580000003 HC RX 258: Performed by: EMERGENCY MEDICINE

## 2021-01-01 PROCEDURE — 83690 ASSAY OF LIPASE: CPT

## 2021-01-01 PROCEDURE — 85520 HEPARIN ASSAY: CPT

## 2021-01-01 PROCEDURE — 6360000002 HC RX W HCPCS

## 2021-01-01 PROCEDURE — 74177 CT ABD & PELVIS W/CONTRAST: CPT

## 2021-01-01 PROCEDURE — 99231 SBSQ HOSP IP/OBS SF/LOW 25: CPT | Performed by: INTERNAL MEDICINE

## 2021-01-01 PROCEDURE — 99233 SBSQ HOSP IP/OBS HIGH 50: CPT | Performed by: INTERNAL MEDICINE

## 2021-01-01 PROCEDURE — 87205 SMEAR GRAM STAIN: CPT

## 2021-01-01 PROCEDURE — 88305 TISSUE EXAM BY PATHOLOGIST: CPT

## 2021-01-01 PROCEDURE — 2580000003 HC RX 258: Performed by: STUDENT IN AN ORGANIZED HEALTH CARE EDUCATION/TRAINING PROGRAM

## 2021-01-01 PROCEDURE — 97535 SELF CARE MNGMENT TRAINING: CPT

## 2021-01-01 PROCEDURE — 80143 DRUG ASSAY ACETAMINOPHEN: CPT

## 2021-01-01 PROCEDURE — 2500000003 HC RX 250 WO HCPCS: Performed by: EMERGENCY MEDICINE

## 2021-01-01 PROCEDURE — APPSS30 APP SPLIT SHARED TIME 16-30 MINUTES: Performed by: NURSE PRACTITIONER

## 2021-01-01 PROCEDURE — G0480 DRUG TEST DEF 1-7 CLASSES: HCPCS

## 2021-01-01 PROCEDURE — 84157 ASSAY OF PROTEIN OTHER: CPT

## 2021-01-01 PROCEDURE — 82140 ASSAY OF AMMONIA: CPT

## 2021-01-01 PROCEDURE — 80179 DRUG ASSAY SALICYLATE: CPT

## 2021-01-01 PROCEDURE — 85730 THROMBOPLASTIN TIME PARTIAL: CPT

## 2021-01-01 PROCEDURE — 97530 THERAPEUTIC ACTIVITIES: CPT

## 2021-01-01 PROCEDURE — 84484 ASSAY OF TROPONIN QUANT: CPT

## 2021-01-01 PROCEDURE — 85610 PROTHROMBIN TIME: CPT

## 2021-01-01 PROCEDURE — 92950 HEART/LUNG RESUSCITATION CPR: CPT

## 2021-01-01 PROCEDURE — 70450 CT HEAD/BRAIN W/O DYE: CPT

## 2021-01-01 PROCEDURE — 6360000002 HC RX W HCPCS: Performed by: STUDENT IN AN ORGANIZED HEALTH CARE EDUCATION/TRAINING PROGRAM

## 2021-01-01 PROCEDURE — 2060000000 HC ICU INTERMEDIATE R&B

## 2021-01-01 PROCEDURE — 99223 1ST HOSP IP/OBS HIGH 75: CPT | Performed by: PSYCHIATRY & NEUROLOGY

## 2021-01-01 PROCEDURE — 31500 INSERT EMERGENCY AIRWAY: CPT

## 2021-01-01 PROCEDURE — 87070 CULTURE OTHR SPECIMN AEROBIC: CPT

## 2021-01-01 PROCEDURE — 82150 ASSAY OF AMYLASE: CPT

## 2021-01-01 PROCEDURE — 96375 TX/PRO/DX INJ NEW DRUG ADDON: CPT

## 2021-01-01 PROCEDURE — 5A1935Z RESPIRATORY VENTILATION, LESS THAN 24 CONSECUTIVE HOURS: ICD-10-PCS | Performed by: INTERNAL MEDICINE

## 2021-01-01 PROCEDURE — 99221 1ST HOSP IP/OBS SF/LOW 40: CPT | Performed by: INTERNAL MEDICINE

## 2021-01-01 PROCEDURE — 32555 ASPIRATE PLEURA W/ IMAGING: CPT | Performed by: RADIOLOGY

## 2021-01-01 PROCEDURE — 99239 HOSP IP/OBS DSCHRG MGMT >30: CPT | Performed by: INTERNAL MEDICINE

## 2021-01-01 PROCEDURE — 99232 SBSQ HOSP IP/OBS MODERATE 35: CPT | Performed by: INTERNAL MEDICINE

## 2021-01-01 PROCEDURE — 6360000004 HC RX CONTRAST MEDICATION: Performed by: INTERNAL MEDICINE

## 2021-01-01 PROCEDURE — 93010 ELECTROCARDIOGRAM REPORT: CPT | Performed by: INTERNAL MEDICINE

## 2021-01-01 PROCEDURE — 83615 LACTATE (LD) (LDH) ENZYME: CPT

## 2021-01-01 PROCEDURE — 2700000000 HC OXYGEN THERAPY PER DAY

## 2021-01-01 PROCEDURE — 80307 DRUG TEST PRSMV CHEM ANLYZR: CPT

## 2021-01-01 PROCEDURE — BW28ZZZ COMPUTERIZED TOMOGRAPHY (CT SCAN) OF HEAD: ICD-10-PCS | Performed by: EMERGENCY MEDICINE

## 2021-01-01 PROCEDURE — 93005 ELECTROCARDIOGRAM TRACING: CPT | Performed by: EMERGENCY MEDICINE

## 2021-01-01 PROCEDURE — 93306 TTE W/DOPPLER COMPLETE: CPT

## 2021-01-01 PROCEDURE — 99239 HOSP IP/OBS DSCHRG MGMT >30: CPT | Performed by: PSYCHIATRY & NEUROLOGY

## 2021-01-01 PROCEDURE — C1729 CATH, DRAINAGE: HCPCS

## 2021-01-01 PROCEDURE — 0BH17EZ INSERTION OF ENDOTRACHEAL AIRWAY INTO TRACHEA, VIA NATURAL OR ARTIFICIAL OPENING: ICD-10-PCS | Performed by: INTERNAL MEDICINE

## 2021-01-01 PROCEDURE — 6360000004 HC RX CONTRAST MEDICATION: Performed by: EMERGENCY MEDICINE

## 2021-01-01 PROCEDURE — 93017 CV STRESS TEST TRACING ONLY: CPT

## 2021-01-01 PROCEDURE — 83036 HEMOGLOBIN GLYCOSYLATED A1C: CPT

## 2021-01-01 PROCEDURE — 83605 ASSAY OF LACTIC ACID: CPT

## 2021-01-01 PROCEDURE — 84443 ASSAY THYROID STIM HORMONE: CPT

## 2021-01-01 PROCEDURE — 96376 TX/PRO/DX INJ SAME DRUG ADON: CPT

## 2021-01-01 RX ORDER — OLANZAPINE 5 MG/1
5 TABLET ORAL NIGHTLY
Qty: 30 TABLET | Refills: 0 | Status: SHIPPED | OUTPATIENT
Start: 2021-01-01 | End: 2021-01-01

## 2021-01-01 RX ORDER — HYDROCODONE BITARTRATE AND ACETAMINOPHEN 5; 325 MG/1; MG/1
1 TABLET ORAL EVERY 4 HOURS PRN
Status: DISCONTINUED | OUTPATIENT
Start: 2021-01-01 | End: 2021-11-23 | Stop reason: HOSPADM

## 2021-01-01 RX ORDER — OXCARBAZEPINE 600 MG/1
600 TABLET, FILM COATED ORAL NIGHTLY
Qty: 30 TABLET | Refills: 0 | Status: SHIPPED | OUTPATIENT
Start: 2021-01-01

## 2021-01-01 RX ORDER — INSULIN GLARGINE 100 [IU]/ML
20 INJECTION, SOLUTION SUBCUTANEOUS DAILY
Qty: 10 PEN | Refills: 0 | Status: SHIPPED | OUTPATIENT
Start: 2021-01-01

## 2021-01-01 RX ORDER — ALUMINA, MAGNESIA, AND SIMETHICONE 2400; 2400; 240 MG/30ML; MG/30ML; MG/30ML
30 SUSPENSION ORAL 3 TIMES DAILY PRN
Status: ON HOLD | COMMUNITY
End: 2021-01-01 | Stop reason: HOSPADM

## 2021-01-01 RX ORDER — GLIPIZIDE 10 MG/1
10 TABLET, FILM COATED, EXTENDED RELEASE ORAL DAILY
Status: ON HOLD | COMMUNITY
End: 2021-01-01 | Stop reason: HOSPADM

## 2021-01-01 RX ORDER — SODIUM CHLORIDE 9 MG/ML
INJECTION, SOLUTION INTRAVENOUS CONTINUOUS
Status: DISCONTINUED | OUTPATIENT
Start: 2021-01-01 | End: 2021-01-01 | Stop reason: HOSPADM

## 2021-01-01 RX ORDER — HEPARIN SODIUM 1000 [USP'U]/ML
80 INJECTION, SOLUTION INTRAVENOUS; SUBCUTANEOUS ONCE
Status: DISCONTINUED | OUTPATIENT
Start: 2021-01-01 | End: 2021-01-01 | Stop reason: SDUPTHER

## 2021-01-01 RX ORDER — LORAZEPAM 2 MG/ML
4 INJECTION INTRAMUSCULAR
Status: DISCONTINUED | OUTPATIENT
Start: 2021-01-01 | End: 2021-01-01 | Stop reason: HOSPADM

## 2021-01-01 RX ORDER — POLYETHYLENE GLYCOL 3350 17 G/17G
17 POWDER, FOR SOLUTION ORAL DAILY PRN
Status: DISCONTINUED | OUTPATIENT
Start: 2021-01-01 | End: 2021-11-23 | Stop reason: HOSPADM

## 2021-01-01 RX ORDER — MAGNESIUM SULFATE 1 G/100ML
1000 INJECTION INTRAVENOUS PRN
Status: DISCONTINUED | OUTPATIENT
Start: 2021-01-01 | End: 2021-11-23 | Stop reason: HOSPADM

## 2021-01-01 RX ORDER — METOPROLOL TARTRATE 50 MG/1
50 TABLET, FILM COATED ORAL 2 TIMES DAILY
COMMUNITY

## 2021-01-01 RX ORDER — LISINOPRIL 30 MG/1
30 TABLET ORAL DAILY
Status: ON HOLD | COMMUNITY
End: 2021-01-01 | Stop reason: HOSPADM

## 2021-01-01 RX ORDER — IBUPROFEN 400 MG/1
400 TABLET ORAL EVERY 6 HOURS PRN
Status: DISCONTINUED | OUTPATIENT
Start: 2021-01-01 | End: 2021-01-01 | Stop reason: HOSPADM

## 2021-01-01 RX ORDER — FENTANYL CITRATE 50 UG/ML
25 INJECTION, SOLUTION INTRAMUSCULAR; INTRAVENOUS
Status: DISCONTINUED | OUTPATIENT
Start: 2021-01-01 | End: 2021-11-23 | Stop reason: HOSPADM

## 2021-01-01 RX ORDER — MAGNESIUM HYDROXIDE/ALUMINUM HYDROXICE/SIMETHICONE 120; 1200; 1200 MG/30ML; MG/30ML; MG/30ML
30 SUSPENSION ORAL EVERY 6 HOURS PRN
Status: DISCONTINUED | OUTPATIENT
Start: 2021-01-01 | End: 2021-01-01 | Stop reason: HOSPADM

## 2021-01-01 RX ORDER — ADENOSINE 3 MG/ML
12 INJECTION, SOLUTION INTRAVENOUS ONCE
Status: COMPLETED | OUTPATIENT
Start: 2021-01-01 | End: 2021-01-01

## 2021-01-01 RX ORDER — LISINOPRIL 30 MG/1
30 TABLET ORAL DAILY
Qty: 30 TABLET | Refills: 0 | Status: SHIPPED | OUTPATIENT
Start: 2021-01-01

## 2021-01-01 RX ORDER — SODIUM CHLORIDE 9 MG/ML
25 INJECTION, SOLUTION INTRAVENOUS PRN
Status: DISCONTINUED | OUTPATIENT
Start: 2021-01-01 | End: 2021-01-01 | Stop reason: HOSPADM

## 2021-01-01 RX ORDER — ATORVASTATIN CALCIUM 40 MG/1
40 TABLET, FILM COATED ORAL NIGHTLY
Qty: 30 TABLET | Refills: 0 | Status: SHIPPED | OUTPATIENT
Start: 2021-01-01

## 2021-01-01 RX ORDER — LISINOPRIL 10 MG/1
30 TABLET ORAL DAILY
Status: DISCONTINUED | OUTPATIENT
Start: 2021-01-01 | End: 2021-11-23 | Stop reason: HOSPADM

## 2021-01-01 RX ORDER — HYDROXYZINE 50 MG/1
50 TABLET, FILM COATED ORAL 3 TIMES DAILY PRN
Qty: 45 TABLET | Refills: 0 | Status: SHIPPED | OUTPATIENT
Start: 2021-01-01 | End: 2021-01-01

## 2021-01-01 RX ORDER — VENLAFAXINE 75 MG/1
75 TABLET ORAL
Status: DISCONTINUED | OUTPATIENT
Start: 2021-01-01 | End: 2021-01-01

## 2021-01-01 RX ORDER — AMLODIPINE BESYLATE 10 MG/1
10 TABLET ORAL DAILY
Status: DISCONTINUED | OUTPATIENT
Start: 2021-01-01 | End: 2021-01-01 | Stop reason: HOSPADM

## 2021-01-01 RX ORDER — METOPROLOL TARTRATE 5 MG/5ML
5 INJECTION INTRAVENOUS ONCE
Status: COMPLETED | OUTPATIENT
Start: 2021-01-01 | End: 2021-01-01

## 2021-01-01 RX ORDER — ATROPINE SULFATE 0.1 MG/ML
0.5 INJECTION INTRAVENOUS EVERY 5 MIN PRN
Status: ACTIVE | OUTPATIENT
Start: 2021-01-01 | End: 2021-01-01

## 2021-01-01 RX ORDER — ADENOSINE 3 MG/ML
INJECTION, SOLUTION INTRAVENOUS
Status: COMPLETED
Start: 2021-01-01 | End: 2021-01-01

## 2021-01-01 RX ORDER — ALOGLIPTIN 25 MG/1
25 TABLET, FILM COATED ORAL DAILY
Status: DISCONTINUED | OUTPATIENT
Start: 2021-01-01 | End: 2021-01-01 | Stop reason: HOSPADM

## 2021-01-01 RX ORDER — DILTIAZEM HYDROCHLORIDE 5 MG/ML
10 INJECTION INTRAVENOUS ONCE
Status: COMPLETED | OUTPATIENT
Start: 2021-01-01 | End: 2021-01-01

## 2021-01-01 RX ORDER — POLYETHYLENE GLYCOL 3350 17 G/17G
17 POWDER, FOR SOLUTION ORAL DAILY PRN
Status: DISCONTINUED | OUTPATIENT
Start: 2021-01-01 | End: 2021-01-01 | Stop reason: HOSPADM

## 2021-01-01 RX ORDER — LORAZEPAM 2 MG/ML
1 INJECTION INTRAMUSCULAR
Status: DISCONTINUED | OUTPATIENT
Start: 2021-01-01 | End: 2021-01-01 | Stop reason: HOSPADM

## 2021-01-01 RX ORDER — PANTOPRAZOLE SODIUM 40 MG/1
40 TABLET, DELAYED RELEASE ORAL
Status: DISCONTINUED | OUTPATIENT
Start: 2021-01-01 | End: 2021-01-01

## 2021-01-01 RX ORDER — 0.9 % SODIUM CHLORIDE 0.9 %
1000 INTRAVENOUS SOLUTION INTRAVENOUS ONCE
Status: COMPLETED | OUTPATIENT
Start: 2021-01-01 | End: 2021-01-01

## 2021-01-01 RX ORDER — DILTIAZEM HYDROCHLORIDE 180 MG/1
360 CAPSULE, COATED, EXTENDED RELEASE ORAL DAILY
Status: DISCONTINUED | OUTPATIENT
Start: 2021-01-01 | End: 2021-11-23 | Stop reason: HOSPADM

## 2021-01-01 RX ORDER — INSULIN GLARGINE 100 [IU]/ML
20 INJECTION, SOLUTION SUBCUTANEOUS DAILY
Status: ON HOLD | COMMUNITY
End: 2021-01-01 | Stop reason: SDUPTHER

## 2021-01-01 RX ORDER — ATORVASTATIN CALCIUM 80 MG/1
40 TABLET, FILM COATED ORAL NIGHTLY
Status: DISCONTINUED | OUTPATIENT
Start: 2021-01-01 | End: 2021-01-01 | Stop reason: HOSPADM

## 2021-01-01 RX ORDER — ACETAMINOPHEN 325 MG/1
650 TABLET ORAL EVERY 6 HOURS PRN
Status: DISCONTINUED | OUTPATIENT
Start: 2021-01-01 | End: 2021-11-23 | Stop reason: HOSPADM

## 2021-01-01 RX ORDER — SODIUM CHLORIDE 0.9 % (FLUSH) 0.9 %
5-40 SYRINGE (ML) INJECTION PRN
Status: DISCONTINUED | OUTPATIENT
Start: 2021-01-01 | End: 2021-01-01 | Stop reason: HOSPADM

## 2021-01-01 RX ORDER — TRAZODONE HYDROCHLORIDE 50 MG/1
50 TABLET ORAL NIGHTLY PRN
Qty: 30 TABLET | Refills: 0 | Status: SHIPPED | OUTPATIENT
Start: 2021-01-01 | End: 2021-01-01

## 2021-01-01 RX ORDER — HEPARIN SODIUM 1000 [USP'U]/ML
40 INJECTION, SOLUTION INTRAVENOUS; SUBCUTANEOUS PRN
Status: DISCONTINUED | OUTPATIENT
Start: 2021-01-01 | End: 2021-11-23 | Stop reason: HOSPADM

## 2021-01-01 RX ORDER — DULOXETIN HYDROCHLORIDE 60 MG/1
60 CAPSULE, DELAYED RELEASE ORAL NIGHTLY
Qty: 30 CAPSULE | Refills: 0 | Status: SHIPPED | OUTPATIENT
Start: 2021-01-01

## 2021-01-01 RX ORDER — LORAZEPAM 0.5 MG/1
2 TABLET ORAL
Status: DISCONTINUED | OUTPATIENT
Start: 2021-01-01 | End: 2021-01-01 | Stop reason: HOSPADM

## 2021-01-01 RX ORDER — DULOXETIN HYDROCHLORIDE 60 MG/1
60 CAPSULE, DELAYED RELEASE ORAL NIGHTLY
Status: DISCONTINUED | OUTPATIENT
Start: 2021-01-01 | End: 2021-11-23 | Stop reason: HOSPADM

## 2021-01-01 RX ORDER — GLIPIZIDE 5 MG/1
10 TABLET ORAL
Status: DISCONTINUED | OUTPATIENT
Start: 2021-01-01 | End: 2021-01-01 | Stop reason: HOSPADM

## 2021-01-01 RX ORDER — AMITRIPTYLINE HYDROCHLORIDE 25 MG/1
25 TABLET, FILM COATED ORAL NIGHTLY
Status: DISCONTINUED | OUTPATIENT
Start: 2021-01-01 | End: 2021-01-01

## 2021-01-01 RX ORDER — GLIPIZIDE 10 MG/1
10 TABLET ORAL
Status: DISCONTINUED | OUTPATIENT
Start: 2021-01-01 | End: 2021-11-23 | Stop reason: HOSPADM

## 2021-01-01 RX ORDER — NICOTINE POLACRILEX 4 MG
15 LOZENGE BUCCAL PRN
Status: DISCONTINUED | OUTPATIENT
Start: 2021-01-01 | End: 2021-11-23 | Stop reason: HOSPADM

## 2021-01-01 RX ORDER — HEPARIN SODIUM 1000 [USP'U]/ML
80 INJECTION, SOLUTION INTRAVENOUS; SUBCUTANEOUS PRN
Status: DISCONTINUED | OUTPATIENT
Start: 2021-01-01 | End: 2021-01-01 | Stop reason: SDUPTHER

## 2021-01-01 RX ORDER — SODIUM CHLORIDE 9 MG/ML
500 INJECTION, SOLUTION INTRAVENOUS CONTINUOUS PRN
Status: ACTIVE | OUTPATIENT
Start: 2021-01-01 | End: 2021-01-01

## 2021-01-01 RX ORDER — ONDANSETRON 2 MG/ML
4 INJECTION INTRAMUSCULAR; INTRAVENOUS EVERY 6 HOURS PRN
Status: DISCONTINUED | OUTPATIENT
Start: 2021-01-01 | End: 2021-11-23 | Stop reason: HOSPADM

## 2021-01-01 RX ORDER — INSULIN GLARGINE 100 [IU]/ML
20 INJECTION, SOLUTION SUBCUTANEOUS DAILY
Status: DISCONTINUED | OUTPATIENT
Start: 2021-01-01 | End: 2021-01-01 | Stop reason: HOSPADM

## 2021-01-01 RX ORDER — SODIUM CHLORIDE 0.9 % (FLUSH) 0.9 %
10 SYRINGE (ML) INJECTION PRN
Status: DISCONTINUED | OUTPATIENT
Start: 2021-01-01 | End: 2021-11-23 | Stop reason: HOSPADM

## 2021-01-01 RX ORDER — TRAZODONE HYDROCHLORIDE 50 MG/1
50 TABLET ORAL NIGHTLY PRN
Status: DISCONTINUED | OUTPATIENT
Start: 2021-01-01 | End: 2021-01-01

## 2021-01-01 RX ORDER — HYDROXYZINE HYDROCHLORIDE 50 MG/ML
50 INJECTION, SOLUTION INTRAMUSCULAR 2 TIMES DAILY
Status: ON HOLD | COMMUNITY
End: 2021-01-01 | Stop reason: HOSPADM

## 2021-01-01 RX ORDER — AMINOPHYLLINE DIHYDRATE 25 MG/ML
50 INJECTION, SOLUTION INTRAVENOUS PRN
Status: ACTIVE | OUTPATIENT
Start: 2021-01-01 | End: 2021-01-01

## 2021-01-01 RX ORDER — METOPROLOL TARTRATE 5 MG/5ML
5 INJECTION INTRAVENOUS EVERY 6 HOURS
Status: DISCONTINUED | OUTPATIENT
Start: 2021-01-01 | End: 2021-01-01

## 2021-01-01 RX ORDER — OLANZAPINE 5 MG/1
5 TABLET ORAL NIGHTLY
Status: DISCONTINUED | OUTPATIENT
Start: 2021-01-01 | End: 2021-01-01

## 2021-01-01 RX ORDER — LORAZEPAM 1 MG/1
2 TABLET ORAL EVERY 4 HOURS PRN
Status: DISCONTINUED | OUTPATIENT
Start: 2021-01-01 | End: 2021-01-01 | Stop reason: HOSPADM

## 2021-01-01 RX ORDER — ALOGLIPTIN 25 MG/1
25 TABLET, FILM COATED ORAL DAILY
Qty: 30 TABLET | Refills: 0 | Status: SHIPPED | OUTPATIENT
Start: 2021-01-01 | End: 2021-01-01 | Stop reason: HOSPADM

## 2021-01-01 RX ORDER — FAMOTIDINE 20 MG/1
20 TABLET, FILM COATED ORAL 2 TIMES DAILY WITH MEALS
Status: DISCONTINUED | OUTPATIENT
Start: 2021-01-01 | End: 2021-11-23 | Stop reason: HOSPADM

## 2021-01-01 RX ORDER — ALBUTEROL SULFATE 90 UG/1
2 AEROSOL, METERED RESPIRATORY (INHALATION) EVERY 6 HOURS PRN
Status: DISCONTINUED | OUTPATIENT
Start: 2021-01-01 | End: 2021-11-23 | Stop reason: HOSPADM

## 2021-01-01 RX ORDER — PROPAFENONE HYDROCHLORIDE 325 MG/1
325 CAPSULE, EXTENDED RELEASE ORAL EVERY 12 HOURS SCHEDULED
Status: DISCONTINUED | OUTPATIENT
Start: 2021-01-01 | End: 2021-11-23 | Stop reason: HOSPADM

## 2021-01-01 RX ORDER — FAMOTIDINE 20 MG/1
20 TABLET, FILM COATED ORAL 2 TIMES DAILY WITH MEALS
Qty: 60 TABLET | Refills: 0 | Status: SHIPPED | OUTPATIENT
Start: 2021-01-01

## 2021-01-01 RX ORDER — OLANZAPINE 5 MG/1
10 TABLET ORAL NIGHTLY
Status: DISCONTINUED | OUTPATIENT
Start: 2021-01-01 | End: 2021-01-01

## 2021-01-01 RX ORDER — INSULIN GLARGINE 100 [IU]/ML
20 INJECTION, SOLUTION SUBCUTANEOUS DAILY
Status: DISCONTINUED | OUTPATIENT
Start: 2021-01-01 | End: 2021-11-23 | Stop reason: HOSPADM

## 2021-01-01 RX ORDER — FAMOTIDINE 20 MG/1
20 TABLET, FILM COATED ORAL 2 TIMES DAILY
Status: ON HOLD | COMMUNITY
End: 2021-01-01

## 2021-01-01 RX ORDER — GABAPENTIN 400 MG/1
800 CAPSULE ORAL 3 TIMES DAILY
Status: DISCONTINUED | OUTPATIENT
Start: 2021-01-01 | End: 2021-01-01

## 2021-01-01 RX ORDER — HYDROXYZINE 50 MG/1
50 TABLET, FILM COATED ORAL 3 TIMES DAILY PRN
Status: DISCONTINUED | OUTPATIENT
Start: 2021-01-01 | End: 2021-01-01 | Stop reason: HOSPADM

## 2021-01-01 RX ORDER — ACETAMINOPHEN 650 MG/1
650 SUPPOSITORY RECTAL EVERY 6 HOURS PRN
Status: DISCONTINUED | OUTPATIENT
Start: 2021-01-01 | End: 2021-11-23 | Stop reason: HOSPADM

## 2021-01-01 RX ORDER — 0.9 % SODIUM CHLORIDE 0.9 %
80 INTRAVENOUS SOLUTION INTRAVENOUS ONCE
Status: COMPLETED | OUTPATIENT
Start: 2021-01-01 | End: 2021-01-01

## 2021-01-01 RX ORDER — POTASSIUM CHLORIDE 7.45 MG/ML
10 INJECTION INTRAVENOUS PRN
Status: DISCONTINUED | OUTPATIENT
Start: 2021-01-01 | End: 2021-11-23 | Stop reason: HOSPADM

## 2021-01-01 RX ORDER — GAUZE BANDAGE 2" X 2"
100 BANDAGE TOPICAL DAILY
Status: DISCONTINUED | OUTPATIENT
Start: 2021-01-01 | End: 2021-01-01 | Stop reason: HOSPADM

## 2021-01-01 RX ORDER — SODIUM CHLORIDE 0.9 % (FLUSH) 0.9 %
5-40 SYRINGE (ML) INJECTION EVERY 12 HOURS SCHEDULED
Status: DISCONTINUED | OUTPATIENT
Start: 2021-01-01 | End: 2021-11-23 | Stop reason: HOSPADM

## 2021-01-01 RX ORDER — DEXTROSE MONOHYDRATE 25 G/50ML
12.5 INJECTION, SOLUTION INTRAVENOUS PRN
Status: DISCONTINUED | OUTPATIENT
Start: 2021-01-01 | End: 2021-01-01 | Stop reason: HOSPADM

## 2021-01-01 RX ORDER — ATORVASTATIN CALCIUM 20 MG/1
40 TABLET, FILM COATED ORAL NIGHTLY
Status: DISCONTINUED | OUTPATIENT
Start: 2021-01-01 | End: 2021-01-01 | Stop reason: HOSPADM

## 2021-01-01 RX ORDER — METOPROLOL TARTRATE 5 MG/5ML
7.5 INJECTION INTRAVENOUS EVERY 6 HOURS
Status: DISCONTINUED | OUTPATIENT
Start: 2021-01-01 | End: 2021-01-01

## 2021-01-01 RX ORDER — FAMOTIDINE 20 MG/1
20 TABLET, FILM COATED ORAL 2 TIMES DAILY
Status: DISCONTINUED | OUTPATIENT
Start: 2021-01-01 | End: 2021-01-01 | Stop reason: HOSPADM

## 2021-01-01 RX ORDER — CALCIUM POLYCARBOPHIL 625 MG 625 MG/1
625 TABLET ORAL 2 TIMES DAILY
Status: ON HOLD | COMMUNITY
End: 2021-01-01 | Stop reason: HOSPADM

## 2021-01-01 RX ORDER — OLANZAPINE 5 MG/1
5 TABLET ORAL NIGHTLY
Status: ON HOLD | COMMUNITY
End: 2021-01-01 | Stop reason: SDUPTHER

## 2021-01-01 RX ORDER — DIPHENHYDRAMINE HYDROCHLORIDE 50 MG/ML
50 INJECTION INTRAMUSCULAR; INTRAVENOUS EVERY 4 HOURS PRN
Status: DISCONTINUED | OUTPATIENT
Start: 2021-01-01 | End: 2021-01-01 | Stop reason: HOSPADM

## 2021-01-01 RX ORDER — LANOLIN ALCOHOL/MO/W.PET/CERES
6 CREAM (GRAM) TOPICAL NIGHTLY PRN
Status: ON HOLD | COMMUNITY
End: 2021-01-01 | Stop reason: HOSPADM

## 2021-01-01 RX ORDER — MORPHINE SULFATE 4 MG/ML
4 INJECTION, SOLUTION INTRAMUSCULAR; INTRAVENOUS ONCE
Status: COMPLETED | OUTPATIENT
Start: 2021-01-01 | End: 2021-01-01

## 2021-01-01 RX ORDER — ALBUTEROL SULFATE 90 UG/1
2 AEROSOL, METERED RESPIRATORY (INHALATION) PRN
Status: ACTIVE | OUTPATIENT
Start: 2021-01-01 | End: 2021-01-01

## 2021-01-01 RX ORDER — LORAZEPAM 2 MG/ML
2 INJECTION INTRAMUSCULAR
Status: DISCONTINUED | OUTPATIENT
Start: 2021-01-01 | End: 2021-01-01 | Stop reason: HOSPADM

## 2021-01-01 RX ORDER — ACETAMINOPHEN 325 MG/1
650 TABLET ORAL EVERY 4 HOURS PRN
Status: DISCONTINUED | OUTPATIENT
Start: 2021-01-01 | End: 2021-01-01 | Stop reason: HOSPADM

## 2021-01-01 RX ORDER — NITROGLYCERIN 0.4 MG/1
0.4 TABLET SUBLINGUAL EVERY 5 MIN PRN
Status: ACTIVE | OUTPATIENT
Start: 2021-01-01 | End: 2021-01-01

## 2021-01-01 RX ORDER — FENOFIBRATE 54 MG/1
54 TABLET ORAL DAILY
Status: DISCONTINUED | OUTPATIENT
Start: 2021-01-01 | End: 2021-01-01

## 2021-01-01 RX ORDER — OXCARBAZEPINE 600 MG/1
600 TABLET, FILM COATED ORAL NIGHTLY
Status: DISCONTINUED | OUTPATIENT
Start: 2021-01-01 | End: 2021-01-01 | Stop reason: HOSPADM

## 2021-01-01 RX ORDER — OXCARBAZEPINE 300 MG/1
600 TABLET, FILM COATED ORAL NIGHTLY
Status: DISCONTINUED | OUTPATIENT
Start: 2021-01-01 | End: 2021-11-23 | Stop reason: HOSPADM

## 2021-01-01 RX ORDER — LANOLIN ALCOHOL/MO/W.PET/CERES
1.5 CREAM (GRAM) TOPICAL NIGHTLY PRN
Qty: 15 TABLET | Refills: 0 | Status: SHIPPED | OUTPATIENT
Start: 2021-01-01 | End: 2021-01-01

## 2021-01-01 RX ORDER — INSULIN GLARGINE 100 [IU]/ML
20 INJECTION, SOLUTION SUBCUTANEOUS DAILY
Qty: 15 VIAL | Refills: 0 | Status: SHIPPED | OUTPATIENT
Start: 2021-01-01 | End: 2021-01-01 | Stop reason: HOSPADM

## 2021-01-01 RX ORDER — DULOXETIN HYDROCHLORIDE 60 MG/1
60 CAPSULE, DELAYED RELEASE ORAL NIGHTLY
Status: ON HOLD | COMMUNITY
End: 2021-01-01 | Stop reason: HOSPADM

## 2021-01-01 RX ORDER — HYDROXYZINE 50 MG/1
100 TABLET, FILM COATED ORAL 3 TIMES DAILY PRN
Status: DISCONTINUED | OUTPATIENT
Start: 2021-01-01 | End: 2021-01-01 | Stop reason: HOSPADM

## 2021-01-01 RX ORDER — GLIPIZIDE 10 MG/1
10 TABLET, FILM COATED, EXTENDED RELEASE ORAL DAILY
Status: DISCONTINUED | OUTPATIENT
Start: 2021-01-01 | End: 2021-01-01 | Stop reason: CLARIF

## 2021-01-01 RX ORDER — DEXTROSE MONOHYDRATE 50 MG/ML
100 INJECTION, SOLUTION INTRAVENOUS PRN
Status: DISCONTINUED | OUTPATIENT
Start: 2021-01-01 | End: 2021-01-01 | Stop reason: HOSPADM

## 2021-01-01 RX ORDER — ACETAMINOPHEN 325 MG/1
650 TABLET ORAL EVERY 6 HOURS PRN
Status: DISCONTINUED | OUTPATIENT
Start: 2021-01-01 | End: 2021-01-01 | Stop reason: HOSPADM

## 2021-01-01 RX ORDER — NORTRIPTYLINE HYDROCHLORIDE 25 MG/1
75 CAPSULE ORAL NIGHTLY
Status: ON HOLD | COMMUNITY
End: 2021-01-01

## 2021-01-01 RX ORDER — ONDANSETRON 4 MG/1
4 TABLET, ORALLY DISINTEGRATING ORAL EVERY 8 HOURS PRN
Status: DISCONTINUED | OUTPATIENT
Start: 2021-01-01 | End: 2021-11-23 | Stop reason: HOSPADM

## 2021-01-01 RX ORDER — HEPARIN SODIUM 1000 [USP'U]/ML
40 INJECTION, SOLUTION INTRAVENOUS; SUBCUTANEOUS PRN
Status: DISCONTINUED | OUTPATIENT
Start: 2021-01-01 | End: 2021-01-01 | Stop reason: SDUPTHER

## 2021-01-01 RX ORDER — ADENOSINE 3 MG/ML
6 INJECTION, SOLUTION INTRAVENOUS ONCE
Status: COMPLETED | OUTPATIENT
Start: 2021-01-01 | End: 2021-01-01

## 2021-01-01 RX ORDER — SODIUM CHLORIDE 9 MG/ML
25 INJECTION, SOLUTION INTRAVENOUS PRN
Status: DISCONTINUED | OUTPATIENT
Start: 2021-01-01 | End: 2021-11-23 | Stop reason: HOSPADM

## 2021-01-01 RX ORDER — LORAZEPAM 2 MG/1
4 TABLET ORAL
Status: DISCONTINUED | OUTPATIENT
Start: 2021-01-01 | End: 2021-01-01 | Stop reason: HOSPADM

## 2021-01-01 RX ORDER — MORPHINE SULFATE 2 MG/ML
2 INJECTION, SOLUTION INTRAMUSCULAR; INTRAVENOUS EVERY 4 HOURS PRN
Status: DISCONTINUED | OUTPATIENT
Start: 2021-01-01 | End: 2021-01-01

## 2021-01-01 RX ORDER — TRAZODONE HYDROCHLORIDE 100 MG/1
100 TABLET ORAL NIGHTLY
COMMUNITY

## 2021-01-01 RX ORDER — NICOTINE POLACRILEX 4 MG
15 LOZENGE BUCCAL PRN
Status: DISCONTINUED | OUTPATIENT
Start: 2021-01-01 | End: 2021-01-01 | Stop reason: HOSPADM

## 2021-01-01 RX ORDER — LANOLIN ALCOHOL/MO/W.PET/CERES
3 CREAM (GRAM) TOPICAL NIGHTLY PRN
Status: DISCONTINUED | OUTPATIENT
Start: 2021-01-01 | End: 2021-01-01 | Stop reason: HOSPADM

## 2021-01-01 RX ORDER — TRAZODONE HYDROCHLORIDE 50 MG/1
50 TABLET ORAL NIGHTLY PRN
Status: DISCONTINUED | OUTPATIENT
Start: 2021-01-01 | End: 2021-01-01 | Stop reason: HOSPADM

## 2021-01-01 RX ORDER — OLANZAPINE 5 MG/1
5 TABLET ORAL NIGHTLY
Status: DISCONTINUED | OUTPATIENT
Start: 2021-01-01 | End: 2021-01-01 | Stop reason: HOSPADM

## 2021-01-01 RX ORDER — PROMETHAZINE HYDROCHLORIDE 12.5 MG/1
12.5 TABLET ORAL EVERY 6 HOURS PRN
Status: DISCONTINUED | OUTPATIENT
Start: 2021-01-01 | End: 2021-01-01 | Stop reason: HOSPADM

## 2021-01-01 RX ORDER — QUETIAPINE FUMARATE 25 MG/1
25 TABLET, FILM COATED ORAL 2 TIMES DAILY
Status: DISCONTINUED | OUTPATIENT
Start: 2021-01-01 | End: 2021-01-01

## 2021-01-01 RX ORDER — FAMOTIDINE 20 MG/1
20 TABLET, FILM COATED ORAL 2 TIMES DAILY WITH MEALS
Status: DISCONTINUED | OUTPATIENT
Start: 2021-01-01 | End: 2021-01-01 | Stop reason: HOSPADM

## 2021-01-01 RX ORDER — HALOPERIDOL 5 MG/ML
5 INJECTION INTRAMUSCULAR EVERY 4 HOURS PRN
Status: DISCONTINUED | OUTPATIENT
Start: 2021-01-01 | End: 2021-01-01 | Stop reason: HOSPADM

## 2021-01-01 RX ORDER — DULOXETIN HYDROCHLORIDE 30 MG/1
60 CAPSULE, DELAYED RELEASE ORAL NIGHTLY
Status: DISCONTINUED | OUTPATIENT
Start: 2021-01-01 | End: 2021-01-01 | Stop reason: HOSPADM

## 2021-01-01 RX ORDER — HALOPERIDOL 5 MG
5 TABLET ORAL EVERY 4 HOURS PRN
Status: DISCONTINUED | OUTPATIENT
Start: 2021-01-01 | End: 2021-01-01 | Stop reason: HOSPADM

## 2021-01-01 RX ORDER — LANOLIN ALCOHOL/MO/W.PET/CERES
1.5 CREAM (GRAM) TOPICAL NIGHTLY PRN
Status: DISCONTINUED | OUTPATIENT
Start: 2021-01-01 | End: 2021-11-23 | Stop reason: HOSPADM

## 2021-01-01 RX ORDER — SODIUM CHLORIDE 0.9 % (FLUSH) 0.9 %
5-40 SYRINGE (ML) INJECTION PRN
Status: ACTIVE | OUTPATIENT
Start: 2021-01-01 | End: 2021-01-01

## 2021-01-01 RX ORDER — ZOLPIDEM TARTRATE 10 MG/1
10 TABLET ORAL NIGHTLY PRN
COMMUNITY
Start: 2021-01-01

## 2021-01-01 RX ORDER — M-VIT,TX,IRON,MINS/CALC/FOLIC 27MG-0.4MG
1 TABLET ORAL DAILY
Status: DISCONTINUED | OUTPATIENT
Start: 2021-01-01 | End: 2021-01-01 | Stop reason: HOSPADM

## 2021-01-01 RX ORDER — WATER / MINERAL OIL / WHITE PETROLATUM 16 OZ
CREAM TOPICAL PRN
Status: ON HOLD | COMMUNITY
End: 2021-01-01 | Stop reason: HOSPADM

## 2021-01-01 RX ORDER — LORAZEPAM 0.5 MG/1
1 TABLET ORAL
Status: DISCONTINUED | OUTPATIENT
Start: 2021-01-01 | End: 2021-01-01 | Stop reason: HOSPADM

## 2021-01-01 RX ORDER — ONDANSETRON 2 MG/ML
4 INJECTION INTRAMUSCULAR; INTRAVENOUS EVERY 6 HOURS PRN
Status: DISCONTINUED | OUTPATIENT
Start: 2021-01-01 | End: 2021-01-01 | Stop reason: HOSPADM

## 2021-01-01 RX ORDER — HEPARIN SODIUM 10000 [USP'U]/100ML
5-30 INJECTION, SOLUTION INTRAVENOUS CONTINUOUS
Status: DISCONTINUED | OUTPATIENT
Start: 2021-01-01 | End: 2021-11-23 | Stop reason: HOSPADM

## 2021-01-01 RX ORDER — OXCARBAZEPINE 600 MG/1
600 TABLET, FILM COATED ORAL NIGHTLY
Status: ON HOLD | COMMUNITY
End: 2021-01-01 | Stop reason: SDUPTHER

## 2021-01-01 RX ORDER — HYDROXYZINE PAMOATE 50 MG/1
50 CAPSULE ORAL 4 TIMES DAILY PRN
Status: ON HOLD | COMMUNITY
End: 2021-01-01 | Stop reason: HOSPADM

## 2021-01-01 RX ORDER — HEPARIN SODIUM 1000 [USP'U]/ML
80 INJECTION, SOLUTION INTRAVENOUS; SUBCUTANEOUS ONCE
Status: COMPLETED | OUTPATIENT
Start: 2021-01-01 | End: 2021-01-01

## 2021-01-01 RX ORDER — HEPARIN SODIUM 1000 [USP'U]/ML
80 INJECTION, SOLUTION INTRAVENOUS; SUBCUTANEOUS PRN
Status: DISCONTINUED | OUTPATIENT
Start: 2021-01-01 | End: 2021-11-23 | Stop reason: HOSPADM

## 2021-01-01 RX ORDER — ERGOCALCIFEROL 1.25 MG/1
50000 CAPSULE ORAL WEEKLY
Status: DISCONTINUED | OUTPATIENT
Start: 2021-01-01 | End: 2021-01-01 | Stop reason: HOSPADM

## 2021-01-01 RX ORDER — ASPIRIN 81 MG/1
81 TABLET ORAL DAILY
Status: DISCONTINUED | OUTPATIENT
Start: 2021-01-01 | End: 2021-01-01 | Stop reason: HOSPADM

## 2021-01-01 RX ORDER — LORAZEPAM 2 MG/ML
3 INJECTION INTRAMUSCULAR
Status: DISCONTINUED | OUTPATIENT
Start: 2021-01-01 | End: 2021-01-01 | Stop reason: HOSPADM

## 2021-01-01 RX ORDER — LORAZEPAM 2 MG/ML
2 INJECTION INTRAMUSCULAR EVERY 4 HOURS PRN
Status: DISCONTINUED | OUTPATIENT
Start: 2021-01-01 | End: 2021-01-01 | Stop reason: HOSPADM

## 2021-01-01 RX ORDER — FOLIC ACID 1 MG/1
1 TABLET ORAL DAILY
Status: DISCONTINUED | OUTPATIENT
Start: 2021-01-01 | End: 2021-01-01 | Stop reason: HOSPADM

## 2021-01-01 RX ORDER — TRAZODONE HYDROCHLORIDE 100 MG/1
100 TABLET ORAL NIGHTLY PRN
Status: DISCONTINUED | OUTPATIENT
Start: 2021-01-01 | End: 2021-11-23 | Stop reason: HOSPADM

## 2021-01-01 RX ORDER — DULOXETIN HYDROCHLORIDE 60 MG/1
60 CAPSULE, DELAYED RELEASE ORAL NIGHTLY
Status: DISCONTINUED | OUTPATIENT
Start: 2021-01-01 | End: 2021-01-01 | Stop reason: HOSPADM

## 2021-01-01 RX ORDER — ASPIRIN 81 MG/1
81 TABLET ORAL DAILY
COMMUNITY

## 2021-01-01 RX ORDER — MIRTAZAPINE 30 MG/1
30 TABLET, FILM COATED ORAL NIGHTLY
Status: DISCONTINUED | OUTPATIENT
Start: 2021-01-01 | End: 2021-01-01 | Stop reason: HOSPADM

## 2021-01-01 RX ORDER — AMLODIPINE BESYLATE 10 MG/1
10 TABLET ORAL DAILY
Status: ON HOLD | COMMUNITY
End: 2021-01-01 | Stop reason: SDUPTHER

## 2021-01-01 RX ORDER — DILTIAZEM HYDROCHLORIDE 5 MG/ML
20 INJECTION INTRAVENOUS ONCE
Status: COMPLETED | OUTPATIENT
Start: 2021-01-01 | End: 2021-01-01

## 2021-01-01 RX ORDER — POTASSIUM CHLORIDE 20 MEQ/1
40 TABLET, EXTENDED RELEASE ORAL PRN
Status: DISCONTINUED | OUTPATIENT
Start: 2021-01-01 | End: 2021-11-23 | Stop reason: HOSPADM

## 2021-01-01 RX ORDER — GLIPIZIDE 10 MG/1
10 TABLET ORAL
Qty: 30 TABLET | Refills: 0 | Status: SHIPPED | OUTPATIENT
Start: 2021-01-01

## 2021-01-01 RX ORDER — BUSPIRONE HYDROCHLORIDE 15 MG/1
60 TABLET ORAL NIGHTLY PRN
Status: ON HOLD | COMMUNITY
End: 2021-01-01 | Stop reason: HOSPADM

## 2021-01-01 RX ORDER — AMLODIPINE BESYLATE 10 MG/1
10 TABLET ORAL DAILY
Qty: 30 TABLET | Refills: 0 | Status: SHIPPED | OUTPATIENT
Start: 2021-01-01

## 2021-01-01 RX ORDER — OLANZAPINE 10 MG/1
10 TABLET ORAL NIGHTLY
COMMUNITY

## 2021-01-01 RX ORDER — ACETAMINOPHEN 500 MG
1000 TABLET ORAL EVERY 6 HOURS PRN
COMMUNITY

## 2021-01-01 RX ORDER — LANOLIN ALCOHOL/MO/W.PET/CERES
1.5 CREAM (GRAM) TOPICAL NIGHTLY PRN
Status: DISCONTINUED | OUTPATIENT
Start: 2021-01-01 | End: 2021-01-01 | Stop reason: HOSPADM

## 2021-01-01 RX ORDER — SODIUM CHLORIDE 0.9 % (FLUSH) 0.9 %
5-40 SYRINGE (ML) INJECTION EVERY 12 HOURS SCHEDULED
Status: DISCONTINUED | OUTPATIENT
Start: 2021-01-01 | End: 2021-01-01 | Stop reason: HOSPADM

## 2021-01-01 RX ORDER — HEPARIN SODIUM 10000 [USP'U]/100ML
5-30 INJECTION, SOLUTION INTRAVENOUS CONTINUOUS
Status: DISCONTINUED | OUTPATIENT
Start: 2021-01-01 | End: 2021-01-01

## 2021-01-01 RX ORDER — DEXTROSE MONOHYDRATE 25 G/50ML
12.5 INJECTION, SOLUTION INTRAVENOUS PRN
Status: DISCONTINUED | OUTPATIENT
Start: 2021-01-01 | End: 2021-11-23 | Stop reason: HOSPADM

## 2021-01-01 RX ORDER — OMEPRAZOLE 20 MG/1
20 CAPSULE, DELAYED RELEASE ORAL DAILY
Status: ON HOLD | COMMUNITY
End: 2021-01-01

## 2021-01-01 RX ORDER — METOPROLOL TARTRATE 5 MG/5ML
5 INJECTION INTRAVENOUS EVERY 5 MIN PRN
Status: ACTIVE | OUTPATIENT
Start: 2021-01-01 | End: 2021-01-01

## 2021-01-01 RX ORDER — ACETAMINOPHEN 650 MG/1
650 SUPPOSITORY RECTAL EVERY 6 HOURS PRN
Status: DISCONTINUED | OUTPATIENT
Start: 2021-01-01 | End: 2021-01-01 | Stop reason: HOSPADM

## 2021-01-01 RX ORDER — VENLAFAXINE HYDROCHLORIDE 37.5 MG/1
37.5 CAPSULE, EXTENDED RELEASE ORAL
Status: DISCONTINUED | OUTPATIENT
Start: 2021-01-01 | End: 2021-01-01 | Stop reason: SDUPTHER

## 2021-01-01 RX ORDER — DEXTROSE MONOHYDRATE 50 MG/ML
100 INJECTION, SOLUTION INTRAVENOUS PRN
Status: DISCONTINUED | OUTPATIENT
Start: 2021-01-01 | End: 2021-11-23 | Stop reason: HOSPADM

## 2021-01-01 RX ORDER — ATORVASTATIN CALCIUM 40 MG/1
40 TABLET, FILM COATED ORAL NIGHTLY
Status: DISCONTINUED | OUTPATIENT
Start: 2021-01-01 | End: 2021-11-23 | Stop reason: HOSPADM

## 2021-01-01 RX ORDER — OLANZAPINE 5 MG/1
10 TABLET ORAL NIGHTLY
Status: DISCONTINUED | OUTPATIENT
Start: 2021-01-01 | End: 2021-11-23 | Stop reason: HOSPADM

## 2021-01-01 RX ADMIN — AZITHROMYCIN MONOHYDRATE 500 MG: 500 INJECTION, POWDER, LYOPHILIZED, FOR SOLUTION INTRAVENOUS at 18:25

## 2021-01-01 RX ADMIN — OXCARBAZEPINE 600 MG: 300 TABLET, FILM COATED ORAL at 19:48

## 2021-01-01 RX ADMIN — SODIUM CHLORIDE, PRESERVATIVE FREE 10 ML: 5 INJECTION INTRAVENOUS at 08:43

## 2021-01-01 RX ADMIN — Medication 100 MG: at 09:26

## 2021-01-01 RX ADMIN — SODIUM CHLORIDE 80 ML: 9 INJECTION, SOLUTION INTRAVENOUS at 18:31

## 2021-01-01 RX ADMIN — ACETAMINOPHEN 650 MG: 325 TABLET ORAL at 20:42

## 2021-01-01 RX ADMIN — METOPROLOL TARTRATE 5 MG: 5 INJECTION INTRAVENOUS at 20:49

## 2021-01-01 RX ADMIN — SODIUM CHLORIDE: 9 INJECTION, SOLUTION INTRAVENOUS at 02:27

## 2021-01-01 RX ADMIN — OXCARBAZEPINE 600 MG: 600 TABLET, FILM COATED ORAL at 21:36

## 2021-01-01 RX ADMIN — INSULIN GLARGINE 20 UNITS: 100 INJECTION, SOLUTION SUBCUTANEOUS at 08:43

## 2021-01-01 RX ADMIN — METOPROLOL TARTRATE 75 MG: 50 TABLET, FILM COATED ORAL at 19:47

## 2021-01-01 RX ADMIN — OXCARBAZEPINE 600 MG: 300 TABLET, FILM COATED ORAL at 20:29

## 2021-01-01 RX ADMIN — QUETIAPINE FUMARATE 25 MG: 25 TABLET ORAL at 08:41

## 2021-01-01 RX ADMIN — PANCRELIPASE 24000 UNITS: 24000; 76000; 120000 CAPSULE, DELAYED RELEASE PELLETS ORAL at 12:44

## 2021-01-01 RX ADMIN — ACETAMINOPHEN 650 MG: 325 TABLET ORAL at 17:32

## 2021-01-01 RX ADMIN — HEPARIN SODIUM 18 UNITS/KG/HR: 10000 INJECTION, SOLUTION INTRAVENOUS at 19:17

## 2021-01-01 RX ADMIN — ATORVASTATIN CALCIUM 40 MG: 80 TABLET, FILM COATED ORAL at 20:44

## 2021-01-01 RX ADMIN — ADENOSINE 12 MG: 3 INJECTION, SOLUTION INTRAVENOUS at 15:06

## 2021-01-01 RX ADMIN — FAMOTIDINE 20 MG: 20 TABLET, FILM COATED ORAL at 08:44

## 2021-01-01 RX ADMIN — DULOXETIN HYDROCHLORIDE 60 MG: 60 CAPSULE, DELAYED RELEASE ORAL at 23:51

## 2021-01-01 RX ADMIN — INSULIN GLARGINE 20 UNITS: 100 INJECTION, SOLUTION SUBCUTANEOUS at 08:10

## 2021-01-01 RX ADMIN — TETRAKIS(2-METHOXYISOBUTYLISOCYANIDE)COPPER(I) TETRAFLUOROBORATE 10.5 MILLICURIE: 1 INJECTION, POWDER, LYOPHILIZED, FOR SOLUTION INTRAVENOUS at 07:30

## 2021-01-01 RX ADMIN — PIPERACILLIN AND TAZOBACTAM 3375 MG: 3; .375 INJECTION, POWDER, LYOPHILIZED, FOR SOLUTION INTRAVENOUS at 15:02

## 2021-01-01 RX ADMIN — MAGNESIUM SULFATE HEPTAHYDRATE 1000 MG: 1 INJECTION, SOLUTION INTRAVENOUS at 15:24

## 2021-01-01 RX ADMIN — SODIUM CHLORIDE, PRESERVATIVE FREE 10 ML: 5 INJECTION INTRAVENOUS at 09:27

## 2021-01-01 RX ADMIN — ACETAMINOPHEN 650 MG: 325 TABLET ORAL at 17:09

## 2021-01-01 RX ADMIN — ATORVASTATIN CALCIUM 40 MG: 20 TABLET, FILM COATED ORAL at 20:43

## 2021-01-01 RX ADMIN — PANCRELIPASE 24000 UNITS: 24000; 76000; 120000 CAPSULE, DELAYED RELEASE PELLETS ORAL at 17:12

## 2021-01-01 RX ADMIN — FAMOTIDINE 20 MG: 20 TABLET, FILM COATED ORAL at 16:43

## 2021-01-01 RX ADMIN — QUETIAPINE FUMARATE 25 MG: 25 TABLET ORAL at 21:34

## 2021-01-01 RX ADMIN — LISINOPRIL 30 MG: 20 TABLET ORAL at 08:10

## 2021-01-01 RX ADMIN — POTASSIUM CHLORIDE 40 MEQ: 20 TABLET, EXTENDED RELEASE ORAL at 09:02

## 2021-01-01 RX ADMIN — OLANZAPINE 5 MG: 5 TABLET, FILM COATED ORAL at 21:36

## 2021-01-01 RX ADMIN — METOPROLOL TARTRATE 7.5 MG: 5 INJECTION INTRAVENOUS at 00:21

## 2021-01-01 RX ADMIN — MORPHINE SULFATE 2 MG: 2 INJECTION, SOLUTION INTRAMUSCULAR; INTRAVENOUS at 15:03

## 2021-01-01 RX ADMIN — METOPROLOL TARTRATE 7.5 MG: 5 INJECTION INTRAVENOUS at 18:39

## 2021-01-01 RX ADMIN — OXCARBAZEPINE 600 MG: 300 TABLET, FILM COATED ORAL at 23:51

## 2021-01-01 RX ADMIN — METFORMIN HYDROCHLORIDE 1000 MG: 500 TABLET ORAL at 17:21

## 2021-01-01 RX ADMIN — INSULIN LISPRO 2 UNITS: 100 INJECTION, SOLUTION INTRAVENOUS; SUBCUTANEOUS at 23:26

## 2021-01-01 RX ADMIN — LURASIDONE HYDROCHLORIDE 80 MG: 40 TABLET, FILM COATED ORAL at 17:09

## 2021-01-01 RX ADMIN — ALOGLIPTIN 25 MG: 25 TABLET, FILM COATED ORAL at 08:36

## 2021-01-01 RX ADMIN — GLIPIZIDE 10 MG: 5 TABLET ORAL at 06:44

## 2021-01-01 RX ADMIN — GABAPENTIN 800 MG: 400 CAPSULE ORAL at 17:30

## 2021-01-01 RX ADMIN — DULOXETIN HYDROCHLORIDE 60 MG: 60 CAPSULE, DELAYED RELEASE ORAL at 20:29

## 2021-01-01 RX ADMIN — INSULIN GLARGINE 20 UNITS: 100 INJECTION, SOLUTION SUBCUTANEOUS at 21:38

## 2021-01-01 RX ADMIN — HEPARIN SODIUM 6170 UNITS: 1000 INJECTION INTRAVENOUS; SUBCUTANEOUS at 19:16

## 2021-01-01 RX ADMIN — DILTIAZEM HYDROCHLORIDE 10 MG: 5 INJECTION INTRAVENOUS at 16:15

## 2021-01-01 RX ADMIN — ALOGLIPTIN 25 MG: 25 TABLET, FILM COATED ORAL at 08:17

## 2021-01-01 RX ADMIN — DILTIAZEM HYDROCHLORIDE 360 MG: 180 CAPSULE, COATED, EXTENDED RELEASE ORAL at 11:02

## 2021-01-01 RX ADMIN — IOPAMIDOL 75 ML: 755 INJECTION, SOLUTION INTRAVENOUS at 18:31

## 2021-01-01 RX ADMIN — ALOGLIPTIN 25 MG: 25 TABLET, FILM COATED ORAL at 08:10

## 2021-01-01 RX ADMIN — LISINOPRIL 30 MG: 20 TABLET ORAL at 21:36

## 2021-01-01 RX ADMIN — METOPROLOL TARTRATE 25 MG: 25 TABLET ORAL at 09:30

## 2021-01-01 RX ADMIN — HEPARIN SODIUM 15 UNITS/KG/HR: 10000 INJECTION, SOLUTION INTRAVENOUS at 18:10

## 2021-01-01 RX ADMIN — FOLIC ACID 1 MG: 1 TABLET ORAL at 08:41

## 2021-01-01 RX ADMIN — Medication 1 TABLET: at 08:40

## 2021-01-01 RX ADMIN — FAMOTIDINE 20 MG: 20 TABLET ORAL at 08:10

## 2021-01-01 RX ADMIN — AMLODIPINE BESYLATE 10 MG: 10 TABLET ORAL at 21:37

## 2021-01-01 RX ADMIN — MORPHINE SULFATE 2 MG: 2 INJECTION, SOLUTION INTRAMUSCULAR; INTRAVENOUS at 18:39

## 2021-01-01 RX ADMIN — DILTIAZEM HYDROCHLORIDE 20 MG: 5 INJECTION INTRAVENOUS at 15:13

## 2021-01-01 RX ADMIN — GLIPIZIDE 10 MG: 5 TABLET ORAL at 06:48

## 2021-01-01 RX ADMIN — MORPHINE SULFATE 2 MG: 2 INJECTION, SOLUTION INTRAMUSCULAR; INTRAVENOUS at 14:13

## 2021-01-01 RX ADMIN — MORPHINE SULFATE 2 MG: 2 INJECTION, SOLUTION INTRAMUSCULAR; INTRAVENOUS at 09:52

## 2021-01-01 RX ADMIN — ACETAMINOPHEN 650 MG: 325 TABLET ORAL at 21:10

## 2021-01-01 RX ADMIN — PANTOPRAZOLE SODIUM 40 MG: 40 TABLET, DELAYED RELEASE ORAL at 06:01

## 2021-01-01 RX ADMIN — ENOXAPARIN SODIUM 40 MG: 40 INJECTION SUBCUTANEOUS at 09:32

## 2021-01-01 RX ADMIN — PANCRELIPASE 24000 UNITS: 24000; 76000; 120000 CAPSULE, DELAYED RELEASE PELLETS ORAL at 11:52

## 2021-01-01 RX ADMIN — METFORMIN HYDROCHLORIDE 1000 MG: 500 TABLET ORAL at 11:41

## 2021-01-01 RX ADMIN — OXCARBAZEPINE 600 MG: 600 TABLET, FILM COATED ORAL at 21:09

## 2021-01-01 RX ADMIN — PANCRELIPASE 24000 UNITS: 24000; 76000; 120000 CAPSULE, DELAYED RELEASE PELLETS ORAL at 17:09

## 2021-01-01 RX ADMIN — HEPARIN SODIUM 15.59 UNITS/KG/HR: 10000 INJECTION, SOLUTION INTRAVENOUS at 16:06

## 2021-01-01 RX ADMIN — METOPROLOL TARTRATE 7.5 MG: 5 INJECTION INTRAVENOUS at 12:48

## 2021-01-01 RX ADMIN — HYDROCODONE BITARTRATE AND ACETAMINOPHEN 1 TABLET: 5; 325 TABLET ORAL at 16:43

## 2021-01-01 RX ADMIN — SODIUM CHLORIDE, PRESERVATIVE FREE 10 ML: 5 INJECTION INTRAVENOUS at 08:31

## 2021-01-01 RX ADMIN — INSULIN LISPRO 2 UNITS: 100 INJECTION, SOLUTION INTRAVENOUS; SUBCUTANEOUS at 08:48

## 2021-01-01 RX ADMIN — ATORVASTATIN CALCIUM 40 MG: 80 TABLET, FILM COATED ORAL at 20:45

## 2021-01-01 RX ADMIN — Medication 81 MG: at 11:42

## 2021-01-01 RX ADMIN — ASPIRIN 81 MG: 81 TABLET, COATED ORAL at 08:10

## 2021-01-01 RX ADMIN — MORPHINE SULFATE 2 MG: 2 INJECTION, SOLUTION INTRAMUSCULAR; INTRAVENOUS at 03:10

## 2021-01-01 RX ADMIN — FENOFIBRATE 54 MG: 54 TABLET ORAL at 09:27

## 2021-01-01 RX ADMIN — LISINOPRIL 30 MG: 20 TABLET ORAL at 08:16

## 2021-01-01 RX ADMIN — GABAPENTIN 800 MG: 400 CAPSULE ORAL at 21:34

## 2021-01-01 RX ADMIN — METOPROLOL TARTRATE 75 MG: 50 TABLET, FILM COATED ORAL at 08:01

## 2021-01-01 RX ADMIN — DULOXETIN HYDROCHLORIDE 60 MG: 60 CAPSULE, DELAYED RELEASE ORAL at 19:48

## 2021-01-01 RX ADMIN — MORPHINE SULFATE 4 MG: 4 INJECTION INTRAVENOUS at 16:03

## 2021-01-01 RX ADMIN — FAMOTIDINE 20 MG: 20 TABLET ORAL at 17:02

## 2021-01-01 RX ADMIN — METOPROLOL TARTRATE 7.5 MG: 5 INJECTION INTRAVENOUS at 06:05

## 2021-01-01 RX ADMIN — INSULIN LISPRO 2 UNITS: 100 INJECTION, SOLUTION INTRAVENOUS; SUBCUTANEOUS at 12:21

## 2021-01-01 RX ADMIN — SODIUM CHLORIDE 1000 ML: 9 INJECTION, SOLUTION INTRAVENOUS at 14:55

## 2021-01-01 RX ADMIN — METFORMIN HYDROCHLORIDE 1000 MG: 500 TABLET ORAL at 08:36

## 2021-01-01 RX ADMIN — SODIUM CHLORIDE, PRESERVATIVE FREE 10 ML: 5 INJECTION INTRAVENOUS at 08:02

## 2021-01-01 RX ADMIN — ASPIRIN 81 MG: 81 TABLET, COATED ORAL at 08:17

## 2021-01-01 RX ADMIN — SERTRALINE HYDROCHLORIDE 50 MG: 50 TABLET, FILM COATED ORAL at 11:53

## 2021-01-01 RX ADMIN — PANTOPRAZOLE SODIUM 40 MG: 40 TABLET, DELAYED RELEASE ORAL at 08:41

## 2021-01-01 RX ADMIN — TETRAKIS(2-METHOXYISOBUTYLISOCYANIDE)COPPER(I) TETRAFLUOROBORATE 28.9 MILLICURIE: 1 INJECTION, POWDER, LYOPHILIZED, FOR SOLUTION INTRAVENOUS at 08:40

## 2021-01-01 RX ADMIN — Medication 100 MG: at 08:40

## 2021-01-01 RX ADMIN — OLANZAPINE 10 MG: 5 TABLET, FILM COATED ORAL at 00:45

## 2021-01-01 RX ADMIN — ACETAMINOPHEN 650 MG: 325 TABLET ORAL at 08:01

## 2021-01-01 RX ADMIN — DILTIAZEM HYDROCHLORIDE 5 MG/HR: 5 INJECTION, SOLUTION INTRAVENOUS at 15:33

## 2021-01-01 RX ADMIN — FAMOTIDINE 20 MG: 20 TABLET, FILM COATED ORAL at 11:42

## 2021-01-01 RX ADMIN — GABAPENTIN 800 MG: 400 CAPSULE ORAL at 09:26

## 2021-01-01 RX ADMIN — SODIUM CHLORIDE 80 ML: 0.9 INJECTION, SOLUTION INTRAVENOUS at 17:26

## 2021-01-01 RX ADMIN — METFORMIN HYDROCHLORIDE 1000 MG: 500 TABLET ORAL at 21:36

## 2021-01-01 RX ADMIN — IOHEXOL 30 ML: 300 INJECTION, SOLUTION INTRAVENOUS at 18:31

## 2021-01-01 RX ADMIN — FENOFIBRATE 54 MG: 54 TABLET ORAL at 08:40

## 2021-01-01 RX ADMIN — Medication 1.5 MG: at 21:09

## 2021-01-01 RX ADMIN — ATORVASTATIN CALCIUM 40 MG: 40 TABLET, FILM COATED ORAL at 20:30

## 2021-01-01 RX ADMIN — AMLODIPINE BESYLATE 10 MG: 10 TABLET ORAL at 08:17

## 2021-01-01 RX ADMIN — Medication 1.5 MG: at 22:46

## 2021-01-01 RX ADMIN — METOPROLOL TARTRATE 7.5 MG: 5 INJECTION INTRAVENOUS at 05:42

## 2021-01-01 RX ADMIN — TRAZODONE HYDROCHLORIDE 100 MG: 100 TABLET ORAL at 00:45

## 2021-01-01 RX ADMIN — MIRTAZAPINE 30 MG: 30 TABLET, FILM COATED ORAL at 20:45

## 2021-01-01 RX ADMIN — METFORMIN HYDROCHLORIDE 1000 MG: 500 TABLET ORAL at 08:17

## 2021-01-01 RX ADMIN — VENLAFAXINE HYDROCHLORIDE 37.5 MG: 37.5 CAPSULE, EXTENDED RELEASE ORAL at 09:28

## 2021-01-01 RX ADMIN — ALOGLIPTIN 25 MG: 25 TABLET, FILM COATED ORAL at 22:38

## 2021-01-01 RX ADMIN — GLIPIZIDE 10 MG: 10 TABLET ORAL at 06:16

## 2021-01-01 RX ADMIN — GLIPIZIDE 10 MG: 5 TABLET ORAL at 06:45

## 2021-01-01 RX ADMIN — METFORMIN HYDROCHLORIDE 1000 MG: 500 TABLET ORAL at 17:02

## 2021-01-01 RX ADMIN — DULOXETINE HYDROCHLORIDE 60 MG: 60 CAPSULE, DELAYED RELEASE ORAL at 21:37

## 2021-01-01 RX ADMIN — FOLIC ACID 1 MG: 1 TABLET ORAL at 09:31

## 2021-01-01 RX ADMIN — AMLODIPINE BESYLATE 10 MG: 10 TABLET ORAL at 08:37

## 2021-01-01 RX ADMIN — INSULIN GLARGINE 20 UNITS: 100 INJECTION, SOLUTION SUBCUTANEOUS at 08:16

## 2021-01-01 RX ADMIN — CEFTRIAXONE SODIUM 1000 MG: 1 INJECTION, POWDER, FOR SOLUTION INTRAMUSCULAR; INTRAVENOUS at 17:38

## 2021-01-01 RX ADMIN — ATORVASTATIN CALCIUM 40 MG: 20 TABLET, FILM COATED ORAL at 21:09

## 2021-01-01 RX ADMIN — SODIUM CHLORIDE, PRESERVATIVE FREE 10 ML: 5 INJECTION INTRAVENOUS at 09:00

## 2021-01-01 RX ADMIN — MORPHINE SULFATE 2 MG: 2 INJECTION, SOLUTION INTRAMUSCULAR; INTRAVENOUS at 22:46

## 2021-01-01 RX ADMIN — ENOXAPARIN SODIUM 40 MG: 40 INJECTION SUBCUTANEOUS at 08:38

## 2021-01-01 RX ADMIN — Medication 1 TABLET: at 09:26

## 2021-01-01 RX ADMIN — METFORMIN HYDROCHLORIDE 1000 MG: 500 TABLET ORAL at 08:10

## 2021-01-01 RX ADMIN — DILTIAZEM HYDROCHLORIDE 15 MG/HR: 5 INJECTION, SOLUTION INTRAVENOUS at 08:40

## 2021-01-01 RX ADMIN — SODIUM CHLORIDE 1000 ML: 9 INJECTION, SOLUTION INTRAVENOUS at 20:45

## 2021-01-01 RX ADMIN — REGADENOSON 0.4 MG: 0.08 INJECTION, SOLUTION INTRAVENOUS at 08:31

## 2021-01-01 RX ADMIN — TRAZODONE HYDROCHLORIDE 50 MG: 50 TABLET ORAL at 21:37

## 2021-01-01 RX ADMIN — Medication 1.5 MG: at 20:43

## 2021-01-01 RX ADMIN — OXCARBAZEPINE 600 MG: 600 TABLET, FILM COATED ORAL at 20:43

## 2021-01-01 RX ADMIN — AMLODIPINE BESYLATE 10 MG: 10 TABLET ORAL at 08:10

## 2021-01-01 RX ADMIN — VENLAFAXINE 75 MG: 75 TABLET ORAL at 08:40

## 2021-01-01 RX ADMIN — TRAZODONE HYDROCHLORIDE 100 MG: 100 TABLET ORAL at 20:29

## 2021-01-01 RX ADMIN — DILTIAZEM HYDROCHLORIDE 15 MG/HR: 5 INJECTION, SOLUTION INTRAVENOUS at 23:49

## 2021-01-01 RX ADMIN — DULOXETINE HYDROCHLORIDE 60 MG: 60 CAPSULE, DELAYED RELEASE ORAL at 21:10

## 2021-01-01 RX ADMIN — OLANZAPINE 5 MG: 5 TABLET, FILM COATED ORAL at 21:09

## 2021-01-01 RX ADMIN — INSULIN LISPRO 1 UNITS: 100 INJECTION, SOLUTION INTRAVENOUS; SUBCUTANEOUS at 20:45

## 2021-01-01 RX ADMIN — GABAPENTIN 800 MG: 400 CAPSULE ORAL at 15:37

## 2021-01-01 RX ADMIN — GABAPENTIN 800 MG: 400 CAPSULE ORAL at 08:41

## 2021-01-01 RX ADMIN — METOPROLOL TARTRATE 7.5 MG: 5 INJECTION INTRAVENOUS at 00:47

## 2021-01-01 RX ADMIN — LISINOPRIL 30 MG: 10 TABLET ORAL at 08:01

## 2021-01-01 RX ADMIN — ATORVASTATIN CALCIUM 40 MG: 40 TABLET, FILM COATED ORAL at 19:47

## 2021-01-01 RX ADMIN — ATORVASTATIN CALCIUM 40 MG: 20 TABLET, FILM COATED ORAL at 21:36

## 2021-01-01 RX ADMIN — FAMOTIDINE 20 MG: 20 TABLET, FILM COATED ORAL at 08:01

## 2021-01-01 RX ADMIN — FAMOTIDINE 20 MG: 20 TABLET ORAL at 08:17

## 2021-01-01 RX ADMIN — PIPERACILLIN AND TAZOBACTAM 3375 MG: 3; .375 INJECTION, POWDER, LYOPHILIZED, FOR SOLUTION INTRAVENOUS at 05:42

## 2021-01-01 RX ADMIN — INSULIN LISPRO 4 UNITS: 100 INJECTION, SOLUTION INTRAVENOUS; SUBCUTANEOUS at 12:45

## 2021-01-01 RX ADMIN — MORPHINE SULFATE 2 MG: 2 INJECTION, SOLUTION INTRAMUSCULAR; INTRAVENOUS at 08:01

## 2021-01-01 RX ADMIN — SODIUM CHLORIDE: 9 INJECTION, SOLUTION INTRAVENOUS at 02:16

## 2021-01-01 RX ADMIN — MAGNESIUM SULFATE HEPTAHYDRATE 1000 MG: 1 INJECTION, SOLUTION INTRAVENOUS at 16:43

## 2021-01-01 RX ADMIN — PANTOPRAZOLE SODIUM 40 MG: 40 TABLET, DELAYED RELEASE ORAL at 05:26

## 2021-01-01 RX ADMIN — MORPHINE SULFATE 4 MG: 4 INJECTION INTRAVENOUS at 17:34

## 2021-01-01 RX ADMIN — THIAMINE HYDROCHLORIDE: 100 INJECTION, SOLUTION INTRAMUSCULAR; INTRAVENOUS at 22:48

## 2021-01-01 RX ADMIN — DILTIAZEM HYDROCHLORIDE 360 MG: 180 CAPSULE, COATED, EXTENDED RELEASE ORAL at 08:01

## 2021-01-01 RX ADMIN — ACETAMINOPHEN 650 MG: 325 TABLET ORAL at 16:53

## 2021-01-01 RX ADMIN — Medication 3 MG: at 00:01

## 2021-01-01 RX ADMIN — FAMOTIDINE 20 MG: 20 TABLET, FILM COATED ORAL at 16:06

## 2021-01-01 RX ADMIN — ADENOSINE 6 MG: 3 INJECTION, SOLUTION INTRAVENOUS at 15:02

## 2021-01-01 RX ADMIN — METOPROLOL TARTRATE 5 MG: 5 INJECTION INTRAVENOUS at 20:16

## 2021-01-01 RX ADMIN — OLANZAPINE 10 MG: 5 TABLET, FILM COATED ORAL at 20:29

## 2021-01-01 RX ADMIN — OLANZAPINE 5 MG: 5 TABLET, FILM COATED ORAL at 20:43

## 2021-01-01 RX ADMIN — QUETIAPINE FUMARATE 25 MG: 25 TABLET ORAL at 09:31

## 2021-01-01 RX ADMIN — SODIUM CHLORIDE, PRESERVATIVE FREE 10 ML: 5 INJECTION INTRAVENOUS at 18:31

## 2021-01-01 RX ADMIN — IOPAMIDOL 75 ML: 755 INJECTION, SOLUTION INTRAVENOUS at 17:27

## 2021-01-01 RX ADMIN — FENOFIBRATE 54 MG: 54 TABLET ORAL at 11:52

## 2021-01-01 RX ADMIN — PANCRELIPASE 24000 UNITS: 24000; 76000; 120000 CAPSULE, DELAYED RELEASE PELLETS ORAL at 08:40

## 2021-01-01 RX ADMIN — Medication 100 MG: at 08:41

## 2021-01-01 RX ADMIN — DULOXETINE HYDROCHLORIDE 60 MG: 60 CAPSULE, DELAYED RELEASE ORAL at 20:43

## 2021-01-01 RX ADMIN — MAGNESIUM SULFATE HEPTAHYDRATE 1000 MG: 1 INJECTION, SOLUTION INTRAVENOUS at 10:57

## 2021-01-01 RX ADMIN — ACETAMINOPHEN 650 MG: 325 TABLET ORAL at 08:47

## 2021-01-01 RX ADMIN — PIPERACILLIN AND TAZOBACTAM 3375 MG: 3; .375 INJECTION, POWDER, LYOPHILIZED, FOR SOLUTION INTRAVENOUS at 16:06

## 2021-01-01 RX ADMIN — LISINOPRIL 30 MG: 20 TABLET ORAL at 08:37

## 2021-01-01 RX ADMIN — SODIUM CHLORIDE, PRESERVATIVE FREE 10 ML: 5 INJECTION INTRAVENOUS at 17:27

## 2021-01-01 RX ADMIN — INSULIN GLARGINE 20 UNITS: 100 INJECTION, SOLUTION SUBCUTANEOUS at 08:41

## 2021-01-01 RX ADMIN — MAGNESIUM SULFATE HEPTAHYDRATE 1000 MG: 1 INJECTION, SOLUTION INTRAVENOUS at 09:02

## 2021-01-01 RX ADMIN — PANCRELIPASE 24000 UNITS: 24000; 76000; 120000 CAPSULE, DELAYED RELEASE PELLETS ORAL at 12:18

## 2021-01-01 RX ADMIN — METOPROLOL TARTRATE 25 MG: 25 TABLET ORAL at 08:41

## 2021-01-01 RX ADMIN — PIPERACILLIN AND TAZOBACTAM 3375 MG: 3; .375 INJECTION, POWDER, LYOPHILIZED, FOR SOLUTION INTRAVENOUS at 22:45

## 2021-01-01 RX ADMIN — PROPAFENONE HYDROCHLORIDE 325 MG: 325 CAPSULE, EXTENDED RELEASE ORAL at 08:02

## 2021-01-01 RX ADMIN — FAMOTIDINE 20 MG: 20 TABLET ORAL at 17:21

## 2021-01-01 RX ADMIN — METOPROLOL TARTRATE 25 MG: 25 TABLET ORAL at 21:34

## 2021-01-01 RX ADMIN — OLANZAPINE 10 MG: 5 TABLET, FILM COATED ORAL at 19:47

## 2021-01-01 RX ADMIN — ASPIRIN 81 MG: 81 TABLET, COATED ORAL at 08:37

## 2021-01-01 RX ADMIN — FOLIC ACID 1 MG: 1 TABLET ORAL at 08:40

## 2021-01-01 RX ADMIN — PANCRELIPASE 24000 UNITS: 24000; 76000; 120000 CAPSULE, DELAYED RELEASE PELLETS ORAL at 09:27

## 2021-01-01 ASSESSMENT — PAIN DESCRIPTION - LOCATION
LOCATION: CHEST
LOCATION: ABDOMEN
LOCATION: GENERALIZED
LOCATION: ABDOMEN
LOCATION: THROAT
LOCATION: ABDOMEN
LOCATION: ABDOMEN

## 2021-01-01 ASSESSMENT — SLEEP AND FATIGUE QUESTIONNAIRES
DIFFICULTY STAYING ASLEEP: YES
DO YOU USE A SLEEP AID: YES
DIFFICULTY ARISING: NO
DIFFICULTY ARISING: NO
DO YOU HAVE DIFFICULTY SLEEPING: NO
AVERAGE NUMBER OF SLEEP HOURS: 6
SLEEP PATTERN: DIFFICULTY FALLING ASLEEP
DO YOU HAVE DIFFICULTY SLEEPING: YES
DO YOU USE A SLEEP AID: NO
AVERAGE NUMBER OF SLEEP HOURS: 8
RESTFUL SLEEP: YES

## 2021-01-01 ASSESSMENT — PAIN DESCRIPTION - ONSET: ONSET: ON-GOING

## 2021-01-01 ASSESSMENT — PAIN SCALES - GENERAL
PAINLEVEL_OUTOF10: 4
PAINLEVEL_OUTOF10: 5
PAINLEVEL_OUTOF10: 7
PAINLEVEL_OUTOF10: 0
PAINLEVEL_OUTOF10: 8
PAINLEVEL_OUTOF10: 6
PAINLEVEL_OUTOF10: 3
PAINLEVEL_OUTOF10: 1
PAINLEVEL_OUTOF10: 8
PAINLEVEL_OUTOF10: 7
PAINLEVEL_OUTOF10: 6
PAINLEVEL_OUTOF10: 0
PAINLEVEL_OUTOF10: 0
PAINLEVEL_OUTOF10: 10
PAINLEVEL_OUTOF10: 5
PAINLEVEL_OUTOF10: 8
PAINLEVEL_OUTOF10: 0
PAINLEVEL_OUTOF10: 8
PAINLEVEL_OUTOF10: 10
PAINLEVEL_OUTOF10: 10
PAINLEVEL_OUTOF10: 5
PAINLEVEL_OUTOF10: 10
PAINLEVEL_OUTOF10: 4
PAINLEVEL_OUTOF10: 10
PAINLEVEL_OUTOF10: 3
PAINLEVEL_OUTOF10: 10
PAINLEVEL_OUTOF10: 7
PAINLEVEL_OUTOF10: 10
PAINLEVEL_OUTOF10: 3
PAINLEVEL_OUTOF10: 7
PAINLEVEL_OUTOF10: 10
PAINLEVEL_OUTOF10: 7
PAINLEVEL_OUTOF10: 0
PAINLEVEL_OUTOF10: 10
PAINLEVEL_OUTOF10: 4

## 2021-01-01 ASSESSMENT — PAIN DESCRIPTION - PAIN TYPE
TYPE: CHRONIC PAIN
TYPE: ACUTE PAIN
TYPE: CHRONIC PAIN
TYPE: ACUTE PAIN
TYPE: ACUTE PAIN

## 2021-01-01 ASSESSMENT — ENCOUNTER SYMPTOMS
ABDOMINAL PAIN: 0
SHORTNESS OF BREATH: 0
EYE DISCHARGE: 0
COLOR CHANGE: 0
EYE REDNESS: 0

## 2021-01-01 ASSESSMENT — PAIN - FUNCTIONAL ASSESSMENT: PAIN_FUNCTIONAL_ASSESSMENT: ACTIVITIES ARE NOT PREVENTED

## 2021-01-01 ASSESSMENT — PAIN DESCRIPTION - FREQUENCY
FREQUENCY: CONTINUOUS
FREQUENCY: CONTINUOUS
FREQUENCY: INTERMITTENT

## 2021-01-01 ASSESSMENT — PAIN DESCRIPTION - DESCRIPTORS
DESCRIPTORS: SORE
DESCRIPTORS: ACHING;DISCOMFORT
DESCRIPTORS: ACHING
DESCRIPTORS: ACHING

## 2021-01-01 ASSESSMENT — PATIENT HEALTH QUESTIONNAIRE - PHQ9: SUM OF ALL RESPONSES TO PHQ QUESTIONS 1-9: 7

## 2021-01-01 ASSESSMENT — PAIN DESCRIPTION - ORIENTATION: ORIENTATION: LEFT

## 2021-01-01 ASSESSMENT — PAIN DESCRIPTION - PROGRESSION: CLINICAL_PROGRESSION: GRADUALLY WORSENING

## 2021-04-17 PROBLEM — E87.1 HYPONATREMIA: Status: ACTIVE | Noted: 2021-01-01

## 2021-04-17 NOTE — Clinical Note
Patient Class: Inpatient [101]   REQUIRED: Diagnosis: Hyponatremia [271278]   Estimated Length of Stay: Estimated stay of more than 2 midnights   Admitting Provider: Minor Fallon [[id number]]   Telemetry Bed Required?: Yes

## 2021-04-17 NOTE — ED NOTES
[] Paco    [x] [facility]    [x]  [address]       SUICIDE RISK ASSESSMENT      Y  N     [x] [] In the past two weeks have you had thoughts of hurting yourself in any way? [x] [] In the past two weeks have you had thoughts that you would be better off dead? [] [x] Have you made a suicide attempt in the past two months? [x] [] Do you have a plan for hurting yourself or suicide? [x] [] Presence of hallucinations/voices related to hurting himself or herself or someone else. SUICIDE/SECURITY WATCH PRECAUTION CHECKLIST     Orders    [x]  Suicide/Security Watch Precautions initiated as checked below:   4/17/21 7:35 PM EDT BH31/BH31E    [x] Notified physician:  Shanell Rosado MD  4/17/21 7:35 PM EDT    [x] Orders obtained as appropriate:     [x] 1:1 Observer     [] Psych Consult     [] Psych Consult    Name:  Date:  Time:    [x] 1:1 Observer, Notified by:  Alejandro Potter Nurse Supervisor    [x] Remove all personal clothes from room and place in snap/paper gown/pants. Slipper only    [x] Remove all personal belongings from room and secured away from patient. Documentation    [x] Initiate Suicide/Security Watch Precaution Flow Sheet    [x] Initiate individualized Care Plan/Problem    [x] Document why precautions initiated on flow sheet (Initiate Nursing Care Plan/Problem)    [x] 1:1 Observer in place; instructions provided. Suicide precautions require observer be within arms length. [x] Nurse-Observer Communication Hand-off initiated by RN, reviewed with Observer. Subsequently used as Hand Off between Observers. [x] Initiate every 15 minute observations per observer as delegated by the RN.     [x] Initiate RN assessment and documentation    Environmental Scan  Search Criteria and Process: OPTIONAL, see Search Policy    [] Reason for search:    [] Nursing in presence of second person to search patient    [] Patient notified of reason for body assessment and belongings search:     Persons present during search:   Results of search and disposition:       Searchers Name: Security     These items or items similar should be removed from the room:   [x] Chairs   [x] Telephone   [x] Trash cans and liners   [x] Plastic utensils (order Patient Safety tray)   [x] Empty or remove Sharps containers   [x] All personal clothing/belongings removed   [x] All unnecessary lead wires, electrical cords, draw cords, etc.   [] Flowers and plants   [x] Double check for lighters, matches, razors, any glass items etc that can be used as weapons. Person completing Checklist: Cuco Fox       GENERAL INFORMATION     Y  N     [x] [] Has the patient been informed that they are on a watch and what that means? [x] [] Can the patient get out of Bed without nursing assistance? [x] [] Can the patient use the restroom without nursing assistance? [x] [] Can the patient walk the halls to [place] their legs? \"   [] [x] Does the patient have metal utensils? [x] [] Have the patient's belongings been placed out of control of the patient? [x] [] Have the patient and his/her belongings been checked for contraband? [] [x] Is the patient under any visitor restrictions? If Yes, explain:   [] [x] Is the patient under an alias? [place] [id number] Name:   Authorized visitors (no more than two are to be on the list)   Name/Relationship:   Name/Relationship:    Name of Staff member that you  Received this information from?: [facility] [employer]  General Description:    Shivani Garcia male 50 y.o. Admission weight:      Race: [x]  [] Black  []   []   [] [place] [] Other  Facial Hair:  [] Yes  [x] No  If yes, please describe: Identifying Marks (i.e. Visible tattoos, scars, etc... ):     NURSING CARE PLAN    Nursing Diagnosis: Risk of Self Directed Harm  [] Actual  [x] Potential  Date Started: 4/17/21      Etiological Factors: (related to)  [x] Expressed or implied suicidal ideation/behavior  [] Depression  [] Suicide attempt      [] Low self-esteem  [x] Hallucinations      [] Feeling of Hopelessness  [] Substance abuse or withdrawal    [] Dysfunctional family  [] Major traumatic event, eg., divorce, etc   [] Excessive stress/anxiety    4/17/21    Expected Outcomes    Patient will:   [x] Patient will remain safe for the duration of their stay   [x] Patient's environment will be safe, eg. Free of potential suicide weapons   [] Verbalize Recovery from suicidal episode and improvement in self-worth   [x] Discuss feeling that precipitated suicide attempt/thoughts/behavior   [] Will describe available resources for crisis prevention and management   [] Will verbalize positive coping skills     Nursing Intervention   [x] Assessment and Observations hourly   [x] Suicide Precautions implemented with patient, should be 1:1 observation   [x] Document observation [id number] and RN assessment hourly   [] Consult physician for:    [] Psychiatric consult    [] Pharmacological therapy    [] Other:    [x] Patient search completed by security   [x] Initiated appropriate safety protocols by removing from the patient's environment anything that could be used to inflict self injury, eg. Order safe tray, snap gown, etc   [x] Maintain open, warm, caring, non-judgmental attitude/manner towards patient   [] Discuss advantages and disadvantages of existing coping methods/skills   [x] Assist and educate patient with identifying present strengths and coping skills   [x] Keep patient informed regarding plan of care and provide clear concise explanations. Provide the patient/family education information as well as telephone numbers and other information about crisis centers, hot lines, and counselors.     Discharge Planning:   [] Referral  [] Groups [] Health agencies  [] Other:            Tye Pike RN  04/17/21 0288
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
## 2021-04-18 NOTE — ED NOTES
Pt given boxed lunch, awaiting psych eval. Pt states that he thought he was going home today and that he is ready to be discharged, writer informed pt that we still need to medically clear patient as well. Pt states \"I don't understand who I am, I don't understand life right now\". Writer offered pt to speak with  and pt agreeable. Will continue plan of care.       Izzy Wilson RN  04/18/21 [id number]

## 2021-04-18 NOTE — ED NOTES
Patient is scheduled for 1300 on 4/19/2021 with Dr. Heber Ortiz for psychiatric evaluation.  [facility] reported if patient moves to a medical floor, to notify the charge RN to contact [facility] to set up telepsych      ANABEL Fajardo  04/18/21 0124

## 2021-04-18 NOTE — H&P
[facility]  Office: [address], DO, Giuliana Socrates, DO, Carmina Franco, DO, Yamilet Kayla Quinonez, DO, Tay Rader MD, Yuriy Avalos MD, Nataliia Manzo MD, Coleman Mcgarry MD, Jen Morillo MD, Nirav Jimenes MD, Ki Camara MD, Becky Anderson MD, Telma Drew DO, Sukumar Brown MD, Aysha Pelaez DO, Becca Holder MD,  Veena Dominguez DO, Jose Manuel Brown MD, Guy Le MD, Azam Sotelo MD, Marcia Rhodes MD, Thang Rocha, [facility], [facility], CNP, Tr Dumont, CNP, Radha Chappell, CNS, Izabela Beckford, CNP, Dale Vazquez, CNP, Jes Cordero, CNP, Getachew Dumont, CNP, Elgin Brennan, CNP, Olvin Almazan PA-C, Ashley Jimenez, [facility], Nathan Rodriguez, CNP, Sandeep Hurtado, CNP, Ary Fenton, CNP, Lacinda Frankel, CNP, Lola Andrea, CNP, Chloe Bloom, [address]    HISTORY AND PHYSICAL EXAMINATION            Date:   4/18/2021  Patient name:  Joy Quinones  Date of admission:  4/17/2021  6:29 PM  MRN:   [id number]  Account:  [de-identified]  YOB: 1965  PCP:    No primary care provider on file. Room:   [address] [place] [address]  Code Status:    Prior    Chief Complaint:     Chief Complaint   Patient presents with    Psychiatric Evaluation       History Obtained From:     electronic medical record    History of Present Illness:     Joy Quinones is a 50 y.o. Non-/non  male who presents with Psychiatric Evaluation   and is admitted to the hospital for the management of hyponatremia. Patient was brought to the emergency room with the concern for psychiatric evaluation, per the chart there are multiple reasons he came. He tells me that he came to \" postpone this\" further evaluation was he wanted to postpone his death. Patient states that he was previously having auditory hallucinations that were telling him to kill himself.   Patient states these are all resolved, however he wants me to postpone his death.      The work up in the emergency room showed a metabolic panel with a sodium of 121, and chloride of 85 and glucose of 148 at 1900, and recheck with a sodium of 124, chloride 89, and glucose of 144. He had an acute leukocytosis with a wbc of 17. 4. no acute anemias with a hgb of 15.2/ hct 554, Ethanol level was normal.    CXR showed stable cardiomegaly without acute pulmonary process and CT brain showed Normal intracranial exam. Minor mucosal thickening in the posterior right maxillary sinus of doubtful clinical significance. Still awaiting urine studies. Pt laying ED in [facility] bed with sitter   Dr Luann Ryan saw the patient today and want his medical conditions taken care   Acc to psych patient's current state is mostly due to medical reasons   Will continue to monitor         Past Medical History:     Past Medical History:   Diagnosis Date    Abnormal EKG 06/05/2018    ST & T WAVE ABNORMALITY    Acid reflux     Anesthesia complication     combative/ confused after some anesthesias per pt    Arthritis     Balance problems     HX. OF, HAS USED CANE [facility] WALKER.  Broken neck (HCC)     HX. OF.    C. difficile diarrhea 12/2/2016    Chest pain     Chronic back pain     Diabetes mellitus ([address]     DKA [facility]di[facility]acidoses[facility]     ETOH abuse     Falls frequently     Full dentures     Hyperlipidemia     Hypertension     Lumbar disc disease     MDRO (multiple drug resistant organisms) resistance     HX. C-DIFF.  Pancreatitis     Sleep apnea     DOESN'T USE MACHINE.  Wears glasses         Past Surgical History:     Past Surgical History:   Procedure Laterality Date    BACK SURGERY      CERVICAL [address] SURGERY  5/2/16    c-3 thru 7    COLONOSCOPY      X3-4    ENDOSCOPY, COLON, DIAGNOSTIC      X2    EYE SURGERY      METAL REMOVED FROM HI. EYES, WORK INJURY.     KNEE ARTHROSCOPY Bilateral     several each knee    KNEE SURGERY Bilateral     right x 3, left x2, scopes plus    NERVE BLOCK 05/25/2017    caudal #1 decadron 10mg    NERVE BLOCK  06/09/2017    bilateral mbnb, marcaine only    NERVE BLOCK Bilateral 06/16/2017    Bilat MBNB #2 marcaine only    NERVE BLOCK Right 06/26/2017    right lumbar RFA decadron  10mg    NERVE BLOCK Left 07/21/2017    left lumbar RF, decadron 4mg and marcaine    NOSE SURGERY      TX EGD TRANSORAL BIOPSY SINGLE/MULTIPLE N/A 3/27/2017    EGD BIOPSY performed by Franklyn Paz DO at [facility] Left 10/8/2018    KNEE TOTAL ARTHROPLASTY performed by Bobby Heard MD at [address] [place] [address]          Medications Prior to Admission:     Prior to Admission medications    Medication Sig Start Date End Date Taking? Authorizing Provider   omeprazole (PRILOSEC) 20 MG delayed release capsule TAKE ONE CAPSULE BY MOUTH DAILY 5/20/19   Levi Burnett MD   glimepiride (AMARYL) 4 MG tablet Take 1 tablet by mouth daily (with breakfast) 4/8/19   Levi Burnett MD   atorvastatin (LIPITOR) 40 MG tablet TAKE ONE TABLET BY MOUTH ONCE NIGHTLY 2/25/19   Levi Burnett MD   meloxicam (MOBIC) 7.5 MG tablet TAKE ONE TABLET BY MOUTH EVERY 12 HOURS 1/29/19   Levi Burnett MD   QUEtiapine (SEROQUEL) 25 MG tablet TAKE ONE TABLET BY MOUTH TWICE A DAY 1/29/19   Levi Burnett MD   blood glucose test strips (TRUE METRIX BLOOD GLUCOSE TEST) strip 1 each by In Vitro route daily 1/21/19   Levi Burnett MD   hydrOXYzine (VISTARIL) 100 MG capsule Take 1 capsule by mouth 3 times daily as needed for Itching 11/26/18   Levi Burnett MD   traMADol (ULTRAM) 50 MG tablet Take 50 mg by mouth every 6 hours as needed for Pain. Lizzy Vasquez     Historical Provider, MD   diphenhydrAMINE (BENADRYL) 25 MG capsule Take 25 mg by mouth every 6 hours as needed for Itching TAKES 3 TABLET AT HS. (75 MG)    Historical Provider, MD   metoprolol tartrate (LOPRESSOR) 25 MG tablet TAKE ONE TABLET BY MOUTH TWICE A DAY 8/27/18   Levi Burnett MD   amitriptyline [employer]) 25 MG tablet TAKE ONE TABLET BY MOUTH ONCE NIGHTLY 8/27/18   Renato Salgado MD   insulin lispro (HUMALOG) 100 UNIT/ML injection cartridge Inject 5-15 Units into the skin 3 times daily (before meals) 8/20/18   Renato Salgado MD   Insulin Pen Needle 31G X 6 MM MISC 1 each by Does not apply route 3 times daily 8/20/18   Renato Salgado MD   tiZANidine (ZANAFLEX) 4 MG tablet TAKE ONE TABLET BY MOUTH EVERY NIGHT 8/6/18   Renato Salgado MD   venlafaxine (EFFEXOR) 75 MG tablet Take 1 tablet by mouth 2 times daily (with meals)  Patient taking differently: Take 75 mg by mouth every morning (before breakfast) TAKES 3 TABLETS IN AM ( 225 MG) 7/31/18   Renato Salgado MD   sertraline (ZOLOFT) 50 MG tablet TAKE ONE TABLET BY MOUTH DAILY 7/30/18   Renato Salgado MD   mirtazapine (REMERON) 45 MG tablet Take 1 tablet by mouth nightly 7/3/18   Rico House MD   lurasidone (LATUDA) 80 MG TABS tablet Take 1 tablet by mouth Daily with supper 7/3/18   Rico House MD   CREON 41547-63612 units delayed release capsule TAKE TWO CAPSULES BY MOUTH THREE TIMES A DAY WITH MEALS 6/6/18   Renato Salgado MD   gabapentin (NEURONTIN) 400 MG capsule Take 800 mg by mouth 3 times daily. Ponce Lunsford Historical Provider, MD   Cholecalciferol 2000 units CAPS Take 50,000 Units by mouth once a week Takes on Monday    Historical Provider, MD   Multiple Vitamins-Minerals (CENTROVITE) TABS Take 1 tablet by mouth daily    Historical Provider, MD   fenofibrate (TRICOR) 54 MG tablet TAKE ONE TABLET BY MOUTH DAILY 10/25/17   Renato Salgado MD   folic acid (FOLVITE) 1 MG tablet Take 1 mg by mouth daily  5/17/16   Historical Provider, MD        Allergies:     Patient has no known allergies. Social History:     Tobacco:    reports that he has been smoking. He has a 17.00 pack-year smoking history. He has never used smokeless tobacco.  Alcohol:      reports current alcohol use of about 3.0 standard drinks of alcohol per week.   Drug Use:  reports no history of drug use. Family History:     Family History   Problem Relation Age of Onset    Diabetes Mother     Heart Disease Father     Diabetes Sister        Review of Systems:     Positive and Negative as described in HPI. Review of Systems   Reason unable to perform ROS: ROS difficult to obtain secondary to the patient preseverates on wanting to \" postpone this\"- \" postpone death\"   Musculoskeletal: Positive for arthralgias (all over) and myalgias (all over). Psychiatric/Behavioral: Positive for hallucinations. Physical Exam:   BP (!) 141/85   Pulse 82   Temp 97.8 °F (36.6 °C) (Oral)   Resp 16   Ht 5' 8\" (1.727 m)   Wt 221 lb 8 oz (100.5 kg)   SpO2 98%   BMI 33.68 kg/m²   Temp (24hrs), Av.8 °F (36.6 °C), Min:97.8 °F (36.6 °C), Max:97.8 °F (36.6 °C)    Recent Labs     21  2357 21  1353 21  1552   POCGLU 144* 179* 115*     No intake or output data in the 24 hours ending 21 1901    Physical Exam  Vitals signs and nursing note reviewed. Constitutional:       General: He is not in acute distress. Appearance: He is well-developed. He is not diaphoretic. HENT:      Head: Normocephalic and atraumatic. Right Ear: Hearing normal.      Left Ear: Hearing normal.      Nose: Nose normal. No rhinorrhea. Eyes:      General: Lids are normal.      Extraocular Movements:      Right eye: Normal extraocular motion. Left eye: Normal extraocular motion. Conjunctiva/sclera: Conjunctivae normal.      Right eye: Right conjunctiva is not injected. Left eye: Left conjunctiva is not injected. Pupils: Pupils are equal, round, and reactive to light. Pupils are equal.      Right eye: Pupil is reactive. Left eye: Pupil is reactive. Neck:      Musculoskeletal: Neck supple. Thyroid: No thyromegaly. Vascular: No carotid bruit. Trachea: Trachea and phonation normal. No tracheal deviation.    Cardiovascular:      Rate and Rhythm: Normal rate and 34.8 g/dL    RDW 11.9 11.8 - 14.4 %    Platelets 073 (H) 997 - 453 k/uL    MPV 8.9 8.1 - 13.5 fL    NRBC Automated 0.0 0.0 per 100 WBC    Differential Type NOT REPORTED     Seg Neutrophils 77 (H) 36 - 65 %    Lymphocytes 15 (L) 24 - 43 %    Monocytes 7 3 - 12 %    Eosinophils % 0 (L) 1 - 4 %    Basophils 0 0 - 2 %    Immature Granulocytes 1 (H) 0 %    Segs Absolute 13.32 (H) 1.50 - 8.10 k/uL    Absolute Lymph # 2.56 1.10 - 3.70 k/uL    Absolute Mono # 1.25 (H) 0.10 - 1.20 k/uL    Absolute Eos # 0.07 0.00 - 0.44 k/uL    Basophils Absolute 0.06 0.00 - 0.20 k/uL    Absolute Immature Granulocyte 0.11 0.00 - 0.30 k/uL    WBC Morphology NOT REPORTED     RBC Morphology NOT REPORTED     Platelet Estimate NOT REPORTED    Comprehensive Metabolic Panel    Collection Time: 04/17/21  7:59 PM   Result Value Ref Range    Glucose 148 (H) 70 - 99 mg/dL    BUN 6 6 - 20 mg/dL    CREATININE 0.60 (L) 0.70 - 1.20 mg/dL    Bun/Cre Ratio NOT REPORTED 9 - 20    Calcium 9.2 8.6 - 10.4 mg/dL    Sodium 121 (L) 135 - 144 mmol/L    Potassium 4.0 3.7 - 5.3 mmol/L    Chloride 85 (L) 98 - 107 mmol/L    CO2 21 20 - 31 mmol/L    Anion Gap 15 9 - 17 mmol/L    Alkaline Phosphatase 94 40 - 129 U/L    ALT 24 5 - 41 U/L    AST 13 <40 U/L    Total Bilirubin 0.47 0.3 - 1.2 mg/dL    Total Protein 8.0 6.4 - 8.3 g/dL    Albumin 4.8 3.5 - 5.2 g/dL    Albumin/Globulin Ratio 1.5 1.0 - 2.5    GFR Non-African American >60 >60 mL/min    GFR African American >60 >60 mL/min    GFR Comment          GFR Staging NOT REPORTED    Hemoglobin A1c    Collection Time: 04/17/21 11:52 PM   Result Value Ref Range    Hemoglobin A1C 6.2 (H) 4.0 - 6.0 %    Estimated Avg Glucose 131 mg/dL   BASIC METABOLIC PANEL    Collection Time: 04/17/21 11:52 PM   Result Value Ref Range    Glucose 144 (H) 70 - 99 mg/dL    BUN 6 6 - 20 mg/dL    CREATININE 0.57 (L) 0.70 - 1.20 mg/dL    Bun/Cre Ratio NOT REPORTED 9 - 20    Calcium 8.5 (L) 8.6 - 10.4 mg/dL    Sodium 124 (L) 135 - 144 mmol/L Potassium 3.7 3.7 - 5.3 mmol/L    Chloride 89 (L) 98 - 107 mmol/L    CO2 23 20 - 31 mmol/L    Anion Gap 12 9 - 17 mmol/L    GFR Non-African American >60 >60 mL/min    GFR African American >60 >60 mL/min    GFR Comment          GFR Staging NOT REPORTED    POC Glucose Fingerstick    Collection Time: 04/17/21 11:57 PM   Result Value Ref Range    POC Glucose 144 (H) 75 - 110 mg/dL   POCT glucose    Collection Time: 04/17/21 11:58 PM   Result Value Ref Range    Glucose 144 mg/dL    QC OK? ok    CBC    Collection Time: 04/18/21  5:27 AM   Result Value Ref Range    WBC 14.3 (H) 3.5 - 11.3 k/uL    RBC 4.49 4.21 - 5.77 m/uL    Hemoglobin 13.2 13.0 - 17.0 g/dL    Hematocrit 37.5 (L) 40.7 - 50.3 %    MCV 83.5 82.6 - 102.9 fL    MCH 29.4 25.2 - 33.5 pg    MCHC 35.2 (H) 28.4 - 34.8 g/dL    RDW 11.8 11.8 - 14.4 %    Platelets 679 (H) 697 - 453 k/uL    MPV 8.8 8.1 - 13.5 fL    NRBC Automated 0.0 0.0 per 100 WBC   COMPREHENSIVE METABOLIC PANEL    Collection Time: 04/18/21  5:27 AM   Result Value Ref Range    Glucose 134 (H) 70 - 99 mg/dL    BUN 6 6 - 20 mg/dL    CREATININE 0.51 (L) 0.70 - 1.20 mg/dL    Bun/Cre Ratio NOT REPORTED 9 - 20    Calcium 8.3 (L) 8.6 - 10.4 mg/dL    Sodium 125 (L) 135 - 144 mmol/L    Potassium 3.5 (L) 3.7 - 5.3 mmol/L    Chloride 92 (L) 98 - 107 mmol/L    CO2 21 20 - 31 mmol/L    Anion Gap 12 9 - 17 mmol/L    Alkaline Phosphatase 78 40 - 129 U/L    ALT 20 5 - 41 U/L    AST 13 <40 U/L    Total Bilirubin 0.54 0.3 - 1.2 mg/dL    Total Protein 6.7 6.4 - 8.3 g/dL    Albumin 4.0 3.5 - 5.2 g/dL    Albumin/Globulin Ratio 1.5 1.0 - 2.5    GFR Non-African American >60 >60 mL/min    GFR African American >60 >60 mL/min    GFR Comment          GFR Staging NOT REPORTED    AMMONIA    Collection Time: 04/18/21  5:27 AM   Result Value Ref Range    Ammonia 33 16 - 60 umol/L   TSH with Reflex    Collection Time: 04/18/21  5:27 AM   Result Value Ref Range    TSH 1.27 0.30 - 5.00 mIU/L   BASIC METABOLIC PANEL    Collection 4. Obtain urine for u/a to rule out infection at the same time assessment of toxins. 5. Denies ETOH use in \" multiple days\", I do not see any CIWA triggers outside mild tremors and mentation. 6. Monitor glucose levels, not contributing to the hyponatremia at this level. 7.  suicide precautions - unclear plan as voices are now gone   8. Psychiatry eval appreciated, appreciate med changes   9. Will monitor electrolytes , await urine tox  10. GI proph  11. DVT proph     Consultations:   IP CONSULT TO HOSPITALIST  IP CONSULT TO SOCIAL WORK  IP CONSULT TO PSYCHIATRY     Patient is admitted as inpatient status because of co-morbidities listed above, severity of signs and symptoms as outlined, requirement for current medical therapies and most importantly because of direct risk to patient if care not provided in a hospital setting. Expected length of stay > 48 hours. Dianelys Kwon MD  4/18/2021  7:01 PM    Copy sent to Dr. Marc primary care provider on file.

## 2021-04-18 NOTE — VIRTUAL HEALTH
Inpatient consult to Psychiatry  Consult performed by: Angelina Hicks MD  Consult ordered by: GREGORIO Evangelista - CNP  Reason for consult: psychosis and suicidal statement        Department of Psychiatry   Psychiatric Assessment      Thank you very much for allowing us to participate in the care of this patient. Reason for Consult:  Psychosis, reported suicidal statement  HISTORY OF PRESENT ILLNESS:          The patient is a 50 y.o. male with significant history of diabetes, hypertension, hyperlipidemia, and bipolar versus schizoaffective disorder bipolar type who is admitted medically for altered mental status, hyponatremia and leukocytosis. Patient is clearly confused. He has no recollection of events leading up to his hospitalization. He is fixated on his Metformin. He states he feels like a  baby starting all over and he cannot remember anything. He states the one thing he knows is that he wants to live. He states he was told somebody's approached him about suicidal ideation and he is adamant that he does not want to die. He is denying presently auditory or visual hallucinations. We reviewed his extensive list of medications and polypharmacy and he states he is taking everything but I do not know how reliable the patient is. His medication list does not make a lot of sense given his reported diagnosis. He does report periods of albaro historically. Also endorses psychotic symptoms in the past, specifically auditory hallucinations. Reports 2 previous suicide preparations, once where he was trying to hang himself and another time when he wanted to shoot himself with a gun but he was able to look at pictures of his daughters and stop. Patient presently has leukocytosis with elevated neutrophil differential, hyponatremia    I do not see a urine drug screen yet. Not sure if the patient has given them a urine.     PSYCHIATRIC HISTORY:      · Outpatient psychiatric provider: Appointment on 5/7/21 with Dr. Zachariah Beavers  · Suicide attempts: 2x's, once tried to hang himself, had a gun to shoot himself, but looked at pictures  · Inpatient psychiatric admissions: Britni Hogan,     Past psychiatric medications includes:     Patient not able to recall all of his medications, will essentially say yes to every medication a name and not entirely reliable with medication list    Adverse reactions from psychotropic medications:    Denies      Lifetime Psychiatric Review of Systems completed    ·    Obsessions and Compulsions: Denies    ·    Albaro or Hypomania: Endorses periods of time consistent with albaro  ·    Hallucinations: Endorses auditory hallucinations in the past and is recent as today when he was confused  ·    panic Attacks:  Denies  ·    Delusions:  Denies  ·    Phobias:  Denies  ·    Trauma: Denies    Prior to Admission medications    Medication Sig Start Date End Date Taking? Authorizing Provider   omeprazole (PRILOSEC) 20 MG delayed release capsule TAKE ONE CAPSULE BY MOUTH DAILY 5/20/19   Chris Kumari MD   glimepiride (AMARYL) 4 MG tablet Take 1 tablet by mouth daily (with breakfast) 4/8/19   Chris Kumari MD   atorvastatin (LIPITOR) 40 MG tablet TAKE ONE TABLET BY MOUTH ONCE NIGHTLY 2/25/19   Chris Kumari MD   meloxicam (MOBIC) 7.5 MG tablet TAKE ONE TABLET BY MOUTH EVERY 12 HOURS 1/29/19   Chris Kumari MD   QUEtiapine (SEROQUEL) 25 MG tablet TAKE ONE TABLET BY MOUTH TWICE A DAY 1/29/19   Chris Kumari MD   blood glucose test strips (TRUE METRIX BLOOD GLUCOSE TEST) strip 1 each by In Vitro route daily 1/21/19   Chris Kumari MD   hydrOXYzine (VISTARIL) 100 MG capsule Take 1 capsule by mouth 3 times daily as needed for Itching 11/26/18   Chris Kumari MD   traMADol (ULTRAM) 50 MG tablet Take 50 mg by mouth every 6 hours as needed for Pain. Alcira Almanzar     Historical Provider, MD   diphenhydrAMINE (BENADRYL) 25 MG capsule Take 25 mg by mouth every 6 hours as needed for Itching TAKES 3 TABLET AT HS. (75 MG)    Historical Provider, MD   metoprolol tartrate (LOPRESSOR) 25 MG tablet TAKE ONE TABLET BY MOUTH TWICE A DAY 8/27/18   Trish Morning, MD   amitriptyline (ELAVIL) 25 MG tablet TAKE ONE TABLET BY MOUTH ONCE NIGHTLY 8/27/18 Julee Morning, MD   insulin lispro (HUMALOG) 100 UNIT/ML injection cartridge Inject 5-15 Units into the skin 3 times daily (before meals) 8/20/18 Julee Morning, MD   Insulin Pen Needle 31G X 6 MM MISC 1 each by Does not apply route 3 times daily 8/20/18   Trish Morning, MD   tiZANidine (ZANAFLEX) 4 MG tablet TAKE ONE TABLET BY MOUTH EVERY NIGHT 8/6/18   Trish Morning, MD   venlafaxine (EFFEXOR) 75 MG tablet Take 1 tablet by mouth 2 times daily (with meals)  Patient taking differently: Take 75 mg by mouth every morning (before breakfast) TAKES 3 TABLETS IN AM ( 225 MG) 7/31/18 Julee Morning, MD   sertraline (ZOLOFT) 50 MG tablet TAKE ONE TABLET BY MOUTH DAILY 7/30/18   Trish Morning, MD   mirtazapine (REMERON) 45 MG tablet Take 1 tablet by mouth nightly 7/3/18   Phi Eugene MD   lurasidone (LATUDA) 80 MG TABS tablet Take 1 tablet by mouth Daily with supper 7/3/18   Phi Eugene MD   CREON 91146-23666 units delayed release capsule TAKE TWO CAPSULES BY MOUTH THREE TIMES A DAY WITH MEALS 6/6/18   Trish Morning, MD   gabapentin (NEURONTIN) 400 MG capsule Take 800 mg by mouth 3 times daily. Nicolas Reddy     Historical Provider, MD   Cholecalciferol 2000 units CAPS Take 50,000 Units by mouth once a week Takes on Monday    Historical Provider, MD   Multiple Vitamins-Minerals (CENTROVITE) TABS Take 1 tablet by mouth daily    Historical Provider, MD   fenofibrate (TRICOR) 54 MG tablet TAKE ONE TABLET BY MOUTH DAILY 10/25/17   Trish Morning, MD   folic acid (FOLVITE) 1 MG tablet Take 1 mg by mouth daily  5/17/16   Historical Provider, MD        Medications:    Current Facility-Administered Medications: amitriptyline (ELAVIL) tablet 25 mg, 25 mg, Oral, Nightly  atorvastatin (LIPITOR) tablet 40 mg, 40 mg, Oral, Nightly  lipase-protease-amylase (CREON) delayed release capsule 24,000 Units, 24,000 Units, Oral, TID WC  fenofibrate (TRICOR) tablet 54 mg, 54 mg, Oral, Daily  folic acid (FOLVITE) tablet 1 mg, 1 mg, Oral, Daily  gabapentin (NEURONTIN) capsule 800 mg, 800 mg, Oral, TID  hydrOXYzine (ATARAX) tablet 100 mg, 100 mg, Oral, TID PRN  lurasidone (LATUDA) tablet 80 mg, 80 mg, Oral, Dinner  metoprolol tartrate (LOPRESSOR) tablet 25 mg, 25 mg, Oral, BID  mirtazapine (REMERON) tablet 45 mg, 45 mg, Oral, Nightly  pantoprazole (PROTONIX) tablet 40 mg, 40 mg, Oral, QAM AC  therapeutic multivitamin-minerals 1 tablet, 1 tablet, Oral, Daily  QUEtiapine (SEROQUEL) tablet 25 mg, 25 mg, Oral, BID  sertraline (ZOLOFT) tablet 50 mg, 50 mg, Oral, Daily  venlafaxine (EFFEXOR) tablet 75 mg, 75 mg, Oral, QAM AC  glucose (GLUTOSE) 40 % oral gel 15 g, 15 g, Oral, PRN  dextrose 50 % IV solution, 12.5 g, Intravenous, PRN  glucagon (rDNA) injection 1 mg, 1 mg, Intramuscular, PRN  dextrose 5 % solution, 100 mL/hr, Intravenous, PRN  sodium chloride flush 0.9 % injection 5-40 mL, 5-40 mL, Intravenous, 2 times per day  sodium chloride flush 0.9 % injection 5-40 mL, 5-40 mL, Intravenous, PRN  0.9 % sodium chloride infusion, 25 mL, Intravenous, PRN  enoxaparin (LOVENOX) injection 40 mg, 40 mg, Subcutaneous, Daily  promethazine (PHENERGAN) tablet 12.5 mg, 12.5 mg, Oral, Q6H PRN **OR** ondansetron (ZOFRAN) injection 4 mg, 4 mg, Intravenous, Q6H PRN  polyethylene glycol (GLYCOLAX) packet 17 g, 17 g, Oral, Daily PRN  acetaminophen (TYLENOL) tablet 650 mg, 650 mg, Oral, Q6H PRN **OR** acetaminophen (TYLENOL) suppository 650 mg, 650 mg, Rectal, Q6H PRN  0.9 % sodium chloride infusion, , Intravenous, Continuous  thiamine mononitrate tablet 100 mg, 100 mg, Oral, Daily  insulin lispro (HUMALOG) injection vial 0-12 Units, 0-12 Units, Subcutaneous, TID WC  insulin lispro (HUMALOG) injection vial 0-6 Units, 0-6 Units, Subcutaneous, Nightly  LORazepam (ATIVAN) tablet 1 mg, 1 mg, Oral, Q1H PRN **OR** LORazepam (ATIVAN) injection 1 mg, 1 mg, Intravenous, Q1H PRN **OR** LORazepam (ATIVAN) tablet 2 mg, 2 mg, Oral, Q1H PRN **OR** LORazepam (ATIVAN) injection 2 mg, 2 mg, Intravenous, Q1H PRN **OR** LORazepam (ATIVAN) tablet 3 mg, 3 mg, Oral, Q1H PRN **OR** LORazepam (ATIVAN) injection 3 mg, 3 mg, Intravenous, Q1H PRN **OR** LORazepam (ATIVAN) tablet 4 mg, 4 mg, Oral, Q1H PRN **OR** LORazepam (ATIVAN) injection 4 mg, 4 mg, Intravenous, Q1H PRN     Past Medical History:        Diagnosis Date    Abnormal EKG 06/05/2018    ST & T WAVE ABNORMALITY    Acid reflux     Anesthesia complication     combative/ confused after some anesthesias per pt    Arthritis     Balance problems     HX. OF, HAS USED CANE & WALKER.  Broken neck (HCC)     HX. OF.    C. difficile diarrhea 12/2/2016    Chest pain     Chronic back pain     Diabetes mellitus ([facility] [address]     DKA (diabetic ketoacidoses) ([facility] [address] [facility]     ETOH abuse     Falls frequently     Full dentures     Hyperlipidemia     Hypertension     Lumbar disc disease     MDRO (multiple drug resistant organisms) resistance     HX. C-DIFF.  Pancreatitis     Sleep apnea     DOESN'T USE MACHINE.  Wears glasses        Past Surgical History:        Procedure Laterality Date    BACK SURGERY      CERVICAL [address] SURGERY  5/2/16    c-3 thru 7    COLONOSCOPY      X3-4    ENDOSCOPY, COLON, DIAGNOSTIC      X2    EYE SURGERY      METAL REMOVED FROM HI. EYES, WORK INJURY.     KNEE ARTHROSCOPY Bilateral     several each knee    KNEE SURGERY Bilateral     right x 3, left x2, scopes plus    NERVE BLOCK  05/25/2017    caudal #1 decadron 10mg    NERVE BLOCK  06/09/2017    bilateral mbnb, marcaine only    NERVE BLOCK Bilateral 06/16/2017    Bilat MBNB #2 marcaine only    NERVE BLOCK Right 06/26/2017    right lumbar RFA decadron  10mg    NERVE BLOCK Left 07/21/2017    left lumbar underlying medical causes (hyponatremia, leukocytosis rule out underlying infection)  schizoaffective disorder bipolar type  Polypharmacy      PLAN:      Patient does not meet criteria for acute inpatient psychiatric hospitalization at this time as his presentation is driven by his medical status. He does appear delirious    On multiple medications that may contribute to hyponatremia and that are also inconsistent with treatment for his underlying schizoaffective disorder bipolar type    Would recommend discontinuing Zoloft  Decrease Effexor to 37.5 mg extended release once daily for now with plan to taper off  Decreased Remeron to 30 mg by mouth at bedtime  Discontinue amitriptyline  Discontinue Zanaflex  Additional recommendations will follow the clinical course. Please reconsult with any questions or concerns    Thank you very much for allowing us to participate in the care of this patient. Time spent 45 min. Electronically signed by Paige Nobles MD on 4/18/21 at 1:01 PM EDT        Patient Location:  [address] [place] [address] ED    Provider Location (City/[facility]):   [address] [place] [address]    This virtual visit was conducted via interactive/real-time audio/video.

## 2021-04-18 NOTE — ED NOTES
Consulted to schedule telepsych appointment. Perfect Serve the Psych Consult Routine to request telepsych appointment, notifying uncertainty if patient will remain in the ED as an ER bed hold while awaiting bed assignment. Pending response from psych to schedule. Met with patient who completed the telepsych consent form. He reported to have been released from jail about 2 weeks ago, spending 30 days in the institution. He relates the imprisonment to intoxication. He admitted to increased suicidal ideation this morning with plan to consume copious amounts of alcohol. He denies a trigger event, only noting that he has been off of [address] for quite some time. He admits to homicidal ideation, but not towards anyone directly. He admits to visual hallucinations of seeing others. Pending clinical course and response for telepsych scheduling. Faxed consent form to [facility].       Jason Santana, [address]  04/18/21 0032

## 2021-04-18 NOTE — PLAN OF CARE
Problem: Suicide risk  Goal: Provide patient with safe environment  Description: Provide patient with safe environment  4/18/2021 1841 by Yevgeniy Santos RN  Outcome: Ongoing     Problem: Falls - Risk of:  Goal: Will remain free from falls  Description: Will remain free from falls  Outcome: Ongoing

## 2021-04-18 NOTE — ED PROVIDER NOTES
[facility] ED  Emergency Department Encounter  Emergency Medicine Resident     Pt Name: Candace Lopez  MRN: [id number]  [place] 1965  Date of evaluation: 4/18/21  PCP:  No primary care provider on file. CHIEF COMPLAINT       Chief Complaint   Patient presents with    Psychiatric Evaluation       HISTORY OFPRESENT ILLNESS  (Location/Symptom, Timing/Onset, Context/Setting, Quality, Duration, Modifying Factors,Severity.)      Candace Lopez is a 50 y.o. male who presents with suicidal ideation. Patient initially stating that he wants to harm himself stating that he has been harming himself by not taking his Metformin as prescribed. States he is an occasional drinker states he did drink a case of beer today. Denies any other drug use. Denies any homicidal thoughts. Denies any pain including chest pain, shortness of breath, nausea, vomiting. PAST MEDICAL / SURGICAL / SOCIAL / FAMILY HISTORY      has a past medical history of Abnormal EKG, Acid reflux, Anesthesia complication, Arthritis, Balance problems, Broken neck (HCC), C. difficile diarrhea, Chest pain, Chronic back pain, Diabetes mellitus ([address] DKA (diabetic ketoacidoses) ([facility] [address] ETOH abuse, Falls frequently, Full dentures, Hyperlipidemia, Hypertension, Lumbar disc disease, MDRO (multiple drug resistant organisms) resistance, Pancreatitis, Sleep apnea, and Wears glasses. has a past surgical history that includes Nose surgery; [place] tooth extraction; knee surgery (Bilateral); Cervical disc surgery (5/2/16); pr egd transoral biopsy single/multiple (N/A, 3/27/2017); Nerve Block (05/25/2017); Nerve Block (06/09/2017); Nerve Block (Bilateral, 06/16/2017); Nerve Block (Right, 06/26/2017); Nerve Block (Left, 07/21/2017); Knee arthroscopy (Bilateral); back surgery; Colonoscopy; Endoscopy, colon, diagnostic; eye surgery; and pr total knee arthroplasty (Left, 10/8/2018).     Social History     Socioeconomic History    Marital status:      Spouse name: Not on file    Number of children: Not on file    Years of education: Not on file    Highest education level: Not on file   Occupational History    Not on file   Social Needs    Financial resource strain: Not on file    Food insecurity     Worry: Not on file     Inability: Not on file    Transportation needs     Medical: Not on file     Non-medical: Not on file   Tobacco Use    Smoking status: Current Every Day Smoker     Packs/day: 0.50     Years: 34.00     Pack years: 17.00    Smokeless tobacco: Never Used   Substance and Sexual Activity    Alcohol use: Yes     Alcohol/week: 3.0 standard drinks     Types: 3 Cans of beer per week     Comment: \"HARDLY DRINK AT ALL NOW, 1 BEER A MONTH. \"    Drug use: No    Sexual activity: Not on file   Lifestyle    Physical activity     Days per week: Not on file     Minutes per session: Not on file    Stress: Not on file   Relationships    Social connections     Talks on phone: Not on file     Gets together: Not on file     Attends Holiness service: Not on file     Active member of club or organization: Not on file     Attends meetings of clubs or organizations: Not on file     Relationship status: Not on file    Intimate partner violence     Fear of current or ex partner: Not on file     Emotionally abused: Not on file     Physically abused: Not on file     Forced sexual activity: Not on file   Other Topics Concern    Not on file   Social History Narrative    Not on file       Family History   Problem Relation Age of Onset    Diabetes Mother     Heart Disease Father     Diabetes Sister        Allergies:  Patient has no known allergies. Home Medications:  Prior to Admission medications    Medication Sig Start Date End Date Taking?  Authorizing Provider   omeprazole (PRILOSEC) 20 MG delayed release capsule TAKE ONE CAPSULE BY MOUTH DAILY 5/20/19   Chris Kumari MD   glimepiride (AMARYL) 4 MG tablet Take 1 tablet by mouth daily (with breakfast) 4/8/19   Nakia Naidu MD   atorvastatin (LIPITOR) 40 MG tablet TAKE ONE TABLET BY MOUTH ONCE NIGHTLY 2/25/19   Nakia Naidu MD   meloxicam (MOBIC) 7.5 MG tablet TAKE ONE TABLET BY MOUTH EVERY 12 HOURS 1/29/19   Nakia Naidu MD   QUEtiapine (SEROQUEL) 25 MG tablet TAKE ONE TABLET BY MOUTH TWICE A DAY 1/29/19   Nakia Naidu MD   blood glucose test strips (TRUE METRIX BLOOD GLUCOSE TEST) strip 1 each by In Vitro route daily 1/21/19   Nakia Naidu MD   hydrOXYzine (VISTARIL) 100 MG capsule Take 1 capsule by mouth 3 times daily as needed for Itching 11/26/18   Nakia Naidu MD   traMADol (ULTRAM) 50 MG tablet Take 50 mg by mouth every 6 hours as needed for Pain. Inda Brittle     Historical Provider, MD   diphenhydrAMINE (BENADRYL) 25 MG capsule Take 25 mg by mouth every 6 hours as needed for Itching TAKES 3 TABLET AT HS. (75 MG)    Historical Provider, MD   metoprolol tartrate (LOPRESSOR) 25 MG tablet TAKE ONE TABLET BY MOUTH TWICE A DAY 8/27/18   Nakia Naidu MD   amitriptyline (ELAVIL) 25 MG tablet TAKE ONE TABLET BY MOUTH ONCE NIGHTLY 8/27/18   Nakia Naidu MD   insulin lispro (HUMALOG) 100 UNIT/ML injection cartridge Inject 5-15 Units into the skin 3 times daily (before meals) 8/20/18   Nakia Naidu MD   Insulin Pen Needle 31G X 6 MM MISC 1 each by Does not apply route 3 times daily 8/20/18   Nakia Naidu MD   tiZANidine (ZANAFLEX) 4 MG tablet TAKE ONE TABLET BY MOUTH EVERY NIGHT 8/6/18   Nakia Naidu MD   venlafaxine (EFFEXOR) 75 MG tablet Take 1 tablet by mouth 2 times daily (with meals)  Patient taking differently: Take 75 mg by mouth every morning (before breakfast) TAKES 3 TABLETS IN AM ( 225 MG) 7/31/18   Nakia Naidu MD   sertraline (ZOLOFT) 50 MG tablet TAKE ONE TABLET BY MOUTH DAILY 7/30/18   Nakia Naidu MD   mirtazapine (REMERON) 45 MG tablet Take 1 tablet by mouth nightly 7/3/18   Leticia Donato MD   lurasidone (LATUDA) 80 MG TABS tablet Take 1 regular rhythm. Heart sounds: Normal heart sounds. No murmur. No friction rub. No gallop. Pulmonary:      Effort: Pulmonary effort is normal. No respiratory distress. Breath sounds: Normal breath sounds. No wheezing or rales. Abdominal:      Palpations: Abdomen is soft. Tenderness: There is no abdominal tenderness. Musculoskeletal: Normal range of motion. Skin:     General: Skin is warm and dry. Findings: No erythema or rash. Neurological:      Mental Status: He is alert and oriented to person, place, and time.       Comments: No facial asymmetry, no aphasia, no dysarthria, EOMI, PERRLA; 5/5 strength in upper and lower extremities b/l; mildly ataxic finger-to-nose testing bilaterally   Psychiatric:      Comments: Flat affect, poor judgment and insight         DIFFERENTIAL  DIAGNOSIS     PLAN (LABS / IMAGING / EKG):  Orders Placed This Encounter   Procedures    CT HEAD WO CONTRAST    XR CHEST PORTABLE    TOX SCR, BLD, ED    CBC Auto Differential    Comprehensive Metabolic Panel    Urinalysis with microscopic    Urine Drug Screen    Hemoglobin A1c    BASIC METABOLIC PANEL    Basic Metabolic Panel w/ Reflex to MG    CBC    DIET CARB CONTROL;    HYPOGLYCEMIA TREATMENT: blood glucose less than 50 mg/dL and patient  ALERT and TOLERATING PO    HYPOGLYCEMIA TREATMENT: blood glucose less than 70 mg/dL and patient ALERT and TOLERATING PO    HYPOGLYCEMIA TREATMENT: blood glucose less than 70 mg/dL and patient NOT ALERT or NPO    Sitter at bedside   [employer] Provider    Notify physician    Vital signs per unit routine    Notify physician    Up as tolerated    Telemetry Monitoring    Inpatient consult to Hospitalist    Inpatient consult to Social Work    Inpatient consult to Psychiatry    Initiate Oxygen Therapy Protocol    POCT glucose    POCT Glucose    POC Glucose Fingerstick    PATIENT STATUS (FROM ED OR OR/PROCEDURAL) Inpatient    Alcohol and or drug assessment    LORazepam (ATIVAN) injection 4 mg       DDX: Electrolyte abnormality versus suicidal ideation versus homicidal ideation versus malingering    Initial MDM/Plan: 50 y.o. male who presents with suicidal ideation. Will check basic labs as patient does admit to large amount of drinking. No recent admissions at [facility]. Anticipate transfer to [facility] once medically cleared.     DIAGNOSTIC RESULTS / EMERGENCY DEPARTMENT COURSE / MDM     LABS:  Labs Reviewed   TOX SCR, BLD, ED - Abnormal; Notable for the following components:       Result Value    Acetaminophen Level <5 (*)     Salicylate Lvl <1 (*)     All other components within normal limits   CBC WITH AUTO DIFFERENTIAL - Abnormal; Notable for the following components:    WBC 17.4 (*)     MCHC 34.9 (*)     Platelets 308 (*)     Seg Neutrophils 77 (*)     Lymphocytes 15 (*)     Eosinophils % 0 (*)     Immature Granulocytes 1 (*)     Segs Absolute 13.32 (*)     Absolute Mono # 1.25 (*)     All other components within normal limits   COMPREHENSIVE METABOLIC PANEL - Abnormal; Notable for the following components:    Glucose 148 (*)     CREATININE 0.60 (*)     Sodium 121 (*)     Chloride 85 (*)     All other components within normal limits   BASIC METABOLIC PANEL - Abnormal; Notable for the following components:    Glucose 144 (*)     CREATININE 0.57 (*)     Calcium 8.5 (*)     Sodium 124 (*)     Chloride 89 (*)     All other components within normal limits   POC GLUCOSE FINGERSTICK - Abnormal; Notable for the following components:    POC Glucose 144 (*)     All other components within normal limits   POCT GLUCOSE - Normal   URINALYSIS WITH MICROSCOPIC   URINE DRUG SCREEN   HEMOGLOBIN C0O   BASIC METABOLIC PANEL   BASIC METABOLIC PANEL   BASIC METABOLIC PANEL W/ REFLEX TO MG FOR LOW K   CBC   POCT GLUCOSE         RADIOLOGY:  Xr Chest Portable    Result Date: 4/17/2021  EXAMINATION: ONE XRAY VIEW OF THE CHEST 4/17/2021 9:28 pm COMPARISON: Chest two views June 5, 2018 HISTORY: ORDERING SYSTEM PROVIDED HISTORY: Hyponatremic, ataxic TECHNOLOGIST PROVIDED HISTORY: Hyponatremic, ataxic Reason for Exam: upr Acuity: Unknown Type of Exam: Unknown FINDINGS: The heart is mildly enlarged with otherwise unremarkable configuration. The mediastinal contours are within normal limits. The lungs are well aerated. The pleural surfaces are normal and no evidence of a pleural effusion is seen. Bones and soft tissues are unremarkable. Stable mild cardiomegaly. Otherwise, unremarkable upright portable AP view of the chest.         EMERGENCY DEPARTMENT COURSE:  ED Course as of Apr 18 0126   Sat Apr 17, 2021 2024 States he wants to commit suicide via book. [MS]   2035 Sodium(!): 121 [MS]   2039 Markedly hyponatremic at 121. [MS]   36 [facility] admitted. [MS]      ED Course User Index  [MS] Chad Castro DO     Ethanol negative however patient's sodium markedly decreased at 121. Will give fluid bolus. Plan admission for hyponatremia which is symptomatic. Will need one-to-one. Discussed with hospitalist service who is agreeable to admission. Remaining suicidal.    PROCEDURES:  None    CONSULTS:  IP CONSULT TO HOSPITALIST  IP CONSULT TO SOCIAL WORK  IP CONSULT TO PSYCHIATRY    CRITICAL CARE:  Please see attending note    FINAL IMPRESSION      1. Hyponatremia          DISPOSITION / PLAN     DISPOSITION Admitted 04/17/2021 11:07:24 PM        PATIENTREFERRED TO:  No follow-up provider specified.     DISCHARGE MEDICATIONS:  New Prescriptions    No medications on file       Chad Castro DO  EmergencyMedicine Resident    (Please note that portions of this note were completed with a voice recognition program.  Efforts were made to edit the dictations but occasionally words are mis-transcribed.)       Chad Castro DO  Resident  04/18/21 7057

## 2021-04-18 NOTE — ED PROVIDER NOTES
[facility]  ED  eMERGENCY dEPARTMENT eNCOUnter   Attending Attestation     Pt Name: Joy Quinones  MRN: [id number]  Erickagfjarrod 1965  Date of evaluation: 4/17/21       Joy Quinones is a 50 y.o. male who presents with Psychiatric Evaluation      History: Patient was for psychiatric evaluation. Patient initially felt like he was going to harm himself because he did not have his metoprolol. Patient says if he can get his metoprolol he will have no issues. Patient denies having any suicidal thoughts or ideations at this time. Patient said he feels resolved. Patient said he is on her himself this time. Exam: Heart rate and rhythm are regular. Lungs are clear to auscultation bilaterally. Abdomen is soft, nontender. Plan for labs, discussed with social work, will plan to give patient his metoprolol as needed if labs are normal.    Labs show hyponatremia. Will plan for admission. I performed a history and physical examination of the patient and discussed management with the resident. I reviewed the residents note and agree with the documented findings and plan of care. Any areas of disagreement are noted on the chart. I was personally present for the key portions of any procedures. I have documented in the chart those procedures where I was not present during the key portions. I have personally reviewed all images and agree with the resident's interpretation. I have reviewed the emergency nurses triage note. I agree with the chief complaint, past medical history, past surgical history, allergies, medications, social and family history as documented unless otherwise noted below. Documentation of the HPI, Physical Exam and Medical Decision Making performed by medical students or scribes is based on my personal performance of the HPI, PE and MDM.  For Phys Assistant/ Nurse Practitioner cases/documentation I have had a face to face evaluation of this patient and have completed at least one if not all key elements of the E/M (history, physical exam, and MDM). Additional findings are as noted. For APC cases I have personally evaluated and examined the patient in conjunction with the APC and agree with the treatment plan and disposition of the patient as recorded by the APC.     Whitney Garcia MD  Attending Emergency  Physician       Ayleen Spence MD  04/17/21 5769

## 2021-04-18 NOTE — PROGRESS NOTES
Writer calls patients family to verify medications and is told that it will be easier for us to call patients pharmacy Monday morning to verify. Writer attempted to call but pharmacy is closed. Patient uses [address] [phone]. Will continue to monitor.

## 2021-04-18 NOTE — ED NOTES
Pt updated on plan of care, awaiting psych eval. Pt asking again when he is able to leave to go home, writer informed pt again that we are waiting for medical and psych clearance first.      Izzy Wilson RN  04/18/21 1476

## 2021-04-19 NOTE — CARE COORDINATION
Case Management Initial Discharge Plan  Candace Lopez,             Met with:patient to discuss discharge plans. Information verified: address, contacts, phone number, , insurance Yes, does not currently have a working phone    Emergency Contact/Next of Kin name & number: Rene Villa [phone]    PCP: No primary care provider on file. Date of last visit: gave PCP list    Insurance Provider: Medicare and Medicaid    Discharge Planning    Living Arrangements:  Parent   Support Systems:  Parent    Home has 2 stories  3 stairs to climb to get into front door, 1 flight stairs to climb to reach second floor  Location of bedroom/bathroom in home second floor    Patient able to perform ADL's:Independent    Current Services (outpatient & in home) none  DME equipment: none  DME provider:      Receiving oral anticoagulation therapy? No    If indicated:   Physician managing anticoagulation treatment: na  Where does patient obtain lab work for ATC treatment? na      Potential Assistance Needed:  N/A    Patient agreeable to home care: No  [place] of choice provided:  yes    Prior SNF/Rehab Placement and Facility: [facility]  Agreeable to SNF/Rehab: No  [place] of choice provided: n/a     Evaluation: no    Expected Discharge date:  21    Patient expects to be discharged to:  home  Follow Up Appointment: Best Day/ Time: Monday AM    Transportation provider: family  Transportation arrangements needed for discharge: No     Readmission Risk              Risk of Unplanned Readmission:        13             Does patient have a readmission risk score greater than 14?: No  If yes, follow-up appointment must be made within 7 days of discharge.      Goals of Care:       Discharge Plan: HOme independent, has a ride          Electronically signed by Shawn Alex RN on 21 at 8:32 AM EDT

## 2021-04-19 NOTE — PROGRESS NOTES
[facility]  Office: [address], DO, Lin Estrada, DO, Arsh Burns, DO, John Quinonez, DO, Boston Jang MD, Sergio Chamberlain MD, Naz Urban MD, Kavya Harrison MD, Nivia Mckay MD, Howard Chavez MD, Cierra Bower MD, Yudelka Kumar MD, Adriana Mcpherson, DO, Juan José Carbajal MD, Vania Nieto, DO, Jane Sparrow MD,  Jeannine Muñoz, DO, Kan Gonzalez MD, Lesley Peters MD, Alexandru Arriaga MD, Cleo Herron MD, Dyan Vickers, Dev Cunningham, CNP, Abhishek Starr, CNP, Fausto Lew, CNS, Pinky Richard, CNP, Angelica Jackson, CNP, Paul Reyes, CNP, Dat Taylor, CNP, Harish Canales, CNP, Familia Mayers PA-C, Kait Giron, [facility], Chris Ratliff, CNP, Jeananne Goltz, CNP, Natalia Yates, CNP, Merly Garza, CNP, Mark Mccann, CNP, Isaac Garcia, [address] [place] [address]    Progress Note    4/19/2021    7:08 PM    Name:   Miriam Valdez  MRN:     [id number]     Acct:      [de-identified]   Room:   [address] [place] [address] Day:  2  Admit Date:  4/17/2021  6:29 PM    PCP:   No primary care provider on file. Code Status:  Prior    Subjective:     C/C:   Chief Complaint   Patient presents with    Psychiatric Evaluation     Interval History Status: improved. Seen and examined   Pt improved much ,   Psych eval done and no need for inpatient psych admission   Pt not suicidal as per psych   Once medically cleared , should be discharged home , follow with psych as outpt     Brief History:     Patient was brought to the emergency room with the concern for psychiatric evaluation, per the chart there are multiple reasons he came. He tells me that he came to \" postpone this\" further evaluation was he wanted to postpone his death. Patient states that he was previously having auditory hallucinations that were telling him to kill himself.   Patient states these are all resolved, however he wants me to postpone his death.       The work up in the emergency room showed a metabolic panel with a sodium of 121, and chloride of 85 and glucose of 148 at 1900, and recheck with a sodium of 124, chloride 89, and glucose of 144. He had an acute leukocytosis with a wbc of 17. 4. no acute anemias with a hgb of 15.2/ hct 554, Ethanol level was normal.    CXR showed stable cardiomegaly without acute pulmonary process and CT brain showed Normal intracranial exam. Minor mucosal thickening in the posterior right maxillary sinus of doubtful clinical significance. Still awaiting urine studies.      4/18/21  Pt laying ED in [facility] bed with sitter   Dr Suraj Schulz saw the patient today and want his medical conditions taken care   Acc to psych patient's current state is mostly due to medical reasons   Will continue to monitor     4/19/21  Seen and examined   Pt improved much ,   Psych eval done and no need for inpatient psych admission   Pt not suicidal as per psych   Once medically cleared , should be discharged home , follow with psych as outpt    Review of Systems:     Constitutional:  negative for chills, fevers, sweats  Respiratory:  negative for cough, dyspnea on exertion, shortness of breath, wheezing  Cardiovascular:  negative for chest pain, chest pressure/discomfort, lower extremity edema, palpitations  Gastrointestinal:  negative for abdominal pain, constipation, diarrhea, nausea, vomiting  Neurological:  negative for dizziness, headache    Medications:      Allergies:  No Known Allergies    Current Meds:   Scheduled Meds:    mirtazapine  30 mg Oral Nightly    venlafaxine  37.5 mg Oral Daily with breakfast    atorvastatin  40 mg Oral Nightly    lipase-protease-amylase  24,000 Units Oral TID WC    fenofibrate  54 mg Oral Daily    folic acid  1 mg Oral Daily    gabapentin  800 mg Oral TID    lurasidone  80 mg Oral Dinner    metoprolol tartrate  25 mg Oral BID    pantoprazole  40 mg Oral QAM AC    therapeutic multivitamin-minerals  1 tablet Oral Daily    QUEtiapine  25 mg Oral BID    sodium chloride flush  5-40 mL Intravenous 2 times per day    enoxaparin  40 mg Subcutaneous Daily    thiamine  100 mg Oral Daily    insulin lispro  0-12 Units Subcutaneous TID WC    insulin lispro  0-6 Units Subcutaneous Nightly     Continuous Infusions:    dextrose      sodium chloride      sodium chloride 75 mL/hr at 21 1123     PRN Meds: hydrOXYzine, glucose, dextrose, glucagon (rDNA), dextrose, sodium chloride flush, sodium chloride, promethazine **OR** ondansetron, polyethylene glycol, acetaminophen **OR** acetaminophen, LORazepam **OR** LORazepam **OR** LORazepam **OR** LORazepam **OR** LORazepam **OR** LORazepam **OR** LORazepam **OR** LORazepam    Data:     Past Medical History:   has a past medical history of Abnormal EKG, Acid reflux, Anesthesia complication, Arthritis, Balance problems, Broken neck (HCC), C. difficile diarrhea, Chest pain, Chronic back pain, Diabetes mellitus ([facility] DKA (diabetic ketoacidoses) ([facility] ETOH abuse, Falls frequently, Full dentures, Hyperlipidemia, Hypertension, Lumbar disc disease, MDRO (multiple drug resistant organisms) resistance, Pancreatitis, Sleep apnea, and Wears glasses. Social History:   reports that he has been smoking. He has a 17.00 pack-year smoking history. He has never used smokeless tobacco. He reports current alcohol use of about 3.0 standard drinks of alcohol per week. He reports that he does not use drugs.      Family History:   Family History   Problem Relation Age of Onset    Diabetes Mother     Heart Disease Father     Diabetes Sister        Vitals:  BP (!) 141/80   Pulse 81   Temp 97.7 °F (36.5 °C) (Oral)   Resp 18   Ht 5' 8\" (1.727 m)   Wt 221 lb 8 oz (100.5 kg)   SpO2 98%   BMI 33.68 kg/m²   Temp (24hrs), Av.2 °F (36.8 °C), Min:97.7 °F (36.5 °C), Max:98.8 °F (37.1 °C)    Recent Labs     21  1552 21  2033 21  1218 21  1632   POCGLU 115* 144* 157* 130*       I/O (24Hr): No intake or output data in the 24 hours ending 04/19/21 1908    Labs:  Hematology:  Recent Labs     04/17/21 1959 04/18/21  0527   WBC 17.4* 14.3*   RBC 5.21 4.49   HGB 15.2 13.2   HCT 43.6 37.5*   MCV 83.7 83.5   MCH 29.2 29.4   MCHC 34.9* 35.2*   RDW 11.9 11.8   * 476*   MPV 8.9 8.8     Chemistry:  Recent Labs     04/19/21  0521 04/19/21  1147 04/19/21  1736   * 131* 131*   K 4.2 4.3 3.9   CL 97* 99 101   CO2 22 21 21   GLUCOSE 123* 163* 122*   BUN 4* 4* 6   CREATININE 0.57* 0.54* 0.54*   ANIONGAP 10 11 9   LABGLOM >60 >60 >60   GFRAA >60 >60 >60   CALCIUM 8.0* 8.1* 8.6     Recent Labs     04/17/21 1959 04/17/21 2352 04/17/21 2357 04/18/21  0527 04/18/21  1353 04/18/21  1552 04/18/21  2033 04/19/21  1218 04/19/21  1632   PROT 8.0  --   --  6.7  --   --   --   --   --    LABALBU 4.8  --   --  4.0  --   --   --   --   --    LABA1C  --  6.2*  --   --   --   --   --   --   --    TSH  --   --   --  1.27  --   --   --   --   --    AST 13  --   --  13  --   --   --   --   --    ALT 24  --   --  20  --   --   --   --   --    ALKPHOS 94  --   --  78  --   --   --   --   --    BILITOT 0.47  --   --  0.54  --   --   --   --   --    AMMONIA  --   --   --  33  --   --   --   --   --    POCGLU  --   --  144*  --  179* 115* 144* 157* 130*     ABG:  Lab Results   Component Value Date    FIO2 NOT REPORTED 03/26/2017     Lab Results   Component Value Date/Time    SPECIAL NOT REPORTED 03/27/2017 03:34 PM    SPECIAL NOT REPORTED 03/27/2017 03:34 PM     Lab Results   Component Value Date/Time    CULTURE YEAST ISOLATED, ID TO FOLLOW (A) 03/27/2017 03:34 PM    CULTURE Identified as : [place] ALBICANS (A) 03/27/2017 03:34 PM    CULTURE  03/27/2017 03:34 PM     [address] [place] [address] [phone]       Radiology:  Ct Head Wo Contrast    Result Date: 4/18/2021  Normal intracranial exam. Minor mucosal thickening in the posterior right maxillary sinus of doubtful clinical significance.      Colt Pimentel Chest Portable    Result Date: 4/17/2021  Stable mild cardiomegaly. Otherwise, unremarkable upright portable AP view of the chest.       Physical Examination:        General appearance:  alert, cooperative and no distress  Mental Status:  oriented to person, place and time and normal affect  Lungs:  clear to auscultation bilaterally, normal effort  Heart:  regular rate and rhythm, no murmur  Abdomen:  soft, nontender, nondistended, normal bowel sounds, no masses, hepatomegaly, splenomegaly  Extremities:  no edema, redness, tenderness in the calves  Skin:  no gross lesions, rashes, induration    Assessment:        Hospital Problems           Last Modified POA    * (Principal) Hyponatremia 4/18/2021 Yes    Essential hypertension 4/18/2021 Yes    Alcohol-induced chronic pancreatitis ([facility] 4/18/2021 Yes    Type 2 diabetes mellitus with hyperglycemia, without long-term current use of insulin ([facility] 4/18/2021 Yes    Schizophrenia ([facility] 4/18/2021 Yes          Plan:        1. Continue to trend the sodium , now 131,   2. Patient was refusing all medications, today much better , med list was very confusing as pt   3. Metabolic encephalopathy, normal ammonia, tsh. CT negative. 4. Denies ETOH use in \" multiple days\", I do not see any CIWA triggers outside mild tremors and mentation. 5. Monitor glucose levels, not contributing to the hyponatremia at this level. 6.  suicide precautions - psych eval done and pt not suicidal  7. Psychiatry eval appreciated, appreciate med changes   8. Will monitor electrolytes , await urine tox  9. GI proph  10. DVT proph   11.  If ok with psych, dc home with mother next am , with outpt psych eval    Lacho Jha MD  4/19/2021  7:08 PM

## 2021-04-19 NOTE — CARE COORDINATION
Consult received for consideration of rehab   Chart reviewed  Noted psych eval and recommendations  Met with pt this date states he was feeling depressed and no longer having feelings of suicide. Pt states he completed assessment at [facility] and has appt scheduled on 5/7/21 with Dr Raquel Lu. Pt denies any alcohol or drug use. Pt plans to keep his appt at [facility] and continue treatment there. Insurance is Medicare and Medicaid.

## 2021-04-19 NOTE — FLOWSHEET NOTE
Assessment: The patient was calm and approachable. Intervention:  engaged in active listening.  asked if they would like prayer and informed them chaplains are available 24/7. Outcome: They engaged in the conversation. Chaplains will remain available to offer spiritual and emotional support as needed.
to feel better, improve function
to feel better, improve function

## 2021-04-20 PROBLEM — F23 ACUTE PSYCHOSIS (HCC): Status: ACTIVE | Noted: 2021-01-01

## 2021-04-20 NOTE — BH NOTE
[facility]  Admission Note     Admission Type:   Admission Type: Voluntary    Reason for admission:  Reason for Admission: Increased depression due to frustrations with social security benefits. PATIENT STRENGTHS:  Strengths: Communication, Positive Support    Patient Strengths and Limitations:  Limitations: Multiple barriers to leisure interests    Addictive Behavior:   Addictive Behavior  In the past 3 months, have you felt or has someone told you that you have a problem with:  : None  Do you have a history of Chemical Use?: No  Do you have a history of Alcohol Use?: Yes  Do you have a history of Street Drug Abuse?: Yes  Histroy of Prescripton Drug Abuse?: No    Medical Problems:   Past Medical History:   Diagnosis Date    Abnormal EKG 06/05/2018    ST & T WAVE ABNORMALITY    Acid reflux     Anesthesia complication     combative/ confused after some anesthesias per pt    Arthritis     Balance problems     HX. OF, HAS USED CANE & WALKER.  Broken neck (HCC)     HX. OF.    C. difficile diarrhea 12/2/2016    Chest pain     Chronic back pain     Diabetes mellitus ([facility]     DKA (diabetic ketoacidoses) ([facility]     ETOH abuse     Falls frequently     Full dentures     Hyperlipidemia     Hypertension     Lumbar disc disease     MDRO (multiple drug resistant organisms) resistance     HX. C-DIFF.  Pancreatitis     Sleep apnea     DOESN'T USE MACHINE.     Wears glasses        Status EXAM:  Status and Exam  Normal: No  Facial Expression: Flat, Worried  Affect: Appropriate  Level of Consciousness: Alert  Mood:Normal: No  Mood: Depressed, Anxious  Motor Activity:Normal: Yes  Interview Behavior: Cooperative  Preception: [place] to Person, [place]ena Stacia to Time, [place] to Place, [place] to Situation  Attention:Normal: Yes  Hallucinations: None  Delusions: No  Memory:Normal: Yes  Insight and Judgment: No  Insight and Judgment: Poor Insight, Poor Judgment  Present Suicidal Ideation: No  Present Homicidal Ideation: No    Tobacco Screening:  Practical Counseling, on admission, sadie X, if applicable and completed (first 3 are required if patient doesn't refuse):            ( )  Recognizing danger situations (included triggers and roadblocks)                    ( )  Coping skills (new ways to manage stress, exercise, relaxation techniques, changing routine, distraction)                                                           ( )  Basic information about quitting (benefits of quitting, techniques in how to quit, available resources  ( ) Referral for counseling faxed to [facility]                                           (x ) Patient refused counseling  ( ) Patient has not smoked in the last 30 days    Metabolic Screening:    Lab Results   Component Value Date    LABA1C 6.2 (H) 04/17/2021       Lab Results   Component Value Date    CHOL 158 06/06/2018    CHOL 221 (H) 03/27/2017    CHOL 201 (H) 04/28/2016    CHOL 171 09/12/2012    CHOL 180 02/23/2012    CHOL 284 (H) 02/20/2012    CHOL 95 08/26/2011     Lab Results   Component Value Date    TRIG 1,084 (H) 06/06/2018    TRIG 448 (H) 03/27/2017    TRIG 108 04/28/2016    TRIG 761 (H) 09/12/2012    TRIG 279 (H) 02/23/2012    TRIG 1,621 (H) 02/20/2012    TRIG 121 08/26/2011     Lab Results   Component Value Date    HDL 26 (L) 06/06/2018    HDL 35 (L) 03/27/2017    HDL 83 04/28/2016    HDL 28 (L) 09/12/2012    HDL 27 (L) 02/23/2012    HDL 30 (L) 02/20/2012    HDL 23 (L) 08/26/2011     No components found for: LDLCAL  No results found for: LABVLDL      Body mass index is 33.45 kg/m².     BP Readings from Last 2 Encounters:   04/20/21 (!) 160/83   04/20/21 (!) 145/83           Pt admitted with followings belongings:  Dentures: None  Vision - Corrective Lenses: None  Hearing Aid: None  Jewelry: None  Body Piercings Removed: N/A  Clothing: Shirt, Socks, Pants, Footwear, Jacket / coat  Were All Patient Medications Collected?: Not Applicable  Other Valuables: Money (Comment)     Valuables placed in safe in security envelope, number:  [id number]. Patient's home medications were verified. Patient oriented to surroundings and program expectations and copy of patient rights given. Received admission packet:  yes. Consents reviewed, signed yes. Patient verbalize understanding:  yes. Patient education on precautions: yes     Patient presented to ED with altered mental status, reporting suicidal thoughts and auditory hallucinations telling him, \"kill yourself\". Confused upon admission, later saying he had no intentions of ever killing himself. Denies any previous thoughts of suicide or suicidal intent to writer yet had presented recollection of 2 previous attempts potentially in the past. Patient reports history of auditory hallucinations. Reports being recently incarcerated for 6 months, living at a homeless shelter, now living with mother. Behavior controlled on the unit, medications verified. Patient already asking about discharge.                       Mukesh Escamilla RN

## 2021-04-20 NOTE — PLAN OF CARE
Problem: Suicide risk  Goal: Provide patient with safe environment  Description: Provide patient with safe environment  4/20/2021 0344 by Chalino Hammond RN  Outcome: Ongoing     Problem: Falls - Risk of:  Goal: Will remain free from falls  Description: Will remain free from falls  4/20/2021 0344 by Chalino Hammond RN  Outcome: Ongoing     Problem: Falls - Risk of:  Goal: Absence of physical injury  Description: Absence of physical injury  4/20/2021 0344 by Chalino Hammond RN  Outcome: Ongoing

## 2021-04-20 NOTE — CARE COORDINATION
TRansitional Planning    CM went to bedside to update patient about plan for him to go to [facility]. When CM asked about him signing voluntary slip for admission to [facility] he said he thought he was going home. Dr Nanda Millan came to bedside at [phone] to talk to patient about home vs [facility]. After discussion patient signed voluntary slip to go to [facility]. CM told him [facility] is at [facility] [place] [facility]. He will need a Covid test before he can go. CM will keep him informed of progress to get him to [facility]. He verbalized understanding. 1230 called [phone] for [facility] admission. Dr Luann Ryan will accept. Fax voluntary slip to [phone]. Done. [phone] received phone call from Yovani Paz at [facility]. Patient will go to room 122. CM to call her back [phone] with transport time. Jesenia 3 and told her patient is being picked up at 2:15 pm today. Report number for nurse to nurse [phone] here to transport patient to [facility].

## 2021-04-20 NOTE — DISCHARGE SUMMARY
reasons he came. [facility] tells me that he came to \" postpone this\" further evaluation was he wanted to postpone his death. Ravi Avila states that he was previously having auditory hallucinations that were telling him to kill himself. Ravi Avila states these are all resolved, however he wants me to postpone his death.       The work up in the emergency room showed a metabolic panel with a sodium of 121, and chloride of 85 and glucose of 148 at 1900, and recheck with a sodium of 124, chloride 89, and glucose of 144.  He had an acute leukocytosis with a wbc of 17. 4. no acute anemias with a hgb of 15.2/ hct 554, Ethanol level was normal.    CXR showed stable cardiomegaly without acute pulmonary process and CT brain showed Normal intracranial exam. Minor mucosal thickening in the posterior right maxillary sinus of doubtful clinical significance. Still awaiting urine studies.      4/18/21  Pt laying ED in [facility] bed with sitter   Dr Tonia Vuong saw the patient today and want his medical conditions taken care   Acc to psych patient's current state is mostly due to medical reasons   Will continue to monitor      4/19/21  Seen and examined   Pt improved much ,   Psych eval done. Discussed with Psych this mng again  As pt still having some psychotic episodes ,had sitter all this time   Medically cleared to go to [facility]    Review of system:  Denies any nausea vomiting fever chills,  Denies any headaches or blurred vision,  Denies any chest pain shortness of pain orthopnea,   Denies any cough phlegm hemoptysis,  Denies any abdominal pain diarrhea constipation,  Denies any tingling tingling numbness weakness of arms or legs,   Skin no rash,    On examination,  Alert awake oriented x3,  flight of ideas  S1-S2 present,  CTA bilateral,  Abdomen soft nontender nondistended bowel sounds present   Extremity no edema no calf tenderness,,  Skin no rash  CNS no focal neurological deficits      Significant therapeutic interventions:   1. Hyponatremia,possible sec tp psych meds ,  Continue to trend the sodium , now 133   2. Patient having psychosis   3. Metabolic encephalopathy, normal ammonia, tsh. CT negative. 4. Denies ETOH use in \" multiple days\", I do not see any CIWA triggers outside mild tremors and mentation. 5. DM2, A1c 6.2 , on Metformin, glipizide, Januvia, monitor glucose levels, not contributing to the hyponatremia at this level. 6.  suicide precautions - psych eval done and pt not suicidal but still psychotic  7. Psychiatry eval appreciated, admit to [facility]  8. Will monitor electrolytes , await urine tox  9. GI proph  10.  DVT proph  Significant Diagnostic Studies:   Labs / Micro:  CBC:   Lab Results   Component Value Date    WBC 14.3 04/18/2021    RBC 4.49 04/18/2021    RBC 4.41 02/23/2012    HGB 13.2 04/18/2021    HCT 37.5 04/18/2021    MCV 83.5 04/18/2021    MCH 29.4 04/18/2021    MCHC 35.2 04/18/2021    RDW 11.8 04/18/2021     04/18/2021     02/23/2012     BMP:    Lab Results   Component Value Date    GLUCOSE 113 04/20/2021    GLUCOSE 136 02/23/2012     04/20/2021    K 3.6 04/20/2021     04/20/2021    CO2 20 04/20/2021    ANIONGAP 12 04/20/2021    BUN 6 04/20/2021    CREATININE 0.55 04/20/2021    BUNCRER NOT REPORTED 04/20/2021    CALCIUM 8.4 04/20/2021    LABGLOM >60 04/20/2021    GFRAA >60 04/20/2021    GFR      04/20/2021    GFR NOT REPORTED 04/20/2021     HFP:    Lab Results   Component Value Date    PROT 6.7 04/18/2021     CMP:    Lab Results   Component Value Date    GLUCOSE 113 04/20/2021    GLUCOSE 136 02/23/2012     04/20/2021    K 3.6 04/20/2021     04/20/2021    CO2 20 04/20/2021    BUN 6 04/20/2021    CREATININE 0.55 04/20/2021    ANIONGAP 12 04/20/2021    ALKPHOS 78 04/18/2021    ALT 20 04/18/2021    AST 13 04/18/2021    BILITOT 0.54 04/18/2021    LABALBU 4.0 04/18/2021    LABALBU 4.4 02/20/2012    ALBUMIN 1.5 04/18/2021    LABGLOM >60 04/20/2021    GFRAA >60 04/20/2021    GFR 04/20/2021    GFR NOT REPORTED 04/20/2021    PROT 6.7 04/18/2021    CALCIUM 8.4 04/20/2021     PT/INR:    Lab Results   Component Value Date    PROTIME 11.3 05/25/2017    PROTIME 10.6 02/20/2012    INR 1.0 05/25/2017     PTT:   Lab Results   Component Value Date    APTT 22.6 09/11/2012     FLP:    Lab Results   Component Value Date    CHOL 158 06/06/2018    TRIG 1,084 06/06/2018    HDL 26 06/06/2018     U/A:    Lab Results   Component Value Date    COLORU YELLOW 09/24/2018    TURBIDITY CLEAR 09/24/2018    SPECGRAV 1.009 09/24/2018    HGBUR NEGATIVE 09/24/2018    PHUR 6.0 09/24/2018    PROTEINU NEGATIVE 09/24/2018    GLUCOSEU 2+ 09/24/2018    GLUCOSEU 3+ 02/20/2012    KETUA NEGATIVE 09/24/2018    BILIRUBINUR NEGATIVE 09/24/2018    BILIRUBINUR NEGATIVE 02/20/2012    UROBILINOGEN Normal 09/24/2018    NITRU NEGATIVE 09/24/2018    LEUKOCYTESUR NEGATIVE 09/24/2018     TSH:    Lab Results   Component Value Date    TSH 1.27 04/18/2021          Radiology:  Ct Head Wo Contrast    Result Date: 4/18/2021  Normal intracranial exam. Minor mucosal thickening in the posterior right maxillary sinus of doubtful clinical significance. Xr Chest Portable    Result Date: 4/17/2021  Stable mild cardiomegaly. Otherwise, unremarkable upright portable AP view of the chest.       Consultations:    Consults:     Final Specialist Recommendations/Findings:   IP CONSULT TO HOSPITALIST  IP CONSULT TO SOCIAL WORK  IP CONSULT TO PSYCHIATRY      The patient was seen and examined on day of discharge and this discharge summary is in conjunction with any daily progress note from day of discharge. Discharge plan:     Disposition: Home    Physician Follow Up:   No primary care provider on file. Will provide list   Follow up In 7 days       No follow-up provider specified.      Requiring Further Evaluation/Follow Up POST HOSPITALIZATION/Incidental Findings:     Diet: low fat, low cholesterol diet    Activity: As tolerated    Instructions to Patient:     Discharge Medications:      Medication List      CONTINUE taking these medications    acetaminophen 500 MG tablet  Commonly known as: TYLENOL     amLODIPine 10 MG tablet  Commonly known as: NORVASC     aspirin 81 MG EC tablet     atorvastatin 40 MG tablet  Commonly known as: LIPITOR  TAKE ONE TABLET BY MOUTH ONCE NIGHTLY     blood glucose test strips strip  Commonly known as: True Metrix Blood Glucose Test  1 each by In Vitro route daily     busPIRone 15 MG tablet  Commonly known as: BUSPAR     Cholecalciferol 50 MCG (2000 UT) Caps     DULoxetine 60 MG extended release capsule  Commonly known as: CYMBALTA     famotidine 20 MG tablet  Commonly known as: PEPCID     glipiZIDE 10 MG extended release tablet  Commonly known as: GLUCOTROL XL     hydrOXYzine 50 MG capsule  Commonly known as: VISTARIL     Insulin Pen Needle 31G X 6 MM Misc  1 each by Does not apply route 3 times daily     Januvia 100 MG tablet  Generic drug: SITagliptin     lisinopril 20 MG tablet  Commonly known as: PRINIVIL;ZESTRIL     melatonin 3 MG Tabs tablet     metFORMIN 1000 MG tablet  Commonly known as: GLUCOPHAGE     nortriptyline 25 MG capsule  Commonly known as: PAMELOR     OLANZapine 5 MG tablet  Commonly known as: ZYPREXA     OXcarbazepine 600 MG tablet  Commonly known as: TRILEPTAL     tiZANidine 4 MG tablet  Commonly known as: ZANAFLEX  TAKE ONE TABLET BY MOUTH EVERY NIGHT        STOP taking these medications    aluminum & magnesium hydroxide-simethicone 400-400-40 MG/5ML Susp  Commonly known as: MYLANTA     diphenhydrAMINE 25 MG capsule  Commonly known as: BENADRYL     eucerin cream     hydrOXYzine 50 MG/ML injection  Commonly known as: VISTARIL     insulin lispro 100 UNIT/ML injection cartridge  Commonly known as: HumaLOG     meloxicam 7.5 MG tablet  Commonly known as: MOBIC     omeprazole 20 MG delayed release capsule  Commonly known as: PRILOSEC     polycarbophil 625 MG tablet  Commonly known as: FIBERCON     traMADol 50 MG tablet  Commonly known as: ULTRAM            No discharge procedures on file. Time Spent on discharge is  33 mins in patient examination, evaluation, counseling as well as medication reconciliation, prescriptions for required medications, discharge plan and follow up. Electronically signed by   Lacho Jha MD  4/20/2021  10:58 AM      Thank you Dr. Marc primary care provider on file. for the opportunity to be involved in this patient's care.

## 2021-04-20 NOTE — SUICIDE SAFETY PLAN
SAFETY PLAN    A suicide Safety Plan is a document that supports someone when they are having thoughts of suicide. Warning Signs that indicate a suicidal crisis may be developing: What (situations, thoughts, feelings, body sensations, behaviors, etc.) do you experience that lets you know you are beginning to think about suicide? 1. ***  2. ***  3. ***    Internal Coping Strategies:  What things can I do (relaxation techniques, hobbies, physical activities, etc.) to take my mind off my problems without contacting another person? 1. ***  2. ***  3. ***    People and social settings that provide distraction: Who can I call or where can I go to distract me? 1. Name: ***  Phone: ***  2. Name: ***  Phone: ***   3. Place: ***            4. Place: ***    People whom I can ask for help: Who can I call when I need help - for example, friends, family, clergy, someone else? 1. Name: ***                Phone: ***  2. Name: ***  Phone: ***  3. Name: ***  Phone: ***    Professionals or [facility] agencies I can contact during a crisis: Who can I call for help - for example, my doctor, my psychiatrist, my psychologist, a mental health provider, a suicide hotline? 1. Clinician Name: ***   Phone: ***      Clinician Pager or Emergency Contact #: ***    2. Clinician Name: ***   Phone: ***      Clinician Pager or Emergency Contact #: ***    3. Suicide Prevention Lifeline: 6-298-076-TALK (1123)    4. [address] [place] [address] Emergency Services -  for example, [address] [facility] Emergency suicide hotline, [facility] Hotline: ***      Emergency Services Address: ***      Emergency Services Phone: ***    Making the environment safe: How can I make my environment (house/apartment/living space) safer? For example, can I remove guns, medications, and other items?   1. ***  2. ***

## 2021-04-21 PROBLEM — F32.A DEPRESSION WITH SUICIDAL IDEATION: Status: ACTIVE | Noted: 2017-03-26

## 2021-04-21 PROBLEM — R45.851 DEPRESSION WITH SUICIDAL IDEATION: Status: ACTIVE | Noted: 2017-03-26
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
## 2021-04-21 NOTE — SUICIDE SAFETY PLAN
SAFETY PLAN    A suicide Safety Plan is a document that supports someone when they are having thoughts of suicide. Warning Signs that indicate a suicidal crisis may be developing: What (situations, thoughts, feelings, body sensations, behaviors, etc.) do you experience that lets you know you are beginning to think about suicide? 1. becoming agitated   2. Bad thoughts     Internal Coping Strategies:  What things can I do (relaxation techniques, hobbies, physical activities, etc.) to take my mind off my problems without contacting another person? 1. Watch TV  2. Turn on radio   3. Go for a walk     People and social settings that provide distraction: Who can I call or where can I go to distract me? 1. Name: friends     2. Name: mom     3. Place: park             4. Place: front [place] [address]. whom I can ask for help: Who can I call when I need help - for example, friends, family, clergy, someone else? 1. Name: friends                  2. Name: mom        Professionals or [facility] agencies I can contact during a crisis: Who can I call for help - for example, my doctor, my psychiatrist, my psychologist, a mental health provider, a suicide hotline? 1. Clinician Name: Benicia   Phone: [phone]      Clinician Pager or Emergency Contact #: [phone]    1. Suicide Prevention Lifeline: 9-780-735-TALK (8000)    3. [address] [place] [address] Emergency Services -  for example, [facility] suicide hotline, [address] [place] [address] Avenue: [facility]       Emergency Services Address: [facility]       Emergency Services Phone: [phone]    Making the environment safe: How can I make my environment (house/apartment/living space) safer? For example, can I remove guns, medications, and other items?   1.   2.

## 2021-04-21 NOTE — PLAN OF CARE
Problem: Altered Mood, Depressive Behavior:  Goal: Ability to disclose and discuss suicidal ideas will improve  Description: Ability to disclose and discuss suicidal ideas will improve  Outcome: Ongoing,  Patient voiced 3 out of 10 for depression;\"I had a bad day\",but I feel better now. Patient attending group therapy and medications compliant. 100% meal and fluid intake. Patient denied suicidal and homicidal ideations. 15 MIN safety intake.

## 2021-04-21 NOTE — GROUP NOTE
Group Therapy Note    Date: 4/21/2021    Group Start Time: 0900  Group End Time: 0915  Group Topic: Community Meeting    WANDA DISHA PRABHU Mcnulty        Group Therapy Note    Attendees: 5/17       Patient's Goal:  To orient to unit and set daily goal    Notes:  Patient attended and participated in group. Daily goal: Talk to DR about treatment plan    Status After Intervention:  Improved    Participation Level:  Active Listener and Interactive    Participation Quality: Appropriate, Attentive and Sharing      Speech:  normal      Thought Process/Content: Logical  Linear    3  Affective Functioning: Congruent      Mood: euthymic      Level of consciousness:  Alert and Attentive      Response to Learning: Able to verbalize current knowledge/experience and Progressing to goal      Endings: None Reported    Modes of Intervention: Education, Support, Socialization, Exploration and Reality-testing      Discipline Responsible: Psychoeducational Specialist      Signature:  Evi Mcnulty

## 2021-04-21 NOTE — FLOWSHEET NOTE
*Patient participated in the [address] [place] [address]       04/21/21 17 Shazia Teran provided to: Patient   Referral/Consult From: Rounding   Continue Visiting   (4/21/21)   Complexity of Encounter Moderate   Length of Encounter 30 minutes   Spiritual Assessment Completed Yes   Spiritual/Episcopal   Type Spiritual support   Assessment Calm; Approachable   Intervention Active listening;Prayer   Outcome Receptive;Engaged in conversation;Expressed gratitude

## 2021-04-21 NOTE — PLAN OF CARE
Problem: Altered Mood, Depressive Behavior:  Goal: Ability to disclose and discuss suicidal ideas will improve  Description: Ability to disclose and discuss suicidal ideas will improve  Outcome: Ongoing  Note: Patient denies suicidal ideation at this time. Problem: Tobacco Use:  Goal: Inpatient tobacco use cessation counseling participation  Description: Inpatient tobacco use cessation counseling participation  Outcome: Ongoing  Note: Patient participated in inpatient tobacco use cessation counseling.
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
## 2021-04-21 NOTE — GROUP NOTE
Group Therapy Note    Date: 4/21/2021    Group Start Time: 1100  Group End Time: 0198  Group Topic: Recreational    CZ DISHA Gilmore, CTRS        Group Therapy Note    Attendees:6/18         Patient's Goal:  Identify positive coping skills through recreation and leisure activites    Status After Intervention:  Improved    Participation Level:  Active Listener and Interactive    Participation Quality: appropriate, attentive       Speech:  normal      Thought Process/Content: Logical      Affective Functioning: Congruent      Mood: euphoric      Level of consciousness:  Alert, Oriented x4 and Attentive      Response to Learning: Able to verbalize current knowledge/experience, Able to verbalize/acknowledge new learning and Able to retain information      Endings: None Reported    Modes of Intervention: Education, Support, Socialization and Exploration      Discipline Responsible: Psychoeducational Specialist      Signature:  Deyanira Clancy

## 2021-04-21 NOTE — GROUP NOTE
Group Therapy Note    Date: 4/21/2021    Group Start Time: 1330  Group End Time: 5663  Group Topic: Cognitive Skills    [address], CTRS        Group Therapy Note    Attendees: 6/17       Patient's Goal:  Identify a safe discharge plan, discuss positive coping skills to combat depression in the community. Status After Intervention:  Improved    Participation Level:  Active Listener and Interactive    Participation Quality: Appropriate, Attentive, Sharing and Supportive      Speech:  normal      Thought Process/Content: Logical      Affective Functioning: Congruent      Mood: euphoric      Level of consciousness:  Alert, Oriented x4 and Attentive      Response to Learning: Able to verbalize current knowledge/experience, Able to verbalize/acknowledge new learning, Able to retain information and Capable of insight      Modes of Intervention: Education, Support, Socialization, Exploration, Clarifying and Problem-solving      Discipline Responsible: Psychoeducational Specialist      Signature:  Delmar Rubio

## 2021-04-21 NOTE — PROGRESS NOTES
Behavioral Services  Medicare Certification Upon Admission    I certify that this patient's inpatient psychiatric hospital admission is medically necessary for:    [x] (1) Treatment which could reasonably be expected to improve this patient's condition,       [x] (2) Or for diagnostic study;     AND     [x](2) The inpatient psychiatric services are provided while the individual is under the care of a physician and are included in the individualized plan of care.     Estimated length of stay/service 2 to 3 days  Plan for post-hospital care Home with outpatient [facility] health follow-up    Electronically signed by Mercedes Almeida MD on 4/21/2021 at 2:00 PM

## 2021-04-22 NOTE — PLAN OF CARE
[place] to Time, [place] to Place, [place] to Situation  Attention:Normal: Yes  Attention: Distractible  Thought Content:Normal: Yes  Hallucinations: None  Delusions: No  Memory:Normal: Yes  Insight and Judgment: No  Insight and Judgment: Poor Judgment, Poor Insight  Present Suicidal Ideation: No  Present Homicidal Ideation: No    Daily Assessment Last Entry:   Daily Sleep (WDL): Within Defined Limits         Patient Currently in Pain: Denies  Daily Nutrition (WDL): Within Defined Limits    Patient Monitoring:  Frequency of Checks: 4 times per hour, close    Psychiatric Symptoms:   Depression Symptoms  Depression Symptoms: No problems reported or observed. Anxiety Symptoms  Anxiety Symptoms: Generalized  Melanie Symptoms  Melanie Symptoms: No problems reported or observed. Psychosis Symptoms  Delusion Type: No problems reported or observed. Suicide Risk CSSR-S:  1) Within the past month, have you wished you were dead or wished you could go to sleep and not wake up? : Yes  2) Have you actually had any thoughts of killing yourself? : No  3) Have you been thinking about how you might kill yourself? : No  5) Have you started to work out or worked out the details of how to kill yourself? Do you intend to carry out this plan? : No  6) Have you ever done anything, started to do anything, or prepared to do anything to end your life?: No  Change in Result NO Change in Plan of care NO      EDUCATION:   EDUCATION:   Learner Progress Toward Treatment Goals: Reviewed results and recommendations of this team, Reviewed group plan and strategies, Reviewed signs, symptoms and risk of self harm and violent behavior, Reviewed goals and plan of care    Method:small group, individual verbal education    Outcome:verbalized by patient, but needs reinforcement to obtain goals    PATIENT GOALS:  Short term: Stabilize medications  Long term: Maintain medication routine;  Improve physical health and start exercising    PLAN/TREATMENT RECOMMENDATIONS UPDATE: continue with group therapies, increased socialization, continue planning for after discharge goals, continue with medication compliance    SHORT-TERM GOALS UPDATE:   Time frame for Short-Term Goals: 5-7 days    LONG-TERM GOALS UPDATE:   Time frame for Long-Term Goals: 6 months  Members Present in Team Meeting: See Signature Sheet    Chyrl Willi

## 2021-04-22 NOTE — GROUP NOTE
Wellness Group Note   Group Topic: Gratitude     Date: April 22, 2021   Group Start Time: 1600   Group End Time: [facility]   Attendees: 6/15     Patient's Goal: Increased understanding and perception of gratitude. Notes:  Patient participated game exploring different types of gratitude and ways of changing views on the worth of things and events. Status After Intervention:  Unchanged      Participation Level:  Active Listener and None      Participation Quality: Minimal      Speech:  normal     Thought Process/Content: WDL     Affective Functioning: Flat and Constricted/Restricted     Mood: anxious and depressed     Level of consciousness:  Alert, Oriented x4 and Attentive     Response to Learning: Able to verbalize current knowledge/experience, Able to verbalize/acknowledge new learning, Able to retain information, Able to change behavior and Progressing to goal     Endings: None Reported Modes of Intervention: Education, Exploration     Discipline Responsible: Behavioral Health Tech   Signature:  [address]

## 2021-04-22 NOTE — GROUP NOTE
Group Therapy Note    Date: 4/22/2021    Group Start Time: 1430  Group End Time: 1783  Group Topic: Music Therapy    CZ BHBAN C    Toro Perdomo        Group Therapy Note    Pt did not attend Music therapy group d/t resting in room despite staff invitation to attend. 1:1 talk time offered as alternative to group session, pt declined.

## 2021-04-22 NOTE — PLAN OF CARE
Problem: Altered Mood, Depressive Behavior:  Goal: Ability to disclose and discuss suicidal ideas will improve  Description: Ability to disclose and discuss suicidal ideas will improve  Outcome: Ongoing, Patient pleasant and cooperative denied depression,suicidal and homicidal ideations. Patient has been medication compliant also attending therapy groups. Patient encouraged to continue his medication regime. Sleeping and eating well. 15 MIN safety checks maintained.

## 2021-04-22 NOTE — GROUP NOTE
Group Therapy Note    Date: 4/22/2021    Group Start Time: 1100  Group End Time: 3782  Group Topic: Psychoeducation    WANDA Cross, JEANES    Group Therapy Note    Attendees: 6    Patient's Goal:  Pt will demonstrate improved interpersonal skills    Notes:  Pt attended group and participated    Status After Intervention:  Improved    Participation Level:  Active Listener and Interactive    Participation Quality: Appropriate, Attentive, Sharing and Supportive      Speech:  normal      Thought Process/Content: Logical      Affective Functioning: Constricted      Mood: euthymic      Level of consciousness:  Alert, Oriented x4 and Attentive      Response to Learning: Able to verbalize current knowledge/experience, Able to verbalize/acknowledge new learning, Able to retain information, Capable of insight, Able to change behavior and Progressing to goal      Endings: None Reported    Modes of Intervention: Education, Support, Socialization, Exploration and Activity      Discipline Responsible: Psychoeducational Specialist      Signature:  Louis Cordova [place]

## 2021-04-22 NOTE — PROGRESS NOTES
Daily Progress Note  Frieda Jordan MD  4/22/2021  CHIEF COMPLAINT: Depression with suicidal ideation    Reviewed patient's current plan of care and vital signs with nursing staff. Sleep:  several hours last night  Attending groups: Yes    SUBJECTIVE:    Patient denying side effects to medication. Reports feeling relatively safe. Denying any safety concerns here in the unit. We discussed patient's disposition planning. Patient had appointment 3 weeks out and  now confirmed that that will be moved up for outpatient follow-up. Patient expresses no concerns about remaining compliant with medication. Discussed if he continues to demonstrate stability of symptoms that we would consider discharge tomorrow and patient is in agreement. Denying suicidal or homicidal ideation intent or plan. Denying auditory or visual hallucinations    Mental Status Exam  Level of consciousness:  Within normal limits  Appearance: Hospital attire, seated in chair, with good grooming and hygiene   Behavior/Motor: No abnormalities noted  Attitude toward examiner:  Cooperative, attentive, good eye contact  Speech:  spontaneous, normal rate, normal volume and well articulated  Mood: \"Pretty good\"  Affect: Congruent  Thought processes:  linear, goal directed and coherent  Thought content:  denies homicidal ideation  Suicidal Ideation: Denies suicidal ideation  Delusions:  no evidence of delusions  Perceptual Disturbance:  denies any perceptual disturbance  Cognition:  Oriented to self, location, time, and situation  Memory: age appropriate  Insight & Judgement: improving  Medication side effects:  denies       Data   height is 5' 8\" (1.727 m) and weight is 220 lb (99.8 kg). His oral temperature is 97.6 °F (36.4 °C). His blood pressure is 117/59 (abnormal) and his pulse is 96. His respiration is 14.    Labs:   Admission on 04/20/2021   Component Date Value Ref Range Status    POC Glucose 04/20/2021 159* 75 - 110 mg/dL Final    POC Glucose 04/21/2021 107  75 - 110 mg/dL Final    POC Glucose 04/21/2021 90  75 - 110 mg/dL Final    POC Glucose 04/21/2021 98  75 - 110 mg/dL Final    POC Glucose 04/21/2021 123* 75 - 110 mg/dL Final    Glucose 04/22/2021 122* 70 - 99 mg/dL Final    BUN 04/22/2021 7  6 - 20 mg/dL Final    CREATININE 04/22/2021 0.58* 0.70 - 1.20 mg/dL Final    Bun/Cre Ratio 04/22/2021 NOT REPORTED  9 - 20 Final    Calcium 04/22/2021 8.8  8.6 - 10.4 mg/dL Final    Sodium 04/22/2021 132* 135 - 144 mmol/L Final    Potassium 04/22/2021 3.7  3.7 - 5.3 mmol/L Final    Chloride 04/22/2021 97* 98 - 107 mmol/L Final    CO2 04/22/2021 25  20 - 31 mmol/L Final    Anion Gap 04/22/2021 10  9 - 17 mmol/L Final    GFR Non- 04/22/2021 >60  >60 mL/min Final    GFR  04/22/2021 >60  >60 mL/min Final    GFR Comment 04/22/2021        Final    Comment: Average GFR for 52-63 years old:   80 mL/min/1.73sq m  Chronic Kidney Disease:   <60 mL/min/1.73sq m  Kidney failure:   <15 mL/min/1.73sq m              eGFR calculated using average adult body mass.  Additional eGFR calculator available at:        [employer]            GFR Staging 04/22/2021 NOT REPORTED   Final    POC Glucose 04/22/2021 111* 75 - 110 mg/dL Final            Medications  Current Facility-Administered Medications: famotidine (PEPCID) tablet 20 mg, 20 mg, Oral, BID WC  acetaminophen (TYLENOL) tablet 650 mg, 650 mg, Oral, Q4H PRN  aluminum & magnesium hydroxide-simethicone (MAALOX) 200-200-20 MG/5ML suspension 30 mL, 30 mL, Oral, Q6H PRN  hydrOXYzine (ATARAX) tablet 50 mg, 50 mg, Oral, TID PRN  ibuprofen (ADVIL;MOTRIN) tablet 400 mg, 400 mg, Oral, Q6H PRN  traZODone (DESYREL) tablet 50 mg, 50 mg, Oral, Nightly PRN  polyethylene glycol (GLYCOLAX) packet 17 g, 17 g, Oral, Daily PRN  nicotine polacrilex (NICORETTE) gum 2 mg, 2 mg, Oral, Q1H PRN  haloperidol (HALDOL) tablet 5 mg, 5 mg, Oral, Q4H PRN **AND** LORazepam (ATIVAN) tablet 2 mg, 2 mg, Oral, Q4H PRN  haloperidol lactate (HALDOL) injection 5 mg, 5 mg, Intramuscular, Q4H PRN **AND** LORazepam (ATIVAN) injection 2 mg, 2 mg, Intramuscular, Q4H PRN **AND** diphenhydrAMINE (BENADRYL) injection 50 mg, 50 mg, Intramuscular, Q4H PRN  amLODIPine (NORVASC) tablet 10 mg, 10 mg, Oral, Daily  aspirin EC tablet 81 mg, 81 mg, Oral, Daily  atorvastatin (LIPITOR) tablet 40 mg, 40 mg, Oral, Nightly  DULoxetine (CYMBALTA) extended release capsule 60 mg, 60 mg, Oral, Nightly  insulin glargine (LANTUS) injection vial 20 Units, 20 Units, Subcutaneous, Daily  lisinopril (PRINIVIL;ZESTRIL) tablet 30 mg, 30 mg, Oral, Daily  melatonin tablet 1.5 mg, 1.5 mg, Oral, Nightly PRN  metFORMIN (GLUCOPHAGE) tablet 1,000 mg, 1,000 mg, Oral, BID WC  OLANZapine (ZYPREXA) tablet 5 mg, 5 mg, Oral, Nightly  OXcarbazepine (TRILEPTAL) tablet 600 mg, 600 mg, Oral, Nightly  alogliptin (NESINA) tablet 25 mg, 25 mg, Oral, Daily  glipiZIDE (GLUCOTROL) tablet 10 mg, 10 mg, Oral, QAM AC  glucose (GLUTOSE) 40 % oral gel 15 g, 15 g, Oral, PRN  dextrose 50 % IV solution, 12.5 g, Intravenous, PRN  glucagon (rDNA) injection 1 mg, 1 mg, Intramuscular, PRN  dextrose 5 % solution, 100 mL/hr, Intravenous, PRN    ASSESSMENT  Depression with suicidal ideation     PLAN  Patient s symptoms   are improving  Continue with current medication  Attempt to develop insight  Psycho-education conducted. Supportive Therapy conducted. Probable discharge is tomorrow  Follow-up daily while in the inpatient unit        Electronically signed by Chastity Orona MD on 4/22/21 at 10:12 AM EDT    **This report has been created using voice recognition software. It may contain minor errors which are inherent in voice recognition technology. **

## 2021-04-22 NOTE — GROUP NOTE
Group Therapy Note    Date: 4/22/2021    Group Start Time: 1330  Group End Time: 7156  Group Topic: Psychoeducation    ERICA Medrano    Patient refused to attend leisure skills and problem solving group at 1330 after encouragement from staff. 1:1 talk time offered by staff as alternative to group session.

## 2021-04-22 NOTE — PROGRESS NOTES
[facility]    CONSULTATION / HISTORY AND PHYSICAL EXAMINATION            Date:   4/22/2021  Patient name:  Emma Juarez  Date of admission:  4/20/2021  3:29 PM  MRN:   [id number]  Account:  [de-identified]  YOB: 1965  PCP:    No primary care provider on file. Room:   [address] [place] [address]  Code Status:    Full Code    Physician Requesting Consult: Santiago Ramires MD    Reason for Consult:  Medical management    Chief Complaint:     No chief complaint on file. Diabetes, hyponatremia    History Obtained From:     Patient, EMR, nursing staff    History of Present Illness:     Diabetes   Duration more than 5 years  Modifying factors on med:   Severity: un/controlled   Associated signs and symtoms: neuropathy/ckd/ CAD. aggravated with sugar diet and better with low sugar diet    HTN  Onset more than 2 years ago  Yolanda: mild to mod  Usually controlled with current po meds  Not associated with headaches or blurry vision  No chest pain    Transferred from Medical floor in [facility] to [facility] on 4/19, he was admitted there for hyponatremia, deemed to be secondary to psych meds. Past Medical History:     Past Medical History:   Diagnosis Date    Abnormal EKG 06/05/2018    ST & T WAVE ABNORMALITY    Acid reflux     Anesthesia complication     combative/ confused after some anesthesias per pt    Arthritis     Balance problems     HX. OF, HAS USED CANE & WALKER.  Broken neck (HCC)     HX. OF.    C. difficile diarrhea 12/2/2016    Chest pain     Chronic back pain     Diabetes mellitus ([address]     DKA [facility]acidoses[facility]     ETOH abuse     Falls frequently     Full dentures     Hyperlipidemia     Hypertension     Lumbar disc disease     MDRO (multiple drug resistant organisms) resistance     HX. C-DIFF.  Pancreatitis     Sleep apnea     DOESN'T USE MACHINE.     Wears glasses         Past Surgical History:     Past Surgical History: Procedure Laterality Date    BACK SURGERY      CERVICAL [address] SURGERY  5/2/16    c-3 thru 7    COLONOSCOPY      X3-4    ENDOSCOPY, COLON, DIAGNOSTIC      X2    EYE SURGERY      METAL REMOVED FROM HI. EYES, WORK INJURY.  KNEE ARTHROSCOPY Bilateral     several each knee    KNEE SURGERY Bilateral     right x 3, left x2, scopes plus    NERVE BLOCK  05/25/2017    caudal #1 decadron 10mg    NERVE BLOCK  06/09/2017    bilateral mbnb, marcaine only    NERVE BLOCK Bilateral 06/16/2017    Bilat MBNB #2 marcaine only    NERVE BLOCK Right 06/26/2017    right lumbar RFA decadron  10mg    NERVE BLOCK Left 07/21/2017    left lumbar RF, decadron 4mg and marcaine    NOSE SURGERY      FL EGD TRANSORAL BIOPSY SINGLE/MULTIPLE N/A 3/27/2017    EGD BIOPSY performed by Christy Fermin DO at [facility] Left 10/8/2018    KNEE TOTAL ARTHROPLASTY performed by Osei Garcia MD at [address] [place] [address]          Medications Prior to Admission:     Prior to Admission medications    Medication Sig Start Date End Date Taking?  Authorizing Provider   insulin glargine (LANTUS) 100 UNIT/ML injection vial Inject 20 Units into the skin daily   Yes Historical Provider, MD   metFORMIN (GLUCOPHAGE) 1000 MG tablet Take 1,000 mg by mouth 2 times daily (with meals)   Yes Historical Provider, MD   amLODIPine (NORVASC) 10 MG tablet Take 10 mg by mouth daily   Yes Historical Provider, MD   aspirin 81 MG EC tablet Take 81 mg by mouth daily   Yes Historical Provider, MD   OLANZapine (ZYPREXA) 5 MG tablet Take 5 mg by mouth nightly   Yes Historical Provider, MD   DULoxetine (CYMBALTA) 60 MG extended release capsule Take 60 mg by mouth nightly   Yes Historical Provider, MD   OXcarbazepine (TRILEPTAL) 600 MG tablet Take 600 mg by mouth nightly   Yes Historical Provider, MD   SITagliptin (JANUVIA) 100 MG tablet Take 100 mg by mouth daily   Yes Historical Provider, MD   glipiZIDE (GLUCOTROL XL) 10 MG extended release tablet Take 10 mg by mouth daily   Yes Historical Provider, MD   busPIRone (BUSPAR) 15 MG tablet Take 60 mg by mouth nightly as needed   Yes Historical Provider, MD   melatonin 3 MG TABS tablet Take 6 mg by mouth nightly as needed    Yes Historical Provider, MD   lisinopril (PRINIVIL;ZESTRIL) 30 MG tablet Take 30 mg by mouth daily    Yes Historical Provider, MD   acetaminophen (TYLENOL) 500 MG tablet Take 1,000 mg by mouth every 6 hours as needed for Pain   Yes Historical Provider, MD   hydrOXYzine (VISTARIL) 50 MG capsule Take 50 mg by mouth 4 times daily as needed    Yes Historical Provider, MD   atorvastatin (LIPITOR) 40 MG tablet TAKE ONE TABLET BY MOUTH ONCE NIGHTLY 2/25/19  Yes Fausto Carlisle MD   blood glucose test strips (TRUE METRIX BLOOD GLUCOSE TEST) strip 1 each by In Vitro route daily 1/21/19  Yes Fausto Carlisle MD   Insulin Pen Needle 31G X 6 MM MISC 1 each by Does not apply route 3 times daily 8/20/18   Fausto Carlisle MD   Cholecalciferol 2000 units CAPS Take 50,000 Units by mouth once a week Takes on Monday    Historical Provider, MD        Allergies:     Patient has no known allergies. Social History:     Tobacco:    reports that he has been smoking. He has a 17.00 pack-year smoking history. He has never used smokeless tobacco.  Alcohol:      reports current alcohol use of about 3.0 standard drinks of alcohol per week. Drug Use:  reports no history of drug use. Family History:     Family History   Problem Relation Age of Onset    Diabetes Mother     Heart Disease Father     Diabetes Sister        Review of Systems:     Positive and Negative as described in HPI. Denies any shortness of breath or cough  Denies chest pain or palpitations  Denies abdominal pain, diarrhea vomiting  Denies any new numbness tremors or weakness.         Physical Exam:     BP (!) 117/59   Pulse 96   Temp 97.6 °F (36.4 °C) (Oral)   Resp 14   Ht 5' 8\" (1.727 m)   Wt 220 lb (99.8 kg) BMI 33.45 kg/m²   Temp (24hrs), Av.8 °F (36.6 °C), Min:97.6 °F (36.4 °C), Max:97.9 °F (36.6 °C)    Recent Labs     21  1635 21  0801 21  1158   POCGLU 98 123* 111* 141*     No intake or output data in the 24 hours ending 21 1635    General Appearance:  alert, well appearing, and in no acute distress  Head:  normocephalic, atraumatic. Eye: no icterus, redness, pupils equal and reactive, extraocular eye movements intact, conjunctiva clear  Ear: normal external ear, no discharge, hearing intact  Nose:  no drainage noted  Mouth: mucous membranes moist  Neck: supple, no carotid bruits, thyroid not palpable  Lungs: Bilateral equal air entry, clear to ausculation, no wheezing, rales or rhonchi, normal effort  Cardiovascular: normal rate, regular rhythm, no murmur, gallop, rub.   Abdomen: Soft, nontender, nondistended, normal bowel sounds, no hepatomegaly or splenomegaly  Neurologic: There are no new focal motor or sensory deficits, normal muscle tone and bulk, no abnormal sensation, normal speech, cranial nerves II through XII grossly intact  Skin: No gross lesions, rashes, bruising or bleeding on exposed skin area  Extremities:  peripheral pulses palpable, no pedal edema or calf pain with palpation      Investigations:      Laboratory Testing:  Recent Results (from the past 24 hour(s))   POC Glucose Fingerstick    Collection Time: 21  8:11 PM   Result Value Ref Range    POC Glucose 123 (H) 75 - 110 mg/dL   BASIC METABOLIC PANEL    Collection Time: 21  7:17 AM   Result Value Ref Range    Glucose 122 (H) 70 - 99 mg/dL    BUN 7 6 - 20 mg/dL    CREATININE 0.58 (L) 0.70 - 1.20 mg/dL    Bun/Cre Ratio NOT REPORTED 9 - 20    Calcium 8.8 8.6 - 10.4 mg/dL    Sodium 132 (L) 135 - 144 mmol/L    Potassium 3.7 3.7 - 5.3 mmol/L    Chloride 97 (L) 98 - 107 mmol/L    CO2 25 20 - 31 mmol/L    Anion Gap 10 9 - 17 mmol/L    GFR Non-African American >60 >60 mL/min    GFR African American

## 2021-04-23 NOTE — GROUP NOTE
Group Therapy Note    Date: 4/23/2021    Group Start Time: 1600  Group End Time: 3582  Group Topic: Healthy Living/Wellness    WANDA Velasquez LPN        Group Therapy Note    Attendees: 7         Patient's Goal:  How to Handle Stress    Notes:      Status After Intervention:  Unchanged    Participation Level:  Active Listener    Participation Quality: Appropriate      Speech:  normal      Thought Process/Content: Logical      Affective Functioning: Flat      Mood: anxious      Level of consciousness:  Oriented x4      Response to Learning: Able to verbalize/acknowledge new learning and Resistant      Endings: None Reported    Modes of Intervention: Education      Discipline Responsible: Licensed Practical Nurse      Signature:  Khris Sullivan LPN

## 2021-04-23 NOTE — PLAN OF CARE
Problem: Altered Mood, Depressive Behavior:  Goal: Ability to disclose and discuss suicidal ideas will improve  Description: Ability to disclose and discuss suicidal ideas will improve  4/23/2021 0833 by Josué Parker RN  Outcome: Ongoing  Note: Patient alert and orient x 4. Speech clear. Patient presents this shift with flat affect, makes fair eye contact during 1:1 talk. Patient reports \" I'm feeling better. \" Patient denies suicidal/homicidal/self harm ideations. Patient is out in day area at intervals. Patient compliant with medications and in behavioral control. Patient reports sleep/appetite are good. Support and encouragement provided. Safety maintained with every 15 minute checks and as needed. Will monitor. Problem: Tobacco Use:  Goal: Inpatient tobacco use cessation counseling participation  Description: Inpatient tobacco use cessation counseling participation  4/23/2021 0833 by Josué Parker RN  Outcome: Ongoing  Note: Patient given tobacco quitline number 74208081515 at this time, refusing to call at this time, states \" I just dont want to quit now\"- patient given information as to the dangers of long term tobacco use. Continue to reinforce the importance of tobacco cessation. Problem: Pain:  Goal: Pain level will decrease  Description: Pain level will decrease  4/23/2021 0833 by Josué Parker RN  Outcome: Ongoing  Note: Patient denies pain upon assessment this shift. Will monitor.

## 2021-04-23 NOTE — PLAN OF CARE
Problem: Altered Mood, Depressive Behavior:  Goal: Ability to disclose and discuss suicidal ideas will improve  Description: Ability to disclose and discuss suicidal ideas will improve  4/22/2021 2201 by Aren Christian RN  Outcome: Ongoing  Note:  Pt denies suicidal ideations at this time. Pt agreed to seek staff at anytime he/she felt like any urges to harm self would arise. Safety checks maintained [id number]. Problem: Tobacco Use:  Goal: Inpatient tobacco use cessation counseling participation  Description: Inpatient tobacco use cessation counseling participation  4/22/2021 2201 by Aren Christian RN  Outcome: Ongoing     Problem: Pain:  Goal: Pain level will decrease  Description: Pain level will decrease  Outcome: Ongoing  Note: Patient reports no new pain, or increase in chronic pain. Will continue to monitor and provide as needed pain medication.        Problem: Pain:  Goal: Control of acute pain  Description: Control of acute pain  Outcome: Ongoing     Problem: Pain:  Goal: Control of chronic pain  Description: Control of chronic pain  Outcome: Ongoing

## 2021-04-23 NOTE — GROUP NOTE
Group Therapy Note    Date: 4/23/2021    Group Start Time: 1330  Group End Time: 0276  Group Topic: Psychoeducation    WANDA Sanders    Patient refused to attend social skills/ values clarification group at 1330 after encouragement from staff. 1:1 talk time provided by staff.         Signature:  Maria Alejandra Sanders

## 2021-04-23 NOTE — BH NOTE
[facility]  Discharge Note    Pt discharged with followings belongings:   Dentures: None  Vision - Corrective Lenses: None  Hearing Aid: None  Jewelry: None  Body Piercings Removed: N/A  Clothing: Shirt, Socks, Pants, Footwear, Jacket / coat  Were All Patient Medications Collected?: Not Applicable  Other Valuables: Money (Comment)   Valuables sent home with patient. Valuables retrieved from safe, Security envelope number:  [id number] and returned to patient. Patient education on aftercare instructions: yes  Information faxed to [facility] by staff Patient verbalize understanding of AVS:  yes. Status EXAM upon discharge:Patient denies thoughts of self harm. Patient discharged to home and transported by cab services.    Status and Exam  Normal: No  Facial Expression: Flat  Affect: Appropriate  Level of Consciousness: Alert  Mood:Normal: No  Mood: Anxious  Motor Activity:Normal: Yes  Interview Behavior: Cooperative  Preception: [place] to Person, Jillene Pry to Time, [place] to Place, [place] to Situation  Attention:Normal: Yes  Attention: Distractible  Thought Processes: Circumstantial  Thought Content:Normal: Yes  Hallucinations: None  Delusions: No  Memory:Normal: No  Memory: Poor Remote  Insight and Judgment: No  Insight and Judgment: Poor Judgment  Present Suicidal Ideation: No  Present Homicidal Ideation: No      Metabolic Screening:    Lab Results   Component Value Date    LABA1C 6.2 (H) 04/17/2021       Lab Results   Component Value Date    CHOL 158 06/06/2018    CHOL 221 (H) 03/27/2017    CHOL 201 (H) 04/28/2016    CHOL 171 09/12/2012    CHOL 180 02/23/2012    CHOL 284 (H) 02/20/2012    CHOL 95 08/26/2011     Lab Results   Component Value Date    TRIG 1,084 (H) 06/06/2018    TRIG 448 (H) 03/27/2017    TRIG 108 04/28/2016    TRIG 761 (H) 09/12/2012    TRIG 279 (H) 02/23/2012    TRIG 1,621 (H) 02/20/2012    TRIG 121 08/26/2011     Lab Results   Component Value Date    HDL 26 (L) 06/06/2018    HDL 35 (L) 03/27/2017    HDL 83 04/28/2016    HDL 28 (L) 09/12/2012    HDL 27 (L) 02/23/2012    HDL 30 (L) 02/20/2012    HDL 23 (L) 08/26/2011     No components found for: LDLCAL  No results found for: Tammy Chirinos, RN

## 2021-04-23 NOTE — BH NOTE
Patient given tobacco quitline number [phone] at this time, refusing to call at this time, states \" I just dont want to quit now\"- patient given information as to the dangers of long term tobacco use. Continue to reinforce the importance of tobacco cessation.

## 2021-04-23 NOTE — PROGRESS NOTES
CLINICAL PHARMACY NOTE: MEDS TO [address] Select Patient?: No  Total # of Prescriptions Filled: .14   The following medications were delivered to the patient:  · Oxcarbazepine  · Olanzapine  · Atorvastatin  · Melatonin  · Glipizide  · Duloxetine  · Trazodone  · Leader pen caps  · Hydroxyzine  · Lisinopril  ·   · Metformin  · famotidine  · Amlodipine  · Lantuss  ·   Total # of Interventions Completed: 0  Time Spent (min): 30    Additional Documentation:    .

## 2021-04-23 NOTE — GROUP NOTE
Group Therapy Note    Date: 4/23/2021    Group Start Time: 0900  Group End Time: 0915  Group Topic: Community Meeting    [facility]    Patient refused to attend community meeting and goal setting group at 0900 after encouragement from staff. 1:1 talk time offered by staff as alternative to group session.

## 2021-04-23 NOTE — GROUP NOTE
Group Therapy Note    Date: 4/23/2021    Group Start Time: 1100  Group End Time: 0981  Group Topic: Recreational    WANDA Jacques    Patient refused to attend leisure/ cognitive skills group at 1100 after encouragement from staff. 1:1 talk time provided by staff.       Signature:  Jose Jacques
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
## 2021-11-20 PROBLEM — J90 PLEURAL EFFUSION ON LEFT: Status: ACTIVE | Noted: 2021-01-01

## 2021-11-20 PROBLEM — Z72.0 TOBACCO ABUSE: Status: ACTIVE | Noted: 2021-01-01

## 2021-11-20 PROBLEM — I48.91 RAPID ATRIAL FIBRILLATION (HCC): Status: ACTIVE | Noted: 2021-01-01

## 2021-11-20 PROBLEM — Z72.0 TOBACCO ABUSE: Status: RESOLVED | Noted: 2021-01-01 | Resolved: 2021-01-01

## 2021-11-20 PROBLEM — Z79.4 TYPE 2 DIABETES MELLITUS WITH HYPERGLYCEMIA, WITH LONG-TERM CURRENT USE OF INSULIN (HCC): Status: ACTIVE | Noted: 2017-08-07

## 2021-11-20 NOTE — H&P
[facility]  Office: [address], DO, Pebbles Velasquez, DO, Enma Barney, DO, Dunia Demetri Blood, DO, Kourtney Mcbride MD, Milly Thompson MD, Hannah Tanner MD, Italia Jordan MD, Oscar Hendricks MD, Raul White MD, Chris Brown MD, Arleen Nagel, DO, Bing Martin DO, Marcus Mistry MD,  Radha Wilkerson DO, Valeria Leonard MD, Huan Cerna MD, Kendra Parrish MD, Tomas Joy MD, Lucy Jimenez MD, Alison Doran MD, Kristi Carver MD, [facility]ison [facility], [facility], CNP, Munira Alonso, CNP, Rachelle , CNS, Ciarra Cordova, CNP, Ana Starr, CNP, Ledy Adam, CNP, Feliz Lennon, CNP, Edilberto Stone, CNP, Janine Simental PA-C, Yue Tabares, [facility], Peri Interiano, CNP, Michael Fitzgerald, CNP, Jd Cifuentes, CNP, Eric Saab, CNP, Charly Gutierrez, CNP, Jhon Orozco, CNP, Shu Hines, CNP         [facility]    HISTORY AND PHYSICAL EXAMINATION            Date:   11/20/2021  Patient name:  Hussain Mulligan  Date of admission:  11/20/2021  2:36 PM  MRN:   [id number]  Account:  [de-identified]  YOB: 1965  PCP:    No primary care provider on file. Room:   [place] [address]  Code Status:    Prior    Chief Complaint:     Chief Complaint   Patient presents with    Abdominal Pain    Tachycardia       History Obtained From:     patient, electronic medical record, Quality of history:  poor historian    History of Present Illness:     Hussain Mulligan is a 49 y.o. Non- / non  male who presents with Abdominal Pain and Tachycardia   and is admitted to the hospital for the management of Rapid atrial fibrillation ([facility] [address] This 49 yom presents with numerous complaints: periumbilical abd pain, cp, sob/[place], poor po intake for couple weeks, weight loss, n/v.  Found to have rapid afib in 170s-190s. Initially looked like svt but did not respond to adenosine. Now on cardizem drip and also cxr shows left sided white-out.     Past Medical History:     Past Medical History:   Diagnosis Date    Abnormal EKG 06/05/2018    ST & T WAVE ABNORMALITY    Acid reflux     Anesthesia complication     combative/ confused after some anesthesias per pt    Arthritis     Balance problems     HX. OF, HAS USED CANE & WALKER.  Broken neck (HCC)     HX. OF.    C. difficile diarrhea 12/2/2016    Chest pain     Chronic back pain     Diabetes mellitus ([facility] [address]     DKA (diabetic ketoacidoses)     ETOH abuse     Falls frequently     Full dentures     Hyperlipidemia     Hypertension     Lumbar disc disease     MDRO (multiple drug resistant organisms) resistance     HX. C-DIFF.  Pancreatitis     Sleep apnea     DOESN'T USE MACHINE.  Tobacco abuse 11/20/2021    Wears glasses         Past Surgical History:     Past Surgical History:   Procedure Laterality Date    BACK SURGERY      CERVICAL [address] SURGERY  5/2/16    c-3 thru 7    COLONOSCOPY      X3-4    ENDOSCOPY, COLON, DIAGNOSTIC      X2    EYE SURGERY      METAL REMOVED FROM HI. EYES, WORK INJURY.  KNEE ARTHROSCOPY Bilateral     several each knee    KNEE SURGERY Bilateral     right x 3, left x2, scopes plus    NERVE BLOCK  05/25/2017    caudal #1 decadron 10mg    NERVE BLOCK  06/09/2017    bilateral mbnb, marcaine only    NERVE BLOCK Bilateral 06/16/2017    Bilat MBNB #2 marcaine only    NERVE BLOCK Right 06/26/2017    right lumbar RFA decadron  10mg    NERVE BLOCK Left 07/21/2017    left lumbar RF, decadron 4mg and marcaine    NOSE SURGERY      MA EGD TRANSORAL BIOPSY SINGLE/MULTIPLE N/A 3/27/2017    EGD BIOPSY performed by Albert M[facility]quez DO at [facility] Left 10/8/2018    KNEE TOTAL ARTHROPLASTY performed by Abhinav Perdomo MD at [facility]          Medications Prior to Admission:     Prior to Admission medications    Medication Sig Start Date End Date Taking?  Authorizing Provider   OXcarbazepine (TRILEPTAL) 600 MG tablet Take 1 tablet by mouth nightly 4/23/21   Ryan Acuña, MD   DULoxetine (CYMBALTA) 60 MG extended release capsule Take 1 capsule by mouth nightly 4/23/21   Ryan Acuña, MD   traZODone (DESYREL) 50 MG tablet Take 1 tablet by mouth nightly as needed for Sleep 4/23/21   Ryan Acuña, MD   glipiZIDE (GLUCOTROL) 10 MG tablet Take 1 tablet by mouth every morning (before breakfast) 4/24/21   Ryan Acuña, MD   metFORMIN (GLUCOPHAGE) 1000 MG tablet Take 1 tablet by mouth 2 times daily (with meals) 4/23/21   Ryan Acuña, MD   atorvastatin (LIPITOR) 40 MG tablet Take 1 tablet by mouth nightly 4/23/21   Ryan Acuña, MD   lisinopril (PRINIVIL;ZESTRIL) 30 MG tablet Take 1 tablet by mouth daily 4/24/21   Ryan Acuña, MD   OLANZapine (ZYPREXA) 5 MG tablet Take 1 tablet by mouth nightly 4/23/21   Ryan Acuña, MD   amLODIPine (NORVASC) 10 MG tablet Take 1 tablet by mouth daily 4/23/21   Ryan Acuña, MD   melatonin 3 MG TABS tablet Take 0.5 tablets by mouth nightly as needed (insomnia) 4/23/21 5/23/21  Ryan Acuña, MD   famotidine (PEPCID) 20 MG tablet Take 1 tablet by mouth 2 times daily (with meals) 4/23/21   Ryan Acuña MD   Insulin Pen Needle 31G X 6 MM MISC 1 each by Does not apply route 3 times daily 4/23/21   Ryan Acuña MD   insulin glargine (LANTUS SOLOSTAR) 100 UNIT/ML injection pen Inject 20 Units into the skin daily 4/23/21   Ryan Acuña MD   aspirin 81 MG EC tablet Take 81 mg by mouth daily    Historical Provider, MD   acetaminophen (TYLENOL) 500 MG tablet Take 1,000 mg by mouth every 6 hours as needed for Pain    Historical Provider, MD        Allergies:     Patient has no known allergies. Social History:     Tobacco:    reports that he has quit smoking. He has a 34.00 pack-year smoking history.  He has never used smokeless tobacco.  Alcohol:      reports current alcohol use of about 3.0 standard drinks of alcohol per week. Drug Use:  reports no history of drug use. Family History:     Family History   Problem Relation Age of Onset    Diabetes Mother     Heart Disease Father     Diabetes Sister        Review of Systems:     Positive and Negative as described in HPI.     CONSTITUTIONAL:  negative for fevers  HEENT:  negative for hearing changes, runny nose, throat pain  RESPIRATORY:  positive for shortness of breath, non productive cough, congestion  CARDIOVASCULAR:  positive for chest pain, palpitations  GASTROINTESTINAL:  negative for diarrhea, constipation, change in bowel habits  GENITOURINARY:  negative for difficulty of urination, burning with urination, frequency   INTEGUMENT:  negative for rash, skin lesions, easy bruising   HEMATOLOGIC/LYMPHATIC:  negative for swelling/edema   ALLERGIC/IMMUNOLOGIC:  negative for urticaria , itching  ENDOCRINE:  negative increase in drinking, increase in urination, hot or cold intolerance  MUSCULOSKELETAL:  negative joint pains, muscle aches, swelling of joints  NEUROLOGICAL:  negative for headaches, dizziness, lightheadedness, numbness, pain, tingling extremities  BEHAVIOR/PSYCH:  negative for depression, anxiety    Physical Exam:   BP (!) 128/92   Pulse 120   Temp 97.7 °F (36.5 °C) (Oral)   Resp 20   Ht 5' 10\" (1.778 m)   Wt 170 lb (77.1 kg)   SpO2 97%   BMI 24.39 kg/m²   Temp (24hrs), Av.7 °F (36.5 °C), Min:97.7 °F (36.5 °C), Max:97.7 °F (36.5 °C)    Recent Labs     21  1454   POCGLU 140*       Intake/Output Summary (Last 24 hours) at 2021 1800  Last data filed at 2021 1556  Gross per 24 hour   Intake 1000 ml   Output    Net 1000 ml       General Appearance:  alert, well appearing, and in no acute distress  Mental status: oriented to person, place, and time  Head:  normocephalic, atraumatic  Eye: no icterus, redness, pupils equal and reactive, extraocular eye movements intact, conjunctiva clear  Ear: normal 8.28 (H) 1.8 - 7.7 k/uL    Absolute Lymph # 1.09 1.0 - 4.8 k/uL    Absolute Mono # 1.20 (H) 0.2 - 0.8 k/uL    Absolute Eos # 0.11 0.0 - 0.4 k/uL    Basophils Absolute 0.11 0.0 - 0.2 k/uL    Absolute Immature Granulocyte 0.11 0.00 - 0.30 k/uL    Morphology ANISOCYTOSIS PRESENT    Basic Metabolic Panel    Collection Time: 11/20/21  2:52 PM   Result Value Ref Range    Glucose 160 (H) 70 - 99 mg/dL    BUN 11 6 - 20 mg/dL    CREATININE 0.49 (L) 0.70 - 1.20 mg/dL    Bun/Cre Ratio 22 (H) 9 - 20    Calcium 9.8 8.6 - 10.4 mg/dL    Sodium 133 (L) 135 - 144 mmol/L    Potassium 3.9 3.7 - 5.3 mmol/L    Chloride 93 (L) 98 - 107 mmol/L    CO2 22 20 - 31 mmol/L    Anion Gap 18 (H) 9 - 17 mmol/L    GFR Non-African American >60 >60 mL/min    GFR African American >60 >60 mL/min    GFR Comment          GFR Staging NOT REPORTED    TROP/MYOGLOBIN    Collection Time: 11/20/21  2:52 PM   Result Value Ref Range    Troponin, High Sensitivity 22 0 - 22 ng/L    Troponin T NOT REPORTED <0.03 ng/mL    Troponin Interp NOT REPORTED     Myoglobin 24 (L) 28 - 72 ng/mL   Amylase    Collection Time: 11/20/21  2:52 PM   Result Value Ref Range    Amylase 53 28 - 100 U/L   Lipase    Collection Time: 11/20/21  2:52 PM   Result Value Ref Range    Lipase 37 13 - 60 U/L   Hepatic Function Panel    Collection Time: 11/20/21  2:52 PM   Result Value Ref Range    Albumin 3.8 3.5 - 5.2 g/dL    Alkaline Phosphatase 154 (H) 40 - 129 U/L    ALT 29 5 - 41 U/L    AST 24 <40 U/L    Total Bilirubin 0.89 0.3 - 1.2 mg/dL    Bilirubin, Direct 0.44 (H) <0.31 mg/dL    Bilirubin, Indirect 0.45 0.00 - 1.00 mg/dL    Total Protein 8.0 6.4 - 8.3 g/dL    Globulin NOT REPORTED 1.5 - 3.8 g/dL    Albumin/Globulin Ratio NOT REPORTED 1.0 - 2.5   POC Glucose Fingerstick    Collection Time: 11/20/21  2:54 PM   Result Value Ref Range    POC Glucose 140 (H) 75 - 110 mg/dL   COVID-19, Rapid    Collection Time: 11/20/21  3:00 PM    Specimen: Nasopharyngeal Swab   Result Value Ref Range Specimen Description . NASOPHARYNGEAL SWAB     SARS-CoV-2, Rapid Not Detected Not Detected   Protime-INR    Collection Time: 11/20/21  3:25 PM   Result Value Ref Range    Protime 14.6 (H) 11.5 - 14.2 sec    INR 1.2    APTT    Collection Time: 11/20/21  3:25 PM   Result Value Ref Range    PTT 28.8 23.9 - 33.8 sec   EKG 12 Lead    Collection Time: 11/20/21  4:21 PM   Result Value Ref Range    Ventricular Rate 142 BPM    Atrial Rate 129 BPM    QRS Duration 70 ms    Q-T Interval 294 ms    QTc Calculation (Bazett) 452 ms    R Axis 9 degrees    T Axis 85 degrees   TROP/MYOGLOBIN    Collection Time: 11/20/21  5:08 PM   Result Value Ref Range    Troponin, High Sensitivity 19 0 - 22 ng/L    Troponin T NOT REPORTED <0.03 ng/mL    Troponin Interp NOT REPORTED     Myoglobin <21 (L) 28 - 72 ng/mL   Lactic Acid    Collection Time: 11/20/21  5:08 PM   Result Value Ref Range    Lactic Acid 2.4 (H) 0.5 - 2.2 mmol/L       Imaging/Diagnostics:  XR CHEST PORTABLE    Result Date: 11/20/2021  New complete opacification of the left hemithorax, likely due to a combination of a large effusion and airspace disease. Assessment :      Hospital Problems           Last Modified POA    * (Principal) Rapid atrial fibrillation ([address] 11/20/2021 Yes    Essential hypertension 11/20/2021 Yes    Type 2 diabetes mellitus with hyperglycemia, with long-term current use of insulin ([address] 11/20/2021 Yes    Schizophrenia ([address] 11/20/2021 Yes    Chronic pancreatitis ([address] 11/20/2021 Yes    Pleural effusion on left 11/20/2021 Yes      possible left effusion but better air sounds than would be expected if this was all fluid; also he isn't hypoxic    Plan:     Patient status inpatient in the Progressive Unit/Step down    1. Cardio consult of rapid afib  2. Check echo  3. pulm consult: white out on left but not hypoxic  4. Ct chest pending  5. IR for thoracentesis if this is indeed effusion  6. cardizem drip and heparin drip for afib  7.  Monitor/control glucose  8. Ct abd/pelvis    Consultations:   IP CONSULT TO HOSPITALIST  IP CONSULT TO CARDIOLOGY  IP CONSULT TO PULMONOLOGY     Patient is admitted as inpatient status because of co-morbidities listed above, severity of signs and symptoms as outlined, requirement for current medical therapies and most importantly because of direct risk to patient if care not provided in a hospital setting. Expected length of stay > 48 hours. Curt Quinonez DO  11/20/2021  6:00 PM    Copy sent to Dr. Marc primary care provider on file.

## 2021-11-20 NOTE — ED PROVIDER NOTES
[address] [place] [address] ED  EMERGENCY DEPARTMENT ENCOUNTER      Pt Name: Katty Reid  MRN: [id number]  [place] 1965  Date of evaluation: 11/20/2021  Provider: Sofia Jones MD    [address] [place] [address]       Chief Complaint   Patient presents with    Abdominal Pain    Tachycardia         HISTORY OF PRESENT ILLNESS  (Location/Symptom, Timing/Onset, Context/Setting, Quality, Duration, Modifying Factors, Severity.)   Katty Reid is a 49 y.o. male who presents to the emergency department for complaints of abdominal pain. He is a poor historian and will answer most of our questions. He has a history of schizoaffective disorder and is reportedly homeless. He was noted to be tachycardic upon presentation with a heart rate in the 170s. No further history is obtainable now. Nursing Notes were reviewed. ALLERGIES     Patient has no known allergies.     CURRENT MEDICATIONS       Previous Medications    ACETAMINOPHEN (TYLENOL) 500 MG TABLET    Take 1,000 mg by mouth every 6 hours as needed for Pain    AMLODIPINE (NORVASC) 10 MG TABLET    Take 1 tablet by mouth daily    ASPIRIN 81 MG EC TABLET    Take 81 mg by mouth daily    ATORVASTATIN (LIPITOR) 40 MG TABLET    Take 1 tablet by mouth nightly    DULOXETINE (CYMBALTA) 60 MG EXTENDED RELEASE CAPSULE    Take 1 capsule by mouth nightly    FAMOTIDINE (PEPCID) 20 MG TABLET    Take 1 tablet by mouth 2 times daily (with meals)    GLIPIZIDE (GLUCOTROL) 10 MG TABLET    Take 1 tablet by mouth every morning (before breakfast)    INSULIN GLARGINE (LANTUS SOLOSTAR) 100 UNIT/ML INJECTION PEN    Inject 20 Units into the skin daily    INSULIN PEN NEEDLE 31G X 6 MM MISC    1 each by Does not apply route 3 times daily    LISINOPRIL (PRINIVIL;ZESTRIL) 30 MG TABLET    Take 1 tablet by mouth daily    MELATONIN 3 MG TABS TABLET    Take 0.5 tablets by mouth nightly as needed (insomnia)    METFORMIN (GLUCOPHAGE) 1000 MG TABLET    Take 1 tablet by mouth 2 times daily (with meals)    OLANZAPINE HISTORY           Problem Relation Age of Onset    Diabetes Mother     Heart Disease Father     Diabetes Sister      Family Status   Relation Name Status    Mother  Alive    Father  Alive    Sister 2nd Alive    MGM      MGF      PGM      PGF      Sister  Alive        SOCIAL HISTORY      reports that he has been smoking. He has a 34.00 pack-year smoking history. He has never used smokeless tobacco. He reports current alcohol use of about 3.0 standard drinks of alcohol per week. He reports that he does not use drugs. REVIEW OF SYSTEMS    (2-9 systems for level 4, 10 or more for level 5)     Review of Systems   Unable to perform ROS: Psychiatric disorder        Except as noted above the remainder of the review of systems was reviewed and negative. PHYSICAL EXAM    (up to 7 for level 4, 8 or more for level 5)     Vitals:    21 1516 21 1534 21 1546 21 1600   BP: 121/87 (!) 121/96 124/89 (!) 113/100   Pulse: 123 145 156 141   Resp: 14 14 15 16   SpO2: 99% 100% 99% 98%   Weight:  170 lb (77.1 kg)     Height:  5' 10\" (1.778 m)         Physical Exam  Constitutional:       General: He is not in acute distress. Appearance: He is well-developed. He is not diaphoretic. HENT:      Head: Normocephalic and atraumatic. Eyes:      General: No scleral icterus. Right eye: No discharge. Left eye: No discharge. Cardiovascular:      Rate and Rhythm: Regular rhythm. Tachycardia present. Pulmonary:      Effort: Pulmonary effort is normal. No respiratory distress. Breath sounds: Normal breath sounds. No stridor. No wheezing or rales. Abdominal:      General: There is no distension. Palpations: Abdomen is soft. Tenderness: There is abdominal tenderness. Musculoskeletal:         General: Normal range of motion. Cervical back: Neck supple. Lymphadenopathy:      Cervical: No cervical adenopathy.    Skin:     General: Skin is warm and dry. Findings: No erythema or rash. Neurological:      Mental Status: He is alert. Comments: Awake alert   Psychiatric:         Behavior: Behavior normal.             DIAGNOSTIC RESULTS     EKG: All EKG's are interpreted by the Emergency Department Physician who either signs or Co-signs this chart in the absence of a cardiologist.    Initial EKG on my interpretation shows SVT at a rate of 175. RADIOLOGY:   Non-plain film images such as CT, Ultrasound and MRI are read by the radiologist. Plain radiographic images are visualized and preliminarily interpreted by the emergency physician with the below findings:    Interpretation per the Radiologist below, if available at the time of this note:    XR CHEST PORTABLE    Result Date: 11/20/2021  EXAMINATION: [address] 11/20/2021 3:46 pm COMPARISON: Chest radiograph April 17, 2021. HISTORY: ORDERING SYSTEM PROVIDED HISTORY: Chest Pain TECHNOLOGIST PROVIDED HISTORY: Chest Pain Reason for Exam: Chest pain, sob for several days Acuity: Acute Type of Exam: Initial FINDINGS: There is complete opacification of the left hemithorax with mass effect/mediastinal shift to the right. Mild linear opacities are seen in the right perihilar region, likely related to atelectasis. Right lung is otherwise clear. No right-sided pneumothorax or pleural effusion. Evaluation of the cardiac and mediastinal contours is limited due to the adjacent opacification of the left hemithorax. No acute osseous abnormality. New complete opacification of the left hemithorax, likely due to a combination of a large effusion and airspace disease.          CT of chest is pending        LABS:  Labs Reviewed   CBC WITH AUTO DIFFERENTIAL - Abnormal; Notable for the following components:       Result Value    RBC 5.95 (*)     Hematocrit 51.3 (*)     RDW 21.4 (*)     Seg Neutrophils 76 (*)     Lymphocytes 10 (*)     Monocytes 11 (*)     Immature Granulocytes 1 (*) Segs Absolute 8.28 (*)     Absolute Mono # 1.20 (*)     All other components within normal limits   BASIC METABOLIC PANEL - Abnormal; Notable for the following components:    Glucose 160 (*)     CREATININE 0.49 (*)     Bun/Cre Ratio 22 (*)     Sodium 133 (*)     Chloride 93 (*)     Anion Gap 18 (*)     All other components within normal limits   TROP/MYOGLOBIN - Abnormal; Notable for the following components:    Myoglobin 24 (*)     All other components within normal limits   PROTIME-INR - Abnormal; Notable for the following components:    Protime 14.6 (*)     All other components within normal limits   HEPATIC FUNCTION PANEL - Abnormal; Notable for the following components:    Alkaline Phosphatase 154 (*)     Bilirubin, Direct 0.44 (*)     All other components within normal limits   POC GLUCOSE FINGERSTICK - Abnormal; Notable for the following components:    POC Glucose 140 (*)     All other components within normal limits   COVID-19, RAPID   CULTURE, BLOOD 1   CULTURE, BLOOD 1   APTT   AMYLASE   LIPASE   TROP/MYOGLOBIN   LACTIC ACID   LACTIC ACID       All other labs were within normal range or not returned as of this dictation.     EMERGENCY DEPARTMENT COURSE and DIFFERENTIAL DIAGNOSIS/MDM:   Vitals:    Vitals:    11/20/21 1516 11/20/21 1534 11/20/21 1546 11/20/21 1600   BP: 121/87 (!) 121/96 124/89 (!) 113/100   Pulse: 123 145 156 141   Resp: 14 14 15 16   SpO2: 99% 100% 99% 98%   Weight:  170 lb (77.1 kg)     Height:  5' 10\" (1.778 m)         Orders Placed This Encounter   Medications    adenosine (ADENOCARD) injection 6 mg    adenosine (ADENOCARD) injection 12 mg    adenosine (ADENOCARD) 6 MG/2ML injection     Luzmaria Flair: cabinet override    dilTIAZem injection 20 mg    0.9 % sodium chloride bolus    dilTIAZem 125 mg in dextrose 5 % 125 mL infusion    morphine sulfate (PF) injection 4 mg    dilTIAZem injection 10 mg    cefTRIAXone (ROCEPHIN) 1000 mg IVPB in 50 mL D5W minibag     Order Specific Question:   Antimicrobial Indications     Answer:   Pneumonia (CAP)    azithromycin (ZITHROMAX) 500 mg in D5W 250ml addavial     Order Specific Question:   Antimicrobial Indications     Answer:   Pneumonia (CAP)    iopamidol (ISOVUE-370) 76 % injection 75 mL    sodium chloride flush 0.9 % injection 10 mL    0.9 % sodium chloride bolus       Medical Decision Making: The patient presented with a regular tachycardia and was given adenosine first 6 mg then 12 mg. This did not slow down his heart rate. It did however show that it was irregular and he was given IV Cardizem, 2 boluses and placed on a drip too. Chest x-ray shows complete opacification of the left lung and cause is uncertain. CT of the chest is pending at the time of this dictation and he is being admitted. Blood cultures were obtained and he was given IV antibiotics. Coronavirus test is negative. CRITICAL CARE TIME     Due to the high probability of sudden and clinically significant deterioration in the patient's condition he required highest level of my preparedness to intervene urgently. I provided critical care time including documentation time, medication orders and management, reevaluation, vital sign assessment, ordering and reviewing of of lab tests ordering and reviewing of x-ray studies, and admission orders. Aggregate critical care time is 45 minutes including only time during which I was engaged in work directly related to his care and did not include time spent treating other patients simultaneously. CONSULTS:  IP CONSULT TO HOSPITALIST    PROCEDURES:  None    FINAL IMPRESSION      1. Atrial fibrillation with RVR ([facility]    2. Pneumonia of left lung due to infectious organism, unspecified part of lung          DISPOSITION/PLAN   DISPOSITION Decision To Admit 11/20/2021 05:19:28 PM      PATIENT REFERRED TO:   No follow-up provider specified.     DISCHARGE MEDICATIONS:     New Prescriptions    No medications on file       The care

## 2021-11-21 PROBLEM — J98.59 MEDIASTINAL MASS: Status: ACTIVE | Noted: 2021-01-01
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
## 2021-11-21 NOTE — PROGRESS NOTES
[facility]  Office: [address], DO, Bryn Luke, DO, Landon Arias, DO, Laurita Salmeron Blood, DO, Ede Godoy MD, Kamaljit Ortega MD, Syl Sage MD, Ronal Jiménez MD, Anisa Collazo MD, Arnie Tanner MD, Jameel Velazco MD, Chapo Zamora, DO, Loli Arzola, DO, Edel Lincoln MD,  Dianelys Friday, DO, Minoo Amaya MD, Charly Schultz MD, Angela Amos MD, Dahlia Balderas MD, Jemima Reyes MD, Meenakshi Garcia MD, Adeline Goldmann, MD, Tania Burns, [facility], [facility], [facility], Ankur Nurse, CNP, Andrew Roblero, CNS, Marline Howard, CNP, Angelika Hidalgo, CNP, [place] Guevara, CNP, Rekha Casarez, CNP, Brooklyn Moreau, CNP, Maycol Gallardo PA-C, Rylie Nieves, MANNY, Mike Erazoelor, CNP, Tunde Srivastava, CNP, Crista Yarbrough, CNP, Marilyn Squibb, CNP, Terri Schneider, CNP, David Mcdaniel, [facility], [facility]    Progress Note    11/21/2021    9:38 AM    Name:   Sancho Skaggs  MRN:     [id number]     Acct:      [de-identified]   Room:   [address] [place] [address] Day:  1  Admit Date:  11/20/2021  2:36 PM    PCP:   No primary care provider on file. Code Status:  Full Code    Subjective:     C/C:   Chief Complaint   Patient presents with    Abdominal Pain    Tachycardia     Interval History Status: improved. Feels a bit better today  Denies cp/n/v  Less sob  afib converted to sinus tach  Still has some abd pain      Brief History:     Sancho Skaggs is a 49 y.o. Non- / non  male who presents with Abdominal Pain and Tachycardia   and is admitted to the hospital for the management of Rapid atrial fibrillation ([facility]    This 49 yom presents with numerous complaints: periumbilical abd pain, cp, sob/[place], poor po intake for couple weeks, weight loss, n/v.  Found to have rapid afib in 170s-190s. Initially looked like svt but did not respond to adenosine. Now on cardizem drip and also cxr shows left sided white-out.     Ct chest revealed large mediastinal mass and effusion    Review of Systems:     Constitutional:  negative for chills, fevers, sweats  Respiratory:  negative for cough, wheezing  Cardiovascular:  negative for chest pain, chest pressure/discomfort, lower extremity edema, palpitations  Gastrointestinal:  negative for  constipation, diarrhea, nausea, vomiting  Neurological:  negative for dizziness, headache    Medications:      Allergies:  No Known Allergies    Current Meds:   Scheduled Meds:    OLANZapine  10 mg Oral Nightly    metoprolol  7.5 mg IntraVENous Q6H    OXcarbazepine  600 mg Oral Nightly    DULoxetine  60 mg Oral Nightly    glipiZIDE  10 mg Oral QAM AC    atorvastatin  40 mg Oral Nightly    lisinopril  30 mg Oral Daily    famotidine  20 mg Oral BID WC    sodium chloride flush  5-40 mL IntraVENous 2 times per day    insulin glargine  20 Units SubCUTAneous Daily    influenza virus vaccine  0.5 mL IntraMUSCular Prior to discharge     Continuous Infusions:    heparin (PORCINE) Infusion 15 Units/kg/hr (11/21/21 0233)    dilTIAZem (CARDIZEM) 125 mg in dextrose 5% 125 mL infusion 15 mg/hr (11/21/21 0840)    sodium chloride      dextrose       PRN Meds: traZODone, heparin (porcine), heparin (porcine), sodium chloride flush, melatonin, sodium chloride flush, sodium chloride, potassium chloride **OR** potassium alternative oral replacement **OR** potassium chloride, magnesium sulfate, ondansetron **OR** ondansetron, polyethylene glycol, acetaminophen **OR** acetaminophen, perflutren lipid microspheres, glucose, dextrose, glucagon (rDNA), dextrose    Data:     Past Medical History:   has a past medical history of Abnormal EKG, Acid reflux, Anesthesia complication, Arthritis, Balance problems, Broken neck (HCC), C. difficile diarrhea, Chest pain, Chronic back pain, Diabetes mellitus ([facility] DKA (diabetic ketoacidoses), ETOH abuse, Falls frequently, Full dentures, Hyperlipidemia, Hypertension, Lumbar disc disease, MDRO (multiple drug resistant organisms) resistance, Pancreatitis, Sleep apnea, Tobacco abuse, and Wears glasses. Social History:   reports that he has quit smoking. He has a 34.00 pack-year smoking history. He has never used smokeless tobacco. He reports current alcohol use of about 3.0 standard drinks of alcohol per week. He reports that he does not use drugs. Family History:   Family History   Problem Relation Age of Onset    Diabetes Mother     Heart Disease Father     Diabetes Sister        Vitals:  BP (!) 114/90   Pulse 102   Temp 97.9 °F (36.6 °C) (Oral)   Resp 16   Ht 5' 9\" (1.753 m)   Wt 164 lb (74.4 kg)   SpO2 95%   BMI 24.22 kg/m²   Temp (24hrs), Av.7 °F (36.5 °C), Min:97.2 °F (36.2 °C), Max:97.9 °F (36.6 °C)    Recent Labs     21  1454 21  0847   POCGLU 140* 161*       I/O (24Hr):     Intake/Output Summary (Last 24 hours) at 2021 0938  Last data filed at 2021 0600  Gross per 24 hour   Intake 3381 ml   Output 350 ml   Net 3031 ml       Labs:  Hematology:  Recent Labs     21  1452 21  1525 21  0653   WBC 10.9  --  7.9   RBC 5.95*  --  4.95   HGB 16.1  --  13.6   HCT 51.3*  --  41.9   MCV 86.2  --  84.6   MCH 27.1  --  27.5   MCHC 31.4  --  32.5   RDW 21.4*  --  20.5*     --  391   MPV 9.6  --  9.4   INR  --  1.2  --      Chemistry:  Recent Labs     21  1452 21  1708 21  0653   *  --  131*   K 3.9  --  3.5*   CL 93*  --  92*   CO2 22  --  22   GLUCOSE 160*  --  158*   BUN 11  --  11   CREATININE 0.49*  --  0.51*   MG  --   --  1.5*   ANIONGAP 18*  --  17   LABGLOM >60  --  >60   GFRAA >60  --  >60   CALCIUM 9.8  --  8.9   TROPHS 22 19  --    MYOGLOBIN 24* <21*  --      Recent Labs     21  1452 21  1454 21  0847   PROT 8.0  --   --    LABALBU 3.8  --   --    AST 24  --   --    ALT 29  --   --    ALKPHOS 154*  --   --    BILITOT 0.89  --   --    BILIDIR 0.44*  --   --    AMYLASE 53  --   --    LIPASE 37 --   --    POCGLU  --  140* 161*     ABG:  Lab Results   Component Value Date    FIO2 NOT REPORTED 03/26/2017     Lab Results   Component Value Date/Time    SPECIAL RT HAND,7ML 11/20/2021 05:08 PM     Lab Results   Component Value Date/Time    CULTURE NO GROWTH 7 HOURS 11/20/2021 05:08 PM       Radiology:  CT CHEST W CONTRAST    Result Date: 11/20/2021  1. Heterogeneous soft tissue mass, measuring approximately 14.5 x 13.3 x 12 cm in cephalo caudad, AP and transverse dimension, involving both the anterior and middle mediastinum, and encasing and displacing the mediastinal structures. Complete collapse of the left lung is noted. Differential considerations would include a malignant thymic neoplasm, primary mediastinal lymphoma, malignant germ cell neoplasm, and biopsy is recommended for histologic evaluation. 2. Complete opacification of the left hemithorax, secondary to complete collapse of the left lung, and a large left pleural effusion 3. Small right pleural effusion 4. Patchy airspace disease within the right apex, differential considerations to include pneumonia or atelectasis 5. Coronary arterial calcifications     XR CHEST PORTABLE    Result Date: 11/20/2021  New complete opacification of the left hemithorax, likely due to a combination of a large effusion and airspace disease.        Physical Examination:        General appearance:  alert, cooperative and no distress  Mental Status:  oriented to person, place and time and normal affect  Lungs:  clear to auscultation bilaterally, normal effort  Heart:  regular rate and rhythm, no murmur  Abdomen:  soft, periumbilical ttp, nondistended, normal bowel sounds, no masses, hepatomegaly, splenomegaly  Extremities:  no edema, redness, tenderness in the calves  Skin:  no gross lesions, rashes, induration    Assessment:        Hospital Problems           Last Modified POA    * (Principal) Mediastinal mass 11/21/2021 Yes    Rapid atrial fibrillation ([address] 11/21/2021 Yes    Essential hypertension 11/20/2021 Yes    Type 2 diabetes mellitus with hyperglycemia, with long-term current use of insulin ([facility] 11/20/2021 Yes    Schizophrenia ([facility] 11/20/2021 Yes    Chronic pancreatitis ([facility] 11/20/2021 Yes    Pleural effusion on left 11/20/2021 Yes          Plan:        1. Ct abd/pelvis today  2. Pain control  3. Dc cardizem drip and transition to po -he converted back to nsr  4. Keep on heparin drip for now  5. cv and pulm evals today  6. IR requested for thoracentesis, may also need biopsy of mass-would like pulm input on best way to approach this large mass  7. Replace mg and k  8.  Check echo    Jacinto Grams Blood, DO  11/21/2021  9:38 AM

## 2021-11-21 NOTE — CARE COORDINATION
Case Management Initial Discharge Plan  Andrea Hoyos,         Readmission Risk              Risk of Unplanned Readmission:  18             Met with:patient to discuss discharge plans. Information verified: address, contacts, phone number, , insurance Yes  PCP: No primary care provider on file. Date of last visit: List of [facility] PCP's provided    Insurance Provider: Stacey Caro Dual    Discharge Planning  Current Residence:  Private home  Living Arrangements:  Parent   Home has 2 stories/3 outside stairs to climb. Full bathroom on both floors, pt's bedroom on second floor but he states he sleeps in recliner on first floor mostly. Support Systems:  Parent, Family Members  Current Services PTA:  Mental health Agency: [facility]  Patient able to perform ADL's:Independent prior to admit  DME in home:  none  DME used to aid ambulation prior to admission:   none  DME used during admission:  none    Potential Assistance Needed:  N/A    Pharmacy: [facility]   Potential Assistance Purchasing Medications:  No  Does patient want to participate in local refill/ meds to beds program?  No    Patient agreeable to home care: No  [place] of choice provided:  n/a      Type of Home Care Services:  None  Patient expects to be discharged to:       Prior SNF/Rehab Placement and Facility: [facility]  Agreeable to SNF/Rehab: No  [place] of choice provided: n/a   Evaluation: n/a    Expected Discharge date:  21  Follow Up Appointment: Best Day/ Time: Tuesday AM    Transportation provider: mom  Transportation arrangements needed for discharge: No    Discharge Plan:   Met with patient to review plan of care. Pt lives with his mom and pt states his mom helps him since he has been ill. Both he and his mom drive. Pt has been indep prior to admit. Pt denies being homeless    Stressed importance of having a PCP and pt willing to accept help making an appt .  List of [employer] provided    IR consulted for possible thoracentesis. Pulmo and Cardiac consults. Will probably need anticoagulants  NEEDS PCP appt.         Electronically signed by Janelle Jimenez on 11/21/21 at 2:16 PM EST

## 2021-11-21 NOTE — PLAN OF CARE
Problem: Falls - Risk of:  Goal: Will remain free from falls  Description: Will remain free from falls  11/21/2021 1207 by Elana Hagen RN  Outcome: Ongoing  Pt fall risk, fall band present, falling star, safety alarm activated and in use as needed. Hourly rounding performed. Pt encouraged to use call light. See Gentry Schaumann fall risk assessment.

## 2021-11-22 PROBLEM — I48.91 ATRIAL FIBRILLATION WITH RVR (HCC): Status: ACTIVE | Noted: 2021-01-01

## 2021-11-22 NOTE — CARE COORDINATION
Social work: spoke to pt who is [facility] with [facility] where he was or the new place they acquired [facility] unless he gets well enough to go home to mom and do ok with home care. Right now he states he has trouble getting to bathroom alone.   Gabriela schuster

## 2021-11-22 NOTE — PROGRESS NOTES
Occupational Therapy   Occupational Therapy Initial Assessment  Date: 2021   Patient Name: Kristy Ling  MRN: [id number]     : 1965    Date of Service: 2021    Discharge Recommendations:  Patient would benefit from continued therapy after discharge   Pt currently functioning below baseline. Would suggest additional therapy at time of discharge to maximize long term outcomes and prevent re-admission. Please refer to AM-PAC score for current level of function. OT Equipment Recommendations  Equipment Needed: Yes  Mobility Devices: ADL Assistive Devices  ADL Assistive Devices: Dressing Stick; Sock-Aid Hard; Reacher; Long-handled Sponge; Emergency Alert System     RN Frakn Garrison reports patient is medically stable for therapy treatment this date. Chart reviewed prior to treatment and patient is agreeable for therapy. All lines intact and patient positioned comfortably at end of treatment. All patient needs addressed prior to ending therapy session. Per H&P: Kristy Ling is a 49 y.o. Non- / non  male who presents with Abdominal Pain and Tachycardia   and is admitted to the hospital for the management of Rapid atrial fibrillation ([facility] [address]         Assessment   Performance deficits / Impairments: Decreased functional mobility ; Decreased safe awareness; Decreased balance; Decreased ADL status; Decreased cognition; Decreased endurance; Decreased strength; Decreased sensation  Assessment: Pt required 2 staff assist as pt is impulsive and has dec safety awareness.  Skilled OT is indicated to increase overall IND and safety with self-care and functional tasks to return home when appropriate  Prognosis: Fair; Good  Decision Making: High Complexity  OT Education: OT Role; Plan of Care; Home Exercise Program; ADL Adaptive Strategies; Transfer Training; Energy Conservation; Equipment  Patient Education: OT POC, benefits of OOB activity, call light/fall prevention, safety in function, fall prevention handout  REQUIRES OT FOLLOW UP: Yes  Activity Tolerance  Activity Tolerance: Patient limited by pain; Patient limited by fatigue  Safety Devices  Safety Devices in place: Yes  Type of devices: All fall risk precautions in place; Nurse notified; Patient at risk for falls; Call light within reach; Bed alarm in place; Left in bed           Patient Diagnosis(es): The primary encounter diagnosis was Atrial fibrillation with RVR ([facility] [address] A diagnosis of Pneumonia of left lung due to infectious organism, unspecified part of lung was also pertinent to this visit. has a past medical history of Abnormal EKG, Acid reflux, Anesthesia complication, Arthritis, Balance problems, Broken neck (HCC), C. difficile diarrhea, Chest pain, Chronic back pain, Diabetes mellitus ([facility] [address] DKA (diabetic ketoacidoses), ETOH abuse, Falls frequently, Full dentures, Hyperlipidemia, Hypertension, Lumbar disc disease, MDRO (multiple drug resistant organisms) resistance, Pancreatitis, Sleep apnea, Tobacco abuse, and Wears glasses. has a past surgical history that includes Nose surgery; [place] tooth extraction; knee surgery (Bilateral); Cervical disc surgery (5/2/16); pr egd transoral biopsy single/multiple (N/A, 3/27/2017); Nerve Block (05/25/2017); Nerve Block (06/09/2017); Nerve Block (Bilateral, 06/16/2017); Nerve Block (Right, 06/26/2017); Nerve Block (Left, 07/21/2017); Knee arthroscopy (Bilateral); back surgery; Colonoscopy; Endoscopy, colon, diagnostic; eye surgery; and pr total knee arthroplasty (Left, 10/8/2018).            Restrictions  Restrictions/Precautions  Restrictions/Precautions: General Precautions, Up as Tolerated  Required Braces or Orthoses?: No  Position Activity Restriction  Other position/activity restrictions: up with assist, RUE IV, 2L O2    Subjective   General  Chart Reviewed: Yes  Patient assessed for rehabilitation services?: Yes  Additional Pertinent Hx: schizoaffective disorder  Family / Caregiver Present: No  Subjective  Subjective: pt pleasant and cooperative for OT eval  General Comment  Comments: 10/10 pain in abd     Social/Functional History  Social/Functional History  Lives With: Family (Mom)  Type of Home: House  Home Layout: Two level, Bed/Bath upstairs (flight of stairs to 2nd floor, R HR)  Home Access: Stairs to enter without rails  Entrance Stairs - Number of Steps: 4  Bathroom Shower/Tub: Tub/Shower unit  Bathroom Toilet: Standard  Bathroom Equipment: Shower chair  Home Equipment:  (no dME)  ADL Assistance: Independent  Homemaking Assistance: Independent  Homemaking Responsibilities: Yes (shared with mom)  Ambulation Assistance: Independent  Transfer Assistance: Independent  Active : Yes  Occupation: On disability  Leisure & Hobbies: fishing  Additional Comments: pt is questionable historian. 3 falls in last 3 months, pt reports he lost balance       Objective   Vision: Impaired  Vision Exceptions: Wears glasses at all times  Hearing: Within functional limits    Orientation  Overall Orientation Status: Within Functional Limits  Observation/Palpation  Posture: Fair  Observation: Pt resting in bed and required MAX encouragement to participate  Balance  Sitting Balance: Stand by assistance  Standing Balance: Moderate assistance (x2)  Standing Balance  Time: ~30 seconds  Activity: functional mob EOB  Functional Mobility  Functional - Mobility Device: No device  Activity: Other  Assist Level: Moderate assistance (x2)  Functional Mobility Comments: With MAX encouragment, pt only agreeable to 2-3 side steps EOB d/t pain. Pt impuslive and has dec safety awareness.  MAX VCs for assist/awareness of lines, pacing, upright posture, and scanning room environment all to increase safety/reduce fall risk  ADL  Feeding: Modified independent   Grooming: Stand by assistance (seated)  UE Bathing: Minimal assistance  LE Bathing: Maximum assistance  UE Dressing: Minimal assistance  LE Dressing: Maximum assistance (MAX B Balance Training, Functional Mobility Training, Safety Education & Training, Cognitive Reorientation, Equipment Evaluation, Education, & procurement, Self-Care / ADL, Pain Management, Home Management Training      AM-PAC Score        AM-PAC Inpatient Daily Activity Raw Score: 16 (11/22/21 1137)  AM-PAC Inpatient ADL T-Scale Score : 35.96 (11/22/21 1137)  ADL Inpatient CMS 0-100% Score: 53.32 (11/22/21 1137)  ADL Inpatient CMS G-Code Modifier : CK (11/22/21 1137)    Goals  Short term goals  Time Frame for Short term goals: by discharge, pt to demo  Short term goal 1: I/MI for bed mob tasks without bed rails  Short term goal 2: I/MI for UB ADLs and SBA for LB ADLs with AE as needed  Short term goal 3: SBA for ADL transfers and functional mob with AD as needed and Good safety  Short term goal 4: increase in BUE strength by 1/2 grade to increase IND and safety with self-care and be IND with HEP with handouts  Short term goal 5: pt/family to be IND with EC/WS and fall prevention tech, as well as DME/AE recommendations with use of handouts as needed  Patient Goals   Patient goals : To go home       Therapy Time   Individual Concurrent Group Co-treatment   Time In 1027         Time Out 1058          Minutes 31          tx time: 24 min     Co-treatment with PT warranted secondary to decreased safety and independence requiring 2 skilled therapy professionals to address individual discipline's goals. OT addressing preparation for ADL transfer, sitting balance for increased ADL performance, sitting/activity tolerance, functional reaching, environmental safety/scanning, fall prevention, functional mobility for ADL transfers, ability to sequence and follow directions and functional UE strength. Upon writer exit, call light within reach, pt retired to bed. All lines intact and patient positioned comfortably. All patient needs addressed prior to ending therapy session.  Chart reviewed prior to treatment and patient is agreeable for therapy. RN reports patient is medically stable for therapy treatment this date.       Laine Jama OTR/L

## 2021-11-22 NOTE — PROCEDURES
[address] [facility] [address]                 [address] [facility][note 3], [address] [place] [address]                              CARDIAC STRESS TEST    PATIENT NAME: Kermit Vera                     :        1965  MED REC NO:   [id number]                             ROOM:       08  ACCOUNT NO:   [de-identified]                           ADMIT DATE: 2021  PROVIDER:     Braxton Tao    DATE OF STUDY:  2021    LEXISCAN MYOVIEW STRESS TEST    ATTENDING PROVIDER:  Ayaan Freitas DO    PERFORMING PHYSICIAN:  Braxton Tao MD    INDICATION:  Chest pain. HEART RATE  100% max predicted heart rate:  166  85% max predicted heart rate:  141  Duration:  1:00    Resting heart rate:  117  Maximum heart rate achieved:  130  % of predicted maximum:  78    BLOOD PRESSURE  Resting BP:  135/77  Peak BP:  135/77  METS:  1.0    MEDICATIONS GIVEN:  0.4 mg Lexiscan    REASON FOR TERMINATION:  Medication infusion complete. BASELINE EKG DEMONSTRATED:  Sinus rhythm. Occasional PACs. During the stress test, the patient reported:  No symptoms. STRESS EKG DEMONSTRATED:  Frequent PACs. HEART RATE RESPONSE:  Normal response. BLOOD PRESSURE RESPONSE:  Normal response. ECG IMPRESSION:  Negative. FINAL IMPRESSION:  Negative. Nuclear medicine report to follow.     COMMENTS:  Normal.        DAVID SERNA    D: 2021 10:40:22       T: 2021 10:41:32     MARIBEL/MORENO_STEVEIT  Job#: [id number]     Doc#: Unknown

## 2021-11-22 NOTE — PLAN OF CARE
Nutrition Problem #1: Inadequate protein-energy intake  Intervention: Food and/or Nutrient Delivery: Continue NPO  Nutritional Goals: Diet advancement within 72 hours or initiate nutrition support

## 2021-11-22 NOTE — PROGRESS NOTES
Comprehensive Nutrition Assessment    Type and Reason for Visit:  Initial, Positive Nutrition Screen (14-23 lb weight loss, decreased appetite, malnutrition score:3 ; difficulty chewing and swallowing food: poor dentition and missing teeth)    Nutrition Recommendations/Plan:   1. Continue NPO status  2. Monitor for diet advancement/ tolerance, po intakes, weights and labs  3. Add Glucerna 3x/d once diet advances   4. Recommend adding chopped/minced to meals once diet advances     Nutrition Assessment:  Patient admission related to rapid atrial fibrillation with RVR and pneumonia. Pt has hx: alcohol abuse with chronic pancreatitis. Pulmonary following for left lung/ mediastinal mass with PE. On arrival pt getting ready for pigtail placement procedure. He does not know how much wt loss he has had. Reports UBW: 200-215#. EHR showing 220# in April 2020. He reports poor appetite/intake x weeks. He drinks Boost at home 3x/d. He needs chopped/minced texture due to poor dentition/missing teeth. Will add Glucerna 3x/d once diet advances. Monitor for diet advancement, po intakes, ONS acceptance, weights and labs. Malnutrition Assessment:  Malnutrition Status:  Insufficient data    Context:  Chronic Illness     Findings of the 6 clinical characteristics of malnutrition:  Energy Intake:  7 - 75% or less estimated energy requirements for 1 month or longer  Weight Loss:  Unable to assess (12% wt loss unable to verify timeframe)     Body Fat Loss:  Unable to assess     Muscle Mass Loss:  Unable to assess    Fluid Accumulation:  1 - Mild Extremities   Strength:  Not Performed    Estimated Daily Nutrient Needs:  Energy (kcal):  0757-0509 kcal (23-25 kcal/kg); Weight Used for Energy Requirements:  Current     Protein (g):   gm (1.3-1.5 g/kg); Weight Used for Protein Requirements:  Ideal        Method Used for Fluid Requirements:  1 ml/kcal      Nutrition Related Findings:  11/22 pigtail cath. Active bowel sounds. Edema generalized +2 non-pitting. Wounds:  None       Current Nutrition Therapies:    Diet NPO  Diet NPO    Anthropometric Measures:  · Height: 5' 9\" (175.3 cm)  · Current Body Weight: 176 lb (79.8 kg)   · Admission Body Weight: 170 lb (77.1 kg) (estimated)    · Usual Body Weight: 200 lb (90.7 kg) (-215# reported)     · Ideal Body Weight: 160 lbs; % Ideal Body Weight 110 %   · BMI: 26  · Adjusted Body Weight:  ; No Adjustment   · BMI Categories: Overweight (BMI 25.0-29. 9)       Nutrition Diagnosis:   · Inadequate protein-energy intake related to inadequate protein-energy intake as evidenced by NPO or clear liquid status due to medical condition, poor intake prior to admission, weight loss    Nutrition Interventions:   Food and/or Nutrient Delivery:  Continue NPO  Nutrition Education/Counseling:  Education not indicated   Coordination of Nutrition Care:  Continue to monitor while inpatient    Goals:  Diet advancement within 72 hours or initiate nutrition support       Nutrition Monitoring and Evaluation:   Behavioral-Environmental Outcomes:  None Identified   Food/Nutrient Intake Outcomes:  Diet Advancement/Tolerance  Physical Signs/Symptoms Outcomes:  Biochemical Data, Fluid Status or Edema, Weight, Skin     Discharge Planning:     Too soon to determine     Syed Stoner, [address] [place] [address], [address] [place] [address]  Office Number: [phone]

## 2021-11-22 NOTE — PLAN OF CARE
Problem: Falls - Risk of:  Goal: Will remain free from falls  Description: Will remain free from falls  Outcome: Ongoing   No falls noted this shift. Problem: Falls - Risk of:  Goal: Absence of physical injury  Description: Absence of physical injury  Outcome: Ongoing   No injury noted this shift. Problem: Pain:  Goal: Pain level will decrease  Description: Pain level will decrease  Outcome: Ongoing   Pt rated pain as high as 10 this shift. Prn pain medications changed to meet the needs of patient comfort. Norco effective. Problem: Nutrition  Goal: Optimal nutrition therapy  Outcome: Ongoing   Pt started on nutritional supplements. Tolerating well.

## 2021-11-22 NOTE — PROGRESS NOTES
[facility]  Office: [address], DO, Claudia Din, DO, Sherman Acron, DO, Vivien Sheehan Blood, DO, Hugo Leggett MD, Juancho Harper MD, Kaya Lunsford MD, Cristin Polanco MD, Tyler Hayden MD, Paulie Hook MD, Demetrius Grossman MD, Armand Herrera, DO, Joon Martinez, DO, Destiny Lloyd MD,  Billy Thorpe, DO, Daniel Mixon MD, Gloria Parkinson MD, Priscila Tejeda MD, David Jj MD, Ap Tompkins MD, Merna Espinoza MD, Dayne Ling MD, Lynn Bates, [facility], [facility] Dagmar, CNP, Mariann Figueroa, CNP, Lona Anderson, CNS, Broderick Childs, CNP, Adelia Alpers, CNP, Jeet Beltran, CNP, Felicia Aceves, CNP, Dianna Mccall, CNP, Pat Peoples PA-C, Suellen Steen, [facility], Darius Nichole, CNP, Federico Quinn, CNP, Ccii Schmidt, CNP, Laura Madrigal, CNP, Jameel Blake, CNP, Max Zaragoza, [facility], Rahul Andrews    Progress Note    11/22/2021    8:28 AM    Name:   Benson Hagen  MRN:     [id number]     Acct:      [de-identified]   Room:   [address] [place] [address] Day:  2  Admit Date:  11/20/2021  2:36 PM    PCP:   No primary care provider on file. Code Status:  Full Code    Subjective:     C/C:   Chief Complaint   Patient presents with    Abdominal Pain    Tachycardia     Interval History Status: not changed. Patient seen and examined this morning. No acute events overnight. Reports that he feels about the same. Continues to have significant dyspnea with exertion. States that he has not gotten up to ambulate today secondary to this. Patient understands a he is going to have a chest tube placed today. Brief History:     Mr. Amilcar Crews is a 49year old  gentleman who initially presented to [facility] for evaluation of multiple complaints, with his largest complaint being abdominal pain.  Other complaints include periumbulical abdominal pain, chest pain, shortness of breath, dyspnea with exertion, nausea, vomiting, and weight loss. Upon arrival to the ED, it is noted that he is quite a poor historian. HR noted to be elevated in the 170s-190s upon presentation. He received adenosine x2 (6 mg, then 12 mg) in the ED, which did not break the SVT. However, he was noted to be irregular. He was placed on a cardizem infusion. CXR revealed complete white out of the left side of his chest. CT of the chest revealed a heterogenous soft tissue mass measuring 14.5 x 13.3 x 12 cm involving the anterior and middle mediastinum and encasing and displacing the mediastinal structures. Complete collapse of  The left lung is noted. Differential includes malignant thymic neoplasm, primary mediastinal lymphoma, malignant germ cell neoplasm. He has complete opacification of the left hemithorax secondary to complete collapse of the left lung and a large left pleural effusion. Right apex has patchy airspace disease. Ct of the abdomen and pelvis is without any acute findings or findings concerning for malignancy. He converted to sinus tachycardia overnight. He was placed on a heparin infusion secondary to new-onset a-fib with RVR. Pulmonology has been consulted and is recommending IR consult for left pigtail catheter insertion. Cytology will be obtained. If cytology is negative, will consider biopsy of the mass.      His comorbidities include a history of schizoaffective disorder, homelessness, DM II, hypertension, COPD, asthma, GERD, and major depressive disorder with suicidal ideations (last admitted in April of 2021).     Review of Systems:     Constitutional:  negative for chills, fevers, sweats  Respiratory: reports shortness of breath, dyspnea with exertion, small productive cough; negative for wheezing  Cardiovascular:  negative for chest pain, chest pressure/discomfort, lower extremity edema, palpitations  Gastrointestinal:  negative for abdominal pain, constipation, diarrhea, nausea, vomiting  Neurological:  negative for dizziness, He has a 34.00 pack-year smoking history. He has never used smokeless tobacco. He reports current alcohol use of about 3.0 standard drinks of alcohol per week. He reports that he does not use drugs. Family History:   Family History   Problem Relation Age of Onset    Diabetes Mother     Heart Disease Father     Diabetes Sister        Vitals:  BP (!) 143/106   Pulse 113   Temp 97.7 °F (36.5 °C) (Oral)   Resp 16   Ht 5' 9\" (1.753 m)   Wt 176 lb (79.8 kg)   SpO2 98%   BMI 25.99 kg/m²   Temp (24hrs), Av.9 °F (36.6 °C), Min:97.7 °F (36.5 °C), Max:98.2 °F (36.8 °C)    Recent Labs     21  0847 21  1157 21  1703 21  1945   POCGLU 161* 166* 142* 180*       I/O (24Hr):     Intake/Output Summary (Last 24 hours) at 2021 0828  Last data filed at 2021 0800  Gross per 24 hour   Intake 1075 ml   Output    Net 1075 ml       Labs:  Hematology:  Recent Labs     21  1452 21  1525 21  0653   WBC 10.9  --  7.9   RBC 5.95*  --  4.95   HGB 16.1  --  13.6   HCT 51.3*  --  41.9   MCV 86.2  --  84.6   MCH 27.1  --  27.5   MCHC 31.4  --  32.5   RDW 21.4*  --  20.5*     --  391   MPV 9.6  --  9.4   INR  --  1.2  --      Chemistry:  Recent Labs     21  1452 21  1708 21  0653 21  0651   *  --  131* 128*   K 3.9  --  3.5* 4.6   CL 93*  --  92* 89*   CO2 22  --  22 22   GLUCOSE 160*  --  158* 151*   BUN 11  --  11 7   CREATININE 0.49*  --  0.51* 0.49*   MG  --   --  1.5* 1.8   ANIONGAP 18*  --  17 17   LABGLOM >60  --  >60 >60   GFRAA >60  --  >60 >60   CALCIUM 9.8  --  8.9 9.0   TROPHS 22 19  --   --    MYOGLOBIN 24* <21*  --   --      Recent Labs     21  1452 21  1454 21  0847 21  1157 21  1703 21  1945 21  0651   PROT 8.0  --   --   --   --   --  7.0   LABALBU 3.8  --   --   --   --   --  3.6   AST 24  --   --   --   --   --  19   ALT 29  --   --   --   --   --  20   LDH  --   --   --   --   -- --  420*   ALKPHOS 154*  --   --   --   --   --  134*   BILITOT 0.89  --   --   --   --   --  0.98   BILIDIR 0.44*  --   --   --   --   --  0.32*   AMYLASE 53  --   --   --   --   --   --    LIPASE 37  --   --   --   --   --   --    POCGLU  --  140* 161* 166* 142* 180*  --      ABG:  Lab Results   Component Value Date    FIO2 NOT REPORTED 03/26/2017     Lab Results   Component Value Date/Time    SPECIAL RT HAND,7ML 11/20/2021 05:08 PM     Lab Results   Component Value Date/Time    CULTURE NO GROWTH 2 DAYS 11/20/2021 05:08 PM       Radiology:  CT CHEST W CONTRAST    Result Date: 11/20/2021  1. Heterogeneous soft tissue mass, measuring approximately 14.5 x 13.3 x 12 cm in cephalo caudad, AP and transverse dimension, involving both the anterior and middle mediastinum, and encasing and displacing the mediastinal structures. Complete collapse of the left lung is noted. Differential considerations would include a malignant thymic neoplasm, primary mediastinal lymphoma, malignant germ cell neoplasm, and biopsy is recommended for histologic evaluation. 2. Complete opacification of the left hemithorax, secondary to complete collapse of the left lung, and a large left pleural effusion 3. Small right pleural effusion 4. Patchy airspace disease within the right apex, differential considerations to include pneumonia or atelectasis 5. Coronary arterial calcifications     CT ABDOMEN PELVIS W IV CONTRAST Additional Contrast? Radiologist Recommendation    Result Date: 11/21/2021  Unremarkable CT study. There are no acute findings and no evidence of malignancy or metastatic disease within the abdomen or pelvis. The left hemidiaphragm is displaced inferiorly due to the large left pleural effusion. XR CHEST PORTABLE    Result Date: 11/20/2021  New complete opacification of the left hemithorax, likely due to a combination of a large effusion and airspace disease.        Physical Examination:        General appearance:  alert, cooperative and no distress, lying on left side  Mental Status:  oriented to person, place and time and normal affect  Lungs: Very poor air movement in left lower and upper lung fields, right lung field is with good air movement, no wheezes/rales/ronchi, normal effort  HEENT: simple cannula present, glasses present, EOMI, sclera anti-icteric  Heart:  regular rate and rhythm, no murmur  Abdomen:  soft, nontender, nondistended, normal bowel sounds, no masses, hepatomegaly, splenomegaly  Extremities:  no edema, redness, tenderness in the calves  Skin:  no gross lesions, rashes, induration    Assessment:        Hospital Problems           Last Modified POA    * (Principal) Mediastinal mass 11/21/2021 Yes    Atrial fibrillation with RVR ([address] 11/22/2021 Yes    Essential hypertension 11/20/2021 Yes    Type 2 diabetes mellitus with hyperglycemia, with long-term current use of insulin ([address] 11/20/2021 Yes    Schizophrenia ([address] 11/20/2021 Yes    Chronic pancreatitis ([address] 11/20/2021 Yes    Hyponatremia 11/22/2021 Yes    New onset a-fib ([address] 11/22/2021 Yes    Pleural effusion on left 11/20/2021 Yes          Plan:        Mediastinal mass - 14.5 x 13.3 x 12 cm mass present. Awaiting pathology from pleural fluid  Left-sided pleural effusion - to undergo thoracentesis / chest tube placement. Await studies. New-onset Afib with RVR - Initially on cardizem infusion. Propafenone. Transitioned to oral today. Stress test and echo performed today. Continue heparin. DM II - continue glucotrol, lantus 20 U qam, insulin sliding scale, continue to hold metformin. Continue statin therapy. Essential hypertension - continue lisinopril, lopressor  COPD - Start albuterol inhaler. Hyponatremia - monitor. Slowly downtrending.      [address] Jessy Truong DO  11/22/2021  8:28 AM
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
## 2021-11-22 NOTE — PROGRESS NOTES
Per DR. Pepe Joy for patient to stop heparin drip for pigtail cath, and ok for oral medications with sip, including beta blockers

## 2021-11-22 NOTE — PROGRESS NOTES
Paged Dr. Mouna Nino due to need for heparin drip stopping as well as oral medications prior to stress test. Awaiting call back.

## 2021-11-22 NOTE — PROGRESS NOTES
Per Dr. Merlin Gondola, patient may restart diet previous and ok for restart of iv or oral anticoagulation per cardiology.

## 2021-11-22 NOTE — PROGRESS NOTES
caries     Acid reflux     Primary osteoarthritis of left knee     Hyponatremia     Acute psychosis (HCC)     Rapid atrial fibrillation (HCC)     Pleural effusion on left     Mediastinal mass      Plan of Treatment:   1. Awaiting results of ECHO and LEXISCAN STRESS tests completed this AM.  2. PAF in SR on tele. Continue CCB, BB, propafenone. Heparin held for pigtail cath placement  3. Keep K > 4.0 and Mag > 2.0 K 4.6 and Mag 1.8 today. Replace Mag per modified SS. 4. Left mediastinal mass per pulmonary.     5. Rest of care managed per Primary Team.    Electronically signed by Alissa Nixon, APRN - NP on 11/22/2021 at 11:37 AM  [address]  [phone]

## 2021-11-22 NOTE — CARE COORDINATION
Social work: Patient accepted at [facility] (formally [facility]) and precert is pending. LIAM started.

## 2021-11-22 NOTE — CARE COORDINATION
Social work: Noted PT/OT recommendation of SNF. Patient is agreeable, list reviewed, he has been to [address] in the past and is agreeable to going there again. Referral faxed for review. LIAM started.

## 2021-11-22 NOTE — PROGRESS NOTES
Pt noted to have completely full atrium when patient arrived back to floor. Clamped the chest tube and called IR. Notified Jamal Espinoza RN to notify her physician and get back to RN.

## 2021-11-22 NOTE — CARE COORDINATION
Discharge planning    Patient chart reviewed. Per prior CM notes patient lives with parent. Has no DME. And is admitted for a fib. Patient has been to [facility] in past. He has no PCP currently. Will provide list and encourage to make an appointment prior to discharge. Patient currently on IV heparin and cardizem. Cardiology is following. Patient may need anticoagulation on discharge. He has SocialF5. Will need to call into his pharmacy to see if need PA> will have to obtain order from attending or cardiology     [facility] [phone]    Pulmonary is also following for left lung/mediastinal mass with left pleural effusion. Pulmonary notes that patent to have pigtail catheter per IR and will consider bx of mass if cytology is negative. If needs pigtail will need someone to follow home care.  He has no PCP

## 2021-11-22 NOTE — PROGRESS NOTES
Physical Therapy    Facility/Department: [facility] PROGRESSIVE CARE  Initial Assessment    NAME: Annie Houston  : 1965  MRN: [id number]    Date of Service: 2021    Discharge Recommendations:  Patient would benefit from continued therapy after discharge        Assessment   Body structures, Functions, Activity limitations: Decreased functional mobility ; Decreased strength; Decreased endurance; Decreased balance  Assessment: Pt lacks tolerance of mobility type activity due to reduced endurance  Prognosis: Good  Decision Making: High Complexity  PT Education: PT Role; Plan of Care; General Safety  Patient Education: Handout: Fall prevention  REQUIRES PT FOLLOW UP: Yes  Activity Tolerance  Activity Tolerance: Patient limited by endurance; Patient limited by fatigue       Patient Diagnosis(es): The primary encounter diagnosis was Atrial fibrillation with RVR ([facility] [address] A diagnosis of Pneumonia of left lung due to infectious organism, unspecified part of lung was also pertinent to this visit. has a past medical history of Abnormal EKG, Acid reflux, Anesthesia complication, Arthritis, Balance problems, Broken neck (HCC), C. difficile diarrhea, Chest pain, Chronic back pain, Diabetes mellitus ([facility] [address] DKA (diabetic ketoacidoses), ETOH abuse, Falls frequently, Full dentures, Hyperlipidemia, Hypertension, Lumbar disc disease, MDRO (multiple drug resistant organisms) resistance, Pancreatitis, Sleep apnea, Tobacco abuse, and Wears glasses. has a past surgical history that includes Nose surgery; [place] tooth extraction; knee surgery (Bilateral); Cervical disc surgery (16); pr egd transoral biopsy single/multiple (N/A, 3/27/2017); Nerve Block (2017); Nerve Block (2017); Nerve Block (Bilateral, 2017); Nerve Block (Right, 2017); Nerve Block (Left, 2017); Knee arthroscopy (Bilateral); back surgery;  Colonoscopy; Endoscopy, colon, diagnostic; eye surgery; and pr total knee arthroplasty (Left, 10/8/2018). Restrictions  Restrictions/Precautions  Restrictions/Precautions: Fall Risk, General Precautions, Up as Tolerated  Required Braces or Orthoses?: No  Position Activity Restriction  Other position/activity restrictions: up with assist, RUE IV, 2L O2  Vision/Hearing        Subjective  General  Patient assessed for rehabilitation services?: Yes  Response To Previous Treatment: Not applicable  Family / Caregiver Present: No  Follows Commands: Within Functional Limits  General Comment  Comments: OK for PT per Dayan Hernandez RN  Pain Screening  Patient Currently in Pain: Yes  Pain Assessment  Pain Assessment: 0-10  Pain Level: 10  Pain Type: Chronic pain  Pain Location: Abdomen  Vital Signs  Patient Currently in Pain: Yes       Orientation  Orientation  Overall Orientation Status: Within Functional Limits  Social/Functional History  Social/Functional History  Lives With: Family (Mom)  Type of Home: House  Home Layout: Two level, Bed/Bath upstairs (flight of stairs to 2nd floor, R HR)  Home Access: Stairs to enter without rails  Entrance Stairs - Number of Steps: 4  Bathroom Shower/Tub: Tub/Shower unit  Bathroom Toilet: Standard  Bathroom Equipment: Shower chair  Home Equipment:  (no dME)  ADL Assistance: Independent  Homemaking Assistance: Independent  Homemaking Responsibilities: Yes (shared with mom)  Ambulation Assistance: Independent  Transfer Assistance: Independent  Active : Yes  Occupation: On disability  Leisure & Hobbies: fishing  Additional Comments: pt is questionable historian.  3 falls in last 3 months, pt reports he lost balance  Cognition        Objective     Observation/Palpation  Posture: Fair  Observation: Pt in bed when PT arrived, states OK for PT eval    AROM RLE (degrees)  RLE AROM: WNL  AROM LLE (degrees)  LLE AROM : WNL  AROM RUE (degrees)  RUE General AROM: See OT neil for B UE ROM  Strength RLE  Strength RLE: WFL  Comment: NITESH HERNANDEZ's 4/5 to 4+/5  Strength LLE  Strength LLE: [facility]  Strength KASSY  Comment: See OT neil for B UE MMT  Tone RLE  RLE Tone: Normotonic  Tone LLE  LLE Tone: Normotonic  Motor Control  Gross Motor?: WNL     Bed mobility  Rolling to Left: Supervision  Rolling to Right: Supervision  Transfers  Sit to Stand: Moderate Assistance; 2 Person Assistance  Stand to sit: Moderate Assistance; 2 Person Assistance  Stand Pivot Transfers: Moderate Assistance; 2 Person Assistance  Ambulation  Ambulation?: Yes  Ambulation 1  Device: [address]: Moderate assistance; 2 Person assistance  Distance: 2 R sidesteps at bedside  Comments: Pt unable to tolerate further ambulation  Stairs/Curb  Stairs?: No     Balance  Posture: Fair  Sitting - Static: Good  Sitting - Dynamic: Good  Standing - Static: Poor; +  Standing - Dynamic: Poor        Plan   Plan  Times per week: 1-2x/day,5-6 days/week  Current Treatment Recommendations: Strengthening, Balance Training, Functional Mobility Training, Transfer Training, Stair training, Gait Training, Endurance Training  Safety Devices  Type of devices: Gait belt, Left in bed, Bed alarm in place, Call light within reach  Restraints  Initially in place: No    G-Code       OutComes Score                                                  AM-PAC Score  AM-PAC Inpatient Mobility Raw Score : 12 (11/22/21 1505)  AM-PAC Inpatient T-Scale Score : 35.33 (11/22/21 1505)  Mobility Inpatient CMS 0-100% Score: 68.66 (11/22/21 1505)  Mobility Inpatient CMS G-Code Modifier : CL (11/22/21 1505)          Goals  Short term goals  Time Frame for Short term goals: 12 treatments  Short term goal 1: Independent bed mobility/transfers  Short term goal 2:  Independent ambulation w/ appropriate assistive device 100' x 1  Short term goal 3: CGA ascending/descending 10 steps without HR  Short term goal 4: 1/2 to 1 grade strength increase B LE's  Short term goal 5: Good standing balance/ Tolerate 30 min ther act  Patient Goals   Patient goals : Feel better and go home       Therapy Time   Individual Concurrent Group Co-treatment   Time In 1045 (25 min treatment time)         Time Out 1117         Minutes [address] Desirae Boyd

## 2021-11-22 NOTE — PROGRESS NOTES
Patient tolerated left pigtail placement without distress. 500 ml of jose fluid removed. Dry dressing to site. Patient returned to room. Nurse updated.

## 2021-11-22 NOTE — PLAN OF CARE
Problem: Falls - Risk of:  Goal: Will remain free from falls  Description: Will remain free from falls  11/21/2021 2332 by Jonathan Hurley RN  Outcome: Ongoing     Problem: Falls - Risk of:  Goal: Absence of physical injury  Description: Absence of physical injury  11/21/2021 2332 by Jonathan Hurley RN  Outcome: Ongoing     Problem: Pain:  Goal: Pain level will decrease  Description: Pain level will decrease  11/21/2021 2332 by Jonathan Hurley RN  Outcome: Ongoing     Problem: Pain:  Goal: Control of acute pain  Description: Control of acute pain  11/21/2021 2332 by Jonathan Hurley RN  Outcome: Ongoing     Problem: Pain:  Goal: Control of chronic pain  Description: Control of chronic pain  11/21/2021 2332 by Jonathan Hurley RN  Outcome: Ongoing

## 2021-11-22 NOTE — PROGRESS NOTES
Dr. Patito Lowe at bedside concerning chest tube. Keep patient on water seal only. Clamp chest tube until 1900. Change atrium now. Send atrium down to lab for fluid cytology with the labs already ordered. Pt 100% on 2LNC. Pt also very anxious and in pain. Per Dr. Patito Lowe, patient to have morphine d/cd and have norco 5/325 mg 1 q4h prn for pain 4-6 and 7-10, and then fentanyl for breakthrough pain 25 mcg q1h prn for 7-10 if norco ineffective.

## 2021-11-22 NOTE — PROGRESS NOTES
Per Dr. Jessica Morgan, patient is to restart heparin drip at the 15 units/kg/hr. Recheck Xa per protocol.

## 2021-11-23 LAB
CASE NUMBER:: NORMAL
SPECIMEN DESCRIPTION: NORMAL

## 2021-11-23 NOTE — DISCHARGE SUMMARY
his largest complaint being abdominal pain. His comorbidities include a history of schizoaffective disorder, homelessness, DM II, hypertension, COPD, asthma, GERD, and major depressive disorder with suicidal ideations (last admitted in April of 2021). Other complaints include periumbulical abdominal pain, chest pain, shortness of breath, dyspnea with exertion, nausea, vomiting, and weight loss. Upon arrival to the ED, it is noted that he was quite a poor historian. HR noted to be elevated in the 170s-190s. He received adenosine x2 (6 mg, then 12 mg) in the ED, which did not break the SVT. However, he was noted to be irregular. He was placed on a cardizem infusion. CXR revealed complete white-out of the left side of his chest. CT of the chest revealed a heterogenous soft tissue mass measuring 14.5 x 13.3 x 12 cm involving the anterior and middle mediastinum and encasing and displacing the mediastinal structures. Complete collapse of the left lung is noted. Differential includes malignant thymic neoplasm, primary mediastinal lymphoma, malignant germ cell neoplasm. He has complete opacification of the left hemithorax secondary to complete collapse of the left lung and a large left pleural effusion. Right apex has patchy airspace disease. Ct of the abdomen and pelvis is without any acute findings or findings concerning for malignancy. He converted to sinus tachycardia overnight. He was placed on a heparin infusion secondary to new-onset a-fib with RVR. Pulmonology had been consulted and is recommending IR consult for left pigtail catheter insertion. Cytology will be obtained. If cytology is negative, will consider biopsy of the mass. He underwent thoracentesis /chest tube insertion on 11/22/2021 with 2 L of fluid removed without suction. He then had another 500 removed. His chest tube was clamped. It was then unclamped with a subsequent 800 cc of fluid removed.   Upon arrival at approximately 8:15 PM to reclamp his chest tube, patient was confused and became apneic. CODE BLUE was called overhead. ACLS protocol was immediately initiated. Patient was intubated. CPR and resuscitative efforts continued for 45 minutes. Unfortunately, patient was unable to be resuscitated. Time of death was called at 2103. Family was contacted and arrived at bedside. The intensivist was present and provided comfort to the family. Significant therapeutic interventions:   -Thoracentesis with over 3 L of fluid removed from his pleural cavity  -IV heparin secondary to A. fib with RVR  -Cardiology and pulmonology evaluations  -Intubation  -CPR/ACLS protocol    Significant Diagnostic Studies:   Labs / Micro:  CBC:   Lab Results   Component Value Date    WBC 7.9 11/21/2021    RBC 4.95 11/21/2021    RBC 4.41 02/23/2012    HGB 13.6 11/21/2021    HCT 41.9 11/21/2021    MCV 84.6 11/21/2021    MCH 27.5 11/21/2021    MCHC 32.5 11/21/2021    RDW 20.5 11/21/2021     11/21/2021     02/23/2012     CMP:    Lab Results   Component Value Date    GLUCOSE 151 11/22/2021    GLUCOSE 136 02/23/2012     11/22/2021    K 4.6 11/22/2021    CL 89 11/22/2021    CO2 22 11/22/2021    BUN 7 11/22/2021    CREATININE 0.49 11/22/2021    ANIONGAP 17 11/22/2021    ALKPHOS 134 11/22/2021    ALT 20 11/22/2021    AST 19 11/22/2021    BILITOT 0.98 11/22/2021    LABALBU 3.6 11/22/2021    LABALBU 4.4 02/20/2012    ALBUMIN NOT REPORTED 11/22/2021    LABGLOM >60 11/22/2021    GFRAA >60 11/22/2021    GFR      11/22/2021    GFR NOT REPORTED 11/22/2021    PROT 7.0 11/22/2021    CALCIUM 9.0 11/22/2021       Radiology:  CT CHEST W CONTRAST    Result Date: 11/20/2021  1. Heterogeneous soft tissue mass, measuring approximately 14.5 x 13.3 x 12 cm in cephalo caudad, AP and transverse dimension, involving both the anterior and middle mediastinum, and encasing and displacing the mediastinal structures. Complete collapse of the left lung is noted.   Differential considerations would include a malignant thymic neoplasm, primary mediastinal lymphoma, malignant germ cell neoplasm, and biopsy is recommended for histologic evaluation. 2. Complete opacification of the left hemithorax, secondary to complete collapse of the left lung, and a large left pleural effusion 3. Small right pleural effusion 4. Patchy airspace disease within the right apex, differential considerations to include pneumonia or atelectasis 5. Coronary arterial calcifications     CT ABDOMEN PELVIS W IV CONTRAST Additional Contrast? Radiologist Recommendation    Result Date: 11/21/2021  Unremarkable CT study. There are no acute findings and no evidence of malignancy or metastatic disease within the abdomen or pelvis. The left hemidiaphragm is displaced inferiorly due to the large left pleural effusion. XR CHEST PORTABLE    Result Date: 11/20/2021  New complete opacification of the left hemithorax, likely due to a combination of a large effusion and airspace disease. IR CHEST TUBE INSERTION    Result Date: 11/22/2021  Successful ultrasound and fluoroscopic guided placement of a 10 [place] left pleural pigtail catheter. Thoracentesis was performed with drainage of 500 mL of fluid which was sent for the requested studies. NM Cardiac Stress Test Nuclear Imaging    Result Date: 11/22/2021  No stress-induced ischemia. No infarct. Normal LVEF. Risk stratification: Low     IR GUIDED THORACENTESIS PLEURAL    Result Date: 11/22/2021  Successful ultrasound and fluoroscopic guided placement of a 10 [place] left pleural pigtail catheter. Thoracentesis was performed with drainage of 500 mL of fluid which was sent for the requested studies.        Consultations:    Consults:     Final Specialist Recommendations/Findings:   IP CONSULT TO CARDIOLOGY  IP CONSULT TO PULMONOLOGY      The patient was seen and examined on day of discharge and this discharge summary is in conjunction with any daily progress note from day of discharge. Discharge plan:     Disposition:     Time Spent on discharge is  43 mins in patient examination, evaluation, counseling as well as medication reconciliation, prescriptions for required medications, discharge plan and follow up. Electronically signed by   Justine Combs DO  2021  10:42 PM      Thank you Dr. Mccann Situ primary care provider on file. for the opportunity to be involved in this patient's care.

## 2021-11-23 NOTE — PROGRESS NOTES
Date: 11/22/2021  Time: 2035  Patient identity confirmed:  Yes  Indications: code blue  Preoxygenation: BVM with 100% O2  Laryngoscope size and type Glidescope  Airway introducer used: No  Evac: No  Tube size:a 7.5 cuffed  Number of attempts:1   Cords visualized:  [x] Clearly  [] Poorly  Breath sounds present bilaterally: Yes   ETCO2   [x] Positive   Tube secured at 24 at lip  Chest x-ray ordered: Yes  BP: BP: 123/84    Notes:    Procedure performed by: Dr Desirae Manzo RCP  9:11 PM

## 2021-11-23 NOTE — PROGRESS NOTES
Pulmonary Critical Care Progress Note    Patient seen for the follow up of Mediastinal mass/pleural effusion    Subjective:    He had chest pigtail catheter placed by IR and had 2 L of fluid out without suction. He had 500 mL removed before that and was fluid was sent for testing. I was contacted advised to clamp tube. He is anxious. He is on oxygen at 2 L nasal cannula. He denies chest pain. He has improved shortness of breath. Examination:    Vitals: /66   Pulse 86   Temp 97.5 °F (36.4 °C) (Oral)   Resp 16   Ht 5' 9\" (1.753 m)   Wt 176 lb (79.8 kg)   SpO2 100%   BMI 25.99 kg/m²   SpO2  Av.9 %  Min: 93 %  Max: 100 %  General appearance: alert and cooperative with exam  Neck: No JVD  Lungs: Decreased breath sound on the left side left chest tube in place no air leak  Heart: regular rate and rhythm, S1, S2 normal, no gallop  Abdomen: Soft, non tender, + BS  Extremities: no cyanosis or clubbing. No significant edema    LABs:    CBC:   Recent Labs     21  1452 21  0653   WBC 10.9 7.9   HGB 16.1 13.6   HCT 51.3* 41.9    391     BMP:   Recent Labs     21  0653 21  0651   * 128*   K 3.5* 4.6   CO2 22 22   BUN 11 7   CREATININE 0.51* 0.49*   LABGLOM >60 >60   GLUCOSE 158* 151*     PT/INR:   Recent Labs     21  1525   PROTIME 14.6*   INR 1.2     APTT:  Recent Labs     21  1525   APTT 28.8     LIVER PROFILE:  Recent Labs     21  1452 21  0651   AST 24 19   ALT 29 20   LABALBU 3.8 3.6       Radiology:    CXR       CT chest     1.  Heterogeneous soft tissue mass, measuring approximately 14.5 x 13.3 x 12   cm in cephalo caudad, AP and transverse dimension, involving both the   anterior and middle mediastinum, and encasing and displacing the mediastinal   structures.  Complete collapse of the left lung is noted.  Differential   considerations would include a malignant thymic neoplasm, primary mediastinal   lymphoma, malignant germ cell neoplasm, and biopsy is recommended for   histologic evaluation. 2. Complete opacification of the left hemithorax, secondary to complete   collapse of the left lung, and a large left pleural effusion   3. Small right pleural effusion   4. Patchy airspace disease within the right apex, differential considerations   to include pneumonia or atelectasis   5.  Coronary arterial calcifications                     Impression:  · Large left lung/mediastinal mass with left main occlusion and complete atelectasis of left lung  · Large left pleural effusion  · Right upper lobe opacity/pneumonia  · With RVR  · Ex-smoker    Recommendations:  · Oxygen by nasal cannula  · Incentive spirometry every hour awake  · Xopenex Neb by nebulizer  · Unclamp chest tube at 7 PM monitor chest tube output  · Repeat chest x-ray  · Check pleural fluid cytology and cultures  · We will consider biopsy of mass if fluid cytology is negative  · Heart rate control per cardiology/Cardizem  · Resume Heparin drip/oral anticoagulation per cardiology  · Liver function test and lipase  · Oncology consult   · PFT outpatient  · Discussed with RN  · DVT prophylaxis  ·     Marlys Parra MD, MD, [facility]  Pulmonary Critical Care and Sleep Medicine,  [facility]  Cell: [phone]  Office: [phone]

## 2021-11-23 NOTE — PROGRESS NOTES
At approxamitely 2115 writer entered the room to clamp pt's chest tube. Pt was confused and trying to get out of bed, saying nonsensical phrases. Writer attempted to reorient patient for several minutes. Pt's eyes then began to roll back and pt stopped all movement and began agonal breathing. At this time writer attempted to find a pulse, there was no pulse so writer initiated chest compressions and a code blue was called overhead    2026- code blue called, chest compressions initiated    2030- 1mg epi given. pulse check, no pulse. PEA with bradycardia, chest compressions resume    2031- 1 amp bicarb given. NS runs wide open    2032- 1 mg epi given. Pulse check, no pulse. PEA with bradycardia, chest compressions resume    2034- Pulse check, no pulse. PEA with bradycardia, chest compressions resume    2035- 1mg epi given. 2035- Pt successfully intubated    2036- Pulse check, no pulse. PEA with bradycardia, chest compressions resume    2038- Pulse check, no pulse. PEA with bradycardia, chest compressions resume    2039- 1mg epi given    2040- Pulse check, no pulse. PEA with bradycardia, chest compressions resume  2040- 1 amp bicarb given    2041- calcium 1000mg given    2042- Pulse check, no pulse. PEA with bradycardia, chest compressions resume    2043- fluids pressurized    2044- 1mg epi given. Pulse check, no pulse. PEA with bradycardia, chest compressions resume    2046- Pt's mother Cristobal Restrepo called, spoke briefly with nurse and Dr. Jojo Caro. Family states they are on their way    2046- Pulse check, no pulse. V-fib. Pt shocked at 200jules. chest compressions resume    2048- Pulse check, no pulse. V-fib, chest compressions resume    2049- IO line successfully placed in right shin.  Normal saline running wide open through this line    2050- Pulse check, no pulse, v.fib, pt shocked at 200jules, chest compressions resume    2051- Amio 300mg given    2052- Pulse check, no pulse, v.fib, pt shocked at 200jules, chest compressions resume    - 1mg epi given    - Pulse check, no pulse, v.fib, chest compressions resume    - 1mg epi given    - Pulse check, no pulse, v.fib, pt shocked at 200 jules, chest compressions resume    - 1mg epi given    - Pulse check, no pulse, v.fib, chest compressions resume    - 1mg epi given    - Pulse check, no pulse, v.fib, pt shocked at 200 johanne chest compressions resume    - 1mg epi given    - 1 amp bicarb given    - Pulse check, no pulse    Time of death called at . Family arrived to bedside at approximately . Hannah to assist family with pastoral care.  notified and released the body. [facility] notified and deferred care to  home. Post mortem care performed.  home arrived at approximately midnight with security and removed body in stretcher.

## 2021-11-23 NOTE — CODE DOCUMENTATION
I was in the ICU when I responded to [address] on patient. I came into evaluate. Patient had PEA arrest and CPR was immediately started. RN reports that she was in the room when patient went unresponsive when rolled his eyes up. He had agonal breathing and lost his pulse. We initiated ACLS protocol and patient received multiple dose epi calcium bicarb and also magnesium. The rhythm remains PEA arrest after check. He had V. fib at the end and was shocked multiple times. He received amiodarone. Efforts were stopped after 45 minutes of resuscitation and no pulse found. Patient asystole at the end.   Discussed with family mother and sisters on arrival.      Ashlyn Rodgers MD  Pulmonary critical care sleep

## 2021-11-23 NOTE — FLOWSHEET NOTE
Patient states well. No major needs or prayers expressed. Declines visit at this time.    leaves prayer card for possible follow up.     11/21/21 5940   Encounter Summary   Services provided to: Patient   Referral/Consult From: [facility]   (11-21-21)   Complexity of Encounter Low   Length of Encounter 15 minutes   Spiritual Assessment Completed Yes   Routine   Type Initial   Assessment Calm   Intervention Explored feelings, thoughts, concerns   Outcome Refused/declined
Priya Mccord and Priya Mccord
Writer responds to Code Omaze paged overhead at approximately 2020 to 1008. Patient is in cardiac arrest. Staff attempted resuscitation for 45 minutes but were unable to revive patient. Patient is pronounced at 2103. Writer provides supportive presence and prayer. 11/22/21 2020   Encounter Summary   Services provided to: Patient not available   Referral/Consult From: Multi-disciplinary team   Support System Parent;  Family members   Continue Visiting   (11/22/21)   Complexity of Encounter High   Length of Encounter 45 minutes   Crisis   Type Code   Assessment Unable to respond   Intervention Sustaining presence/ Ministry of presence; Prayer   Outcome Did not respond
FOLLOW-UP NEEDED? No    IF SO, WHAT?   None    Electronically signed by Kathie Monk, on 11/22/2021 at 10:25 PM.  [address] [place] [address]  [phone]

## 2021-11-23 NOTE — PROCEDURES
Jose Alejandro Das is a 49 y.o. male patient. 1. Atrial fibrillation with RVR ([facility]    2. Pneumonia of left lung due to infectious organism, unspecified part of lung      Past Medical History:   Diagnosis Date    Abnormal EKG 06/05/2018    ST & T WAVE ABNORMALITY    Acid reflux     Anesthesia complication     combative/ confused after some anesthesias per pt    Arthritis     Balance problems     HX. OF, HAS USED CANE & WALKER.  Broken neck (HCC)     HX. OF.    C. difficile diarrhea 12/2/2016    Chest pain     Chronic back pain     Diabetes mellitus ([facility]     DKA (diabetic ketoacidoses)     ETOH abuse     Falls frequently     Full dentures     Hyperlipidemia     Hypertension     Lumbar disc disease     MDRO (multiple drug resistant organisms) resistance     HX. C-DIFF.  Pancreatitis     Sleep apnea     DOESN'T USE MACHINE.  Tobacco abuse 11/20/2021    Wears glasses      Blood pressure 123/84, pulse 98, temperature 97.2 °F (36.2 °C), temperature source Oral, resp. rate 18, height 5' 9\" (1.753 m), weight 176 lb (79.8 kg), SpO2 95 %. Intubation    Date/Time: 11/23/2021 12:31 PM  Performed by: Jeni Luna MD  Authorized by: Jeni Luna MD   Consent: The procedure was performed in an emergent situation.   Indications: respiratory failure  Intubation method: video-assisted  Tube size: 8.0 mm  Tube type: cuffed  Number of attempts: 1  Cords visualized: yes  Post-procedure assessment: chest rise and ETCO2 monitor  Breath sounds: equal  Tube secured with: ETT hernandez          Jeni Luna MD  11/23/2021

## 2021-11-24 LAB
APPEARANCE FLUID: NORMAL
BASO FLUID: NORMAL %
COLOR FLUID: NORMAL
EOSINOPHIL FLUID: NORMAL %
FLUID DIFF COMMENT: NORMAL
LYMPHOCYTES, BODY FLUID: 78 %
MONOCYTE, FLUID: NORMAL %
NEUTROPHIL, FLUID: 4 %
OTHER CELLS FLUID: NORMAL %
RBC FLUID: <3000 /MM3
SPECIMEN TYPE: NORMAL
SURGICAL PATHOLOGY REPORT: NORMAL
WBC FLUID: 339 /MM3

## 2021-11-26 LAB
CULTURE: NORMAL
CULTURE: NORMAL
Lab: NORMAL
Lab: NORMAL
SPECIMEN DESCRIPTION: NORMAL
SPECIMEN DESCRIPTION: NORMAL

## 2021-11-28 LAB
CULTURE: ABNORMAL
DIRECT EXAM: ABNORMAL
Lab: ABNORMAL
SPECIMEN DESCRIPTION: ABNORMAL

## 2023-05-01 NOTE — BH NOTE
Psychoeducation Group Note    Date: 06/06/18  Start Time: 1615  End Time: 2427    Number Participants in Group:  6/14    Goal:  Patient will demonstrate increased interpersonal interaction   Topic: Wellness group- Positive Affirmations    Discipline Responsible:   OT  AT   X Nsg.  RT  Other       Participation Level:     None  Minimal   X Active Listener X Interactive    Monopolizing         Participation Quality:  X Appropriate  Inappropriate   X       Attentive        Intrusive   X       Sharing        Resistant   X       Supportive        Lethargic       Affective:   X Congruent  Incongruent  Blunted  Flat    Constricted  Anxious  Elated  Angry    Labile  Depressed  Other         Cognitive:  X Alert  Oriented PPTP     Concentration X G  F  P   Attention Span X G  F  P   Short-Term Memory X G  F  P   Long-Term Memory X G  F  P   ProblemSolving/  Decision Making X G  F  P   Ability to Process  Information X G  F  P      Contributing Factors             Delusional             Hallucinating             Flight of Ideas             Other:       Modes of Intervention:  X Education X Support  Exploration    Clarifying  Problem Solving  Confrontation    Socialization  Limit Setting  Reality Testing   X Activity  Movement  Media    Other:            Response to Learning:  X Able to verbalize current knowledge/experience    Able to verbalize/acknowledge new learning    Able to retain information    Capable of insight    Able to change behavior   X Progressing to goal    Other:        Comments: Pt. Chose positive affirmation and discussed how it applied to their life. All pts were able to verbalize positive coping skill. Cyclophosphamide Pregnancy And Lactation Text: This medication is Pregnancy Category D and it isn't considered safe during pregnancy. This medication is excreted in breast milk.

## 2024-11-06 NOTE — PROGRESS NOTES
Ferritin was 7 will get Feraheme x 2 dose. Patient ins does not cover Injectafer and there is a shortage on Venofer; Patient had a reaction to Ferrlicit.   Pharmacy Med Education Group Note    Date: 6/13/18  Start Time: 36  End Time: 9282    Number Participants in Group:  10    Goal:  Patient will demonstrate an understanding of the medications intended purpose and possible adverse effects  Topic: [place] for Pharmacy Med Ed Group    Discipline Responsible:     OT  AT  [facility].  RT     X Other       Participation Level:     None  Minimal      X Active Listener    X Interactive    Monopolizing         Participation Quality:    X Appropriate  Inappropriate     X       Attentive        Intrusive          Sharing        Resistant          Supportive        Lethargic       Affective:     X Congruent  Incongruent  Blunted  Flat    Constricted  Anxious  Elated  Angry    Labile  Depressed  Other         Cognitive:    X Alert  Oriented PPTP     Concentration   X G  F  P   Attention Span   X G  F  P   Short-Term Memory   X G  F  P   Long-Term Memory  G  F  P   ProblemSolving/  Decision Making  G  F  P   Ability to Process  Information   X G  F  P      Contributing Factors             Delusional             Hallucinating             Flight of Ideas             Other:       Modes of Intervention:    X Education   X Support  Exploration    Clarifying  Problem Solving  Confrontation    Socialization  Limit Setting  Reality Testing    Activity  Movement  Media    Other:            Response to Learning:    X Able to verbalize current knowledge/experience    Able to verbalize/acknowledge new learning    Able to retain information    Capable of insight    Able to change behavior    Progressing to goal    Other:        Comments:     Denice Falk,PharmD, 6/13/2018, 5:31 PM

## (undated) DEVICE — Z INACTIVE USE 2660664 SOLUTION IRRIG 3000ML 0.9% SOD CHL USP UROMATIC PLAS CONT

## (undated) DEVICE — SOLUTION IV IRRIG POUR BRL 0.9% SODIUM CHL 2F7124

## (undated) DEVICE — BRUSH CYTO GI W/ 3 RNG HNDL 1.8MMX120MM

## (undated) DEVICE — COVER,MAYO STAND,XL,STERILE: Brand: MEDLINE

## (undated) DEVICE — SOLUTION IV IRRIG WATER 1000ML POUR BRL 2F7114

## (undated) DEVICE — SET HNDPC W COAX BNE CLN TIP SUCT TB BTTRY PWR DISPOSABLE

## (undated) DEVICE — Z DISCONTINUED PER MEDLINE USE 2741943 DRESSING AQUACEL 10 IN ALG W9XL25CM SIL CVR WTRPRF VIR BACT BARR ANTIMIC

## (undated) DEVICE — DRAPE,REIN 53X77,STERILE: Brand: MEDLINE

## (undated) DEVICE — DUP USE 310781 BLADE SAW SAG 29MMX83.7MMX1.32MM

## (undated) DEVICE — BANDAGE,SELF ADHRNT,COFLEX,4"X5YD,STRL: Brand: COLABEL

## (undated) DEVICE — CEMENT MIXING SYSTEM WITH FEMORAL BREAKWAY NOZZLE: Brand: REVOLUTION

## (undated) DEVICE — TUBING, SUCTION, 9/32" X 20', STRAIGHT: Brand: MEDLINE INDUSTRIES, INC.

## (undated) DEVICE — GLOVE SURG SZ 85 STD WHT LTX SYN POLYMER BEAD REINF ANTI RL

## (undated) DEVICE — SUTURE VCRL SZ 0 L36IN ABSRB UD CT-1 L36MM 1/2 CIR TAPR PNT VCP946H

## (undated) DEVICE — SUTURE STRATAFIX SYMMETRIC PDS + SZ 1 L18IN ABSRB VLT L48MM SXPP1A400

## (undated) DEVICE — KIT SEP W/ BLD DRAW TB SYR NDL TRNQT PD

## (undated) DEVICE — COOLER THER 20-31IN L CRYO COMB W/ PD KNEE TB GRAV FLO

## (undated) DEVICE — COVER,TABLE,HEAVY DUTY,50"X90",STRL: Brand: MEDLINE

## (undated) DEVICE — STERILE PATIENT PROTECTIVE PAD FOR IMP® KNEE POSITIONERS & COHESIVE WRAP (10 / CASE): Brand: DE MAYO KNEE POSITIONER®

## (undated) DEVICE — 4-PORT MANIFOLD: Brand: NEPTUNE 2

## (undated) DEVICE — Z INACTIVE USE 2392665 BLADE LARYNGOSCOPE 4 GLIDESCOPE GVL STAT DISP

## (undated) DEVICE — 3M™ WARMING BLANKET, UPPER BODY, 10 PER CASE, 42268: Brand: BAIR HUGGER™

## (undated) DEVICE — SINGLE-USE BIOPSY FORCEPS: Brand: RADIAL JAW 4

## (undated) DEVICE — KIT AUTOTRNS APPL AERO 2 SET SYR 2 TIP FOR PLT SEP SYS GPS

## (undated) DEVICE — SUTURE STRATAFIX SPRL MCRYL + SZ 2 0 L27IN ABSRB UD W NDL SXMP1B419

## (undated) DEVICE — GLOVE SURG SZ 8 L12IN FNGR THK13MIL BRN LTX SYN POLYMER W

## (undated) DEVICE — DUAL CUT SAGITTAL BLADE

## (undated) DEVICE — Device